# Patient Record
Sex: MALE | Race: WHITE | Employment: OTHER | URBAN - METROPOLITAN AREA
[De-identification: names, ages, dates, MRNs, and addresses within clinical notes are randomized per-mention and may not be internally consistent; named-entity substitution may affect disease eponyms.]

---

## 2018-10-07 ENCOUNTER — APPOINTMENT (OUTPATIENT)
Dept: GENERAL RADIOLOGY | Age: 73
DRG: 177 | End: 2018-10-07
Payer: MEDICARE

## 2018-10-07 ENCOUNTER — HOSPITAL ENCOUNTER (INPATIENT)
Age: 73
LOS: 11 days | Discharge: HOME | DRG: 177 | End: 2018-10-18
Attending: EMERGENCY MEDICINE
Payer: MEDICARE

## 2018-10-07 DIAGNOSIS — R79.89 ELEVATED BRAIN NATRIURETIC PEPTIDE (BNP) LEVEL: ICD-10-CM

## 2018-10-07 DIAGNOSIS — R77.8 ELEVATED TROPONIN: ICD-10-CM

## 2018-10-07 DIAGNOSIS — J18.9 COMMUNITY ACQUIRED PNEUMONIA, UNSPECIFIED LATERALITY: Primary | ICD-10-CM

## 2018-10-07 DIAGNOSIS — I48.91 ATRIAL FIBRILLATION WITH RVR (HCC): ICD-10-CM

## 2018-10-07 LAB
ANION GAP SERPL CALCULATED.3IONS-SCNC: 15 MMOL/L (ref 3–16)
ANISOCYTOSIS: ABNORMAL
BANDED NEUTROPHILS RELATIVE PERCENT: 4 % (ref 0–7)
BASOPHILS ABSOLUTE: 0 K/UL (ref 0–0.2)
BASOPHILS RELATIVE PERCENT: 0 %
BUN BLDV-MCNC: 15 MG/DL (ref 7–20)
CALCIUM SERPL-MCNC: 9.1 MG/DL (ref 8.3–10.6)
CHLORIDE BLD-SCNC: 98 MMOL/L (ref 99–110)
CO2: 24 MMOL/L (ref 21–32)
CREAT SERPL-MCNC: 1 MG/DL (ref 0.8–1.3)
EOSINOPHILS ABSOLUTE: 0.3 K/UL (ref 0–0.6)
EOSINOPHILS RELATIVE PERCENT: 2 %
GFR AFRICAN AMERICAN: >60
GFR NON-AFRICAN AMERICAN: >60
GLUCOSE BLD-MCNC: 119 MG/DL (ref 70–99)
HCT VFR BLD CALC: 47.2 % (ref 40.5–52.5)
HEMOGLOBIN: 15.8 G/DL (ref 13.5–17.5)
LACTIC ACID: 1.3 MMOL/L (ref 0.4–2)
LYMPHOCYTES ABSOLUTE: 1 K/UL (ref 1–5.1)
LYMPHOCYTES RELATIVE PERCENT: 6 %
MACROCYTES: ABNORMAL
MCH RBC QN AUTO: 32.5 PG (ref 26–34)
MCHC RBC AUTO-ENTMCNC: 33.6 G/DL (ref 31–36)
MCV RBC AUTO: 96.8 FL (ref 80–100)
MONOCYTES ABSOLUTE: 1.2 K/UL (ref 0–1.3)
MONOCYTES RELATIVE PERCENT: 7 %
NEUTROPHILS ABSOLUTE: 14 K/UL (ref 1.7–7.7)
NEUTROPHILS RELATIVE PERCENT: 81 %
PDW BLD-RTO: 14.7 % (ref 12.4–15.4)
PLATELET # BLD: 208 K/UL (ref 135–450)
PMV BLD AUTO: 8.2 FL (ref 5–10.5)
POTASSIUM SERPL-SCNC: 5 MMOL/L (ref 3.5–5.1)
PRO-BNP: 2856 PG/ML (ref 0–124)
PROCALCITONIN: 0.06 NG/ML (ref 0–0.15)
RBC # BLD: 4.87 M/UL (ref 4.2–5.9)
SLIDE REVIEW: ABNORMAL
SODIUM BLD-SCNC: 137 MMOL/L (ref 136–145)
TROPONIN: 0.03 NG/ML
WBC # BLD: 16.5 K/UL (ref 4–11)

## 2018-10-07 PROCEDURE — 94640 AIRWAY INHALATION TREATMENT: CPT

## 2018-10-07 PROCEDURE — 96374 THER/PROPH/DIAG INJ IV PUSH: CPT

## 2018-10-07 PROCEDURE — 84484 ASSAY OF TROPONIN QUANT: CPT

## 2018-10-07 PROCEDURE — 83880 ASSAY OF NATRIURETIC PEPTIDE: CPT

## 2018-10-07 PROCEDURE — 2580000003 HC RX 258: Performed by: EMERGENCY MEDICINE

## 2018-10-07 PROCEDURE — 84145 PROCALCITONIN (PCT): CPT

## 2018-10-07 PROCEDURE — 80048 BASIC METABOLIC PNL TOTAL CA: CPT

## 2018-10-07 PROCEDURE — 6370000000 HC RX 637 (ALT 250 FOR IP): Performed by: EMERGENCY MEDICINE

## 2018-10-07 PROCEDURE — 2580000003 HC RX 258: Performed by: INTERNAL MEDICINE

## 2018-10-07 PROCEDURE — 87040 BLOOD CULTURE FOR BACTERIA: CPT

## 2018-10-07 PROCEDURE — 6360000002 HC RX W HCPCS: Performed by: INTERNAL MEDICINE

## 2018-10-07 PROCEDURE — 94664 DEMO&/EVAL PT USE INHALER: CPT

## 2018-10-07 PROCEDURE — 94762 N-INVAS EAR/PLS OXIMTRY CONT: CPT

## 2018-10-07 PROCEDURE — 36415 COLL VENOUS BLD VENIPUNCTURE: CPT

## 2018-10-07 PROCEDURE — 93005 ELECTROCARDIOGRAM TRACING: CPT | Performed by: EMERGENCY MEDICINE

## 2018-10-07 PROCEDURE — 93010 ELECTROCARDIOGRAM REPORT: CPT | Performed by: INTERNAL MEDICINE

## 2018-10-07 PROCEDURE — 96375 TX/PRO/DX INJ NEW DRUG ADDON: CPT

## 2018-10-07 PROCEDURE — 83605 ASSAY OF LACTIC ACID: CPT

## 2018-10-07 PROCEDURE — 6360000002 HC RX W HCPCS: Performed by: EMERGENCY MEDICINE

## 2018-10-07 PROCEDURE — 2060000000 HC ICU INTERMEDIATE R&B

## 2018-10-07 PROCEDURE — 85025 COMPLETE CBC W/AUTO DIFF WBC: CPT

## 2018-10-07 PROCEDURE — 71045 X-RAY EXAM CHEST 1 VIEW: CPT

## 2018-10-07 PROCEDURE — 99285 EMERGENCY DEPT VISIT HI MDM: CPT

## 2018-10-07 PROCEDURE — 2500000003 HC RX 250 WO HCPCS: Performed by: EMERGENCY MEDICINE

## 2018-10-07 RX ORDER — LORAZEPAM 1 MG/1
1 TABLET ORAL NIGHTLY PRN
Status: DISCONTINUED | OUTPATIENT
Start: 2018-10-07 | End: 2018-10-08

## 2018-10-07 RX ORDER — SODIUM CHLORIDE 0.9 % (FLUSH) 0.9 %
10 SYRINGE (ML) INJECTION PRN
Status: DISCONTINUED | OUTPATIENT
Start: 2018-10-07 | End: 2018-10-18 | Stop reason: HOSPADM

## 2018-10-07 RX ORDER — METOPROLOL TARTRATE 5 MG/5ML
5 INJECTION INTRAVENOUS ONCE
Status: COMPLETED | OUTPATIENT
Start: 2018-10-07 | End: 2018-10-07

## 2018-10-07 RX ORDER — DILTIAZEM HYDROCHLORIDE 60 MG/1
60 TABLET, FILM COATED ORAL EVERY 6 HOURS SCHEDULED
Status: DISCONTINUED | OUTPATIENT
Start: 2018-10-07 | End: 2018-10-10

## 2018-10-07 RX ORDER — SODIUM CHLORIDE 0.9 % (FLUSH) 0.9 %
10 SYRINGE (ML) INJECTION EVERY 12 HOURS SCHEDULED
Status: DISCONTINUED | OUTPATIENT
Start: 2018-10-07 | End: 2018-10-18 | Stop reason: HOSPADM

## 2018-10-07 RX ORDER — IPRATROPIUM BROMIDE AND ALBUTEROL SULFATE 2.5; .5 MG/3ML; MG/3ML
1 SOLUTION RESPIRATORY (INHALATION) ONCE
Status: COMPLETED | OUTPATIENT
Start: 2018-10-07 | End: 2018-10-07

## 2018-10-07 RX ORDER — FUROSEMIDE 40 MG/1
40 TABLET ORAL DAILY
Status: DISCONTINUED | OUTPATIENT
Start: 2018-10-08 | End: 2018-10-08

## 2018-10-07 RX ORDER — FUROSEMIDE 10 MG/ML
20 INJECTION INTRAMUSCULAR; INTRAVENOUS ONCE
Status: COMPLETED | OUTPATIENT
Start: 2018-10-07 | End: 2018-10-07

## 2018-10-07 RX ORDER — ASPIRIN 81 MG/1
324 TABLET, CHEWABLE ORAL ONCE
Status: COMPLETED | OUTPATIENT
Start: 2018-10-07 | End: 2018-10-07

## 2018-10-07 RX ORDER — ONDANSETRON 2 MG/ML
4 INJECTION INTRAMUSCULAR; INTRAVENOUS EVERY 6 HOURS PRN
Status: DISCONTINUED | OUTPATIENT
Start: 2018-10-07 | End: 2018-10-18 | Stop reason: HOSPADM

## 2018-10-07 RX ADMIN — FUROSEMIDE 20 MG: 10 INJECTION, SOLUTION INTRAMUSCULAR; INTRAVENOUS at 16:26

## 2018-10-07 RX ADMIN — IPRATROPIUM BROMIDE AND ALBUTEROL SULFATE 1 AMPULE: .5; 3 SOLUTION RESPIRATORY (INHALATION) at 16:10

## 2018-10-07 RX ADMIN — METOPROLOL TARTRATE 5 MG: 5 INJECTION, SOLUTION INTRAVENOUS at 16:30

## 2018-10-07 RX ADMIN — AZITHROMYCIN MONOHYDRATE 500 MG: 500 INJECTION, POWDER, LYOPHILIZED, FOR SOLUTION INTRAVENOUS at 19:15

## 2018-10-07 RX ADMIN — Medication 10 ML: at 19:15

## 2018-10-07 RX ADMIN — ASPIRIN 81 MG CHEWABLE TABLET 324 MG: 81 TABLET CHEWABLE at 17:13

## 2018-10-07 RX ADMIN — CEFTRIAXONE 2 G: 2 INJECTION, POWDER, FOR SOLUTION INTRAMUSCULAR; INTRAVENOUS at 18:13

## 2018-10-07 NOTE — ED TRIAGE NOTES
Pt presents to ED with c/o of SOB. Recent dx of pneumonia and finished abx. Increased in SOB started x2 days ago. Daughter at bedside. Pt in tripod position. Pt in afib on arrival. +producive cough. A/ox4. Denies any need at this time. Call light within reach. Bed in lowest position. Will continue to monitor.

## 2018-10-08 ENCOUNTER — APPOINTMENT (OUTPATIENT)
Dept: CT IMAGING | Age: 73
DRG: 177 | End: 2018-10-08
Payer: MEDICARE

## 2018-10-08 LAB
A/G RATIO: 0.7 (ref 1.1–2.2)
ALBUMIN SERPL-MCNC: 2.9 G/DL (ref 3.4–5)
ALP BLD-CCNC: 74 U/L (ref 40–129)
ALT SERPL-CCNC: 30 U/L (ref 10–40)
ANION GAP SERPL CALCULATED.3IONS-SCNC: 13 MMOL/L (ref 3–16)
AST SERPL-CCNC: 35 U/L (ref 15–37)
BASOPHILS ABSOLUTE: 0.1 K/UL (ref 0–0.2)
BASOPHILS RELATIVE PERCENT: 0.4 %
BILIRUB SERPL-MCNC: 1.4 MG/DL (ref 0–1)
BUN BLDV-MCNC: 18 MG/DL (ref 7–20)
CALCIUM SERPL-MCNC: 9 MG/DL (ref 8.3–10.6)
CHLORIDE BLD-SCNC: 94 MMOL/L (ref 99–110)
CO2: 26 MMOL/L (ref 21–32)
CREAT SERPL-MCNC: 1.2 MG/DL (ref 0.8–1.3)
EOSINOPHILS ABSOLUTE: 0.2 K/UL (ref 0–0.6)
EOSINOPHILS RELATIVE PERCENT: 1.6 %
GFR AFRICAN AMERICAN: >60
GFR NON-AFRICAN AMERICAN: 59
GLOBULIN: 4.3 G/DL
GLUCOSE BLD-MCNC: 99 MG/DL (ref 70–99)
LYMPHOCYTES ABSOLUTE: 1.2 K/UL (ref 1–5.1)
LYMPHOCYTES RELATIVE PERCENT: 7.6 %
MONOCYTES ABSOLUTE: 1.9 K/UL (ref 0–1.3)
MONOCYTES RELATIVE PERCENT: 12.5 %
NEUTROPHILS ABSOLUTE: 11.8 K/UL (ref 1.7–7.7)
NEUTROPHILS RELATIVE PERCENT: 77.9 %
POTASSIUM REFLEX MAGNESIUM: 4.4 MMOL/L (ref 3.5–5.1)
PRO-BNP: 2486 PG/ML (ref 0–124)
SODIUM BLD-SCNC: 133 MMOL/L (ref 136–145)
TOTAL PROTEIN: 7.2 G/DL (ref 6.4–8.2)
TROPONIN: 0.04 NG/ML

## 2018-10-08 PROCEDURE — 2580000003 HC RX 258: Performed by: INTERNAL MEDICINE

## 2018-10-08 PROCEDURE — 94664 DEMO&/EVAL PT USE INHALER: CPT

## 2018-10-08 PROCEDURE — 6360000002 HC RX W HCPCS: Performed by: INTERNAL MEDICINE

## 2018-10-08 PROCEDURE — 85027 COMPLETE CBC AUTOMATED: CPT

## 2018-10-08 PROCEDURE — 80053 COMPREHEN METABOLIC PANEL: CPT

## 2018-10-08 PROCEDURE — 87070 CULTURE OTHR SPECIMN AEROBIC: CPT

## 2018-10-08 PROCEDURE — 87276 INFLUENZA A AG IF: CPT

## 2018-10-08 PROCEDURE — 87260 ADENOVIRUS AG IF: CPT

## 2018-10-08 PROCEDURE — 71260 CT THORAX DX C+: CPT

## 2018-10-08 PROCEDURE — 87275 INFLUENZA B AG IF: CPT

## 2018-10-08 PROCEDURE — 6360000004 HC RX CONTRAST MEDICATION: Performed by: HOSPITALIST

## 2018-10-08 PROCEDURE — 2580000003 HC RX 258: Performed by: HOSPITALIST

## 2018-10-08 PROCEDURE — 87205 SMEAR GRAM STAIN: CPT

## 2018-10-08 PROCEDURE — 36415 COLL VENOUS BLD VENIPUNCTURE: CPT

## 2018-10-08 PROCEDURE — 6360000002 HC RX W HCPCS: Performed by: HOSPITALIST

## 2018-10-08 PROCEDURE — 6370000000 HC RX 637 (ALT 250 FOR IP): Performed by: INTERNAL MEDICINE

## 2018-10-08 PROCEDURE — 6370000000 HC RX 637 (ALT 250 FOR IP): Performed by: HOSPITALIST

## 2018-10-08 PROCEDURE — 6360000002 HC RX W HCPCS: Performed by: NURSE PRACTITIONER

## 2018-10-08 PROCEDURE — 85007 BL SMEAR W/DIFF WBC COUNT: CPT

## 2018-10-08 PROCEDURE — 94640 AIRWAY INHALATION TREATMENT: CPT

## 2018-10-08 PROCEDURE — 87280 RESPIRATORY SYNCYTIAL AG IF: CPT

## 2018-10-08 PROCEDURE — 2060000000 HC ICU INTERMEDIATE R&B

## 2018-10-08 PROCEDURE — 84484 ASSAY OF TROPONIN QUANT: CPT

## 2018-10-08 PROCEDURE — 83880 ASSAY OF NATRIURETIC PEPTIDE: CPT

## 2018-10-08 PROCEDURE — 87449 NOS EACH ORGANISM AG IA: CPT

## 2018-10-08 PROCEDURE — 94760 N-INVAS EAR/PLS OXIMETRY 1: CPT

## 2018-10-08 PROCEDURE — 87641 MR-STAPH DNA AMP PROBE: CPT

## 2018-10-08 PROCEDURE — 87299 ANTIBODY DETECTION NOS IF: CPT

## 2018-10-08 PROCEDURE — 87279 PARAINFLUENZA AG IF: CPT

## 2018-10-08 RX ORDER — ALBUTEROL SULFATE 2.5 MG/3ML
2.5 SOLUTION RESPIRATORY (INHALATION) EVERY 4 HOURS PRN
Status: DISCONTINUED | OUTPATIENT
Start: 2018-10-08 | End: 2018-10-18 | Stop reason: HOSPADM

## 2018-10-08 RX ORDER — ALBUTEROL SULFATE 2.5 MG/3ML
0.5 SOLUTION RESPIRATORY (INHALATION) CONTINUOUS
Status: DISCONTINUED | OUTPATIENT
Start: 2018-10-08 | End: 2018-10-08

## 2018-10-08 RX ORDER — FUROSEMIDE 10 MG/ML
40 INJECTION INTRAMUSCULAR; INTRAVENOUS 2 TIMES DAILY
Status: DISCONTINUED | OUTPATIENT
Start: 2018-10-08 | End: 2018-10-10

## 2018-10-08 RX ORDER — DILTIAZEM HYDROCHLORIDE 240 MG/1
240 CAPSULE, EXTENDED RELEASE ORAL DAILY
Status: ON HOLD | COMMUNITY
End: 2018-10-18 | Stop reason: HOSPADM

## 2018-10-08 RX ORDER — KETOROLAC TROMETHAMINE 15 MG/ML
15 INJECTION, SOLUTION INTRAMUSCULAR; INTRAVENOUS ONCE
Status: COMPLETED | OUTPATIENT
Start: 2018-10-08 | End: 2018-10-08

## 2018-10-08 RX ORDER — LORAZEPAM 1 MG/1
1 TABLET ORAL NIGHTLY PRN
Status: DISCONTINUED | OUTPATIENT
Start: 2018-10-08 | End: 2018-10-18 | Stop reason: HOSPADM

## 2018-10-08 RX ADMIN — DILTIAZEM HYDROCHLORIDE 60 MG: 60 TABLET, FILM COATED ORAL at 12:32

## 2018-10-08 RX ADMIN — AZITHROMYCIN MONOHYDRATE 500 MG: 500 INJECTION, POWDER, LYOPHILIZED, FOR SOLUTION INTRAVENOUS at 18:37

## 2018-10-08 RX ADMIN — Medication 10 ML: at 23:48

## 2018-10-08 RX ADMIN — LORAZEPAM 1 MG: 1 TABLET ORAL at 00:00

## 2018-10-08 RX ADMIN — Medication 10 ML: at 10:27

## 2018-10-08 RX ADMIN — ENOXAPARIN SODIUM 40 MG: 40 INJECTION SUBCUTANEOUS at 10:24

## 2018-10-08 RX ADMIN — KETOROLAC TROMETHAMINE 15 MG: 15 INJECTION, SOLUTION INTRAMUSCULAR; INTRAVENOUS at 00:43

## 2018-10-08 RX ADMIN — ENOXAPARIN SODIUM 40 MG: 40 INJECTION SUBCUTANEOUS at 23:46

## 2018-10-08 RX ADMIN — DILTIAZEM HYDROCHLORIDE 60 MG: 60 TABLET, FILM COATED ORAL at 23:46

## 2018-10-08 RX ADMIN — CEFEPIME HYDROCHLORIDE 2 G: 2 INJECTION, POWDER, FOR SOLUTION INTRAVENOUS at 16:27

## 2018-10-08 RX ADMIN — FUROSEMIDE 40 MG: 40 TABLET ORAL at 10:24

## 2018-10-08 RX ADMIN — ALBUTEROL SULFATE 2.5 MG: 2.5 SOLUTION RESPIRATORY (INHALATION) at 16:56

## 2018-10-08 RX ADMIN — LORAZEPAM 1 MG: 1 TABLET ORAL at 23:46

## 2018-10-08 RX ADMIN — DILTIAZEM HYDROCHLORIDE 60 MG: 60 TABLET, FILM COATED ORAL at 18:37

## 2018-10-08 RX ADMIN — DILTIAZEM HYDROCHLORIDE 60 MG: 60 TABLET, FILM COATED ORAL at 06:42

## 2018-10-08 RX ADMIN — ALBUTEROL SULFATE 2.5 MG: 2.5 SOLUTION RESPIRATORY (INHALATION) at 23:53

## 2018-10-08 RX ADMIN — FUROSEMIDE 40 MG: 10 INJECTION, SOLUTION INTRAMUSCULAR; INTRAVENOUS at 18:37

## 2018-10-08 RX ADMIN — IOPAMIDOL 75 ML: 755 INJECTION, SOLUTION INTRAVENOUS at 09:34

## 2018-10-08 ASSESSMENT — PAIN SCALES - GENERAL
PAINLEVEL_OUTOF10: 0
PAINLEVEL_OUTOF10: 8
PAINLEVEL_OUTOF10: 0

## 2018-10-08 NOTE — PROGRESS NOTES
4 Eyes Skin Assessment     The patient is being assess for  Admission  I agree that 2 RN's have performed a thorough Head to Toe Skin Assessment on the patient. ALL assessment sites listed below have been assessed. Areas assessed by both nurses:  [x]   Head, Face, and Ears   [x]   Shoulders, Back, and Chest  [x]   Arms, Elbows, and Hands   [x]   Coccyx, Sacrum, and IschIum  []   Legs, Feet, and Heels        Does the Patient have Skin Breakdown?  No      Tera Prevention initiated:  No   Wound Care Orders initiated: No      [facility] nurse consulted for Pressure Injury (Stage 3,4, Unstageable, DTI, NWPT, and Complex wounds), New and Established Ostomies:  No     Nurse 1 eSignature: Electronically signed by Arik Be RN on 10/8/18 at 1:41 AM    **SHARE this note so that the co-signing nurse is able to place an eSignature**    Nurse 2 eSignature: {Esignature:[id number]}

## 2018-10-08 NOTE — PROGRESS NOTES
Aspiration Screen    Name: Breanna Nolan  : 1945  Medical Diagnosis: Pneumonia [J18.9]    Swallow screen was completed per MD orders for pneumonia protocol. Patient demonstrates no significant high risk indicators for potential aspiration per swallow screen at this time. No further swallowing intervention is warranted at this time. Continue current diet as tolerated. Please consult speech therapy for bedside swallow evaluation if symptoms of swallowing dysfunction arise.       Ifeanyi Perez M.S, CCC/SLP  #[id number]  Speech Language Pathologist

## 2018-10-08 NOTE — PLAN OF CARE
Problem: FLUID AND ELECTROLYTE IMBALANCE  Goal: Fluid and electrolyte balance are achieved/maintained  Instruct Patient to drink prescribed fluid amount. Report abnormal lab values to Physician. Administer medications/treatments as ordered. Assess/monitor skin turgor,mucus membranes,and respiratory status. Include Patientfamily in decisions. Problem: ACTIVITY INTOLERANCE/IMPAIRED MOBILITY  Goal: Mobility/activity is maintained at optimum level for patient  Outcome: Ongoing  Assess for barriers to mobility/activity. Assess Patient's emotional response to limitations. Assess need for assistive/adaptive devices. Encourage independent activity per ability. Maintain proper body alignment.

## 2018-10-09 LAB
ANION GAP SERPL CALCULATED.3IONS-SCNC: 13 MMOL/L (ref 3–16)
BASOPHILS ABSOLUTE: 0.1 K/UL (ref 0–0.2)
BASOPHILS RELATIVE PERCENT: 1 %
BILIRUBIN URINE: NEGATIVE
BLOOD, URINE: ABNORMAL
BUN BLDV-MCNC: 21 MG/DL (ref 7–20)
CALCIUM SERPL-MCNC: 8.9 MG/DL (ref 8.3–10.6)
CHLORIDE BLD-SCNC: 95 MMOL/L (ref 99–110)
CLARITY: ABNORMAL
CO2: 26 MMOL/L (ref 21–32)
COLOR: ABNORMAL
CREAT SERPL-MCNC: 1.1 MG/DL (ref 0.8–1.3)
EOSINOPHILS ABSOLUTE: 0.4 K/UL (ref 0–0.6)
EOSINOPHILS RELATIVE PERCENT: 4.6 %
EPITHELIAL CELLS, UA: 0 /HPF (ref 0–5)
GFR AFRICAN AMERICAN: >60
GFR NON-AFRICAN AMERICAN: >60
GLUCOSE BLD-MCNC: 102 MG/DL (ref 70–99)
GLUCOSE URINE: NEGATIVE MG/DL
HCT VFR BLD CALC: 45.8 % (ref 40.5–52.5)
HCT VFR BLD CALC: 46.5 % (ref 40.5–52.5)
HEMATOLOGY PATH CONSULT: NORMAL
HEMATOLOGY PATH CONSULT: YES
HEMOGLOBIN: 15.2 G/DL (ref 13.5–17.5)
HEMOGLOBIN: 15.2 G/DL (ref 13.5–17.5)
HYALINE CASTS: 1 /LPF (ref 0–8)
KETONES, URINE: NEGATIVE MG/DL
L. PNEUMOPHILA SEROGP 1 UR AG: NORMAL
LEUKOCYTE ESTERASE, URINE: ABNORMAL
LV EF: 40 %
LVEF MODALITY: NORMAL
LYMPHOCYTES ABSOLUTE: 1.3 K/UL (ref 1–5.1)
LYMPHOCYTES RELATIVE PERCENT: 13.4 %
MCH RBC QN AUTO: 31.9 PG (ref 26–34)
MCH RBC QN AUTO: 32.2 PG (ref 26–34)
MCHC RBC AUTO-ENTMCNC: 32.7 G/DL (ref 31–36)
MCHC RBC AUTO-ENTMCNC: 33.3 G/DL (ref 31–36)
MCV RBC AUTO: 96.6 FL (ref 80–100)
MCV RBC AUTO: 97.4 FL (ref 80–100)
MICROSCOPIC EXAMINATION: YES
MONOCYTES ABSOLUTE: 1.4 K/UL (ref 0–1.3)
MONOCYTES RELATIVE PERCENT: 14.9 %
MRSA SCREEN RT-PCR: NORMAL
NEUTROPHILS ABSOLUTE: 6.2 K/UL (ref 1.7–7.7)
NEUTROPHILS RELATIVE PERCENT: 66.1 %
NITRITE, URINE: NEGATIVE
PDW BLD-RTO: 14.8 % (ref 12.4–15.4)
PDW BLD-RTO: 14.9 % (ref 12.4–15.4)
PH UA: 5.5
PLATELET # BLD: 192 K/UL (ref 135–450)
PLATELET # BLD: 209 K/UL (ref 135–450)
PMV BLD AUTO: 8.6 FL (ref 5–10.5)
PMV BLD AUTO: 8.9 FL (ref 5–10.5)
POTASSIUM REFLEX MAGNESIUM: 4 MMOL/L (ref 3.5–5.1)
PRO-BNP: 1933 PG/ML (ref 0–124)
PROCALCITONIN: 0.09 NG/ML (ref 0–0.15)
PROTEIN UA: 30 MG/DL
RBC # BLD: 4.74 M/UL (ref 4.2–5.9)
RBC # BLD: 4.78 M/UL (ref 4.2–5.9)
RBC UA: >900 /HPF (ref 0–4)
SODIUM BLD-SCNC: 134 MMOL/L (ref 136–145)
SPECIFIC GRAVITY UA: 1.01
URINE TYPE: ABNORMAL
UROBILINOGEN, URINE: 1 E.U./DL
WBC # BLD: 15.2 K/UL (ref 4–11)
WBC # BLD: 9.4 K/UL (ref 4–11)
WBC UA: 5 /HPF (ref 0–5)

## 2018-10-09 PROCEDURE — 2580000003 HC RX 258: Performed by: HOSPITALIST

## 2018-10-09 PROCEDURE — 51798 US URINE CAPACITY MEASURE: CPT

## 2018-10-09 PROCEDURE — 85025 COMPLETE CBC W/AUTO DIFF WBC: CPT

## 2018-10-09 PROCEDURE — 6370000000 HC RX 637 (ALT 250 FOR IP): Performed by: INTERNAL MEDICINE

## 2018-10-09 PROCEDURE — 36415 COLL VENOUS BLD VENIPUNCTURE: CPT

## 2018-10-09 PROCEDURE — 6360000002 HC RX W HCPCS: Performed by: HOSPITALIST

## 2018-10-09 PROCEDURE — 83880 ASSAY OF NATRIURETIC PEPTIDE: CPT

## 2018-10-09 PROCEDURE — 6370000000 HC RX 637 (ALT 250 FOR IP): Performed by: HOSPITALIST

## 2018-10-09 PROCEDURE — 2580000003 HC RX 258: Performed by: INTERNAL MEDICINE

## 2018-10-09 PROCEDURE — 87449 NOS EACH ORGANISM AG IA: CPT

## 2018-10-09 PROCEDURE — 94640 AIRWAY INHALATION TREATMENT: CPT

## 2018-10-09 PROCEDURE — 93306 TTE W/DOPPLER COMPLETE: CPT

## 2018-10-09 PROCEDURE — 84145 PROCALCITONIN (PCT): CPT

## 2018-10-09 PROCEDURE — 87086 URINE CULTURE/COLONY COUNT: CPT

## 2018-10-09 PROCEDURE — 2060000000 HC ICU INTERMEDIATE R&B

## 2018-10-09 PROCEDURE — 81001 URINALYSIS AUTO W/SCOPE: CPT

## 2018-10-09 PROCEDURE — 94760 N-INVAS EAR/PLS OXIMETRY 1: CPT

## 2018-10-09 PROCEDURE — 80048 BASIC METABOLIC PNL TOTAL CA: CPT

## 2018-10-09 RX ADMIN — LORAZEPAM 1 MG: 1 TABLET ORAL at 23:59

## 2018-10-09 RX ADMIN — ALBUTEROL SULFATE 2.5 MG: 2.5 SOLUTION RESPIRATORY (INHALATION) at 06:15

## 2018-10-09 RX ADMIN — DILTIAZEM HYDROCHLORIDE 60 MG: 60 TABLET, FILM COATED ORAL at 17:25

## 2018-10-09 RX ADMIN — Medication 10 ML: at 09:47

## 2018-10-09 RX ADMIN — FUROSEMIDE 40 MG: 10 INJECTION, SOLUTION INTRAMUSCULAR; INTRAVENOUS at 17:25

## 2018-10-09 RX ADMIN — FUROSEMIDE 40 MG: 10 INJECTION, SOLUTION INTRAMUSCULAR; INTRAVENOUS at 09:47

## 2018-10-09 RX ADMIN — CEFEPIME HYDROCHLORIDE 2 G: 2 INJECTION, POWDER, FOR SOLUTION INTRAVENOUS at 17:25

## 2018-10-09 RX ADMIN — DILTIAZEM HYDROCHLORIDE 60 MG: 60 TABLET, FILM COATED ORAL at 23:59

## 2018-10-09 RX ADMIN — ENOXAPARIN SODIUM 40 MG: 40 INJECTION SUBCUTANEOUS at 11:26

## 2018-10-09 RX ADMIN — CEFEPIME HYDROCHLORIDE 2 G: 2 INJECTION, POWDER, FOR SOLUTION INTRAVENOUS at 05:45

## 2018-10-09 RX ADMIN — DILTIAZEM HYDROCHLORIDE 60 MG: 60 TABLET, FILM COATED ORAL at 05:46

## 2018-10-09 RX ADMIN — AZITHROMYCIN MONOHYDRATE 500 MG: 500 INJECTION, POWDER, LYOPHILIZED, FOR SOLUTION INTRAVENOUS at 18:17

## 2018-10-09 RX ADMIN — Medication 10 ML: at 20:46

## 2018-10-09 RX ADMIN — DILTIAZEM HYDROCHLORIDE 60 MG: 60 TABLET, FILM COATED ORAL at 11:26

## 2018-10-09 RX ADMIN — ALBUTEROL SULFATE 2.5 MG: 2.5 SOLUTION RESPIRATORY (INHALATION) at 22:05

## 2018-10-09 ASSESSMENT — PAIN SCALES - WONG BAKER
WONGBAKER_NUMERICALRESPONSE: 2
WONGBAKER_NUMERICALRESPONSE: 2

## 2018-10-09 ASSESSMENT — PAIN SCALES - GENERAL
PAINLEVEL_OUTOF10: 0

## 2018-10-09 ASSESSMENT — PAIN DESCRIPTION - ORIENTATION
ORIENTATION: OUTER
ORIENTATION: OTHER (COMMENT)

## 2018-10-09 ASSESSMENT — PAIN DESCRIPTION - LOCATION
LOCATION: PENIS
LOCATION: PENIS

## 2018-10-09 ASSESSMENT — PAIN DESCRIPTION - PAIN TYPE
TYPE: ACUTE PAIN
TYPE: ACUTE PAIN

## 2018-10-09 NOTE — PROGRESS NOTES
10/07/18   1625  10/08/18   0826  10/09/18   0552   WBC  16.5*  15.2*  9.4   HGB  15.8  15.2  15.2   HCT  47.2  46.5  45.8   MCV  96.8  97.4  96.6   PLT  208  209  192     BMP:   Recent Labs      10/07/18   1625  10/08/18   0826  10/09/18   0552   NA  137  133*  134*   K  5.0  4.4  4.0   CL  98*  94*  95*   CO2  24  26  26   BUN  15  18  21*   CREATININE  1.0  1.2  1.1     Mag: No results for input(s): MAG in the last 72 hours. Phos: No results found for: PHOS  No components found for: GLU    LIVER PROFILE:   Recent Labs      10/08/18   0826   AST  35   ALT  30   BILITOT  1.4*   ALKPHOS  74     PT/INR: No results for input(s): PROTIME, INR in the last 72 hours. APTT: No results for input(s): APTT in the last 72 hours. UA:No results for input(s): NITRITE, COLORU, PHUR, LABCAST, WBCUA, RBCUA, MUCUS, TRICHOMONAS, YEAST, BACTERIA, CLARITYU, SPECGRAV, LEUKOCYTESUR, UROBILINOGEN, BILIRUBINUR, BLOODU, GLUCOSEU, AMORPHOUS in the last 72 hours. Invalid input(s): Minal Godfrey input(s): ABG  Lab Results   Component Value Date    CALCIUM 8.9 10/09/2018       Assessment and Plan:    Pneumonia  Continue azithromycin. Continue with  cefepime to treat gram-negative bacteria pneumonia   Follow blood and sputum culture   MRSA nasal probe was negative   Check viral respiratory panel  Legionella antigen is negative   CAT scan show multi focal pneumonia  Patient will need imaging follow-up in 4-6 weeks    Possible CHF:  Patient has some orthopnea, lower extremity edema, and weight gain since his primary care visit. We'll continue IV Lasix.   Tian Camille  down 5 pounds since admission  Strict input and output monitoring  Echocardiogram is still pending     Chronic atrial fibrillation  Continue with Cardizem  Patient is not on anticoagulation as an outpatient    Morbid obesity complicated his medical management  Tian Camille  loss counseling was provided    Code status:  Full   DVT prophylaxis: Lovenox   Disposition: Pending clinical

## 2018-10-09 NOTE — PLAN OF CARE
Problem: Falls - Risk of:  Goal: Will remain free from falls  Will remain free from falls   Outcome: Ongoing    Goal: Absence of physical injury  Absence of physical injury   Outcome: Ongoing      Problem: Anxiety:  Goal: Level of anxiety will decrease  Level of anxiety will decrease   Outcome: Ongoing      Problem: Discharge Planning:  Goal: Discharged to appropriate level of care  Discharged to appropriate level of care   Outcome: Ongoing    Goal: Participates in care planning  Participates in care planning   Outcome: Ongoing      Problem: Gas Exchange - Impaired:  Goal: Levels of oxygenation will improve  Levels of oxygenation will improve   Outcome: Ongoing      Problem:  Activity:  Goal: Expression of feelings of increased energy will increase  Expression of feelings of increased energy will increase   Outcome: Ongoing      Problem: Cardiac:  Goal: Ability to maintain an adequate cardiac output will improve  Ability to maintain an adequate cardiac output will improve   Outcome: Ongoing    Goal: Complications related to the disease process, condition or treatment will be avoided or minimized  Complications related to the disease process, condition or treatment will be avoided or minimized   Outcome: Ongoing      Problem: Coping:  Goal: General experience of comfort will improve  General experience of comfort will improve   Outcome: Ongoing    Goal: Level of anxiety will decrease  Level of anxiety will decrease   Outcome: Ongoing      Problem: Health Behavior:  Goal: Ability to manage health-related needs will improve  Ability to manage health-related needs will improve   Outcome: Ongoing      Problem: Safety:  Goal: Ability to remain free from injury will improve  Ability to remain free from injury will improve   Outcome: Ongoing    Goal: Will show no signs and symptoms of excessive bleeding  Will show no signs and symptoms of excessive bleeding   Outcome: Ongoing      Problem: HEMODYNAMIC STATUS  Goal: Patient has stable vital signs and fluid balance  Outcome: Ongoing      Problem: FLUID AND ELECTROLYTE IMBALANCE  Goal: Fluid and electrolyte balance are achieved/maintained  Outcome: Ongoing      Problem: ACTIVITY INTOLERANCE/IMPAIRED MOBILITY  Goal: Mobility/activity is maintained at optimum level for patient  Outcome: Ongoing

## 2018-10-10 LAB
ADENOVIRUS, DFA: NEGATIVE
ANION GAP SERPL CALCULATED.3IONS-SCNC: 13 MMOL/L (ref 3–16)
BASOPHILS ABSOLUTE: 0.1 K/UL (ref 0–0.2)
BASOPHILS RELATIVE PERCENT: 0.9 %
BUN BLDV-MCNC: 20 MG/DL (ref 7–20)
CALCIUM SERPL-MCNC: 9 MG/DL (ref 8.3–10.6)
CHLORIDE BLD-SCNC: 97 MMOL/L (ref 99–110)
CO2: 26 MMOL/L (ref 21–32)
CREAT SERPL-MCNC: 1 MG/DL (ref 0.8–1.3)
CULTURE, RESPIRATORY: NORMAL
EKG ATRIAL RATE: 105 BPM
EKG DIAGNOSIS: NORMAL
EKG Q-T INTERVAL: 334 MS
EKG QRS DURATION: 116 MS
EKG QTC CALCULATION (BAZETT): 445 MS
EKG R AXIS: 63 DEGREES
EKG T AXIS: -12 DEGREES
EKG VENTRICULAR RATE: 107 BPM
EOSINOPHILS ABSOLUTE: 0.5 K/UL (ref 0–0.6)
EOSINOPHILS RELATIVE PERCENT: 6.8 %
GFR AFRICAN AMERICAN: >60
GFR NON-AFRICAN AMERICAN: >60
GLUCOSE BLD-MCNC: 97 MG/DL (ref 70–99)
GRAM STAIN RESULT: NORMAL
HCT VFR BLD CALC: 44.7 % (ref 40.5–52.5)
HEMOGLOBIN: 14.9 G/DL (ref 13.5–17.5)
INFLUENZA A,DFA: NEGATIVE
INFLUENZA B,DFA: NEGATIVE
LYMPHOCYTES ABSOLUTE: 1.4 K/UL (ref 1–5.1)
LYMPHOCYTES RELATIVE PERCENT: 18.9 %
MCH RBC QN AUTO: 32 PG (ref 26–34)
MCHC RBC AUTO-ENTMCNC: 33.3 G/DL (ref 31–36)
MCV RBC AUTO: 96.1 FL (ref 80–100)
METAPNEUMOVIRUS, DFA: NEGATIVE
MONOCYTES ABSOLUTE: 1.2 K/UL (ref 0–1.3)
MONOCYTES RELATIVE PERCENT: 16.9 %
NEUTROPHILS ABSOLUTE: 4.2 K/UL (ref 1.7–7.7)
NEUTROPHILS RELATIVE PERCENT: 56.5 %
PARAINFLUENZA 1 DFA STAIN: NEGATIVE
PARAINFLUENZA 2 DFA STAIN: NEGATIVE
PARAINFLUENZA 3: NEGATIVE
PDW BLD-RTO: 14.7 % (ref 12.4–15.4)
PLATELET # BLD: 218 K/UL (ref 135–450)
PMV BLD AUTO: 8.7 FL (ref 5–10.5)
POTASSIUM REFLEX MAGNESIUM: 3.8 MMOL/L (ref 3.5–5.1)
PRO-BNP: 1960 PG/ML (ref 0–124)
RBC # BLD: 4.65 M/UL (ref 4.2–5.9)
RESPIRATORY SYNCYTIAL VIRUS  (RSV) DFA: NEGATIVE
RSPFA SOURCE: NORMAL
SODIUM BLD-SCNC: 136 MMOL/L (ref 136–145)
TSH REFLEX FT4: 2.37 UIU/ML (ref 0.27–4.2)
WBC # BLD: 7.4 K/UL (ref 4–11)

## 2018-10-10 PROCEDURE — 6360000002 HC RX W HCPCS: Performed by: HOSPITALIST

## 2018-10-10 PROCEDURE — 80048 BASIC METABOLIC PNL TOTAL CA: CPT

## 2018-10-10 PROCEDURE — 2580000003 HC RX 258: Performed by: INTERNAL MEDICINE

## 2018-10-10 PROCEDURE — 99223 1ST HOSP IP/OBS HIGH 75: CPT | Performed by: INTERNAL MEDICINE

## 2018-10-10 PROCEDURE — 6370000000 HC RX 637 (ALT 250 FOR IP): Performed by: INTERNAL MEDICINE

## 2018-10-10 PROCEDURE — 36415 COLL VENOUS BLD VENIPUNCTURE: CPT

## 2018-10-10 PROCEDURE — 94760 N-INVAS EAR/PLS OXIMETRY 1: CPT

## 2018-10-10 PROCEDURE — 84443 ASSAY THYROID STIM HORMONE: CPT

## 2018-10-10 PROCEDURE — 6370000000 HC RX 637 (ALT 250 FOR IP): Performed by: HOSPITALIST

## 2018-10-10 PROCEDURE — 6360000002 HC RX W HCPCS: Performed by: INTERNAL MEDICINE

## 2018-10-10 PROCEDURE — 83880 ASSAY OF NATRIURETIC PEPTIDE: CPT

## 2018-10-10 PROCEDURE — 2580000003 HC RX 258: Performed by: HOSPITALIST

## 2018-10-10 PROCEDURE — 2060000000 HC ICU INTERMEDIATE R&B

## 2018-10-10 PROCEDURE — 85025 COMPLETE CBC W/AUTO DIFF WBC: CPT

## 2018-10-10 RX ORDER — LISINOPRIL 10 MG/1
10 TABLET ORAL DAILY
Status: DISCONTINUED | OUTPATIENT
Start: 2018-10-11 | End: 2018-10-13

## 2018-10-10 RX ORDER — METOPROLOL SUCCINATE 50 MG/1
50 TABLET, EXTENDED RELEASE ORAL DAILY
Status: DISCONTINUED | OUTPATIENT
Start: 2018-10-10 | End: 2018-10-18 | Stop reason: HOSPADM

## 2018-10-10 RX ORDER — FUROSEMIDE 10 MG/ML
60 INJECTION INTRAMUSCULAR; INTRAVENOUS 2 TIMES DAILY
Status: DISCONTINUED | OUTPATIENT
Start: 2018-10-10 | End: 2018-10-10

## 2018-10-10 RX ORDER — LISINOPRIL 5 MG/1
2.5 TABLET ORAL DAILY
Status: DISCONTINUED | OUTPATIENT
Start: 2018-10-10 | End: 2018-10-10

## 2018-10-10 RX ORDER — SPIRONOLACTONE 25 MG/1
25 TABLET ORAL DAILY
Status: DISCONTINUED | OUTPATIENT
Start: 2018-10-10 | End: 2018-10-12

## 2018-10-10 RX ORDER — AZITHROMYCIN 500 MG/1
500 TABLET, FILM COATED ORAL EVERY EVENING
Status: DISCONTINUED | OUTPATIENT
Start: 2018-10-10 | End: 2018-10-12

## 2018-10-10 RX ADMIN — CEFEPIME HYDROCHLORIDE 2 G: 2 INJECTION, POWDER, FOR SOLUTION INTRAVENOUS at 06:07

## 2018-10-10 RX ADMIN — DILTIAZEM HYDROCHLORIDE 60 MG: 60 TABLET, FILM COATED ORAL at 06:08

## 2018-10-10 RX ADMIN — CEFEPIME HYDROCHLORIDE 2 G: 2 INJECTION, POWDER, FOR SOLUTION INTRAVENOUS at 18:17

## 2018-10-10 RX ADMIN — FUROSEMIDE 10 MG/HR: 10 INJECTION, SOLUTION INTRAMUSCULAR; INTRAVENOUS at 18:50

## 2018-10-10 RX ADMIN — Medication 10 ML: at 08:58

## 2018-10-10 RX ADMIN — FUROSEMIDE 40 MG: 10 INJECTION, SOLUTION INTRAMUSCULAR; INTRAVENOUS at 08:58

## 2018-10-10 RX ADMIN — METOPROLOL SUCCINATE 50 MG: 50 TABLET, EXTENDED RELEASE ORAL at 18:25

## 2018-10-10 RX ADMIN — DILTIAZEM HYDROCHLORIDE 60 MG: 60 TABLET, FILM COATED ORAL at 12:25

## 2018-10-10 RX ADMIN — AZITHROMYCIN 500 MG: 500 TABLET, FILM COATED ORAL at 18:18

## 2018-10-10 RX ADMIN — Medication 10 ML: at 20:30

## 2018-10-10 RX ADMIN — LISINOPRIL 2.5 MG: 5 TABLET ORAL at 14:29

## 2018-10-10 RX ADMIN — SPIRONOLACTONE 25 MG: 25 TABLET ORAL at 18:25

## 2018-10-10 RX ADMIN — METOPROLOL TARTRATE 25 MG: 25 TABLET ORAL at 14:29

## 2018-10-10 RX ADMIN — LORAZEPAM 1 MG: 1 TABLET ORAL at 23:34

## 2018-10-10 ASSESSMENT — PAIN SCALES - GENERAL
PAINLEVEL_OUTOF10: 0
PAINLEVEL_OUTOF10: 0

## 2018-10-10 ASSESSMENT — PAIN SCALES - WONG BAKER
WONGBAKER_NUMERICALRESPONSE: 0

## 2018-10-10 NOTE — PROGRESS NOTES
Hospitalist Progress Note    CC:   Shortness of breath and cough. Admit date: 10/7/2018  Days in hospital:  3    Subjective:   Patient has a gross hematuria last night. Urology  was called. He thinks his breathing is the same. ROS:   Review of Systems   Constitutional: Negative for chills and fever. Cardiovascular: Negative for chest pain and palpitations. Gastrointestinal: Negative for abdominal pain, constipation and diarrhea. Genitourinary: Negative for dysuria and urgency. Neurological: Negative for headaches. Objective:    /70   Pulse 92   Temp 97.8 °F (36.6 °C) (Oral)   Resp 16   Ht 6' 1\" (1.854 m)   Wt (!) 331 lb 12.7 oz (150.5 kg)   SpO2 95%   BMI 43.77 kg/m²     Gen: alert, NAD  HEENT: NC/AT, moist mucous membranes, no oropharyngeal erythema or exudate  Neck: supple, trachea midline, no anterior cervical or SC LAD  Heart: Irregular irregular , no murmurs, gallops, or rubs. Lungs: CTA bilaterally, no wheezing. No use of accessory muscle. Abd: bowel sounds present, soft, nondistended, none tender. Extrem: No clubbing, cyanosis, bilateral pitting edema  Psych: A & O x3, affect appropriate  Neuro: grossly intact, moves all four extremities spontaneously.       Medications:  Scheduled Meds:   azithromycin  500 mg Intravenous Q24H    furosemide  40 mg Intravenous BID    cefepime  2 g Intravenous Q12H    sodium chloride flush  10 mL Intravenous 2 times per day    diltiazem  60 mg Oral 4 times per day       PRN Meds:  albuterol, LORazepam, sodium chloride flush, magnesium hydroxide, ondansetron, perflutren lipid microspheres    IV:        Intake/Output Summary (Last 24 hours) at 10/10/18 0811  Last data filed at 10/09/18 2200   Gross per 24 hour   Intake             1130 ml   Output              675 ml   Net              455 ml       Results:  CBC:   Recent Labs      10/08/18   0826  10/09/18   0552  10/10/18   0616   WBC  15.2*  9.4  7.4   HGB  15.2  15.2 the patient    Morbid obesity complicated his medical management  Melania roberson counseling was provided    Code status:  Full   DVT prophylaxis:  SCDs due to hematuria   Disposition: Pending clinical improvement    Electronically signed by Jay Jay Chou MD on 10/10/2018 at 8:11 AM

## 2018-10-10 NOTE — PROGRESS NOTES
Patient had earlier episode of some blood in his urine. Urine culture sent. Patient up to void again & had 150 ml of bright red blood with a long strand/ clot coming from his penis. NP called & patient was bladder scanned. Zero residual.  Urology consult ordered.

## 2018-10-11 ENCOUNTER — APPOINTMENT (OUTPATIENT)
Dept: CT IMAGING | Age: 73
DRG: 177 | End: 2018-10-11
Payer: MEDICARE

## 2018-10-11 LAB
ANION GAP SERPL CALCULATED.3IONS-SCNC: 13 MMOL/L (ref 3–16)
BASOPHILS ABSOLUTE: 0.1 K/UL (ref 0–0.2)
BASOPHILS RELATIVE PERCENT: 1 %
BUN BLDV-MCNC: 22 MG/DL (ref 7–20)
CALCIUM SERPL-MCNC: 9.2 MG/DL (ref 8.3–10.6)
CHLORIDE BLD-SCNC: 98 MMOL/L (ref 99–110)
CO2: 27 MMOL/L (ref 21–32)
CREAT SERPL-MCNC: 1 MG/DL (ref 0.8–1.3)
EOSINOPHILS ABSOLUTE: 0.4 K/UL (ref 0–0.6)
EOSINOPHILS RELATIVE PERCENT: 4 %
GFR AFRICAN AMERICAN: >60
GFR NON-AFRICAN AMERICAN: >60
GLUCOSE BLD-MCNC: 102 MG/DL (ref 70–99)
HCT VFR BLD CALC: 46.2 % (ref 40.5–52.5)
HEMOGLOBIN: 15.4 G/DL (ref 13.5–17.5)
LYMPHOCYTES ABSOLUTE: 1.2 K/UL (ref 1–5.1)
LYMPHOCYTES RELATIVE PERCENT: 14.1 %
MCH RBC QN AUTO: 31.8 PG (ref 26–34)
MCHC RBC AUTO-ENTMCNC: 33.3 G/DL (ref 31–36)
MCV RBC AUTO: 95.5 FL (ref 80–100)
MONOCYTES ABSOLUTE: 1 K/UL (ref 0–1.3)
MONOCYTES RELATIVE PERCENT: 11.2 %
NEUTROPHILS ABSOLUTE: 6.2 K/UL (ref 1.7–7.7)
NEUTROPHILS RELATIVE PERCENT: 69.7 %
ORGANISM: ABNORMAL
PDW BLD-RTO: 14.6 % (ref 12.4–15.4)
PLATELET # BLD: 248 K/UL (ref 135–450)
PMV BLD AUTO: 8.1 FL (ref 5–10.5)
POTASSIUM REFLEX MAGNESIUM: 4 MMOL/L (ref 3.5–5.1)
PRO-BNP: 2687 PG/ML (ref 0–124)
RBC # BLD: 4.83 M/UL (ref 4.2–5.9)
SODIUM BLD-SCNC: 138 MMOL/L (ref 136–145)
STREP PNEUMONIAE ANTIGEN, URINE: NORMAL
URINE CULTURE, ROUTINE: ABNORMAL
URINE CULTURE, ROUTINE: ABNORMAL
WBC # BLD: 8.8 K/UL (ref 4–11)

## 2018-10-11 PROCEDURE — 6360000002 HC RX W HCPCS: Performed by: HOSPITALIST

## 2018-10-11 PROCEDURE — 6360000004 HC RX CONTRAST MEDICATION: Performed by: UROLOGY

## 2018-10-11 PROCEDURE — 83880 ASSAY OF NATRIURETIC PEPTIDE: CPT

## 2018-10-11 PROCEDURE — 85025 COMPLETE CBC W/AUTO DIFF WBC: CPT

## 2018-10-11 PROCEDURE — 94640 AIRWAY INHALATION TREATMENT: CPT

## 2018-10-11 PROCEDURE — 36415 COLL VENOUS BLD VENIPUNCTURE: CPT

## 2018-10-11 PROCEDURE — 99233 SBSQ HOSP IP/OBS HIGH 50: CPT | Performed by: INTERNAL MEDICINE

## 2018-10-11 PROCEDURE — 2580000003 HC RX 258: Performed by: INTERNAL MEDICINE

## 2018-10-11 PROCEDURE — 74178 CT ABD&PLV WO CNTR FLWD CNTR: CPT

## 2018-10-11 PROCEDURE — 2580000003 HC RX 258: Performed by: HOSPITALIST

## 2018-10-11 PROCEDURE — 6370000000 HC RX 637 (ALT 250 FOR IP): Performed by: HOSPITALIST

## 2018-10-11 PROCEDURE — 80048 BASIC METABOLIC PNL TOTAL CA: CPT

## 2018-10-11 PROCEDURE — 6360000002 HC RX W HCPCS: Performed by: INTERNAL MEDICINE

## 2018-10-11 PROCEDURE — 2060000000 HC ICU INTERMEDIATE R&B

## 2018-10-11 PROCEDURE — 6370000000 HC RX 637 (ALT 250 FOR IP): Performed by: INTERNAL MEDICINE

## 2018-10-11 RX ORDER — METOLAZONE 5 MG/1
5 TABLET ORAL DAILY
Status: DISCONTINUED | OUTPATIENT
Start: 2018-10-11 | End: 2018-10-12

## 2018-10-11 RX ADMIN — IOPAMIDOL 75 ML: 755 INJECTION, SOLUTION INTRAVENOUS at 09:12

## 2018-10-11 RX ADMIN — LORAZEPAM 1 MG: 1 TABLET ORAL at 23:48

## 2018-10-11 RX ADMIN — LISINOPRIL 10 MG: 10 TABLET ORAL at 08:40

## 2018-10-11 RX ADMIN — ALBUTEROL SULFATE 2.5 MG: 2.5 SOLUTION RESPIRATORY (INHALATION) at 17:43

## 2018-10-11 RX ADMIN — CEFEPIME HYDROCHLORIDE 2 G: 2 INJECTION, POWDER, FOR SOLUTION INTRAVENOUS at 16:29

## 2018-10-11 RX ADMIN — Medication 10 ML: at 08:40

## 2018-10-11 RX ADMIN — METOLAZONE 5 MG: 5 TABLET ORAL at 17:55

## 2018-10-11 RX ADMIN — CEFEPIME HYDROCHLORIDE 2 G: 2 INJECTION, POWDER, FOR SOLUTION INTRAVENOUS at 05:45

## 2018-10-11 RX ADMIN — METOPROLOL SUCCINATE 50 MG: 50 TABLET, EXTENDED RELEASE ORAL at 08:40

## 2018-10-11 RX ADMIN — Medication 10 ML: at 22:02

## 2018-10-11 RX ADMIN — AZITHROMYCIN 500 MG: 500 TABLET, FILM COATED ORAL at 16:29

## 2018-10-11 RX ADMIN — SPIRONOLACTONE 25 MG: 25 TABLET ORAL at 08:40

## 2018-10-11 RX ADMIN — FUROSEMIDE 10 MG/HR: 10 INJECTION, SOLUTION INTRAMUSCULAR; INTRAVENOUS at 21:42

## 2018-10-11 ASSESSMENT — PAIN SCALES - GENERAL
PAINLEVEL_OUTOF10: 0

## 2018-10-12 LAB
ANION GAP SERPL CALCULATED.3IONS-SCNC: 13 MMOL/L (ref 3–16)
BASOPHILS ABSOLUTE: 0.1 K/UL (ref 0–0.2)
BASOPHILS RELATIVE PERCENT: 1.3 %
BLOOD CULTURE, ROUTINE: NORMAL
BUN BLDV-MCNC: 28 MG/DL (ref 7–20)
CALCIUM SERPL-MCNC: 9.6 MG/DL (ref 8.3–10.6)
CHLORIDE BLD-SCNC: 93 MMOL/L (ref 99–110)
CO2: 30 MMOL/L (ref 21–32)
CREAT SERPL-MCNC: 1.3 MG/DL (ref 0.8–1.3)
CULTURE, BLOOD 2: NORMAL
EOSINOPHILS ABSOLUTE: 0.4 K/UL (ref 0–0.6)
EOSINOPHILS RELATIVE PERCENT: 5.7 %
GFR AFRICAN AMERICAN: >60
GFR NON-AFRICAN AMERICAN: 54
GLUCOSE BLD-MCNC: 99 MG/DL (ref 70–99)
HCT VFR BLD CALC: 47.5 % (ref 40.5–52.5)
HEMOGLOBIN: 15.9 G/DL (ref 13.5–17.5)
LYMPHOCYTES ABSOLUTE: 1.6 K/UL (ref 1–5.1)
LYMPHOCYTES RELATIVE PERCENT: 22.3 %
MCH RBC QN AUTO: 32 PG (ref 26–34)
MCHC RBC AUTO-ENTMCNC: 33.6 G/DL (ref 31–36)
MCV RBC AUTO: 95.4 FL (ref 80–100)
MONOCYTES ABSOLUTE: 0.8 K/UL (ref 0–1.3)
MONOCYTES RELATIVE PERCENT: 12.1 %
NEUTROPHILS ABSOLUTE: 4.1 K/UL (ref 1.7–7.7)
NEUTROPHILS RELATIVE PERCENT: 58.6 %
PDW BLD-RTO: 14.5 % (ref 12.4–15.4)
PLATELET # BLD: 265 K/UL (ref 135–450)
PMV BLD AUTO: 8.1 FL (ref 5–10.5)
POTASSIUM REFLEX MAGNESIUM: 4 MMOL/L (ref 3.5–5.1)
PRO-BNP: 3617 PG/ML (ref 0–124)
RBC # BLD: 4.98 M/UL (ref 4.2–5.9)
SODIUM BLD-SCNC: 136 MMOL/L (ref 136–145)
WBC # BLD: 7 K/UL (ref 4–11)

## 2018-10-12 PROCEDURE — 99233 SBSQ HOSP IP/OBS HIGH 50: CPT | Performed by: INTERNAL MEDICINE

## 2018-10-12 PROCEDURE — 2580000003 HC RX 258: Performed by: HOSPITALIST

## 2018-10-12 PROCEDURE — 6370000000 HC RX 637 (ALT 250 FOR IP): Performed by: INTERNAL MEDICINE

## 2018-10-12 PROCEDURE — 6360000002 HC RX W HCPCS: Performed by: INTERNAL MEDICINE

## 2018-10-12 PROCEDURE — 6360000002 HC RX W HCPCS: Performed by: HOSPITALIST

## 2018-10-12 PROCEDURE — 94760 N-INVAS EAR/PLS OXIMETRY 1: CPT

## 2018-10-12 PROCEDURE — 85025 COMPLETE CBC W/AUTO DIFF WBC: CPT

## 2018-10-12 PROCEDURE — 36415 COLL VENOUS BLD VENIPUNCTURE: CPT

## 2018-10-12 PROCEDURE — 6370000000 HC RX 637 (ALT 250 FOR IP): Performed by: HOSPITALIST

## 2018-10-12 PROCEDURE — 2580000003 HC RX 258: Performed by: INTERNAL MEDICINE

## 2018-10-12 PROCEDURE — 80048 BASIC METABOLIC PNL TOTAL CA: CPT

## 2018-10-12 PROCEDURE — 83880 ASSAY OF NATRIURETIC PEPTIDE: CPT

## 2018-10-12 PROCEDURE — 2060000000 HC ICU INTERMEDIATE R&B

## 2018-10-12 RX ORDER — LEVOFLOXACIN 500 MG/1
500 TABLET, FILM COATED ORAL DAILY
Status: DISCONTINUED | OUTPATIENT
Start: 2018-10-12 | End: 2018-10-14

## 2018-10-12 RX ADMIN — Medication 10 ML: at 21:49

## 2018-10-12 RX ADMIN — FUROSEMIDE 10 MG/HR: 10 INJECTION, SOLUTION INTRAMUSCULAR; INTRAVENOUS at 21:49

## 2018-10-12 RX ADMIN — CEFEPIME HYDROCHLORIDE 2 G: 2 INJECTION, POWDER, FOR SOLUTION INTRAVENOUS at 06:01

## 2018-10-12 RX ADMIN — METOLAZONE 5 MG: 5 TABLET ORAL at 08:24

## 2018-10-12 RX ADMIN — LORAZEPAM 1 MG: 1 TABLET ORAL at 23:56

## 2018-10-12 RX ADMIN — SPIRONOLACTONE 25 MG: 25 TABLET ORAL at 08:24

## 2018-10-12 RX ADMIN — LISINOPRIL 10 MG: 10 TABLET ORAL at 08:24

## 2018-10-12 RX ADMIN — LEVOFLOXACIN 500 MG: 500 TABLET, FILM COATED ORAL at 12:17

## 2018-10-12 RX ADMIN — METOPROLOL SUCCINATE 50 MG: 50 TABLET, EXTENDED RELEASE ORAL at 08:24

## 2018-10-12 ASSESSMENT — PAIN SCALES - GENERAL
PAINLEVEL_OUTOF10: 0

## 2018-10-12 NOTE — PROGRESS NOTES
anticoagulation due to the hematuria    Hematuria  Seen by Urology. Patient will need a cystoscopy as an outpatient.     CAT scan: kidney stones       Morbid obesity complicated his medical management  Aury Menjivar  loss counseling was provided    Code status:  Full   DVT prophylaxis:  SCDs due to hematuria   Disposition: Pending clinical improvement    Electronically signed by Lawyer Sabine MD on 10/12/2018 at 8:43 AM

## 2018-10-13 LAB
ANION GAP SERPL CALCULATED.3IONS-SCNC: 13 MMOL/L (ref 3–16)
BASOPHILS ABSOLUTE: 0.1 K/UL (ref 0–0.2)
BASOPHILS RELATIVE PERCENT: 1 %
BUN BLDV-MCNC: 35 MG/DL (ref 7–20)
CALCIUM SERPL-MCNC: 9.5 MG/DL (ref 8.3–10.6)
CHLORIDE BLD-SCNC: 94 MMOL/L (ref 99–110)
CO2: 30 MMOL/L (ref 21–32)
CREAT SERPL-MCNC: 1.5 MG/DL (ref 0.8–1.3)
EOSINOPHILS ABSOLUTE: 0.4 K/UL (ref 0–0.6)
EOSINOPHILS RELATIVE PERCENT: 6.1 %
GFR AFRICAN AMERICAN: 56
GFR NON-AFRICAN AMERICAN: 46
GLUCOSE BLD-MCNC: 109 MG/DL (ref 70–99)
HCT VFR BLD CALC: 47.1 % (ref 40.5–52.5)
HEMOGLOBIN: 15.8 G/DL (ref 13.5–17.5)
LYMPHOCYTES ABSOLUTE: 1.5 K/UL (ref 1–5.1)
LYMPHOCYTES RELATIVE PERCENT: 21.2 %
MCH RBC QN AUTO: 32.1 PG (ref 26–34)
MCHC RBC AUTO-ENTMCNC: 33.4 G/DL (ref 31–36)
MCV RBC AUTO: 95.8 FL (ref 80–100)
MONOCYTES ABSOLUTE: 0.7 K/UL (ref 0–1.3)
MONOCYTES RELATIVE PERCENT: 10.1 %
NEUTROPHILS ABSOLUTE: 4.5 K/UL (ref 1.7–7.7)
NEUTROPHILS RELATIVE PERCENT: 61.6 %
PDW BLD-RTO: 14.3 % (ref 12.4–15.4)
PLATELET # BLD: 289 K/UL (ref 135–450)
PMV BLD AUTO: 8.1 FL (ref 5–10.5)
POTASSIUM REFLEX MAGNESIUM: 3.8 MMOL/L (ref 3.5–5.1)
PRO-BNP: 2984 PG/ML (ref 0–124)
RBC # BLD: 4.91 M/UL (ref 4.2–5.9)
SODIUM BLD-SCNC: 137 MMOL/L (ref 136–145)
WBC # BLD: 7.3 K/UL (ref 4–11)

## 2018-10-13 PROCEDURE — 99233 SBSQ HOSP IP/OBS HIGH 50: CPT | Performed by: INTERNAL MEDICINE

## 2018-10-13 PROCEDURE — 6370000000 HC RX 637 (ALT 250 FOR IP): Performed by: HOSPITALIST

## 2018-10-13 PROCEDURE — 85025 COMPLETE CBC W/AUTO DIFF WBC: CPT

## 2018-10-13 PROCEDURE — 2580000003 HC RX 258: Performed by: INTERNAL MEDICINE

## 2018-10-13 PROCEDURE — 6370000000 HC RX 637 (ALT 250 FOR IP): Performed by: INTERNAL MEDICINE

## 2018-10-13 PROCEDURE — 83880 ASSAY OF NATRIURETIC PEPTIDE: CPT

## 2018-10-13 PROCEDURE — 2060000000 HC ICU INTERMEDIATE R&B

## 2018-10-13 PROCEDURE — 36415 COLL VENOUS BLD VENIPUNCTURE: CPT

## 2018-10-13 PROCEDURE — 80048 BASIC METABOLIC PNL TOTAL CA: CPT

## 2018-10-13 RX ADMIN — METOPROLOL SUCCINATE 50 MG: 50 TABLET, EXTENDED RELEASE ORAL at 08:15

## 2018-10-13 RX ADMIN — Medication 10 ML: at 20:48

## 2018-10-13 RX ADMIN — LORAZEPAM 1 MG: 1 TABLET ORAL at 23:33

## 2018-10-13 RX ADMIN — LISINOPRIL 10 MG: 10 TABLET ORAL at 08:16

## 2018-10-13 RX ADMIN — LEVOFLOXACIN 500 MG: 500 TABLET, FILM COATED ORAL at 08:15

## 2018-10-13 RX ADMIN — Medication 10 ML: at 08:15

## 2018-10-13 ASSESSMENT — PAIN SCALES - GENERAL: PAINLEVEL_OUTOF10: 0

## 2018-10-13 NOTE — PROGRESS NOTES
10/13/18   0543   NA  136  137   K  4.0  3.8   CO2  30  30   BUN  28*  35*   CREATININE  1.3  1.5*   GLUCOSE  99  109*     ECG  Atrial fibrillation. Chest x-ray  Right-sided infiltrates; bilateral pleural effusions      ECHO:   Very poor quality images. Suboptimal image quality. Patient appears to be in atrial fibrillation. Ejection fraction appears reduced, though difficult to adequately assess due  to irregular rhythm and poor image quality. LVEF  40%. Unable to adequately assess diastolic function due to arrhythmia. The right ventricle is not well visualized. Right ventricular size and function appear normal.   Mildly dilated left atrium. Mild mitral regurgitation is present. ASSESSMENT AND PLAN:      New onset congestive heart failure  Echocardiogram shows ejection fraction of 40%, which is a new finding  Etiology is unclear but could be tachycardia induced  He is symptomatic with orthopnea  On Lasix drip, with modest diuresis; will add metolazone  He was instructed about fluid and salt restriction    Lasix drip discontinued because of rising Creatinine 1.5  Breathing is improved. Able to lay flat  Legs still 3+ edematous  Wt down form 335 > 312 lbs. Tough situation   Still has significant extra vascular fluid retention but intravascularly is depleted (low BP, creatinine 1.5)  Could start Dobutamine but that would complicate other factors. Atrial fibrillation  New onset  Rate is controlled  CHADS vasc score (Age, Htn, CHF) =3  Will need anticoagulation   However has hematuria ; So for the time being on no anticoagulation. Recommend cystoscopy to rule out pathology in the bladder after heart failure is resolved  I believe he will need a watchman device    Obesity  Rule out obstructive sleep apnea    Hypertension  BP low      Yani DOMINGO  10/13/2018

## 2018-10-13 NOTE — PROGRESS NOTES
SCD boots given to patient and educated on DVT prevention. Pt verbalized understanding and is agreeable to wearing boots while sitting in chair. Pt does state he wants to be able to get up and walk around. Pt encouraged to do so and to inform nursing when he wants to so SCD boots can be disconnected.

## 2018-10-14 LAB
ANION GAP SERPL CALCULATED.3IONS-SCNC: 13 MMOL/L (ref 3–16)
BASOPHILS ABSOLUTE: 0.1 K/UL (ref 0–0.2)
BASOPHILS RELATIVE PERCENT: 0.8 %
BUN BLDV-MCNC: 42 MG/DL (ref 7–20)
CALCIUM SERPL-MCNC: 9.3 MG/DL (ref 8.3–10.6)
CHLORIDE BLD-SCNC: 94 MMOL/L (ref 99–110)
CO2: 30 MMOL/L (ref 21–32)
CREAT SERPL-MCNC: 1.8 MG/DL (ref 0.8–1.3)
EOSINOPHILS ABSOLUTE: 0.4 K/UL (ref 0–0.6)
EOSINOPHILS RELATIVE PERCENT: 5.6 %
GFR AFRICAN AMERICAN: 45
GFR NON-AFRICAN AMERICAN: 37
GLUCOSE BLD-MCNC: 105 MG/DL (ref 70–99)
HCT VFR BLD CALC: 45.9 % (ref 40.5–52.5)
HEMOGLOBIN: 15.4 G/DL (ref 13.5–17.5)
LYMPHOCYTES ABSOLUTE: 1.4 K/UL (ref 1–5.1)
LYMPHOCYTES RELATIVE PERCENT: 19 %
MCH RBC QN AUTO: 32.5 PG (ref 26–34)
MCHC RBC AUTO-ENTMCNC: 33.5 G/DL (ref 31–36)
MCV RBC AUTO: 97.2 FL (ref 80–100)
MONOCYTES ABSOLUTE: 0.8 K/UL (ref 0–1.3)
MONOCYTES RELATIVE PERCENT: 10.8 %
NEUTROPHILS ABSOLUTE: 4.8 K/UL (ref 1.7–7.7)
NEUTROPHILS RELATIVE PERCENT: 63.8 %
PDW BLD-RTO: 14.5 % (ref 12.4–15.4)
PLATELET # BLD: 293 K/UL (ref 135–450)
PMV BLD AUTO: 7.8 FL (ref 5–10.5)
POTASSIUM REFLEX MAGNESIUM: 3.8 MMOL/L (ref 3.5–5.1)
RBC # BLD: 4.72 M/UL (ref 4.2–5.9)
SODIUM BLD-SCNC: 137 MMOL/L (ref 136–145)
WBC # BLD: 7.5 K/UL (ref 4–11)

## 2018-10-14 PROCEDURE — 36415 COLL VENOUS BLD VENIPUNCTURE: CPT

## 2018-10-14 PROCEDURE — 94760 N-INVAS EAR/PLS OXIMETRY 1: CPT

## 2018-10-14 PROCEDURE — 80048 BASIC METABOLIC PNL TOTAL CA: CPT

## 2018-10-14 PROCEDURE — 2060000000 HC ICU INTERMEDIATE R&B

## 2018-10-14 PROCEDURE — 99232 SBSQ HOSP IP/OBS MODERATE 35: CPT | Performed by: NURSE PRACTITIONER

## 2018-10-14 PROCEDURE — 85025 COMPLETE CBC W/AUTO DIFF WBC: CPT

## 2018-10-14 PROCEDURE — 2580000003 HC RX 258: Performed by: INTERNAL MEDICINE

## 2018-10-14 PROCEDURE — 6370000000 HC RX 637 (ALT 250 FOR IP): Performed by: HOSPITALIST

## 2018-10-14 PROCEDURE — 6370000000 HC RX 637 (ALT 250 FOR IP): Performed by: INTERNAL MEDICINE

## 2018-10-14 RX ADMIN — Medication 10 ML: at 20:27

## 2018-10-14 RX ADMIN — METOPROLOL SUCCINATE 50 MG: 50 TABLET, EXTENDED RELEASE ORAL at 09:47

## 2018-10-14 RX ADMIN — Medication 10 ML: at 09:48

## 2018-10-14 RX ADMIN — LEVOFLOXACIN 500 MG: 500 TABLET, FILM COATED ORAL at 09:47

## 2018-10-14 RX ADMIN — LORAZEPAM 1 MG: 1 TABLET ORAL at 23:52

## 2018-10-14 ASSESSMENT — PAIN SCALES - GENERAL: PAINLEVEL_OUTOF10: 0

## 2018-10-14 NOTE — PROGRESS NOTES
currently on hold  -his SOB has improved significantly  -continue BB    2. Atrial fibrillation  -with controlled VR  -on BB  -CHADs Vasc score 3  -not currently on anticoagulation d/t hematuria  -per Dr. David Lemons: ? Watchman evaluation    3. Essential hypertension  -has been running lower BP  -on BB only    4. GISSELLE  -Cr now at 1.8  -off nephrotoxic meds  -? Nephrology consult    5. Obesity  -wt loss encouraged    6. H/O hematuria  -seen by urology  -plan is for cysto as an outpt    7.  PNA  -on antibiotic      Electronically signed by JOHN Casey - CNP on 10/14/2018 at 10:55 AM

## 2018-10-15 LAB
ANION GAP SERPL CALCULATED.3IONS-SCNC: 13 MMOL/L (ref 3–16)
BASOPHILS ABSOLUTE: 0.1 K/UL (ref 0–0.2)
BASOPHILS RELATIVE PERCENT: 1.1 %
BILIRUBIN URINE: NEGATIVE
BLOOD, URINE: NEGATIVE
BUN BLDV-MCNC: 42 MG/DL (ref 7–20)
CALCIUM SERPL-MCNC: 9.4 MG/DL (ref 8.3–10.6)
CHLORIDE BLD-SCNC: 94 MMOL/L (ref 99–110)
CLARITY: CLEAR
CO2: 31 MMOL/L (ref 21–32)
COLOR: YELLOW
CREAT SERPL-MCNC: 1.9 MG/DL (ref 0.8–1.3)
EOSINOPHILS ABSOLUTE: 0.4 K/UL (ref 0–0.6)
EOSINOPHILS RELATIVE PERCENT: 5.2 %
EPITHELIAL CELLS, UA: 0 /HPF (ref 0–5)
GFR AFRICAN AMERICAN: 42
GFR NON-AFRICAN AMERICAN: 35
GLUCOSE BLD-MCNC: 92 MG/DL (ref 70–99)
GLUCOSE URINE: NEGATIVE MG/DL
HCT VFR BLD CALC: 48.7 % (ref 40.5–52.5)
HEMOGLOBIN: 16.1 G/DL (ref 13.5–17.5)
HYALINE CASTS: 1 /LPF (ref 0–8)
KETONES, URINE: NEGATIVE MG/DL
LEUKOCYTE ESTERASE, URINE: NEGATIVE
LYMPHOCYTES ABSOLUTE: 1.5 K/UL (ref 1–5.1)
LYMPHOCYTES RELATIVE PERCENT: 19 %
MCH RBC QN AUTO: 32.1 PG (ref 26–34)
MCHC RBC AUTO-ENTMCNC: 32.9 G/DL (ref 31–36)
MCV RBC AUTO: 97.4 FL (ref 80–100)
MICROSCOPIC EXAMINATION: YES
MONOCYTES ABSOLUTE: 0.9 K/UL (ref 0–1.3)
MONOCYTES RELATIVE PERCENT: 11.6 %
NEUTROPHILS ABSOLUTE: 4.9 K/UL (ref 1.7–7.7)
NEUTROPHILS RELATIVE PERCENT: 63.1 %
NITRITE, URINE: NEGATIVE
PDW BLD-RTO: 14.4 % (ref 12.4–15.4)
PH UA: 7.5
PLATELET # BLD: 316 K/UL (ref 135–450)
PMV BLD AUTO: 8 FL (ref 5–10.5)
POTASSIUM REFLEX MAGNESIUM: 3.9 MMOL/L (ref 3.5–5.1)
PROTEIN UA: 30 MG/DL
RBC # BLD: 5.01 M/UL (ref 4.2–5.9)
RBC UA: 2 /HPF (ref 0–4)
SODIUM BLD-SCNC: 138 MMOL/L (ref 136–145)
SODIUM URINE: 61 MMOL/L
SPECIFIC GRAVITY UA: 1.01
URINE TYPE: ABNORMAL
UROBILINOGEN, URINE: 1 E.U./DL
WBC # BLD: 7.7 K/UL (ref 4–11)
WBC UA: 2 /HPF (ref 0–5)

## 2018-10-15 PROCEDURE — 6370000000 HC RX 637 (ALT 250 FOR IP): Performed by: HOSPITALIST

## 2018-10-15 PROCEDURE — 80048 BASIC METABOLIC PNL TOTAL CA: CPT

## 2018-10-15 PROCEDURE — 99233 SBSQ HOSP IP/OBS HIGH 50: CPT | Performed by: INTERNAL MEDICINE

## 2018-10-15 PROCEDURE — 94760 N-INVAS EAR/PLS OXIMETRY 1: CPT

## 2018-10-15 PROCEDURE — 2580000003 HC RX 258: Performed by: INTERNAL MEDICINE

## 2018-10-15 PROCEDURE — 81001 URINALYSIS AUTO W/SCOPE: CPT

## 2018-10-15 PROCEDURE — 51798 US URINE CAPACITY MEASURE: CPT

## 2018-10-15 PROCEDURE — 2060000000 HC ICU INTERMEDIATE R&B

## 2018-10-15 PROCEDURE — 2580000003 HC RX 258: Performed by: HOSPITALIST

## 2018-10-15 PROCEDURE — 85025 COMPLETE CBC W/AUTO DIFF WBC: CPT

## 2018-10-15 PROCEDURE — 36415 COLL VENOUS BLD VENIPUNCTURE: CPT

## 2018-10-15 PROCEDURE — 84300 ASSAY OF URINE SODIUM: CPT

## 2018-10-15 RX ORDER — 0.9 % SODIUM CHLORIDE 0.9 %
250 INTRAVENOUS SOLUTION INTRAVENOUS ONCE
Status: COMPLETED | OUTPATIENT
Start: 2018-10-15 | End: 2018-10-15

## 2018-10-15 RX ADMIN — Medication 10 ML: at 11:35

## 2018-10-15 RX ADMIN — METOPROLOL SUCCINATE 50 MG: 50 TABLET, EXTENDED RELEASE ORAL at 11:21

## 2018-10-15 RX ADMIN — SODIUM CHLORIDE 250 ML: 9 INJECTION, SOLUTION INTRAVENOUS at 11:21

## 2018-10-15 RX ADMIN — Medication 10 ML: at 20:50

## 2018-10-15 RX ADMIN — LORAZEPAM 1 MG: 1 TABLET ORAL at 23:40

## 2018-10-15 ASSESSMENT — PAIN SCALES - GENERAL
PAINLEVEL_OUTOF10: 0

## 2018-10-15 NOTE — PROGRESS NOTES
Department of Internal Medicine  Nephrology Progress Note    SUBJECTIVE:  We are following this patient for leesa . Patient progress reviewed. The patient feels ok. REVIEW OF SYSTEMS:  Improved SOB/SOUZA. Feels weak and tired . No family present     Physical Exam:    VITALS:  /68   Pulse 77   Temp 97.6 °F (36.4 °C) (Oral)   Resp 15   Ht 6' 1\" (1.854 m)   Wt (!) 311 lb 15.2 oz (141.5 kg)   SpO2 97%   BMI 41.16 kg/m²   24HR INTAKE/OUTPUT:    Intake/Output Summary (Last 24 hours) at 10/15/18 1336  Last data filed at 10/15/18 1210   Gross per 24 hour   Intake              495 ml   Output             1625 ml   Net            -1130 ml       Constitutional:  Looks comfortable   Respiratory:  Decrease BS at bases   Gastrointestinal:  No epigastric tenderness.   Normal Bowel Sounds  Cardiovascular:  S1, S2 RRR   Edema:  1 plus edema  CNS non focal     DATA:    CBC:  Lab Results   Component Value Date    WBC 7.7 10/15/2018    RBC 5.01 10/15/2018    HGB 16.1 10/15/2018    HCT 48.7 10/15/2018    MCV 97.4 10/15/2018    MCH 32.1 10/15/2018    MCHC 32.9 10/15/2018    RDW 14.4 10/15/2018     10/15/2018    MPV 8.0 10/15/2018     CMP:  Lab Results   Component Value Date     10/15/2018    K 3.9 10/15/2018    CL 94 10/15/2018    CO2 31 10/15/2018    BUN 42 10/15/2018    CREATININE 1.9 10/15/2018    GFRAA 42 10/15/2018    GFRAA >60 04/24/2012    AGRATIO 0.7 10/08/2018    LABGLOM 35 10/15/2018    GLUCOSE 92 10/15/2018    PROT 7.2 10/08/2018    CALCIUM 9.4 10/15/2018    BILITOT 1.4 10/08/2018    ALKPHOS 74 10/08/2018    AST 35 10/08/2018    ALT 30 10/08/2018      Hepatic Function Panel: Lab Results   Component Value Date    ALKPHOS 74 10/08/2018    ALT 30 10/08/2018    AST 35 10/08/2018    PROT 7.2 10/08/2018    BILITOT 1.4 10/08/2018      Phosphorus: No results found for: PHOS    ASSESSMENT:  Active Problems:    Community acquired pneumonia    Atrial fibrillation with RVR (HCC)    Acute combined systolic and diastolic HF (heart failure) (HCC)    Persistent atrial fibrillation (HCC)    GISSELLE (acute kidney injury) ([facility] [address]    Essential hypertension  Resolved Problems:    * No resolved hospital problems. *      PLAN:  1. GISSELLE most likely ATN from low BP, over diuresis and ACE-I.  2. Check PVR, will do workup . 3. Hypotension off ACE-I. Meds adjusted . 4. H/o Jack non obstruction renal calculi . 5. CHF resolved . 6. A fib on meds per cards .    7. Long  dw pt and family     Abel Lopez MD,FACP

## 2018-10-15 NOTE — PROGRESS NOTES
Hold on starting anticoagulation due to the hematuria. Continue BB     Hematuria  Resolved   Seen by Urology. Patient will need a cystoscopy as an outpatient.     CAT scan: kidney stones       Morbid obesity complicated his medical management  Melania roberson counseling was provided    Code status:  Full   DVT prophylaxis:  SCDs due to hematuria   Disposition: Pending clinical improvement    Electronically signed by Jay Jay Chou MD on 10/15/2018 at 8:30 AM

## 2018-10-16 LAB
ANION GAP SERPL CALCULATED.3IONS-SCNC: 13 MMOL/L (ref 3–16)
BASOPHILS ABSOLUTE: 0 K/UL (ref 0–0.2)
BASOPHILS RELATIVE PERCENT: 0.6 %
BILIRUBIN URINE: NEGATIVE
BLOOD, URINE: NEGATIVE
BUN BLDV-MCNC: 38 MG/DL (ref 7–20)
CALCIUM SERPL-MCNC: 10 MG/DL (ref 8.3–10.6)
CHLORIDE BLD-SCNC: 95 MMOL/L (ref 99–110)
CLARITY: CLEAR
CO2: 29 MMOL/L (ref 21–32)
COLOR: YELLOW
CREAT SERPL-MCNC: 1.6 MG/DL (ref 0.8–1.3)
EOSINOPHILS ABSOLUTE: 0.4 K/UL (ref 0–0.6)
EOSINOPHILS RELATIVE PERCENT: 4.9 %
FERRITIN: 336.4 NG/ML (ref 30–400)
GFR AFRICAN AMERICAN: 52
GFR NON-AFRICAN AMERICAN: 43
GLUCOSE BLD-MCNC: 94 MG/DL (ref 70–99)
GLUCOSE URINE: NEGATIVE MG/DL
HCT VFR BLD CALC: 49 % (ref 40.5–52.5)
HEMOGLOBIN: 16.3 G/DL (ref 13.5–17.5)
IRON SATURATION: 30 % (ref 20–50)
IRON: 77 UG/DL (ref 59–158)
KETONES, URINE: NEGATIVE MG/DL
LEUKOCYTE ESTERASE, URINE: ABNORMAL
LYMPHOCYTES ABSOLUTE: 1.4 K/UL (ref 1–5.1)
LYMPHOCYTES RELATIVE PERCENT: 18.5 %
MCH RBC QN AUTO: 32.1 PG (ref 26–34)
MCHC RBC AUTO-ENTMCNC: 33.4 G/DL (ref 31–36)
MCV RBC AUTO: 96.2 FL (ref 80–100)
MICROSCOPIC EXAMINATION: YES
MONOCYTES ABSOLUTE: 0.9 K/UL (ref 0–1.3)
MONOCYTES RELATIVE PERCENT: 11.3 %
NEUTROPHILS ABSOLUTE: 5 K/UL (ref 1.7–7.7)
NEUTROPHILS RELATIVE PERCENT: 64.7 %
NITRITE, URINE: NEGATIVE
PDW BLD-RTO: 14 % (ref 12.4–15.4)
PH UA: 7.5
PLATELET # BLD: 332 K/UL (ref 135–450)
PMV BLD AUTO: 8 FL (ref 5–10.5)
POTASSIUM REFLEX MAGNESIUM: 3.9 MMOL/L (ref 3.5–5.1)
PRO-BNP: 3301 PG/ML (ref 0–124)
PROTEIN UA: NEGATIVE MG/DL
RBC # BLD: 5.09 M/UL (ref 4.2–5.9)
RBC UA: NORMAL /HPF (ref 0–2)
SODIUM BLD-SCNC: 137 MMOL/L (ref 136–145)
SPECIFIC GRAVITY UA: 1.01
TOTAL IRON BINDING CAPACITY: 254 UG/DL (ref 260–445)
URINE TYPE: ABNORMAL
UROBILINOGEN, URINE: 0.2 E.U./DL
WBC # BLD: 7.7 K/UL (ref 4–11)
WBC UA: NORMAL /HPF (ref 0–5)

## 2018-10-16 PROCEDURE — 81001 URINALYSIS AUTO W/SCOPE: CPT

## 2018-10-16 PROCEDURE — 84155 ASSAY OF PROTEIN SERUM: CPT

## 2018-10-16 PROCEDURE — 6370000000 HC RX 637 (ALT 250 FOR IP): Performed by: HOSPITALIST

## 2018-10-16 PROCEDURE — 83880 ASSAY OF NATRIURETIC PEPTIDE: CPT

## 2018-10-16 PROCEDURE — 83540 ASSAY OF IRON: CPT

## 2018-10-16 PROCEDURE — 36415 COLL VENOUS BLD VENIPUNCTURE: CPT

## 2018-10-16 PROCEDURE — 80048 BASIC METABOLIC PNL TOTAL CA: CPT

## 2018-10-16 PROCEDURE — 2580000003 HC RX 258: Performed by: INTERNAL MEDICINE

## 2018-10-16 PROCEDURE — 82728 ASSAY OF FERRITIN: CPT

## 2018-10-16 PROCEDURE — 94760 N-INVAS EAR/PLS OXIMETRY 1: CPT

## 2018-10-16 PROCEDURE — 84165 PROTEIN E-PHORESIS SERUM: CPT

## 2018-10-16 PROCEDURE — 99233 SBSQ HOSP IP/OBS HIGH 50: CPT | Performed by: INTERNAL MEDICINE

## 2018-10-16 PROCEDURE — 2060000000 HC ICU INTERMEDIATE R&B

## 2018-10-16 PROCEDURE — 83550 IRON BINDING TEST: CPT

## 2018-10-16 PROCEDURE — 85025 COMPLETE CBC W/AUTO DIFF WBC: CPT

## 2018-10-16 RX ORDER — ATORVASTATIN CALCIUM 10 MG/1
10 TABLET, FILM COATED ORAL DAILY
Status: DISCONTINUED | OUTPATIENT
Start: 2018-10-17 | End: 2018-10-17

## 2018-10-16 RX ADMIN — Medication 10 ML: at 22:35

## 2018-10-16 RX ADMIN — METOPROLOL SUCCINATE 50 MG: 50 TABLET, EXTENDED RELEASE ORAL at 10:26

## 2018-10-16 RX ADMIN — Medication 10 ML: at 10:27

## 2018-10-16 ASSESSMENT — PAIN SCALES - GENERAL
PAINLEVEL_OUTOF10: 0

## 2018-10-16 NOTE — PLAN OF CARE
Problem: Falls - Risk of:  Goal: Will remain free from falls  Will remain free from falls   Outcome: Ongoing    Goal: Absence of physical injury  Absence of physical injury   Outcome: Ongoing      Problem: Anxiety:  Goal: Level of anxiety will decrease  Level of anxiety will decrease   Outcome: Ongoing      Problem: Discharge Planning:  Goal: Discharged to appropriate level of care  Discharged to appropriate level of care   Outcome: Ongoing    Goal: Participates in care planning  Participates in care planning   Outcome: Ongoing      Problem:  Activity:  Goal: Ability to tolerate increased activity will improve  Ability to tolerate increased activity will improve   Outcome: Ongoing    Goal: Expression of feelings of increased energy will increase  Expression of feelings of increased energy will increase   Outcome: Ongoing      Problem: Cardiac:  Goal: Ability to maintain an adequate cardiac output will improve  Ability to maintain an adequate cardiac output will improve   Outcome: Ongoing    Goal: Complications related to the disease process, condition or treatment will be avoided or minimized  Complications related to the disease process, condition or treatment will be avoided or minimized   Outcome: Ongoing      Problem: Coping:  Goal: General experience of comfort will improve  General experience of comfort will improve   Outcome: Ongoing    Goal: Level of anxiety will decrease  Level of anxiety will decrease   Outcome: Ongoing      Problem: Health Behavior:  Goal: Ability to manage health-related needs will improve  Ability to manage health-related needs will improve   Outcome: Ongoing      Problem: Safety:  Goal: Ability to remain free from injury will improve  Ability to remain free from injury will improve   Outcome: Ongoing    Goal: Will show no signs and symptoms of excessive bleeding  Will show no signs and symptoms of excessive bleeding   Outcome: Ongoing      Problem: HEMODYNAMIC STATUS  Goal: Patient has stable vital signs and fluid balance  Outcome: Ongoing      Problem: FLUID AND ELECTROLYTE IMBALANCE  Goal: Fluid and electrolyte balance are achieved/maintained  Outcome: Ongoing      Problem: ACTIVITY INTOLERANCE/IMPAIRED MOBILITY  Goal: Mobility/activity is maintained at optimum level for patient  Outcome: Ongoing      Problem: Pain:  Goal: Pain level will decrease  Pain level will decrease   Outcome: Ongoing    Goal: Control of acute pain  Control of acute pain   Outcome: Ongoing

## 2018-10-17 LAB
ALBUMIN SERPL-MCNC: 3 G/DL (ref 3.1–4.9)
ALBUMIN SERPL-MCNC: 3.2 G/DL (ref 3.4–5)
ALPHA-1-GLOBULIN: 0.4 G/DL (ref 0.2–0.4)
ALPHA-2-GLOBULIN: 0.8 G/DL (ref 0.4–1.1)
ANION GAP SERPL CALCULATED.3IONS-SCNC: 13 MMOL/L (ref 3–16)
BETA GLOBULIN: 1.5 G/DL (ref 0.9–1.6)
BUN BLDV-MCNC: 33 MG/DL (ref 7–20)
CALCIUM SERPL-MCNC: 9.5 MG/DL (ref 8.3–10.6)
CHLORIDE BLD-SCNC: 95 MMOL/L (ref 99–110)
CO2: 28 MMOL/L (ref 21–32)
CREAT SERPL-MCNC: 1.5 MG/DL (ref 0.8–1.3)
GAMMA GLOBULIN: 1.7 G/DL (ref 0.6–1.8)
GFR AFRICAN AMERICAN: 56
GFR NON-AFRICAN AMERICAN: 46
GLUCOSE BLD-MCNC: 91 MG/DL (ref 70–99)
PHOSPHORUS: 2.9 MG/DL (ref 2.5–4.9)
POTASSIUM SERPL-SCNC: 3.9 MMOL/L (ref 3.5–5.1)
SODIUM BLD-SCNC: 136 MMOL/L (ref 136–145)
SPE/IFE INTERPRETATION: NORMAL
TOTAL PROTEIN: 7.4 G/DL (ref 6.4–8.2)

## 2018-10-17 PROCEDURE — 6370000000 HC RX 637 (ALT 250 FOR IP): Performed by: HOSPITALIST

## 2018-10-17 PROCEDURE — 6370000000 HC RX 637 (ALT 250 FOR IP): Performed by: INTERNAL MEDICINE

## 2018-10-17 PROCEDURE — 36415 COLL VENOUS BLD VENIPUNCTURE: CPT

## 2018-10-17 PROCEDURE — 80069 RENAL FUNCTION PANEL: CPT

## 2018-10-17 PROCEDURE — 6370000000 HC RX 637 (ALT 250 FOR IP): Performed by: FAMILY MEDICINE

## 2018-10-17 PROCEDURE — 2580000003 HC RX 258: Performed by: INTERNAL MEDICINE

## 2018-10-17 PROCEDURE — 94760 N-INVAS EAR/PLS OXIMETRY 1: CPT

## 2018-10-17 PROCEDURE — 2060000000 HC ICU INTERMEDIATE R&B

## 2018-10-17 RX ORDER — FUROSEMIDE 40 MG/1
40 TABLET ORAL DAILY
Status: DISCONTINUED | OUTPATIENT
Start: 2018-10-17 | End: 2018-10-18 | Stop reason: HOSPADM

## 2018-10-17 RX ORDER — ATORVASTATIN CALCIUM 10 MG/1
10 TABLET, FILM COATED ORAL NIGHTLY
Status: DISCONTINUED | OUTPATIENT
Start: 2018-10-17 | End: 2018-10-18 | Stop reason: HOSPADM

## 2018-10-17 RX ADMIN — Medication 10 ML: at 09:15

## 2018-10-17 RX ADMIN — LORAZEPAM 1 MG: 1 TABLET ORAL at 23:47

## 2018-10-17 RX ADMIN — FUROSEMIDE 40 MG: 40 TABLET ORAL at 12:48

## 2018-10-17 RX ADMIN — ATORVASTATIN CALCIUM 10 MG: 10 TABLET, FILM COATED ORAL at 21:16

## 2018-10-17 RX ADMIN — LORAZEPAM 1 MG: 1 TABLET ORAL at 00:20

## 2018-10-17 RX ADMIN — METOPROLOL SUCCINATE 50 MG: 50 TABLET, EXTENDED RELEASE ORAL at 09:15

## 2018-10-17 RX ADMIN — ASPIRIN 325 MG: 325 TABLET, DELAYED RELEASE ORAL at 09:15

## 2018-10-17 RX ADMIN — Medication 10 ML: at 21:15

## 2018-10-17 ASSESSMENT — PAIN SCALES - GENERAL: PAINLEVEL_OUTOF10: 0

## 2018-10-17 NOTE — PLAN OF CARE
Problem: Falls - Risk of:  Goal: Absence of physical injury  Absence of physical injury   Outcome: Ongoing  Patient free from falls this shift. Fall precautions in place at all times. Bed in lowest position with two side rails up and wheels locked. Call light within reach. Patient able and agreeable to contact for safety appropriately. Problem: Anxiety:  Goal: Level of anxiety will decrease  Level of anxiety will decrease   Outcome: Ongoing      Problem:  Activity:  Goal: Expression of feelings of increased energy will increase  Expression of feelings of increased energy will increase   Outcome: Ongoing      Problem: Cardiac:  Goal: Complications related to the disease process, condition or treatment will be avoided or minimized  Complications related to the disease process, condition or treatment will be avoided or minimized   Outcome: Ongoing      Problem: Coping:  Goal: General experience of comfort will improve  General experience of comfort will improve   Outcome: Ongoing    Goal: Level of anxiety will decrease  Level of anxiety will decrease   Outcome: Ongoing      Problem: HEMODYNAMIC STATUS  Goal: Patient has stable vital signs and fluid balance  Outcome: Ongoing      Problem: FLUID AND ELECTROLYTE IMBALANCE  Goal: Fluid and electrolyte balance are achieved/maintained  Outcome: Ongoing      Problem: ACTIVITY INTOLERANCE/IMPAIRED MOBILITY  Goal: Mobility/activity is maintained at optimum level for patient  Outcome: Ongoing

## 2018-10-18 VITALS
TEMPERATURE: 98.2 F | OXYGEN SATURATION: 96 % | HEART RATE: 81 BPM | HEIGHT: 73 IN | SYSTOLIC BLOOD PRESSURE: 98 MMHG | WEIGHT: 308.42 LBS | DIASTOLIC BLOOD PRESSURE: 51 MMHG | BODY MASS INDEX: 40.88 KG/M2 | RESPIRATION RATE: 18 BRPM

## 2018-10-18 LAB
ALBUMIN SERPL-MCNC: 3.1 G/DL (ref 3.4–5)
ANION GAP SERPL CALCULATED.3IONS-SCNC: 14 MMOL/L (ref 3–16)
BUN BLDV-MCNC: 30 MG/DL (ref 7–20)
CALCIUM SERPL-MCNC: 9.5 MG/DL (ref 8.3–10.6)
CHLORIDE BLD-SCNC: 95 MMOL/L (ref 99–110)
CO2: 27 MMOL/L (ref 21–32)
CREAT SERPL-MCNC: 1.4 MG/DL (ref 0.8–1.3)
GFR AFRICAN AMERICAN: >60
GFR NON-AFRICAN AMERICAN: 50
GLUCOSE BLD-MCNC: 96 MG/DL (ref 70–99)
PHOSPHORUS: 3 MG/DL (ref 2.5–4.9)
POTASSIUM SERPL-SCNC: 3.8 MMOL/L (ref 3.5–5.1)
SODIUM BLD-SCNC: 136 MMOL/L (ref 136–145)

## 2018-10-18 PROCEDURE — 6370000000 HC RX 637 (ALT 250 FOR IP): Performed by: INTERNAL MEDICINE

## 2018-10-18 PROCEDURE — 6370000000 HC RX 637 (ALT 250 FOR IP): Performed by: HOSPITALIST

## 2018-10-18 PROCEDURE — 80069 RENAL FUNCTION PANEL: CPT

## 2018-10-18 PROCEDURE — 2580000003 HC RX 258: Performed by: INTERNAL MEDICINE

## 2018-10-18 PROCEDURE — 94760 N-INVAS EAR/PLS OXIMETRY 1: CPT

## 2018-10-18 RX ORDER — METOPROLOL SUCCINATE 50 MG/1
50 TABLET, EXTENDED RELEASE ORAL DAILY
Qty: 30 TABLET | Refills: 3 | Status: ON HOLD | OUTPATIENT
Start: 2018-10-19 | End: 2019-03-04 | Stop reason: SDUPTHER

## 2018-10-18 RX ORDER — ATORVASTATIN CALCIUM 10 MG/1
10 TABLET, FILM COATED ORAL NIGHTLY
Qty: 30 TABLET | Refills: 3 | Status: ON HOLD | OUTPATIENT
Start: 2018-10-18 | End: 2019-03-04 | Stop reason: HOSPADM

## 2018-10-18 RX ORDER — FUROSEMIDE 40 MG/1
40 TABLET ORAL DAILY
Qty: 60 TABLET | Refills: 3 | Status: ON HOLD | OUTPATIENT
Start: 2018-10-19 | End: 2019-03-04 | Stop reason: HOSPADM

## 2018-10-18 RX ADMIN — Medication 10 ML: at 10:56

## 2018-10-18 RX ADMIN — FUROSEMIDE 40 MG: 40 TABLET ORAL at 10:55

## 2018-10-18 RX ADMIN — METOPROLOL SUCCINATE 50 MG: 50 TABLET, EXTENDED RELEASE ORAL at 10:55

## 2018-10-18 RX ADMIN — ASPIRIN 325 MG: 325 TABLET, DELAYED RELEASE ORAL at 10:55

## 2018-10-18 ASSESSMENT — PAIN SCALES - GENERAL: PAINLEVEL_OUTOF10: 0

## 2018-10-18 NOTE — PROGRESS NOTES
weeks    New onset  atrial fibrillation  OLI Vas score is : 3  Hold on starting anticoagulation due to the hematuria; will need in the future  Continue BB   Will need a watchman, will follow up with Dr Smita Mota in the out pt     Hematuria  Resolved   UA neg for blood x 2 now  Seen by Urology. Patient will need a cystoscopy as an outpatient.     CAT scan: kidney stones     Morbid obesity complicated his medical management  Amber roberson counseling was provided    Code status:  Full   DVT prophylaxis:  SCDs due to hematuria   Disposition: DC when crt stabilizes, likely tomorrow; need to address [facility] as well    Electronically signed by David Cruz DO on 10/18/2018 at 9:04 AM

## 2018-10-18 NOTE — CARE COORDINATION
LSW met with patient to discuss dc plan. Patient frustrated to be inpatient for the required length and looking forward to be discharged and return home soon. Daughter to transport home. LSW provided form for patient to gain access to medical records; also educated about [address] IMM letter presented. No other questions or concerns at this time.     Electronically signed by JONO Heaton on 10/18/18 at 4:31 PM
ambulation and all adl's. His PCP is  from [address] [place] [address]. The goal for [address] at time of d/c is to return home with no new needs. Dick [address] likes to go out dancing - that is his goal.Discussed [facility] program - letter given. Contact info provided. No future appointments.     Health Maintenance  Health Maintenance Due   Topic Date Due    Lipid screen  05/04/1986       Chuyita Mack RN

## 2018-10-18 NOTE — CONSULTS
[address] [place] [address]  Heart Failure Service    NAME: Radha Alcantara  MEDICAL RECORD NUMBER:  [id number]  AGE: 68 y.o. GENDER: male  : 1945  TODAY'S DATE:  10/7/2018      I met with Radha Alcantara today to discuss the [facility]. A pamphlet was also provided that includes a description of our services as well as our  contact information. Any questions from the patient were answered. Pertinent heart failure education was reviewed with this patient including but not limited to: Low sodium diet, monitoring of daily weights, fluid restriction, worsening signs and symptoms of heart failure and when to call, the importance of regular exercise and activity, adherence to medication therapy and appropriate follow up. [x] Radha Alcantara is interested in attending our [address] [place] [address], a referral was obtained. He asked that I call him at home to coordinate his Outpatient appointment with his other appointments. I will reach out Monday. He will await our call. [address] [place] [address]  Heart Failure Service  Phone: [phone]  Fax [phone]    We also have outpatient services in COPD, Diabetes, Anticoagulation and Infusion.
[address] [facility]                                 CONSULTATION    PATIENT NAME: Rebeca Erwin                   :           MED REC NO:   [id number]                          ROOM:       [id number]  ACCOUNT NO:   [de-identified]                           ADMIT DATE:  10/07/2018  PROVIDER:     Edwar De La Paz MD    CONSULT DATE:  10/15/2018    REASON FOR CONSULTATION:  Acute kidney injury. HISTORY OF PRESENTING ILLNESS:  He is a 43-year-old   gentleman with a past medical history significant for hypertension,  chronic kidney disease stage IIB, obesity, admitted with shortness of  breath and dyspnea on exertion. The patient was diagnosed with acute  congestive heart failure, possible pneumonia, and was started on  antibiotics as well as Lasix. Renal consultation has been called  because of gradual decline in renal function. At the time of consultation, the patient feeling and breathing better,  has some weakness but denies any shortness of breath or dyspnea on  exertion. Admits improvement in his energy level as well as  breathing. Because of the decline in the kidney function, he has been  taken off of diuretics as well as ACE inhibitor. At the time of  presentation, also found to be in new-onset atrial fibrillation. Cardiologists were consulted and the patient was started on  medications. Echo showed ejection fraction of 40%. PAST MEDICAL HISTORY:  1.  Obesity. 2.  Obstructive sleep apnea. 3.  Peripheral vascular disease (?). SOCIAL HISTORY:  He does not smoke or drink. FAMILY HISTORY:  Brother used to be on dialysis. ALLERGIES:  None. MEDICATIONS:  At this point, off of Lasix, metolazone, and lisinopril. Inpatient medications include Ativan, Proventil, Zofran as needed with  Toprol. REVIEW OF SYSTEMS:  Hard of hearing. No vision problem. No nausea,  vomiting, or diarrhea.   No
SHANIshawna [facility]  Cardiology Consult Note        CC:      Shortness of breath/atrial fibrillation           HPI:   This is a 68 y.o. male has been battling with productive cough, shortness of breath for the last several weeks. He claims to be fine. About 5-6 weeks ago. He claims that he [place] [facility] [place] a distance without stopping. However, lately has been short of breath with productive cough. He was diagnosed with pneumonia and being treated for that but wasn't getting better and decided to come to the ER. The patient also claims to have orthopnea with inability to lay flat and marked exertional dyspnea. He has no previous cardiac history. His proBNP was 2800. A recent echocardiogram done shows that his ejection fraction is 40%, which is new onset. He has been treated with IV Lasix, but his urine output has not been. On the monitor. He is found to have A. fib which is new for him. He is not feeling any palpitations or racing heart. He denies any chest pain      Past Medical History:   Diagnosis Date    Hypertension     Sciatica       History reviewed. No pertinent surgical history. History reviewed. No pertinent family history.    Social History   Substance Use Topics    Smoking status: Never Smoker    Smokeless tobacco: Never Used    Alcohol use No     No Known Allergies   azithromycin  500 mg Oral QPM    metoprolol tartrate  25 mg Oral Q6H    lisinopril  2.5 mg Oral Daily    furosemide  60 mg Intravenous BID    cefepime  2 g Intravenous Q12H    sodium chloride flush  10 mL Intravenous 2 times per day       Review of Systems -   Constitutional: Negative for weight gain/loss; malaise, fever  Respiratory: Negative for Asthma;  cough and hemoptysis  Cardiovascular: Negative for palpitations,dizziness   Gastrointestinal: Negative for abd.pain; constipation/diarrhea;    Genitourinary: Negative for stones; hematuria; frequency
reiterate education and assist with transition of care. CURRENT DIET: DIET LOW SODIUM 2 GM; 1200 ml    EDUCATIONAL PACKETS PROVIDED- PRINTED FROM [employer]. Titles and material given:  Yes   [x]  What is Heart Failure? [x]  Heart Failure: Warning Signs of a Flare-Up  [x]  Heart Failure: Making Changes to Your Diet  [x]  Heart Failure: Medications to Help Your Heart   [x]  Other:  HF Zones and Wt log sheet     PATIENT/CAREGIVER TEACHING:    Level of patient/caregiver understanding able to:   [x] Verbalize understanding   [] Demonstrate understanding       [x] Teach back        [x] Needs reinforcement     []  Other:      TEACHING TIME:  60 minutes       Plan:       DISCHARGE PLAN:  Placement for patient upon discharge: home with support from dtr    Hospice Care:  no  Code Status: Full Code  Discharge appointment scheduled: Yes- 10/18 at 2:00 with Dr Alyssa Galindo and I also made card f/u appt as new diagnosis with Dr Tawanda Schroeder 10/25 at 12:00      RECOMMENDATIONS:   [x]  Encourage to call Heart Failure Resource Line with any questions or concerns. [x]  Educate further Mr. Fady Harden on fluid restriction 48 oz (1.2 FR presently) during inpatient stay so he can understand how to measure intake at home. [x]  Continue to educate on S/S of Heart Failure.   [x]  Emphasize daily weights, diet, and if changes, to call Heart Failure Resource Line  [x]  Other: attend the [facility] faxed           Electronically signed by Bobby Muñoz RN, BSN CHFN  on 10/11/2018 at 1:50 PM

## 2018-10-18 NOTE — PROGRESS NOTES
Pharmacy Heart Failure Medication Reconciliation Note    Pt discharged from [facility] today after admission for SOBSanti Pina has a diagnosis of combined diastolic / systolic heart failure (last EF = 40% on 10/9/18).     Patient taking an ACEI / ARB / Entresto:  No: held due to GISSELLE      Patient taking a BETA BLOCKER:  Yes  Patient taking a LOOP DIURETIC: Yes  Patient taking a ALDOSTERONE RECEPTOR ANTAGONIST: No: GISSELLE - EF = 40%     Corrections to discharge medications include:  None    Discharge Medications:         Medication List        START taking these medications      aspirin 325 MG EC tablet  Take 1 tablet by mouth daily  Start taking on:  10/19/2018     atorvastatin 10 MG tablet  Commonly known as:  LIPITOR  Take 1 tablet by mouth nightly     furosemide 40 MG tablet  Commonly known as:  LASIX  Take 1 tablet by mouth daily  Start taking on:  10/19/2018     metoprolol succinate 50 MG extended release tablet  Commonly known as:  TOPROL XL  Take 1 tablet by mouth daily  Start taking on:  10/19/2018            STOP taking these medications      diltiazem 240 MG extended release capsule  Commonly known as:  DILACOR XR               Where to Get Your Medications        You can get these medications from any pharmacy    Bring a paper prescription for each of these medications  · aspirin 325 MG EC tablet  · atorvastatin 10 MG tablet  · furosemide 40 MG tablet  · metoprolol succinate 50 MG extended release tablet         Aurora Blue, PharmD  Heart Failure Discharge Medication Reconciliation Program  [phone]

## 2018-10-18 NOTE — PROGRESS NOTES
Department of Internal Medicine  Nephrology Progress Note    SUBJECTIVE:  We are following this patient for leesa . Patient progress reviewed. The patient feels ok. Patient reports fees well and has no acute concerns at this time. Agreeable to following up as o/p. REVIEW OF SYSTEMS:  Stable  SOB/SOUZA. Feels weak and tired . Physical Exam:    VITALS:  BP (!) 98/51   Pulse 81   Temp 98.2 °F (36.8 °C) (Oral)   Resp 18   Ht 6' 1\" (1.854 m)   Wt (!) 308 lb 6.8 oz (139.9 kg)   SpO2 96%   BMI 40.69 kg/m²   24HR INTAKE/OUTPUT:      Intake/Output Summary (Last 24 hours) at 10/18/18 1627  Last data filed at 10/18/18 0801   Gross per 24 hour   Intake              500 ml   Output             1750 ml   Net            -1250 ml       Constitutional:  Looks comfortable   Respiratory:  Decrease BS at bases   Gastrointestinal:  No epigastric tenderness.   Normal Bowel Sounds  Cardiovascular:  S1, S2 RRR   Edema:  1 plus edema  CNS non focal     DATA:    CBC:  Lab Results   Component Value Date    WBC 7.7 10/16/2018    RBC 5.09 10/16/2018    HGB 16.3 10/16/2018    HCT 49.0 10/16/2018    MCV 96.2 10/16/2018    MCH 32.1 10/16/2018    MCHC 33.4 10/16/2018    RDW 14.0 10/16/2018     10/16/2018    MPV 8.0 10/16/2018     CMP:  Lab Results   Component Value Date     10/18/2018    K 3.8 10/18/2018    K 3.9 10/16/2018    CL 95 10/18/2018    CO2 27 10/18/2018    BUN 30 10/18/2018    CREATININE 1.4 10/18/2018    GFRAA >60 10/18/2018    GFRAA >60 04/24/2012    AGRATIO 0.7 10/08/2018    LABGLOM 50 10/18/2018    GLUCOSE 96 10/18/2018    PROT 7.4 10/16/2018    CALCIUM 9.5 10/18/2018    BILITOT 1.4 10/08/2018    ALKPHOS 74 10/08/2018    AST 35 10/08/2018    ALT 30 10/08/2018      Hepatic Function Panel:   Lab Results   Component Value Date    ALKPHOS 74 10/08/2018    ALT 30 10/08/2018    AST 35 10/08/2018    PROT 7.4 10/16/2018    BILITOT 1.4 10/08/2018      Phosphorus:   Lab Results   Component Value Date    PHOS 3.0 10/18/2018       ASSESSMENT:  Active Problems:    Community acquired pneumonia    Atrial fibrillation with RVR ([facility]    Acute combined systolic and diastolic HF (heart failure) (HCC)    Persistent atrial fibrillation (HCC)    GISSELLE (acute kidney injury) ([facility]    Essential hypertension    Acute on chronic systolic heart failure (HCC)    PAF (paroxysmal atrial fibrillation) ([facility]    Aortic atherosclerosis ([facility]    Morbid obesity with BMI of 40.0-44.9, adult ([facility]  Resolved Problems:    * No resolved hospital problems. *      PLAN:  1. GISSELLE most likely ATN from low BP, over diuresis and ACE-I. Now resolved on current regimen. ACE-I to start as O/P.  2. Cr better . Lasix 20 mg daily. . ACE-I will be started as  Out pt . 3. Hypotension off ACE-I. Resolved . 4. H/o Jack non obstruction renal calculi . 5. CHF; continue lasix 20 mg daily. 6. A fib on meds per cards . 7. Long  dw pt and family and team.         Recommendations:  Stable for DC today; patient agreeable to DC and follow up with Dr. Lucero Left as o/p.  DC Weight 140 kg. Lasix 40 mg daily Scr is 1.4. Thank you for involving us in the care of this patient, please call with any questions.     Signed:  Vimal Taylor M.D.   [facility]  Office Number: [phone]  Fax Number: [phone]  Answering Service: [phone]  10/18/2018, 4:27 PM

## 2018-10-19 ENCOUNTER — CARE COORDINATION (OUTPATIENT)
Dept: CASE MANAGEMENT | Age: 73
End: 2018-10-19

## 2018-10-20 ENCOUNTER — CARE COORDINATION (OUTPATIENT)
Dept: CASE MANAGEMENT | Age: 73
End: 2018-10-20

## 2018-10-20 NOTE — CARE COORDINATION
[facility] Transitions Initial Follow Up Call    Call within 2 business days of discharge: Yes    Patient: Saritha Ventura Patient : 1945   MRN: <[id number]>  Reason for Admission: There are no discharge diagnoses documented for the most recent discharge. Discharge Date: 10/18/18 RARS: Readmission Risk Score: 11         Facility: [address] [place] [address] 24 Hour Call    Do you have support at home?:  Child  Are you an active caregiver in your home?:  No  Care Transitions Interventions         Follow Up: Attempted to reach patient for Care Transition follow up. No answer to phone. Message left with CTC contact information and request for call back.   Future Appointments  Date Time Provider Jessi Covarrubias   10/22/2018 6:30 PM SCHEDULE, [facility] SCHEDULE LETITIA OAKLEY [facility] None   10/25/2018 12:00 PM Gregg Kim MD [facility] ROSENDO Mccrary, RN

## 2018-10-21 ENCOUNTER — CARE COORDINATION (OUTPATIENT)
Dept: CASE MANAGEMENT | Age: 73
End: 2018-10-21

## 2018-10-22 ENCOUNTER — SCHEDULED TELEPHONE ENCOUNTER (OUTPATIENT)
Dept: PHARMACY | Age: 73
End: 2018-10-22

## 2018-10-22 NOTE — TELEPHONE ENCOUNTER
Left message for patient to please call to schedule an appointment in the [facility] for HF.

## 2018-10-29 ENCOUNTER — SCHEDULED TELEPHONE ENCOUNTER (OUTPATIENT)
Dept: PHARMACY | Age: 73
End: 2018-10-29

## 2018-10-30 ENCOUNTER — TELEPHONE (OUTPATIENT)
Dept: PHARMACY | Age: 73
End: 2018-10-30

## 2018-11-01 ENCOUNTER — OFFICE VISIT (OUTPATIENT)
Dept: CARDIOLOGY CLINIC | Age: 73
End: 2018-11-01
Payer: MEDICARE

## 2018-11-01 VITALS
SYSTOLIC BLOOD PRESSURE: 122 MMHG | OXYGEN SATURATION: 99 % | WEIGHT: 300 LBS | DIASTOLIC BLOOD PRESSURE: 72 MMHG | HEART RATE: 62 BPM | HEIGHT: 73 IN | BODY MASS INDEX: 39.76 KG/M2

## 2018-11-01 DIAGNOSIS — N17.9 AKI (ACUTE KIDNEY INJURY) (HCC): ICD-10-CM

## 2018-11-01 DIAGNOSIS — I48.19 PERSISTENT ATRIAL FIBRILLATION (HCC): ICD-10-CM

## 2018-11-01 DIAGNOSIS — I50.42 CHRONIC COMBINED SYSTOLIC AND DIASTOLIC HF (HEART FAILURE) (HCC): Primary | ICD-10-CM

## 2018-11-01 DIAGNOSIS — I10 ESSENTIAL HYPERTENSION: ICD-10-CM

## 2018-11-01 PROCEDURE — 1111F DSCHRG MED/CURRENT MED MERGE: CPT | Performed by: NURSE PRACTITIONER

## 2018-11-01 PROCEDURE — 4040F PNEUMOC VAC/ADMIN/RCVD: CPT | Performed by: NURSE PRACTITIONER

## 2018-11-01 PROCEDURE — 1036F TOBACCO NON-USER: CPT | Performed by: NURSE PRACTITIONER

## 2018-11-01 PROCEDURE — G8427 DOCREV CUR MEDS BY ELIG CLIN: HCPCS | Performed by: NURSE PRACTITIONER

## 2018-11-01 PROCEDURE — 1101F PT FALLS ASSESS-DOCD LE1/YR: CPT | Performed by: NURSE PRACTITIONER

## 2018-11-01 PROCEDURE — 1123F ACP DISCUSS/DSCN MKR DOCD: CPT | Performed by: NURSE PRACTITIONER

## 2018-11-01 PROCEDURE — G8484 FLU IMMUNIZE NO ADMIN: HCPCS | Performed by: NURSE PRACTITIONER

## 2018-11-01 PROCEDURE — 99214 OFFICE O/P EST MOD 30 MIN: CPT | Performed by: NURSE PRACTITIONER

## 2018-11-01 PROCEDURE — G8417 CALC BMI ABV UP PARAM F/U: HCPCS | Performed by: NURSE PRACTITIONER

## 2018-11-01 PROCEDURE — G8598 ASA/ANTIPLAT THER USED: HCPCS | Performed by: NURSE PRACTITIONER

## 2018-11-01 PROCEDURE — 3017F COLORECTAL CA SCREEN DOC REV: CPT | Performed by: NURSE PRACTITIONER

## 2018-11-01 RX ORDER — ASCORBIC ACID 500 MG
500 TABLET ORAL DAILY
COMMUNITY

## 2018-11-01 NOTE — PROGRESS NOTES
Lab Results   Component Value Date    WBC 7.7 10/16/2018    HGB 16.3 10/16/2018    HCT 49.0 10/16/2018    MCV 96.2 10/16/2018     10/16/2018     Echo: 10/9/18  Very poor quality images. Suboptimal image quality. Patient appears to be in atrial fibrillation. Ejection fraction appears reduced, though difficult to adequately assess due to irregular rhythm and poor image quality. LVEF 40%. Unable to adequately assess diastolic function due to arrhythmia. The right ventricle is not well visualized. Right ventricular size and function appear normal.  Mildly dilated left atrium. Mild mitral regurgitation is present.     CT chest:   Calcification of the thoracic aorta.  Coronary artery calcification       Assessment:    1. 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 Persistent atrial fibrillation (HCC)  -controlled VR on exam ( he refuses an ECG)  -CHADS Vasc score at least 4: he refuses long term anticoagulation despite being aware of increased stroke risk. He does not want any procedures performed  -continue ASA    3. Essential hypertension  -well controlled  -goal 130/80  -continue medical management    4. GISSELLE (acute kidney injury) ([facility]  -last Cr 1.4  -will repeat and if improved consider low dose ARB if pt willing    5. Obesity  -wt loss encouraged     6. H/O hematuria  -seen by urology  -plan is for cysto (he has not followed with urology)     7. Aortic atherosclerosis/coronary atherosclerosis.    -continue ASA, statin, Toprol  -continue medical management and risk factor modification    Plan:  Decrease Lasix to 20 mg daily and take additional 20 mg if needed  Continue Toprol, lipitor, ASA  Emphasized and discussed low-fat/low sodium diet, monitoring of daily weights, fluid restriction, worsening signs and symptoms

## 2018-11-03 RX ORDER — FUROSEMIDE 20 MG/1
20 TABLET ORAL 2 TIMES DAILY
Qty: 60 TABLET | Refills: 5 | Status: SHIPPED | OUTPATIENT
Start: 2018-11-03 | End: 2019-03-01

## 2018-11-06 ENCOUNTER — CARE COORDINATION (OUTPATIENT)
Dept: CARE COORDINATION | Age: 73
End: 2018-11-06

## 2018-11-16 ENCOUNTER — CARE COORDINATION (OUTPATIENT)
Dept: CASE MANAGEMENT | Age: 73
End: 2018-11-16

## 2018-11-23 ENCOUNTER — CARE COORDINATION (OUTPATIENT)
Dept: CARE COORDINATION | Age: 73
End: 2018-11-23

## 2018-12-11 DIAGNOSIS — N17.9 AKI (ACUTE KIDNEY INJURY) (HCC): ICD-10-CM

## 2018-12-11 DIAGNOSIS — I50.42 CHRONIC COMBINED SYSTOLIC AND DIASTOLIC HF (HEART FAILURE) (HCC): ICD-10-CM

## 2018-12-11 LAB
ANION GAP SERPL CALCULATED.3IONS-SCNC: 14 MMOL/L (ref 3–16)
BUN BLDV-MCNC: 20 MG/DL (ref 7–20)
CALCIUM SERPL-MCNC: 9.9 MG/DL (ref 8.3–10.6)
CHLORIDE BLD-SCNC: 99 MMOL/L (ref 99–110)
CO2: 30 MMOL/L (ref 21–32)
CREAT SERPL-MCNC: 1.2 MG/DL (ref 0.8–1.3)
GFR AFRICAN AMERICAN: >60
GFR NON-AFRICAN AMERICAN: 59
GLUCOSE BLD-MCNC: 112 MG/DL (ref 70–99)
POTASSIUM SERPL-SCNC: 4.5 MMOL/L (ref 3.5–5.1)
SODIUM BLD-SCNC: 143 MMOL/L (ref 136–145)

## 2018-12-21 ENCOUNTER — CARE COORDINATION (OUTPATIENT)
Dept: CASE MANAGEMENT | Age: 73
End: 2018-12-21

## 2018-12-21 NOTE — CARE COORDINATION
[facility] Transitions Follow Up Call    2018    Patient: Yen Ghosh  Patient : 1945   MRN: <[id number]>  Reason for Admission: There are no discharge diagnoses documented for the most recent discharge. Discharge Date: 10/18/18 RARS: Readmission Risk Score: 11      Care Transitions Subsequent and Final Call    Subsequent and Final Calls  Care Transitions Interventions  Other Interventions: Follow Up  Unable to contact left name and contact number on patients VM for return contact   No future appointments.     Asiya Mccarty RN

## 2019-01-15 ENCOUNTER — CARE COORDINATION (OUTPATIENT)
Dept: CASE MANAGEMENT | Age: 74
End: 2019-01-15

## 2019-02-05 ENCOUNTER — TELEPHONE (OUTPATIENT)
Dept: CARDIOLOGY CLINIC | Age: 74
End: 2019-02-05

## 2019-03-01 ENCOUNTER — APPOINTMENT (OUTPATIENT)
Dept: CT IMAGING | Age: 74
DRG: 062 | End: 2019-03-01
Payer: MEDICARE

## 2019-03-01 ENCOUNTER — HOSPITAL ENCOUNTER (INPATIENT)
Age: 74
LOS: 3 days | Discharge: HOME HEALTH | DRG: 062 | End: 2019-03-04
Attending: EMERGENCY MEDICINE | Admitting: INTERNAL MEDICINE
Payer: MEDICARE

## 2019-03-01 ENCOUNTER — APPOINTMENT (OUTPATIENT)
Dept: MRI IMAGING | Age: 74
DRG: 062 | End: 2019-03-01
Payer: MEDICARE

## 2019-03-01 ENCOUNTER — APPOINTMENT (OUTPATIENT)
Dept: GENERAL RADIOLOGY | Age: 74
DRG: 062 | End: 2019-03-01
Payer: MEDICARE

## 2019-03-01 DIAGNOSIS — R91.8 PULMONARY NODULES: ICD-10-CM

## 2019-03-01 DIAGNOSIS — I48.91 ATRIAL FIBRILLATION WITH RVR (HCC): ICD-10-CM

## 2019-03-01 DIAGNOSIS — J90 PLEURAL EFFUSION ON RIGHT: ICD-10-CM

## 2019-03-01 DIAGNOSIS — I63.9 CEREBROVASCULAR ACCIDENT (CVA), UNSPECIFIED MECHANISM (HCC): Primary | ICD-10-CM

## 2019-03-01 LAB
ANION GAP SERPL CALCULATED.3IONS-SCNC: 17 MMOL/L (ref 3–16)
APTT: 38.3 SEC (ref 26–36)
BASOPHILS ABSOLUTE: 0.1 K/UL (ref 0–0.2)
BASOPHILS RELATIVE PERCENT: 0.7 %
BILIRUBIN URINE: NEGATIVE
BLOOD, URINE: NEGATIVE
BUN BLDV-MCNC: 21 MG/DL (ref 7–20)
CALCIUM SERPL-MCNC: 9.5 MG/DL (ref 8.3–10.6)
CHLORIDE BLD-SCNC: 100 MMOL/L (ref 99–110)
CLARITY: CLEAR
CO2: 22 MMOL/L (ref 21–32)
COLOR: YELLOW
CREAT SERPL-MCNC: 1.2 MG/DL (ref 0.8–1.3)
EOSINOPHILS ABSOLUTE: 0.3 K/UL (ref 0–0.6)
EOSINOPHILS RELATIVE PERCENT: 3.2 %
EPITHELIAL CELLS, UA: 1 /HPF (ref 0–5)
GFR AFRICAN AMERICAN: >60
GFR NON-AFRICAN AMERICAN: 59
GLUCOSE BLD-MCNC: 136 MG/DL (ref 70–99)
GLUCOSE BLD-MCNC: 162 MG/DL (ref 70–99)
GLUCOSE URINE: NEGATIVE MG/DL
HCT VFR BLD CALC: 50 % (ref 40.5–52.5)
HEMOGLOBIN: 16.9 G/DL (ref 13.5–17.5)
HYALINE CASTS: 1 /LPF (ref 0–8)
INR BLD: 1.17 (ref 0.86–1.14)
KETONES, URINE: NEGATIVE MG/DL
LACTIC ACID: 2.3 MMOL/L (ref 0.4–2)
LEUKOCYTE ESTERASE, URINE: NEGATIVE
LV EF: 28 %
LVEF MODALITY: NORMAL
LYMPHOCYTES ABSOLUTE: 1.8 K/UL (ref 1–5.1)
LYMPHOCYTES RELATIVE PERCENT: 18.7 %
MAGNESIUM: 2 MG/DL (ref 1.8–2.4)
MCH RBC QN AUTO: 32.5 PG (ref 26–34)
MCHC RBC AUTO-ENTMCNC: 33.8 G/DL (ref 31–36)
MCV RBC AUTO: 96.3 FL (ref 80–100)
MICROSCOPIC EXAMINATION: YES
MONOCYTES ABSOLUTE: 1.1 K/UL (ref 0–1.3)
MONOCYTES RELATIVE PERCENT: 11.7 %
NEUTROPHILS ABSOLUTE: 6.1 K/UL (ref 1.7–7.7)
NEUTROPHILS RELATIVE PERCENT: 65.7 %
NITRITE, URINE: NEGATIVE
PDW BLD-RTO: 15.4 % (ref 12.4–15.4)
PERFORMED ON: ABNORMAL
PH UA: 6
PLATELET # BLD: 182 K/UL (ref 135–450)
PMV BLD AUTO: 8.3 FL (ref 5–10.5)
POTASSIUM REFLEX MAGNESIUM: 3.5 MMOL/L (ref 3.5–5.1)
PRO-BNP: 3192 PG/ML (ref 0–124)
PROTEIN UA: 30 MG/DL
PROTHROMBIN TIME: 13.3 SEC (ref 9.8–13)
RBC # BLD: 5.19 M/UL (ref 4.2–5.9)
RBC UA: 1 /HPF (ref 0–4)
SODIUM BLD-SCNC: 139 MMOL/L (ref 136–145)
SPECIFIC GRAVITY UA: >1.03
TOTAL CK: 52 U/L (ref 39–308)
TROPONIN: <0.01 NG/ML
TSH REFLEX: 3.14 UIU/ML (ref 0.27–4.2)
URINE REFLEX TO CULTURE: YES
URINE TYPE: ABNORMAL
UROBILINOGEN, URINE: 0.2 E.U./DL
WBC # BLD: 9.3 K/UL (ref 4–11)
WBC UA: 6 /HPF (ref 0–5)

## 2019-03-01 PROCEDURE — 83735 ASSAY OF MAGNESIUM: CPT

## 2019-03-01 PROCEDURE — 6360000002 HC RX W HCPCS: Performed by: INTERNAL MEDICINE

## 2019-03-01 PROCEDURE — 70496 CT ANGIOGRAPHY HEAD: CPT

## 2019-03-01 PROCEDURE — 85610 PROTHROMBIN TIME: CPT

## 2019-03-01 PROCEDURE — 93005 ELECTROCARDIOGRAM TRACING: CPT | Performed by: EMERGENCY MEDICINE

## 2019-03-01 PROCEDURE — 81001 URINALYSIS AUTO W/SCOPE: CPT

## 2019-03-01 PROCEDURE — 83036 HEMOGLOBIN GLYCOSYLATED A1C: CPT

## 2019-03-01 PROCEDURE — 3E03317 INTRODUCTION OF OTHER THROMBOLYTIC INTO PERIPHERAL VEIN, PERCUTANEOUS APPROACH: ICD-10-PCS | Performed by: EMERGENCY MEDICINE

## 2019-03-01 PROCEDURE — 92610 EVALUATE SWALLOWING FUNCTION: CPT

## 2019-03-01 PROCEDURE — 96365 THER/PROPH/DIAG IV INF INIT: CPT

## 2019-03-01 PROCEDURE — 36415 COLL VENOUS BLD VENIPUNCTURE: CPT

## 2019-03-01 PROCEDURE — 94762 N-INVAS EAR/PLS OXIMTRY CONT: CPT

## 2019-03-01 PROCEDURE — 6370000000 HC RX 637 (ALT 250 FOR IP): Performed by: INTERNAL MEDICINE

## 2019-03-01 PROCEDURE — 94664 DEMO&/EVAL PT USE INHALER: CPT

## 2019-03-01 PROCEDURE — 6360000004 HC RX CONTRAST MEDICATION: Performed by: EMERGENCY MEDICINE

## 2019-03-01 PROCEDURE — 71045 X-RAY EXAM CHEST 1 VIEW: CPT

## 2019-03-01 PROCEDURE — 85730 THROMBOPLASTIN TIME PARTIAL: CPT

## 2019-03-01 PROCEDURE — 84443 ASSAY THYROID STIM HORMONE: CPT

## 2019-03-01 PROCEDURE — 99291 CRITICAL CARE FIRST HOUR: CPT

## 2019-03-01 PROCEDURE — 82550 ASSAY OF CK (CPK): CPT

## 2019-03-01 PROCEDURE — 70450 CT HEAD/BRAIN W/O DYE: CPT

## 2019-03-01 PROCEDURE — 70498 CT ANGIOGRAPHY NECK: CPT

## 2019-03-01 PROCEDURE — 2580000003 HC RX 258: Performed by: INTERNAL MEDICINE

## 2019-03-01 PROCEDURE — 80048 BASIC METABOLIC PNL TOTAL CA: CPT

## 2019-03-01 PROCEDURE — 85025 COMPLETE CBC W/AUTO DIFF WBC: CPT

## 2019-03-01 PROCEDURE — 2100000000 HC CCU R&B

## 2019-03-01 PROCEDURE — 6360000002 HC RX W HCPCS: Performed by: EMERGENCY MEDICINE

## 2019-03-01 PROCEDURE — 83880 ASSAY OF NATRIURETIC PEPTIDE: CPT

## 2019-03-01 PROCEDURE — 84484 ASSAY OF TROPONIN QUANT: CPT

## 2019-03-01 PROCEDURE — 70551 MRI BRAIN STEM W/O DYE: CPT

## 2019-03-01 PROCEDURE — 83605 ASSAY OF LACTIC ACID: CPT

## 2019-03-01 PROCEDURE — 2580000003 HC RX 258: Performed by: EMERGENCY MEDICINE

## 2019-03-01 PROCEDURE — 93010 ELECTROCARDIOGRAM REPORT: CPT | Performed by: INTERNAL MEDICINE

## 2019-03-01 PROCEDURE — 87086 URINE CULTURE/COLONY COUNT: CPT

## 2019-03-01 PROCEDURE — 92523 SPEECH SOUND LANG COMPREHEN: CPT

## 2019-03-01 PROCEDURE — 93306 TTE W/DOPPLER COMPLETE: CPT

## 2019-03-01 PROCEDURE — 99223 1ST HOSP IP/OBS HIGH 75: CPT | Performed by: INTERNAL MEDICINE

## 2019-03-01 RX ORDER — FLUOCINONIDE 0.5 MG/G
OINTMENT TOPICAL 2 TIMES DAILY
Status: DISCONTINUED | OUTPATIENT
Start: 2019-03-01 | End: 2019-03-04 | Stop reason: HOSPADM

## 2019-03-01 RX ORDER — ATORVASTATIN CALCIUM 40 MG/1
40 TABLET, FILM COATED ORAL NIGHTLY
Status: DISCONTINUED | OUTPATIENT
Start: 2019-03-01 | End: 2019-03-04 | Stop reason: HOSPADM

## 2019-03-01 RX ORDER — SODIUM CHLORIDE 0.9 % (FLUSH) 0.9 %
10 SYRINGE (ML) INJECTION PRN
Status: DISCONTINUED | OUTPATIENT
Start: 2019-03-01 | End: 2019-03-02 | Stop reason: SDUPTHER

## 2019-03-01 RX ORDER — SODIUM CHLORIDE 0.9 % (FLUSH) 0.9 %
10 SYRINGE (ML) INJECTION PRN
Status: DISCONTINUED | OUTPATIENT
Start: 2019-03-01 | End: 2019-03-04 | Stop reason: HOSPADM

## 2019-03-01 RX ORDER — ONDANSETRON 2 MG/ML
4 INJECTION INTRAMUSCULAR; INTRAVENOUS EVERY 6 HOURS PRN
Status: DISCONTINUED | OUTPATIENT
Start: 2019-03-01 | End: 2019-03-04 | Stop reason: HOSPADM

## 2019-03-01 RX ORDER — ACETAMINOPHEN 325 MG/1
650 TABLET ORAL EVERY 4 HOURS PRN
Status: DISCONTINUED | OUTPATIENT
Start: 2019-03-01 | End: 2019-03-04 | Stop reason: HOSPADM

## 2019-03-01 RX ORDER — SODIUM CHLORIDE 0.9 % (FLUSH) 0.9 %
10 SYRINGE (ML) INJECTION EVERY 12 HOURS SCHEDULED
Status: DISCONTINUED | OUTPATIENT
Start: 2019-03-01 | End: 2019-03-02 | Stop reason: SDUPTHER

## 2019-03-01 RX ORDER — FUROSEMIDE 10 MG/ML
40 INJECTION INTRAMUSCULAR; INTRAVENOUS 2 TIMES DAILY
Status: COMPLETED | OUTPATIENT
Start: 2019-03-01 | End: 2019-03-03

## 2019-03-01 RX ORDER — DILTIAZEM HYDROCHLORIDE 5 MG/ML
10 INJECTION INTRAVENOUS ONCE
Status: DISCONTINUED | OUTPATIENT
Start: 2019-03-01 | End: 2019-03-01 | Stop reason: ALTCHOICE

## 2019-03-01 RX ORDER — DEXTROSE MONOHYDRATE 25 G/50ML
12.5 INJECTION, SOLUTION INTRAVENOUS
Status: DISCONTINUED | OUTPATIENT
Start: 2019-03-01 | End: 2019-03-01

## 2019-03-01 RX ORDER — FLUOCINONIDE GEL 0.5 MG/G
1 GEL TOPICAL DAILY PRN
Refills: 3 | COMMUNITY
Start: 2019-02-08

## 2019-03-01 RX ORDER — METOPROLOL SUCCINATE 25 MG/1
50 TABLET, EXTENDED RELEASE ORAL DAILY
Status: DISCONTINUED | OUTPATIENT
Start: 2019-03-01 | End: 2019-03-04 | Stop reason: HOSPADM

## 2019-03-01 RX ORDER — FUROSEMIDE 40 MG/1
40 TABLET ORAL DAILY
Status: DISCONTINUED | OUTPATIENT
Start: 2019-03-01 | End: 2019-03-01

## 2019-03-01 RX ORDER — SODIUM CHLORIDE 0.9 % (FLUSH) 0.9 %
10 SYRINGE (ML) INJECTION EVERY 12 HOURS SCHEDULED
Status: DISCONTINUED | OUTPATIENT
Start: 2019-03-01 | End: 2019-03-04 | Stop reason: HOSPADM

## 2019-03-01 RX ADMIN — Medication 10 ML: at 20:46

## 2019-03-01 RX ADMIN — Medication 10 ML: at 12:06

## 2019-03-01 RX ADMIN — FUROSEMIDE 40 MG: 40 TABLET ORAL at 16:45

## 2019-03-01 RX ADMIN — METOPROLOL SUCCINATE 50 MG: 25 TABLET, EXTENDED RELEASE ORAL at 11:16

## 2019-03-01 RX ADMIN — IOPAMIDOL 75 ML: 755 INJECTION, SOLUTION INTRAVENOUS at 07:36

## 2019-03-01 RX ADMIN — Medication 10 ML: at 20:44

## 2019-03-01 RX ADMIN — ALTEPLASE 9 MG: KIT at 09:11

## 2019-03-01 RX ADMIN — FUROSEMIDE 40 MG: 10 INJECTION, SOLUTION INTRAMUSCULAR; INTRAVENOUS at 18:13

## 2019-03-01 RX ADMIN — FLUOCINONIDE: 0.05 OINTMENT TOPICAL at 20:46

## 2019-03-01 RX ADMIN — ALTEPLASE 81 MG: KIT at 09:11

## 2019-03-01 RX ADMIN — ATORVASTATIN CALCIUM 40 MG: 40 TABLET, FILM COATED ORAL at 20:39

## 2019-03-01 ASSESSMENT — ENCOUNTER SYMPTOMS
COLOR CHANGE: 0
SHORTNESS OF BREATH: 0
BLOOD IN STOOL: 0
VOMITING: 0
CHOKING: 0
EYE ITCHING: 0
ABDOMINAL DISTENTION: 0
EYE REDNESS: 0
RECTAL PAIN: 0
EYE DISCHARGE: 0
ANAL BLEEDING: 0
CHEST TIGHTNESS: 0
WHEEZING: 0
NAUSEA: 0
APNEA: 0
ABDOMINAL PAIN: 0
DIARRHEA: 0
STRIDOR: 0
COUGH: 0
CONSTIPATION: 0
EYE PAIN: 0
BACK PAIN: 0
PHOTOPHOBIA: 0

## 2019-03-01 ASSESSMENT — PAIN SCALES - GENERAL
PAINLEVEL_OUTOF10: 0

## 2019-03-01 ASSESSMENT — PAIN SCALES - WONG BAKER
WONGBAKER_NUMERICALRESPONSE: 0
WONGBAKER_NUMERICALRESPONSE: 0

## 2019-03-01 NOTE — PROGRESS NOTES
Speech Language Pathology  Facility/Department: [address] CVICU  Initial Speech/Language/Cognitive Assessment    NAME: Declan Aceevdo  : [id number]   MRN: [id number]  ADMISSION DATE: 3/1/2019  ADMITTING DIAGNOSIS: Acute CVA (cerebrovascular accident) [facility]) [I63.9]     Declan Acevedo has Community acquired pneumonia; Atrial fibrillation with RVR ([facility] Acute combined systolic and diastolic HF (heart failure) ([facility] Persistent atrial fibrillation ([address] GISSELLE (acute kidney injury) ([facility] Essential hypertension; Acute on chronic systolic heart failure ([facility] PAF (paroxysmal atrial fibrillation) ([facility] Aortic atherosclerosis ([facility] Morbid obesity with BMI of 40.0-44.9, adult ([facility] Acute CVA (cerebrovascular accident) ([facility] Pleural effusion on right; and Pulmonary nodules on his problem list.    DATE ONSET: 3/1/2019    Date of Eval: 3/1/2019   Evaluating Therapist: KATERINE Sherman    Chart Review:  H&P: 68 y. o. male who presented to [facility] with left arm weakness, patient went to bed about 3:30 AM and he was in his normal state of health, he woke up at about 6 AM with left-sided weakness and numbness mainly in his arm came to emergency department CT scans and MRI done stroke team consult to TPA infused patient started to feel some improvement in his left arm weakness, denies any chest pain, shortness of breath, nausea, vomiting, headache, blurry or double vision.  No fever or chills.  Patient found to be in Atrovent fibrillation with rapid ventricular response and he was started on Cardizem drip.     2019 admitted with numbness and tingling in left arm  2019 MRI Brain:   Impression   Small area of increased DWI signal within right frontal and parietal lobe   posteriorly could represent acute infarcts.  No evidence of abnormal   susceptibility signal to suggest hemorrhage.  No corresponding FLAIR signal   abnormality.  Correlation with the timing of symptoms is recommended.       Slightly asymmetric T2 recommendations: Patient;RN;Family member  Family member consulted: daughter    Education:  Patient Education: Completed on results/recommendations  Patient Education Response: Verbalizes understanding    Therapy Time:   Individual   Time In 1320   Time Out 1350   Minutes [address].  Hernan Santiago, [id number] [facility], [id number]  Speech-Language Pathologist  3/1/2019 1:50 PM

## 2019-03-01 NOTE — PROGRESS NOTES
Occupational Therapy/Cancel      Mauricio Leaks    OT orders received . Chart reviewed. Pt received tPA today for CVA. Will hold OT assessment until Tomorrow when he is 24 hours S/P treatment w/  tPA   .   Sally Courser OT/L #[id number]  3/1/19 6:12 PM

## 2019-03-01 NOTE — CONSULTS
Neurology Consult Note  Reason for Consult: CVA    Chief complaint: left arm weakness, sensory changes    Dr Renée Mccoy MD asked me to see Sofia Fuller in consultation for evaluation of CVA    History of Present Illness:  Sofia Fuller is a 68 y.o. male who presents with left arm weakness sensory changes. I obtained my information via interview with the patient, supplemented by chart review. The patient says that he went to sleep around 0300 this morning, without any problems. He says that he woke up around 0600 in order to go to the bathroom and initially felt that he had been sleeping on his left arm wrong, as it felt weak and had a numbness and tingling sensation. He denies any additional symptoms at the time. After about 10-15 minutes of persistent symptoms, he became concerned and decided to come to the ED in order to be evaluated. When he arrived here, his BP was 150/82. His initial exam was only documented to be positive for numbness of his LUE. A stroke alert was called. He went for a CT head which was without any acute intracranial abnormalities. CTA head/neck did not identify any significant stenosis or LVO. At this point, TPA was not recommended. Upon returning form the CT scanner, his neuro exam was now positive for LUE weakness, in conjunction with the numbness. [facility] Stroke Team was advised of this change and recommended MRI brain w/o contrast per WAKE UP protocol. There were DWI changes in the right MCA territory, without any associated FLAIR changes. The patient agreed to TPA, which was administered at 0911. Currently, he feels that his symptoms are improving, though certainly not back to baseline. The patient does have persistent atrial fibrillation. Per Cardiology note from November, his CHADS VASc score was 4 and it appears anticoagulation was discussed, though the patient ultimately declined.   He apparently also elected to not have any procedures either (would imagine a Watchman device was discussed). He was on aspirin prior to admission. Medical History:  Past Medical History:   Diagnosis Date    Arrhythmia     Atrial fib    Hypertension     Sciatica      No past surgical history on file. Scheduled Meds:   diltiazem  10 mg Intravenous Once    sodium chloride flush  10 mL Intravenous 2 times per day    atorvastatin  40 mg Oral Nightly    furosemide  40 mg Oral Daily    metoprolol succinate  50 mg Oral Daily    sodium chloride flush  10 mL Intravenous 2 times per day     Prescriptions Prior to Admission:   Multiple Vitamins-Minerals (MULTIVITAMIN ADULT PO), Take by mouth  vitamin C (ASCORBIC ACID) 500 MG tablet, Take 500 mg by mouth daily  aspirin 325 MG EC tablet, Take 1 tablet by mouth daily  metoprolol succinate (TOPROL XL) 50 MG extended release tablet, Take 1 tablet by mouth daily  furosemide (LASIX) 40 MG tablet, Take 1 tablet by mouth daily  fluocinonide (LIDEX) 0.05 % gel, Apply 1 each topically daily as needed for Other For psoriasis  atorvastatin (LIPITOR) 10 MG tablet, Take 1 tablet by mouth nightly    No Known Allergies    No family history on file. History   Smoking Status    Never Smoker   Smokeless Tobacco    Never Used     History   Drug Use No     History   Alcohol Use No     ROS:  Constitutional- No weight loss or fevers  Eyes- No diplopia. No photophobia. Ears/nose/throat- No dysphagia. No Dysarthria  Cardiovascular- No palpitations. No chest pain  Respiratory- No dyspnea. No Cough  Gastrointestinal- No Abdominal pain. No Vomiting. Genitourinary- No incontinence. No urinary retention  Musculoskeletal- No myalgia. No arthralgia  Skin- No rash. No easy bruising. Psychiatric- No depression. No anxiety  Endocrine- No diabetes. No thyroid issues. Hematologic- No bleeding difficulty. No fatigue  Neurologic- + weakness. No Headache.     Exam:  Blood pressure 128/80, pulse 102, temperature 97.7 °F (36.5 °C), temperature source Oral, resp. rate 23, height 6' 1\" (1.854 m), weight (!) 301 lb 8 oz (136.8 kg), SpO2 95 %. Constitutional    Vital signs: BP, HR, and RR reviewed   General alert, no distress, well-nourished  Eyes: unable to visualize the fundi  Cardiovascular: pulses symmetric in all 4 extremities. + mild peripheral edema. Psychiatric: cooperative with examination, no psychotic behavior noted. Neurologic  Mental status:   orientation to person, place, time, situation. General fund of knowledge grossly intact   Memory grossly intact   Attention intact as able to attend well to the exam     Language fluent in conversation. No aphasia. Comprehension intact; follows simple commands  Cranial nerves:   CN2: visual fields full w/o extinction on confrontational testing  CN 3,4,6: extraocular muscles intact. Pupils are equal, round, reactive bilaterally. CN5: facial sensation symmetric   CN7: face symmetric without dysarthria  CN8: hearing grossly intact  CN9: palate elevated symmetrically  CN11: trap full strength on shoulder shrug  CN12: tongue midline with protrusion  Strength: LUE drift, weakness, though antigravity. Good strength in his remaining limbs. Deep tendon reflexes: normal in all 4 extremities  Sensory: diminished sensation more distally involving his LUE, and possible his LLE. Cerebellar/coordination: finger nose finger normal without ataxia in his RUE. Clumsy LUE. Tone: normal in all 4 extremities  Gait: deferred at this time. Labs  Glucose 136  Na 139  K 3.5  BUN 21  Creatinine 1.2  Mg 2.00  Lactic acid 2.3  TSH 3.14    WBC 9.3K  Hg 16.9  Platelets 154  INR 4.34    CK 52  BNP 3192    Studies  MRI brain w/o contrast 3/1/19, independently reviewed  Small area of increased DWI signal within the right frontal and parietal lobe. No hemorrhage. No corresponding FLAIR signal abnormality.     Slightly asymmetric T2 signal within the left thalamus and left midbrain, may be artifactual.

## 2019-03-01 NOTE — ED NOTES
Pt returned from MRI. Pt notified me he needed to urinate. I explained to pt he would have to use an urinal in bed until the results of the MRI came back due to his weakness on the left side. Pt began to argue with me and stated that he stood up while in MRI and that I could just ask the tech I explained to him that due to his weakness on the left side that I was not going to take the risk of him falling. Pt is not happy but daughter states she understands.       Juana Perrin, RN  03/01/19 [id number]
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
## 2019-03-01 NOTE — CONSULTS
Stroke Team Consult note:    70 yo M with ho afib and no AC due to hematuria in past assoc with nephrolithiasis. Admitted with left UE weakness. Initially resolving but then recurred. Last known well 3am.  Initial NIHSS 3 (LUE only, Disabling). MRI with DWI+/FLAIR neg changes to suggest <4.5 hours since stroke onset. ED arrrival 7:01am  IV tPA given at 9:11am.   min    Exam in ED after tPA at about 11:30am:  NIHSS 1 (LUE drift only). IMP:   Rt cortical stroke treated with extended time window thrombolysis. NIHSS 3->1  Likely cardioembolic    REC:  Post tPA care as per protocol. Neurology consult. Consider longterm DOAC given high risk for recurrence. Please call if worsened sxs; n/v/ha; BP>180/105.     CRITICAL CARE TIME=20 min    Nisha Lee  [facility]  [phone] (mobile)

## 2019-03-01 NOTE — ED TRIAGE NOTES
Pt is a 68year old male who presents in the ED today via EMS for left arm numbness and tingling that he states started at 0600 this morning when he woke up to go to the bathroom. Pt states he went to bed between 3-4am.  Pt LKW is between 3-4am.  Pt was placed on the monitor. Breathing is easy, even and unlabored. Skin is warm and dry. Pt is alert and oriented x4. Call light in reach. Daughter is on her way. Upon assessment pt is complaining of left arm numbness and tingling but states he can still make a fist and has a .

## 2019-03-01 NOTE — H&P
Hospital Medicine History & Physical      PCP: No primary care provider on file. Date of Admission: 3/1/2019    Date of Service: Pt seen/examined on 03/01/2019 and Admitted to Inpatient with expected LOS greater than two midnights due to medical therapy. Chief Complaint:  Left arm weakness       History Of Present Illness:      68 y.o. male who presented to [facility] with left arm weakness, patient went to bed about 3:30 AM and he was in his normal state of health, he woke up at about 6 AM with left-sided weakness and numbness mainly in his arm came to emergency department CT scans and MRI done stroke team consult to TPA infused patient started to feel some improvement in his left arm weakness, denies any chest pain, shortness of breath, nausea, vomiting, headache, blurry or double vision. No fever or chills. Patient found to be in Atrovent fibrillation with rapid ventricular response and he was started on Cardizem drip. Past Medical History:          Diagnosis Date    Arrhythmia     Atrial fib    Hypertension     Sciatica        Past Surgical History:      No past surgical history on file. Medications Prior to Admission:      Prior to Admission medications    Medication Sig Start Date End Date Taking? Authorizing Provider   Multiple Vitamins-Minerals (MULTIVITAMIN ADULT PO) Take by mouth   Yes Historical Provider, MD   vitamin C (ASCORBIC ACID) 500 MG tablet Take 500 mg by mouth daily   Yes Historical Provider, MD   aspirin 325 MG EC tablet Take 1 tablet by mouth daily 10/19/18  Yes Aurora Jeffers DO   metoprolol succinate (TOPROL XL) 50 MG extended release tablet Take 1 tablet by mouth daily 10/19/18  Yes Aurora Jeffers DO   furosemide (LASIX) 40 MG tablet Take 1 tablet by mouth daily 10/19/18  Yes Aurora Jeffers DO   atorvastatin (LIPITOR) 10 MG tablet Take 1 tablet by mouth nightly 10/18/18   Aurora Jeffers DO       Allergies:  Patient has no known allergies.     Social History:      The patient currently lives at home    TOBACCO:   reports that he has never smoked. He has never used smokeless tobacco.  ETOH:   reports that he does not drink alcohol. Family History:      Reviewed in detail and negative for DM    No family history on file. REVIEW OF SYSTEMS:   Pertinent positives as noted in the HPI. All other systems reviewed and negative. PHYSICAL EXAM PERFORMED:    /78   Pulse 102   Temp 97.7 °F (36.5 °C) (Oral)   Resp 20   Ht 6' 1\" (1.854 m)   Wt (!) 301 lb 8 oz (136.8 kg)   SpO2 96%   BMI 39.78 kg/m²     General appearance:  No apparent distress  HEENT:  Normal cephalic. Neck: Supple  Respiratory:  Normal respiratory effort. Clear to auscultation, bilaterally without Rales/Wheezes/Rhonchi. Cardiovascular:  Regular rate and rhythm with normal S1/S2 without murmurs, rubs or gallops. Abdomen: Soft, non-tender  Musculoskeletal:  No clubbing  Skin: Skin color, texture, turgor normal.  No rashes or lesions. Neurologic:  4/5 LUE, otherwise 5/5, CRANIAL NERVES NORMAL AS EXAMINED   Psychiatric:  Alert and oriented  Capillary Refill: Brisk,< 3 seconds   Peripheral Pulses: +2 palpable, equal bilaterally       Labs:     Recent Labs      03/01/19   0707   WBC  9.3   HGB  16.9   HCT  50.0   PLT  182     Recent Labs      03/01/19   0707   NA  139   K  3.5   CL  100   CO2  22   BUN  21*   CREATININE  1.2   CALCIUM  9.5     No results for input(s): AST, ALT, BILIDIR, BILITOT, ALKPHOS in the last 72 hours.   Recent Labs      03/01/19   0707   INR  1.17*     Recent Labs      03/01/19   0707   CKTOTAL  52   TROPONINI  <0.01       Urinalysis:      Lab Results   Component Value Date    NITRU Negative 10/16/2018    WBCUA 0-2 10/16/2018    RBCUA 0-2 10/16/2018    BLOODU Negative 10/16/2018    SPECGRAV 1.009 10/16/2018    GLUCOSEU Negative 10/16/2018       Radiology:     CXR: I have reviewed the CXR with the following interpretation: RIGHT PLEURAL EFFUSION   EKG:  I have reviewed the EKG with the following interpretation: AFIB RVR    MRI BRAIN WO CONTRAST MR WITNESS   Preliminary Result   Small area of increased DWI signal within right frontal and parietal lobe   posteriorly could represent acute infarcts. No evidence of abnormal   susceptibility signal to suggest hemorrhage. No corresponding FLAIR signal   abnormality. Correlation with the timing of symptoms is recommended. Slightly asymmetric T2 signal within the left thalamus, and left midbrain. This is of unclear etiology, and may be artifactual.  Consider short interval   follow-up. Chronic small vessel ischemic white matter disease and diffuse cerebral   volume loss. CTA HEAD W CONTRAST   Preliminary Result   No high-grade stenosis or focal occlusion involving the intracranial cervical   vasculature. 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 This is age indeterminate but favored to be chronic. No   discrete dissection flap is identified. No evidence of intracranial aneurysm. Partially imaged moderate to large right pleural effusion with findings   suggestive of pulmonary interstitial edema. Enlarged mediastinal lymph   nodes. Cluster of pulmonary nodules within left lung apex measuring 15 x 9   mm. Consider follow-up chest CT for further evaluation. CTA NECK W CONTRAST   Preliminary Result   No high-grade stenosis or focal occlusion involving the intracranial cervical   vasculature. 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 This is age indeterminate but favored to be chronic. No   discrete dissection flap is identified. No evidence of intracranial aneurysm. Partially imaged moderate to large right pleural effusion with findings   suggestive of pulmonary interstitial edema. Enlarged mediastinal lymph   nodes.   Cluster of pulmonary nodules within left lung apex measuring 15 x 9 mm.  Consider follow-up chest CT for further evaluation. XR CHEST PORTABLE   Final Result   There is a small right-sided pleural effusion which may be loculated given   the imaging appearance. Mild pulmonary edema. CT Head WO Contrast   Final Result   1. No acute intracranial abnormality. Critical results were called by Dr. Lee Chen. Cathryn Salazar MD to Kahty Pacheco on   3/1/2019 at 07:38. ASSESSMENT:    Active Hospital Problems    Diagnosis Date Noted    Acute CVA (cerebrovascular accident) ([facility] [I63.9] 03/01/2019    Pleural effusion on right [J90] 03/01/2019    Pulmonary nodules [R91.8] 03/01/2019    Morbid obesity with BMI of 40.0-44.9, adult ([facility] [E66.01, Z68.41]     Essential hypertension [I10]     Atrial fibrillation with RVR ([facility] [I48.91]          PLAN:    1. Acute right-sided stroke, patient will be admitted to intensive care unit, TPA initiated, closely monitor for any potential complications, patient is at risk for intracranial bleed, no aspirin or heparin product for the next 24 hours, neurology consulted, will order echo, dementia monitoring, will follow on results and adjust management as needed, discussed with patient, all questions answered. 2.  Atrophy fibrillation with rapid ventricular response, currently on Cardizem drip, will continue. 3.  Essential hypertension, resume by mouth medication. 4.  Multiple pulmonary nodules along was right-sided pleural effusion, to note that patient did have history of pneumonia, never been smoker no recent weight loss, will consult pulmonary for further recommendation. 5. .  Obesity, complicated current presentation discussed weight loss and will discuss further. 6.  Pleural effusion on the right, as above.     DVT Prophylaxis: SCD  Diet: Diet NPO Effective Now  Code Status: Prior    Critical care time including but not limited to physical exam, monitoring and adjusting IV medication, discussion was other subspecialty, in a patient with potential life-threatening complications and not including any procedure time 47 minutes. Loreda Meckel, MD    Thank you No primary care provider on file. for the opportunity to be involved in this patient's care. If you have any questions or concerns please feel free to contact me at [phone].

## 2019-03-01 NOTE — PROGRESS NOTES
diminished. Patient sitting up in bed. Vital signs are stable. Daughter at bedside. Call light within reach, bed alarm on for safety.

## 2019-03-01 NOTE — ED PROVIDER NOTES
[facility]  eMERGENCY dEPARTMENT eNCOUnter      Pt Name: Raghav Rollins  MRN: [id number]  Sofiagfcedric 1945  Date of evaluation: 3/1/2019  Provider: Asad Rush MD    CHIEF COMPLAINT       Chief Complaint   Patient presents with    Numbness     numbness and tingling in left arm going down to hand started 1 hour ago     [address] Cortes is a 68 y.o. male who presents to the emergency department with numbness, weakness,  and tingling in left arm. Went to bed last night at 3am (Therefore last known well 4.15 hours PTA). Awoke this morning with left arm weakness, numbness, tingling. States that on arrival states that weakness has resolved but still feels mild numbness to the left arm. No slurring of speech, facial droop. No other associated symptoms. To note, Hx pAfib high rupinder vasc score supposed to be on anticoagulation per cardiology but per patient choice is not on any currently. Nursing Notes were reviewed. REVIEW OF SYSTEMS       Review of Systems   Constitutional: Negative for activity change, appetite change, chills, diaphoresis, fatigue, fever and unexpected weight change. HENT: Negative for congestion, dental problem, drooling, ear discharge and ear pain. Eyes: Negative for photophobia, pain, discharge, redness, itching and visual disturbance. Respiratory: Negative for apnea, cough, choking, chest tightness, shortness of breath, wheezing and stridor. Cardiovascular: Negative for chest pain, palpitations and leg swelling. Gastrointestinal: Negative for abdominal distention, abdominal pain, anal bleeding, blood in stool, constipation, diarrhea, nausea, rectal pain and vomiting. Endocrine: Negative for cold intolerance and heat intolerance. Genitourinary: Negative for decreased urine volume and urgency. Musculoskeletal: Negative for arthralgias and back pain. Skin: Negative for color change and pallor. Neurological: Positive for numbness. Negative for dizziness and facial asymmetry. Hematological: Negative for adenopathy. Does not bruise/bleed easily. Psychiatric/Behavioral: Negative for agitation, behavioral problems, confusion and decreased concentration. Except as noted above the remainder of the review of systems was reviewed and negative. PAST MEDICAL HISTORY     Past Medical History:   Diagnosis Date    Arrhythmia     Atrial fib    Hypertension     Sciatica        SURGICAL HISTORY     No past surgical history on file. CURRENT MEDICATIONS       Previous Medications    ASPIRIN 325 MG EC TABLET    Take 1 tablet by mouth daily    ATORVASTATIN (LIPITOR) 10 MG TABLET    Take 1 tablet by mouth nightly    FUROSEMIDE (LASIX) 20 MG TABLET    Take 1 tablet by mouth 2 times daily    FUROSEMIDE (LASIX) 40 MG TABLET    Take 1 tablet by mouth daily    METOPROLOL SUCCINATE (TOPROL XL) 50 MG EXTENDED RELEASE TABLET    Take 1 tablet by mouth daily    MULTIPLE VITAMINS-MINERALS (MULTIVITAMIN ADULT PO)    Take by mouth    VITAMIN C (ASCORBIC ACID) 500 MG TABLET    Take 500 mg by mouth daily       ALLERGIES     Patient has no known allergies. FAMILY HISTORY     No family history on file. SOCIAL HISTORY       Social History     Social History    Marital status:      Spouse name: N/A    Number of children: N/A    Years of education: N/A     Social History Main Topics    Smoking status: Never Smoker    Smokeless tobacco: Never Used    Alcohol use No    Drug use: No    Sexual activity: Not on file     Other Topics Concern    Not on file     Social History Narrative    No narrative on file       PHYSICAL EXAM       Vitals:    03/01/19 0705   Pulse: 108   Resp: 25   Temp: 97.7 °F (36.5 °C)   TempSrc: Oral   Weight: (!) 301 lb 8 oz (136.8 kg)   Height: 6' 1\" (1.854 m)     Physical Exam   Constitutional: He appears well-developed. No distress. HENT:   Head: Normocephalic and atraumatic. Reviewed   BASIC METABOLIC PANEL W/ REFLEX TO MG FOR LOW K - Abnormal; Notable for the following:        Result Value    Anion Gap 17 (*)     Glucose 162 (*)     BUN 21 (*)     GFR Non- 59 (*)     All other components within normal limits    Narrative:     Performed at:  [address] [facility]   Phone [phone]   LACTIC ACID, PLASMA - Abnormal; Notable for the following:     Lactic Acid 2.3 (*)     All other components within normal limits    Narrative:     Performed at:  [address] [facility]   Phone [phone]   APTT - Abnormal; Notable for the following:     aPTT 38.3 (*)     All other components within normal limits    Narrative:     Performed at:  [address] [employer]   Phone [phone]   PROTIME-INR - Abnormal; Notable for the following:     Protime 13.3 (*)     INR 1.17 (*)     All other components within normal limits    Narrative:     Performed at:  [address] [facility]   Phone [phone]   BRAIN NATRIURETIC PEPTIDE - Abnormal; Notable for the following:     Pro-BNP 3,192 (*)     All other components within normal limits    Narrative:     Performed at:  [address] [facility]   Phone [phone]   POCT GLUCOSE - Abnormal; Notable for the following:     POC Glucose 136 (*)     All other components within normal limits    Narrative:     Performed at:  [address] [employer]   Phone [phone]   CBC WITH AUTO DIFFERENTIAL    Narrative:     Performed at:  [address] [facility]   Phone [phone]   TROPONIN Narrative:     Performed at:  [facility]  [address] [place], [place] [address]   Phone [phone]   CK    Narrative:     Performed at:  [facility]  [address] [place], [place] [address]   Phone [phone]   TSH WITH REFLEX    Narrative:     Performed at:  [facility]  [address]  [place], [place] [address]   Phone [phone]   MAGNESIUM    Narrative:     Performed at:  [facility]  [address]  [place], [place] [address]   Phone [phone]   URINE RT REFLEX TO CULTURE       All other labs were withinnormal range or not returned as of this dictation. EMERGENCY DEPARTMENT COURSE and DIFFERENTIAL DIAGNOSIS/MDM:     1am- Dr Harsha Mathew for stroke team paged after [address]- No tPa as symptoms resolving and NIH 0.     744am- Patient with worsening left arm weakness, called Dr Marino Burrell obtaining MRI and consider tPa if DWI + and FLAIR negative, Will await CTA results. 815am- MRI results obtained, called and discussed with Dr Harsha Mathew for stroke, based off results and protocol it was determined that patient should get tPa (went to bed last night at 4am awoke at 6am with left arm tingling and mild weakness resolving NIH 0 at bedside, upon going to CT weakness came back around 6am and NIH 2). No thrombectomy as no occlusion on CTA, in afib RVR for which Cardizem started. Embolus from afib most likely cause of stroke. 941- Will admit to hospitalist    CRITICAL CARE TIME   Total Critical Caretime was 39 minutes, excluding separately reportable procedures. There was a high probability of clinically significant/life threatening deterioration in the patient's condition which required my urgent intervention. PROCEDURES:  Unlessotherwise noted below, none    FINAL IMPRESSION      1.  Cerebrovascular accident (CVA), unspecified mechanism ([address]

## 2019-03-01 NOTE — PROGRESS NOTES
Speech Language Pathology  Facility/Department: [address] CVICU   BEDSIDE SWALLOW EVALUATION    NAME: Raghav Rollins  : [id number]  MRN: [id number]    ADMISSION DATE: 3/1/2019       ADMITTING DIAGNOSIS: Acute CVA (cerebrovascular accident) [facility]) [I63.9]      Raghav Rollins has Community acquired pneumonia; Atrial fibrillation with RVR ([facility] Acute combined systolic and diastolic HF (heart failure) ([facility] Persistent atrial fibrillation ([address] GISSELLE (acute kidney injury) ([facility] Essential hypertension; Acute on chronic systolic heart failure ([facility] PAF (paroxysmal atrial fibrillation) ([facility] Aortic atherosclerosis ([facility] Morbid obesity with BMI of 40.0-44.9, adult ([facility] Acute CVA (cerebrovascular accident) ([facility] Pleural effusion on right; and Pulmonary nodules on his problem list.       ONSET DATE: 2019    Chart Review:  H&P: 68 y.o. male who presented to [facility] with left arm weakness, patient went to bed about 3:30 AM and he was in his normal state of health, he woke up at about 6 AM with left-sided weakness and numbness mainly in his arm came to emergency department CT scans and MRI done stroke team consult to TPA infused patient started to feel some improvement in his left arm weakness, denies any chest pain, shortness of breath, nausea, vomiting, headache, blurry or double vision. No fever or chills. Patient found to be in Atrovent fibrillation with rapid ventricular response and he was started on Cardizem drip.    2019 admitted with numbness and tingling in left arm  2019 MRI Brain:   Impression   Small area of increased DWI signal within right frontal and parietal lobe   posteriorly could represent acute infarcts.  No evidence of abnormal   susceptibility signal to suggest hemorrhage.  No corresponding FLAIR signal   abnormality.  Correlation with the timing of symptoms is recommended.       Slightly asymmetric T2 signal within the left thalamus, and left midbrain.    This is of unclear etiology, and may be artifactual.  Consider short interval   follow-up.       Chronic small vessel ischemic white matter disease and diffuse cerebral   volume loss. 03/01/2019 CXR:There is a small right-sided pleural effusion which may be loculated given the imaging appearance. Mild pulmonary edema. Date of Eval: 3/1/2019  Evaluating Therapist: Wilberto Carlson    Current Diet level:  Current Diet : NPO  Current Liquid Diet : NPO    Primary Complaint  RN reports no difficulty with swallowing at this date. Pain:  Pain Assessment  Patient Currently in Pain: Denies  Pain Assessment: 0-10  Pain Level: 0  Patient's Stated Pain Goal: No pain    Reason for Referral  Cait Richard was referred for a bedside swallow evaluation to assess the efficiency of his swallow function, identify signs and symptoms of aspiration and make recommendations regarding safe dietary consistencies, effective compensatory strategies, and safe eating environment. Impression  · Accepted and tolerated swallow evaluation at bedside. · Patient was alert, cooperative, and pleasant. · Patient was Ox4 and can follow complex directions. · Oral and phayrngeal stages of the swallow appear to be [facility]. · All PO trials were were tolerated with no overt s/s of penetration/aspiration. · Recommend regular/thin diet as the safest, most optimal diet at this date. · F/u for safe diet tolerance 1-3 times while in actue admisison. Dysphagia Outcome Severity Scale: Level 7: Normal in all situations     Treatment Plan  Requires SLP Intervention: Yes  Duration/Frequency of Treatment: ST to f/u 1-3 times for safe diet tolerance while in acute admission  D/C Recommendations: Home independently; To be determined    Recommended Diet and Intervention  Diet Solids Recommendation: Regular  Liquid Consistency Recommendation:  Thin  Recommended Form of Meds: PO    Compensatory Swallowing Strategies  Remain upright for 30-45 minutes after meals;Upright as possible for all oral intake;Small bites/sips;Eat/Feed slowly    Treatment/Goals  The patient will tolerate recommended diet without observed clinical signs of aspiration    General  Chart Reviewed: Yes  Subjective: Accepted and tolerated swallow evaluation at bedside  Behavior/Cognition: Alert; Cooperative;Pleasant mood  Follows Directions: Complex  Dentition: Adequate  Patient Positioning: Upright in bed  Baseline Vocal Quality: Normal  Volitional Cough: Strong  Prior Dysphagia History: Patient denies history of dysphagia   Consistencies Administered: Reg solid;Puree; Thin - straw;Honey - cup;Nectar - cup    Vision/Hearing  Vision: Within Functional Limits  Hearing: Within functional limits    Oral Motor Deficits  Oral Motor: Exceptions to [facility]  Labial Symmetry: Abnormal symmetry right  Lingual ROM: Reduced right    Oral Phase Dysfunction  Oral Phase: WFL     Indicators of Pharyngeal Phase Dysfunction  Pharyngeal Phase: WFL    Prognosis  Prognosis for safe diet advancement: good  Individuals consulted  Consulted and agree with results and recommendations: Patient; Family member;RN  Family member consulted: daughter    Education  Patient Education Response: Verbalizes understanding;Demonstrated understanding           Therapy Time  SLP Individual Minutes  Time In: 1300  Time Out: 5027  Minutes: [address], [place].  Clinician   3/1/2019 1:20 PM    This Speech Language Pathologist was present directed the patient's care, made skilled judgement and was responsible for assessment and treatment. Lory Ambrose, #[id number]  Speech-Language Pathologist

## 2019-03-02 ENCOUNTER — APPOINTMENT (OUTPATIENT)
Dept: CT IMAGING | Age: 74
DRG: 062 | End: 2019-03-02
Payer: MEDICARE

## 2019-03-02 LAB
CHOLESTEROL, TOTAL: 120 MG/DL (ref 0–199)
ESTIMATED AVERAGE GLUCOSE: 114 MG/DL
HBA1C MFR BLD: 5.6 %
HCT VFR BLD CALC: 48.7 % (ref 40.5–52.5)
HDLC SERPL-MCNC: 50 MG/DL (ref 40–60)
HEMOGLOBIN: 16.3 G/DL (ref 13.5–17.5)
LDL CHOLESTEROL CALCULATED: 57 MG/DL
MCH RBC QN AUTO: 32.1 PG (ref 26–34)
MCHC RBC AUTO-ENTMCNC: 33.4 G/DL (ref 31–36)
MCV RBC AUTO: 96.2 FL (ref 80–100)
PDW BLD-RTO: 15.4 % (ref 12.4–15.4)
PLATELET # BLD: 210 K/UL (ref 135–450)
PMV BLD AUTO: 9 FL (ref 5–10.5)
RBC # BLD: 5.06 M/UL (ref 4.2–5.9)
TRIGL SERPL-MCNC: 63 MG/DL (ref 0–150)
VLDLC SERPL CALC-MCNC: 13 MG/DL
WBC # BLD: 10 K/UL (ref 4–11)

## 2019-03-02 PROCEDURE — 97162 PT EVAL MOD COMPLEX 30 MIN: CPT

## 2019-03-02 PROCEDURE — 97530 THERAPEUTIC ACTIVITIES: CPT

## 2019-03-02 PROCEDURE — 6360000002 HC RX W HCPCS: Performed by: INTERNAL MEDICINE

## 2019-03-02 PROCEDURE — 97167 OT EVAL HIGH COMPLEX 60 MIN: CPT

## 2019-03-02 PROCEDURE — 80061 LIPID PANEL: CPT

## 2019-03-02 PROCEDURE — 99222 1ST HOSP IP/OBS MODERATE 55: CPT | Performed by: INTERNAL MEDICINE

## 2019-03-02 PROCEDURE — 2580000003 HC RX 258: Performed by: INTERNAL MEDICINE

## 2019-03-02 PROCEDURE — 97116 GAIT TRAINING THERAPY: CPT

## 2019-03-02 PROCEDURE — 2100000000 HC CCU R&B

## 2019-03-02 PROCEDURE — 2580000003 HC RX 258: Performed by: EMERGENCY MEDICINE

## 2019-03-02 PROCEDURE — 85027 COMPLETE CBC AUTOMATED: CPT

## 2019-03-02 PROCEDURE — 70450 CT HEAD/BRAIN W/O DYE: CPT

## 2019-03-02 PROCEDURE — 6370000000 HC RX 637 (ALT 250 FOR IP): Performed by: PSYCHIATRY & NEUROLOGY

## 2019-03-02 PROCEDURE — 97110 THERAPEUTIC EXERCISES: CPT

## 2019-03-02 PROCEDURE — 36415 COLL VENOUS BLD VENIPUNCTURE: CPT

## 2019-03-02 PROCEDURE — 6370000000 HC RX 637 (ALT 250 FOR IP): Performed by: INTERNAL MEDICINE

## 2019-03-02 RX ORDER — ASPIRIN 81 MG/1
81 TABLET, CHEWABLE ORAL DAILY
Status: DISCONTINUED | OUTPATIENT
Start: 2019-03-02 | End: 2019-03-03

## 2019-03-02 RX ADMIN — FUROSEMIDE 40 MG: 10 INJECTION, SOLUTION INTRAMUSCULAR; INTRAVENOUS at 08:09

## 2019-03-02 RX ADMIN — Medication 10 ML: at 08:10

## 2019-03-02 RX ADMIN — ATORVASTATIN CALCIUM 40 MG: 40 TABLET, FILM COATED ORAL at 21:49

## 2019-03-02 RX ADMIN — FUROSEMIDE 40 MG: 10 INJECTION, SOLUTION INTRAMUSCULAR; INTRAVENOUS at 18:27

## 2019-03-02 RX ADMIN — ENOXAPARIN SODIUM 40 MG: 40 INJECTION SUBCUTANEOUS at 21:00

## 2019-03-02 RX ADMIN — Medication 10 ML: at 21:53

## 2019-03-02 RX ADMIN — METOPROLOL SUCCINATE 50 MG: 25 TABLET, EXTENDED RELEASE ORAL at 11:08

## 2019-03-02 RX ADMIN — ASPIRIN 81 MG 81 MG: 81 TABLET ORAL at 21:49

## 2019-03-02 ASSESSMENT — PAIN SCALES - GENERAL
PAINLEVEL_OUTOF10: 0

## 2019-03-02 ASSESSMENT — PAIN SCALES - WONG BAKER: WONGBAKER_NUMERICALRESPONSE: 0

## 2019-03-02 NOTE — PROGRESS NOTES
3/1/19  1904: Shift report received from Jillian COLON/Kristi student nurse. 1934: Initial assessment completed, pt in bed wit daughter at bedside. Lt sided weakness noted. alert, oriented and follows commands, clear speech, stated feeling of tingling and numbness in Lt extremity. 2034: Initially stated he was taking 10mg at home so wouldn't take 40mg. This RN educated pt on the reason behind the increased dose. Pt verbalized understanding. NIHSS reassessment remain unchanged, pt able to follow commands but with little drift in the LT arm.  2100: Pt preferred to use urinal in bathroom but not by the bedside. 2200: Education on SCDs reinforced. Pt insisted SCDs be removed, this RN educated pt and daughter on imnportance of SDCs. Pt verbalized understanding. 3/2/19  0000: Reassessment remain unchanged, VSS, Lt hand slightly drift but not touching the bed. Decreased sensation to the lower part of lt arm than upper part, Pt able to ambulate to the bathroom to void in a urinal.  0200: Pt helped to sit up in a chair for about 15mins, then returned back to bed.   0400: Pt ambulate to bathroom to void, reassessment still unchanged with slight drifting of lt arm. Decreased sensation to lt lower hand than the upper part. 2396: End of shift report given to Audrene Cabot, RN.   Electronically signed by Yin Brandon RN on 3/2/2019 at 7:38 AM

## 2019-03-02 NOTE — PROGRESS NOTES
4 Eyes Skin Assessment     The patient is being assess for  Admission    I agree that 2 RN's have performed a thorough Head to Toe Skin Assessment on the patient. ALL assessment sites listed below have been assessed. Areas assessed by both nurses:   [x]   Head, Face, and Ears     [x]   Shoulders, Back, and Chest  [x]   Arms, Elbows, and Hands   []   Coccyx, Sacrum, and IschIum   REFUSED  [x]   Legs, Feet, and Heels        Does the Patient have Skin Breakdown?   No         Tera Prevention initiated:  No   Wound Care Orders initiated:  No      [facility] nurse consulted for Pressure Injury (Stage 3,4, Unstageable, DTI, NWPT, and Complex wounds), New and Established Ostomies:  No      Nurse 1 eSignature: Electronically signed by Nava Jensen RN on 3/1/19 at 8:29 PM    **SHARE this note so that the co-signing nurse is able to place an eSignature**    Nurse 2 eSignature: Electronically signed by Joanna Stewart RN on 3/1/19 at 10:50 PM

## 2019-03-02 NOTE — PLAN OF CARE
Problem: Falls - Risk of:  Goal: Will remain free from falls  Will remain free from falls   Outcome: Ongoing  Patient remained free of falls throughout entire shift. Patient educated on safe ambulation techniques and how to keep environment safe for ambulation. Patient also educated on bleeding precautions and risk of falling on tPA medication. Bed alarm placed for safety. Daughter at bedside, also educated on fall precautions. Problem: HEMODYNAMIC STATUS  Goal: Patient has stable vital signs and fluid balance  Outcome: Ongoing  Blood pressure assessed frequently per protocol. Maintained within limits. SBP less than 180 and DBP less than 105. Monitoring I&Os. Problem: ACTIVITY INTOLERANCE/IMPAIRED MOBILITY  Goal: Mobility/activity is maintained at optimum level for patient  Outcome: Ongoing  Activity improving. LUE gradually increasing function. Problem: Anxiety/Stress:  Goal: Level of anxiety will decrease  Level of anxiety will decrease   Outcome: Ongoing  Patient with high levels of anxiety. Educated frequently on nursing interventions. Emotional support provided and patient encouraged to express emotions. Daughter at the bedside for support system. Problem: Aspiration:  Goal: Absence of aspiration  Absence of aspiration   Outcome: Ongoing  Patient met with speech therapy. Assessed and able to have PO intake per recommendations. Tolerating PO fluids and PO intake without any S/S of aspiration. SpO2 maintaining in the 90s.

## 2019-03-02 NOTE — PLAN OF CARE
Problem: Falls - Risk of:  Goal: Will remain free from falls  Will remain free from falls   Outcome: Ongoing  Fall risk precautions in place. Call light within reach. Frequent visual monitoring in place q1h. Bed/chair alarm activated as applicable. Problem: ACTIVITY INTOLERANCE/IMPAIRED MOBILITY  Goal: Mobility/activity is maintained at optimum level for patient  Pt educated to use call light for assistance to the bathroom, pt verbalized understanding.

## 2019-03-02 NOTE — PROGRESS NOTES
Occupational Therapy   Occupational Therapy Initial Assessment  Date: 3/2/2019   Patient Name: Dannie Delcid  MRN: [id number]     : 1945    Date of Service: 3/2/2019  Assessment: Pt is 69 yo male admitted w/ CVA involving primarily L UE function and sensation. Pt now s/p tPA and has return in major muscle groups with residual weaknes and  impaired coordination. Pt was fully independent PTA. Pt currently requiring S-Min A for ADLs and S-for mobility . Pt safe ti return home with dtr's assist . recommend Out pt OT for UE and PT assessment to assure pt has returned to baseline for all functional mobility tasks. Pt fatigued after OT session. Pt and dtr aware if pt does not tolerate activity involved with getting to out pt therapy he may qualify for homcare therapies instead initiallty and then transition to Out pt. Discharge Recommendations:  Dannie Delcid scored a 18/24 on the AM-PAC ADL Inpatient form. Current research shows that an AM-PAC score of 18 or greater is typically associated with a discharge to the patient's home setting. Based on the patients AM-PAC score and their current ADL deficits, it is recommended that the patient have 2-3 sessions per week of Occupational Therapy at d/c to increase the patients independence. Patient would benefit from continued therapy after discharge, Outpatient OT, Home with assist PRN  OT Equipment Recommendations  Equipment Needed: No      Patient Diagnosis(es): The primary encounter diagnosis was Cerebrovascular accident (CVA), unspecified mechanism ([address] A diagnosis of Atrial fibrillation with RVR (HCC) was also pertinent to this visit. has a past medical history of Arrhythmia, Cerebral artery occlusion with cerebral infarction [facility]), CHF (congestive heart failure) ([address] Hypertension, and Sciatica. has a past surgical history that includes fracture surgery and eye surgery.            Restrictions  Restrictions/Precautions  Restrictions/Precautions: Fall Risk    Subjective   General  Chart Reviewed: Yes  Patient assessed for rehabilitation services?: Yes(Needs out pt OT / PT)  Additional Pertinent Hx:  Hx per S Dexterpavan:\" 67 y/o male admit 3/1/19 with Sxs of L UE Numb/Tingling. MRI Brain - Small Area of Increase Signal R Frontal Parietal Lobe Posteriorly could represent Acute Infarcts. Slight Asymmetri T2 Signal within L Thalamus and L Midbrain. PMH as noted including CVA, CHF, Chronic A-Fib (seen by Cardio 11/2918 although pt declined Anticoag despite knowing CVA risk), Sciatica, Fx/Surg (Jaw, 1970's).   '  Family / Caregiver Present: Yes(dtr)  Referring Practitioner: Jazz Fall  Diagnosis: R CVA,  L weakness  Subjective  Subjective: Pt received sitting up in chair,dtr present. Pt repeatedly asking when the lab is to come as he needs fasting blood work. Pt difficult to redirect from issue of blood draw . Eventually able to focus on L UE function and education on stroke recovery. General Comment  Comments: Session focused primarily on L UE function and questions about recovery of function and initial neuro re-ed. Pt and dtr ed to have pt to attempt use of L UE -particularly in weight bearing when ever possible . Pt seems to have some decreased awareness of RE side as well.   Pain Assessment  Patient Currently in Pain: Denies  Pain Assessment: 0-10  Patel-Baker Pain Rating: No hurt  Pain Level: 0  Patient's Stated Pain Goal: No pain  Oxygen Therapy  SpO2: 94 %  Social/Functional History  Social/Functional History  Lives With: Daughter(Dtr Jacques Sun) - works for [employer]. )  Type of Home: [employer] Layout: Two level, Able to Live on Main level with bedroom/bathroom, Laundry in basement  Home Access: Stairs to enter without rails(1+1 step to enter garage to main level. )  Bathroom Shower/Tub: Tub/Shower unit  Bathroom Toilet: Standard  Bathroom Accessibility: Accessible  ADL Assistance: [address] [place] [address] Avenue: (Shared with dtr. )  Ambulation Assistance: Independent(Without assist device PTA. )  Transfer Assistance: Independent  Active : Yes  Occupation: Retired  Type of occupation: Retired - various jobs. Objective   Vision: Within Functional Limits  Hearing: Within functional limits    Orientation  Overall Orientation Status: Within Normal Limits     Balance  Sitting Balance: Independent  Standing Balance: Supervision  Standing Balance  Activity: Pt amb in room w/o A per RN, stands to get undergarments up and down w/o LOB  Functional Mobility  Functional - Mobility Device: No device  Assist Level: Supervision  ADL  Feeding: Setup;Verbal cueing;Supervision  Grooming: Setup;Minimal assistance;Supervision  Toileting: Minimal assistance; Moderate assistance(uses urinal)  Additional Comments: Given pt's limited L UE function , mobility ,balance and activity tolerance expect he is needing Min A for bath/dressing. Pt states he can pull underware up and down using his R UE successfully. Pt /dtr ed on safety tips for ADLs . Tone RUE  RUE Tone: Normotonic  Tone LUE  LUE Tone: Hypotonic  Tone Description: generally lower tone however Pronators are tight - can supinate passively to neutral only  Coordination  Coordination and Movement description: Fine motor impairments;Decreased accuracy; Left UE;Gross motor impairments; Ataxia; Decreased speed  Quality of Movement Other  Comment: return of function in major muscle groups , function diminished distally, c/o numbness and tingling in L hand primarily but extends to a lesser degree just below elbow, Pt lacks functional supination of L, has full  gross wrist /finger flex and ext  . Can achieve crude tip to tip prehension with thumb and index. , cannot isolate digits to prevent their interference. Cognition  Overall Cognitive Status: [facility]  Cognition Comment: Pt,s anxiety limits his ability to focus and process . requires multiple repititions of information.  This appears to be more a function of anxiety thamn recall. Sensation  Overall Sensation Status: Impaired(Pt reports impaired sensation (dull) L distal UE.  c/o parasthesia in hand particularly with numbness and tingling )  Type of ROM/Therapeutic Exercise  Type of ROM/Therapeutic Exercise: AROM;AAROM  Comment: Instruct pt and dtr in initial ther ex program af ROM and dexterity ex and use of L UE in theraputic activities. Pt given info re: out pt services as well. LUE PROM (degrees)  LUE PROM: Exceptions  LUE General PROM: passive supination forearm to neutral only, difficulty retracting/depresing  scapula , humeral head in anterior positon   LUE AROM (degrees)  LUE AROM : WFL  LUE General AROM: Has Funtional gross ROM - all major muscle groups except forearm supination , difficulty sustaining AROM  Left Hand PROM (degrees)  Left Hand PROM: WNL  Left Hand AROM (degrees)  Left Hand General AROM: gross flex ext, cannot isolate movements , lacks approximation of thumb and little finger  RUE PROM (degrees)  RUE PROM: WFL  RUE AROM (degrees)  RUE AROM : WFL  Right Hand AROM (degrees)  Right Hand AROM: WFL  LUE Strength  Gross LUE Strength: Exceptions to [facility]  LUE Strength Comment: antigravity strength in major muscle groups, overall stength is 3+/ to  4-/5 , scapular stability weak   RUE Strength  Gross RUE Strength: WFL       Assessment   Performance deficits / Impairments: Decreased functional mobility ; Decreased ADL status; Decreased endurance;Decreased ROM; Decreased strength;Decreased sensation;Decreased coordination;Decreased fine motor control  Assessment: Pt is 69 yo male admitted w/ CVA involving primarily L UE function and sensation. Pt now s/p tPA and has return in major muscle groups with residual weaknes and  impaired coordination. Pt was fully independent PTA. Pt currently requiring S-Min A for ADLs and S-for mobility .  Pt safe ti return home with dtr's assist . recommend Out pt OT for UE and PT assessment to assure pt has returned to baseline for all functional mobility tasks. Pt fatigued after OT session. Pt and dtr aware if pt does not tolerate activity involved with getting to out pt therapy he may qualify for homecare therapies instead initially and then transition to Out pt OT. Prognosis: Good  Decision Making: High Complexity  History: see chart  Exam: 8 deficit areas  Assistance / Modification: S-Min A ADLs and Mob  Patient Education: Ed pt and dtr at length re: nature of CVA and recovery process, UE thr ex and neuro re ed, incorporating L UE into ADLs ,safety and necessity of having his doctors OK to return to driving eventually.   Barriers to Learning: anxiety  REQUIRES OT FOLLOW UP: Yes  Activity Tolerance  Activity Tolerance: Treatment limited secondary to medical complications (free text)  Activity Tolerance: Pt distracted by his anxiety  waiting for his lab draw so he can eat breakfast and anxiety created about his distorted sensation and kinited function of L UE. treatment more time consuming but he eventually processed the information   Safety Devices  Safety Devices in place: Yes  Type of devices: Left in chair;Nurse notified;Call light within reach(RN reports pt amb in room w/ S only)         Plan   Plan  Times per week: 30-5  Current Treatment Recommendations: Strengthening, Positioning, ROM, Safety Education & Training, Self-Care / ADL, Patient/Caregiver Education & Training, Neuromuscular Re-education, Endurance Training    G-Code     OutComes Score  How much help for putting on and taking off regular lower body clothing?: A Little  How much help for Bathing?: A Little  How much help for Toileting?: A Little  How much help for putting on and taking off regular upper body clothing?: A Little  How much help for taking care of personal grooming?: A Little  How much help for eating meals?: A Little  AM-PAC Inpatient Daily Activity Raw Score: 18  AM-PAC Inpatient ADL T-Scale Score : 38.66  ADL Inpatient CMS 0-100% Score:

## 2019-03-02 NOTE — PROGRESS NOTES
0700: Handoff received from [facility]. NHISS completed with 2 RN's. 0745: Dr. Elena Yang at bedside. 0903: NHISS completed. Assessment also completed. Pt appears anxious, asks repeated questions about his current situation, does not listen well. WILIE presents with obvious weakness and ataxia. BLE both strong and gait is steady. Lungs are clear and heart rhythm is a-fib. He refused metoprolol until he is able to eat following blood draw at approximately 1030 today. He is able to ambulate to BR and use urinal independently. 0830: Dr. George Davila at bedside. 4896: Pt escorted to CT scan. 1855: Pt returned to room and 24 hour post tpa NHISS administration administered. 1100: Blood drawn for fasting lipid panel. Pt now agreeable to take metoprolol. 1221: Reassessment complete - no changes. 1245: Dr. Mateo Zamora at bedside. 1600: Reassessment complete - no changes. 1800: Pt refused lovenox. Needs education before agreeing. 1940: Handoff report given to Kuldip Arredondo. NIHSS completed by both RN's.

## 2019-03-02 NOTE — PROGRESS NOTES
Nilson Chambers MD, 50 mg at 03/02/19 1108    acetaminophen (TYLENOL) tablet 650 mg, 650 mg, Oral, Q4H PRN, Nilson Suresh MD    magnesium hydroxide (MILK OF MAGNESIA) 400 MG/5ML suspension 30 mL, 30 mL, Oral, Daily PRN, Nilson Suresh MD    ondansetron (ZOFRAN) injection 4 mg, 4 mg, Intravenous, Q6H PRN, Nilson Suresh MD    diltiazem 125 mg in dextrose 5 % 125 mL infusion, 5 mg/hr, Intravenous, Continuous PRN, Nilson Suresh MD    fluocinonide (LIDEX) 0.05 % ointment, , Topical, BID, Nilson Suresh MD, Stopped at 03/02/19 0823    furosemide (LASIX) injection 40 mg, 40 mg, Intravenous, BID, Yessenia Ron MD, 40 mg at 03/02/19 0809    ------------------------------------------    Exam:    Vitals:  BP (!) 162/58   Pulse 93   Temp 98.4 °F (36.9 °C) (Oral)   Resp 17   Ht 6' 1\" (1.854 m)   Wt 293 lb 3.4 oz (133 kg)   SpO2 94%   BMI 38.68 kg/m²     Awake, alert, oriented x3. PERRL. EOMI. Moves all 4 ext. Pronator drift on left. No focal weakness in BLE. Ataxia and dysdiadochokinesis in LUE. Recent Results (from the past 24 hour(s))   Lipid panel - fasting    Collection Time: 03/02/19 11:08 AM   Result Value Ref Range    Cholesterol, Total 120 0 - 199 mg/dL    Triglycerides 63 0 - 150 mg/dL    HDL 50 40 - 60 mg/dL    LDL Calculated 57 <100 mg/dL    VLDL Cholesterol Calculated 13 Not Established mg/dL       --  Repeat head CT images personally reviewed from this am. No acute findings present.       --  Echo (3/1/2109):  Summary   Suboptimal image quality.   Ejection fraction is visually estimated to be 25-30%, though likely   underestimated due to atrial fibrillation.   Indeterminate diastolic function.   Mitral valve leaflets appear mildly thickened.   Mild to moderate mitral regurgitation is present.   The left atrium is severely dilated.   Trivial tricuspid regurgitation.   PA systolic pressure is 30 mm

## 2019-03-02 NOTE — PROGRESS NOTES
Physical Therapy    Facility/Department: [address] CVICU  Initial Assessment    PTA pt living with dtr in home with 1st floor bed/bath, independent with daily care and functional mobility. Sxs limited to mild numb/weak and impaired coordination L UE. Pt interested in Out-Pt PT Services for cont recovery of UE sxs. Dtr in agreement and reports adequate assist/support for d/c home. NAME: Carmita Mahan  : 1945  MRN: [id number]    Date of Service: 3/2/2019    Discharge Recommendations:  Outpatient PT   PT Equipment Recommendations  Equipment Needed: No       Patient Diagnosis(es): The primary encounter diagnosis was Cerebrovascular accident (CVA), unspecified mechanism ([facility] [address] A diagnosis of Atrial fibrillation with RVR ([facility]) was also pertinent to this visit. has a past medical history of Arrhythmia, Cerebral artery occlusion with cerebral infarction [facility]), CHF (congestive heart failure) ([facility] [address] Hypertension, and Sciatica. has a past surgical history that includes fracture surgery and eye surgery. Restrictions  Restrictions/Precautions  Restrictions/Precautions: Fall Risk  Vision/Hearing  Vision: Within Functional Limits  Hearing: Within functional limits     Subjective  General  Chart Reviewed: Yes  Patient assessed for rehabilitation services?: Yes  Additional Pertinent Hx: 67 y/o male admit 3/1/19 with Sxs of L UE Numb/Tingling. MRI Brain - Small Area of Increase Signal R Frontal Parietal Lobe Posteriorly could represent Acute Infarcts. Slight Asymmetri T2 Signal within L Thalamus and L Midbrain. PMH as noted including CVA, CHF, Chronic A-Fib (seen by Cardio 2918 although pt declined Anticoag despite knowing CVA risk), Sciatica, Fx/Surg (Jaw, 's). Response To Previous Treatment: Not applicable  Family / Caregiver Present: Yes(Dtr Kem Pickard). )  Referring Practitioner: Dr. Christos Taylor  Diagnosis: R/O CVA. H/O Chronic A-Fib.    Follows Commands: Within Functional Limits  Other (Comment): Slight guarded initially although improving as progress functional activities. Balance  Sitting - Static: Good  Sitting - Dynamic: Good  Standing - Static: Good  Standing - Dynamic: Good;-        Assessment   Body structures, Functions, Activity limitations: Decreased functional mobility ; Decreased strength;Decreased balance  Assessment: 69 y/o male admit 3/1/19 with Sxs of L UE Numb/Tingling. MRI Brain - Small Area of Increase Signal R Frontal Parietal Lobe Posteriorly could represent Acute Infarcts. Slight Asymmetri T2 Signal within L Thalamus and L Midbrain. PMH as noted including CVA, CHF, Chronic A-Fib (seen by Cardio 11/2918 although pt declined Anticoag despite knowing CVA risk), Sciatica, Fx/Surg (Jaw, 1970's). PTA pt living with dtr in home with 1st floor bed/bath, independent with daily care and functional mobility. Sxs limited to mild numb/weak and impaired coordination L UE. Pt interested in Out-Pt PT Services for cont recovery of UE sxs. Dtr in agreement and reports adequate assist/support for d/c home. Prognosis: Good  Decision Making: Medium Complexity  History: 69 y/o male admit 3/1/19 with Sxs of L UE Numb/Tingling. MRI Brain - Small Area of Increase Signal R Frontal Parietal Lobe Posteriorly could represent Acute Infarcts. Slight Asymmetri T2 Signal within L Thalamus and L Midbrain. PMH as noted including CVA, CHF, Chronic A-Fib (seen by Cardio 11/2918 although pt declined Anticoag despite knowing CVA risk), Sciatica, Fx/Surg (Jaw, 1970's). Exam: See above. Clinical Presentation: See above. Patient Education: Role of PT, POC, Need to call for assist.   Barriers to Learning: Some anxiety. REQUIRES PT FOLLOW UP: Yes  Activity Tolerance  Activity Tolerance: Patient Tolerated treatment well         Plan   Plan  Times per week: 1-2 further PT Rxs while in acute care setting.    Current Treatment Recommendations: Strengthening, Transfer Training, Gait Training, Stair training, Safety Education & Training, Patient/Caregiver Education & Training  Safety Devices  Type of devices: Call light within reach, Left in chair, Nurse notified(Pt/family aware to call for assist.  PT spoke with pt's nurse Madhavi Mitchell).  )    G-Code     OutComes Score                                                  AM-PAC Score  AM-PAC Inpatient Mobility Raw Score : 21  AM-PAC Inpatient T-Scale Score : 50.25  Mobility Inpatient CMS 0-100% Score: 28.97  Mobility Inpatient CMS G-Code Modifier : CJ          Goals  Short term goals  Time Frame for Short term goals: 1-2 further PT Rxs. Short term goal 1: Transfers Independent. Short term goal 2: Amb with/without assist device 200' Supervision only. Short term goal 3: Stair Negotiation SBA. Patient Goals   Patient goals : Have sensation and strength L UE get better.          Therapy Time   Individual Concurrent Group Co-treatment   Time In 0700         Time Out 0745         Minutes [address] [place], PT

## 2019-03-02 NOTE — PROGRESS NOTES
Hospitalist Progress Note      PCP: No primary care provider on file. Date of Admission: 3/1/2019        Subjective: feels ok, still having some numbness in his left hand, no blurry vision, nausea or vomiting, no headache, no neck pain. Medications:  Reviewed    Infusion Medications    diltiazem       Scheduled Medications    sodium chloride flush  10 mL Intravenous 2 times per day    atorvastatin  40 mg Oral Nightly    metoprolol succinate  50 mg Oral Daily    sodium chloride flush  10 mL Intravenous 2 times per day    fluocinonide   Topical BID    furosemide  40 mg Intravenous BID     PRN Meds: sodium chloride flush, sodium chloride flush, acetaminophen, magnesium hydroxide, ondansetron, diltiazem      Intake/Output Summary (Last 24 hours) at 3/2/2019 0741  Last data filed at 3/2/2019 0500  Gross per 24 hour   Intake 837 ml   Output 1530 ml   Net -693 ml       Physical Exam Performed:    BP (!) 133/92   Pulse 83   Temp 98 °F (36.7 °C) (Oral)   Resp 16   Ht 6' 1\" (1.854 m)   Wt 293 lb 3.4 oz (133 kg)   SpO2 96%   BMI 38.68 kg/m²     General appearance: No apparent distress  Neck: Supple  Respiratory:  Normal respiratory effort. Clear to auscultation, bilaterally without Rales/Wheezes/Rhonchi. Cardiovascular: Regular rate and rhythm with normal S1/S2 without murmurs, rubs or gallops. Abdomen: Soft, non-tender  Musculoskeletal: No clubbing  Skin: Skin color, texture, turgor normal.  No rashes or lesions. Neurologic:  Minimal weakness left arm. Psychiatric: Alert and oriented  Capillary Refill: Brisk,< 3 seconds   Peripheral Pulses: +2 palpable, equal bilaterally       Labs:   Recent Labs     03/01/19  0707   WBC 9.3   HGB 16.9   HCT 50.0        Recent Labs     03/01/19  0707      K 3.5      CO2 22   BUN 21*   CREATININE 1.2   CALCIUM 9.5     No results for input(s): AST, ALT, BILIDIR, BILITOT, ALKPHOS in the last 72 hours.   Recent Labs     03/01/19  0707   INR 1.17*     Recent Labs     03/01/19  0707   CKTOTAL 52   TROPONINI <0.01       Urinalysis:      Lab Results   Component Value Date    NITRU Negative 03/01/2019    WBCUA 6 03/01/2019    RBCUA 1 03/01/2019    BLOODU Negative 03/01/2019    SPECGRAV >1.030 03/01/2019    GLUCOSEU Negative 03/01/2019       Radiology:  MRI BRAIN WO CONTRAST MR WITNESS   Preliminary Result   Small area of increased DWI signal within right frontal and parietal lobe   posteriorly could represent acute infarcts. No evidence of abnormal   susceptibility signal to suggest hemorrhage. No corresponding FLAIR signal   abnormality. Correlation with the timing of symptoms is recommended. Slightly asymmetric T2 signal within the left thalamus, and left midbrain. This is of unclear etiology, and may be artifactual.  Consider short interval   follow-up. Chronic small vessel ischemic white matter disease and diffuse cerebral   volume loss. XR CHEST PORTABLE   Final Result   There is a small right-sided pleural effusion which may be loculated given   the imaging appearance. Mild pulmonary edema. CTA NECK W CONTRAST   Preliminary Result   No high-grade stenosis or focal occlusion involving the intracranial cervical   vasculature. 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 This is age indeterminate but favored to be chronic. No   discrete dissection flap is identified. No evidence of intracranial aneurysm. Partially imaged moderate to large right pleural effusion with findings   suggestive of pulmonary interstitial edema. Enlarged mediastinal lymph   nodes. Cluster of pulmonary nodules within left lung apex measuring 15 x 9   mm. Consider follow-up chest CT for further evaluation. CTA HEAD W CONTRAST   Preliminary Result   No high-grade stenosis or focal occlusion involving the intracranial cervical   vasculature.       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 This is age indeterminate but favored to be chronic. No   discrete dissection flap is identified. No evidence of intracranial aneurysm. Partially imaged moderate to large right pleural effusion with findings   suggestive of pulmonary interstitial edema. Enlarged mediastinal lymph   nodes. Cluster of pulmonary nodules within left lung apex measuring 15 x 9   mm. Consider follow-up chest CT for further evaluation. CT Head WO Contrast   Final Result   1. No acute intracranial abnormality. Critical results were called by Dr. Porsha Moses. Adriana White MD to Ashleegregoria Vanessa on   3/1/2019 at 07:38. CT HEAD WO CONTRAST    (Results Pending)           Assessment/Plan:    Active Hospital Problems    Diagnosis Date Noted    Cerebrovascular accident (CVA) ([facility] [I63.9] 03/01/2019    Pleural effusion on right [J90] 03/01/2019    Pulmonary nodules [R91.8] 03/01/2019    Morbid obesity with BMI of 40.0-44.9, adult ([facility] [E66.01, Z68.41]     Essential hypertension [I10]     Atrial fibrillation with RVR ([address] [I48.91]      1. Acute right-sided stroke, admitted to intensive care unit, TPA in ED, closely monitor for any potential complications, patient is at risk for intracranial bleed, no aspirin or heparin product for the next 24 hours, neurology consulted, will follow on ct scan. 2.  Atrophy fibrillation with rapid ventricular response, controlled. 3.  Essential hypertension, resume by mouth medication. 4.  Multiple pulmonary nodules along was right-sided pleural effusion, pulmonary consulted. 5. .  Obesity, complicated current presentation discussed weight loss and will discuss further. 6.  Pleural effusion on the right, as above. 7. Chronic systolic heart failure, POA, worsened EF since last time, no signs of acute decompensation, EF could be affected by AFIB and RVR on admission, cardiology consulted, lasix iv.          DVT Prophylaxis: SCD for now  Diet: DIET CARDIAC; Low Sodium (2 GM)  Code Status: Full Code        Dispo - ICU    Nilson Grayson MD
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
## 2019-03-03 ENCOUNTER — APPOINTMENT (OUTPATIENT)
Dept: CT IMAGING | Age: 74
DRG: 062 | End: 2019-03-03
Payer: MEDICARE

## 2019-03-03 LAB
A/G RATIO: 0.8 (ref 1.1–2.2)
ALBUMIN SERPL-MCNC: 3.6 G/DL (ref 3.4–5)
ALP BLD-CCNC: 69 U/L (ref 40–129)
ALT SERPL-CCNC: 13 U/L (ref 10–40)
ANION GAP SERPL CALCULATED.3IONS-SCNC: 16 MMOL/L (ref 3–16)
AST SERPL-CCNC: 42 U/L (ref 15–37)
BASOPHILS ABSOLUTE: 0.1 K/UL (ref 0–0.2)
BASOPHILS RELATIVE PERCENT: 1.1 %
BILIRUB SERPL-MCNC: 1.5 MG/DL (ref 0–1)
BUN BLDV-MCNC: 20 MG/DL (ref 7–20)
CALCIUM SERPL-MCNC: 9.6 MG/DL (ref 8.3–10.6)
CHLORIDE BLD-SCNC: 98 MMOL/L (ref 99–110)
CO2: 27 MMOL/L (ref 21–32)
CREAT SERPL-MCNC: 1.1 MG/DL (ref 0.8–1.3)
EOSINOPHILS ABSOLUTE: 0.2 K/UL (ref 0–0.6)
EOSINOPHILS RELATIVE PERCENT: 2.7 %
GFR AFRICAN AMERICAN: >60
GFR NON-AFRICAN AMERICAN: >60
GLOBULIN: 4.7 G/DL
GLUCOSE BLD-MCNC: 104 MG/DL (ref 70–99)
HCT VFR BLD CALC: 48 % (ref 40.5–52.5)
HEMOGLOBIN: 16 G/DL (ref 13.5–17.5)
LYMPHOCYTES ABSOLUTE: 1.3 K/UL (ref 1–5.1)
LYMPHOCYTES RELATIVE PERCENT: 14.4 %
MCH RBC QN AUTO: 31.9 PG (ref 26–34)
MCHC RBC AUTO-ENTMCNC: 33.3 G/DL (ref 31–36)
MCV RBC AUTO: 96 FL (ref 80–100)
MONOCYTES ABSOLUTE: 1.1 K/UL (ref 0–1.3)
MONOCYTES RELATIVE PERCENT: 12.3 %
NEUTROPHILS ABSOLUTE: 6.3 K/UL (ref 1.7–7.7)
NEUTROPHILS RELATIVE PERCENT: 69.5 %
PDW BLD-RTO: 15.2 % (ref 12.4–15.4)
PLATELET # BLD: 197 K/UL (ref 135–450)
PMV BLD AUTO: 8.3 FL (ref 5–10.5)
POTASSIUM SERPL-SCNC: 3.9 MMOL/L (ref 3.5–5.1)
RBC # BLD: 5 M/UL (ref 4.2–5.9)
SODIUM BLD-SCNC: 141 MMOL/L (ref 136–145)
TOTAL PROTEIN: 8.3 G/DL (ref 6.4–8.2)
URINE CULTURE, ROUTINE: NORMAL
WBC # BLD: 9.1 K/UL (ref 4–11)

## 2019-03-03 PROCEDURE — 36415 COLL VENOUS BLD VENIPUNCTURE: CPT

## 2019-03-03 PROCEDURE — 2580000003 HC RX 258: Performed by: EMERGENCY MEDICINE

## 2019-03-03 PROCEDURE — 6360000002 HC RX W HCPCS: Performed by: INTERNAL MEDICINE

## 2019-03-03 PROCEDURE — 6370000000 HC RX 637 (ALT 250 FOR IP): Performed by: INTERNAL MEDICINE

## 2019-03-03 PROCEDURE — 94762 N-INVAS EAR/PLS OXIMTRY CONT: CPT

## 2019-03-03 PROCEDURE — 2140000000 HC CCU INTERMEDIATE R&B

## 2019-03-03 PROCEDURE — 80053 COMPREHEN METABOLIC PANEL: CPT

## 2019-03-03 PROCEDURE — 85025 COMPLETE CBC W/AUTO DIFF WBC: CPT

## 2019-03-03 PROCEDURE — 71250 CT THORAX DX C-: CPT

## 2019-03-03 PROCEDURE — 99232 SBSQ HOSP IP/OBS MODERATE 35: CPT | Performed by: INTERNAL MEDICINE

## 2019-03-03 RX ORDER — LISINOPRIL 5 MG/1
5 TABLET ORAL DAILY
Status: DISCONTINUED | OUTPATIENT
Start: 2019-03-03 | End: 2019-03-04

## 2019-03-03 RX ORDER — SPIRONOLACTONE 25 MG/1
25 TABLET ORAL DAILY
Status: DISCONTINUED | OUTPATIENT
Start: 2019-03-04 | End: 2019-03-04 | Stop reason: HOSPADM

## 2019-03-03 RX ADMIN — ATORVASTATIN CALCIUM 40 MG: 40 TABLET, FILM COATED ORAL at 20:19

## 2019-03-03 RX ADMIN — APIXABAN 5 MG: 5 TABLET, FILM COATED ORAL at 08:06

## 2019-03-03 RX ADMIN — FUROSEMIDE 40 MG: 10 INJECTION, SOLUTION INTRAMUSCULAR; INTRAVENOUS at 17:17

## 2019-03-03 RX ADMIN — FUROSEMIDE 40 MG: 10 INJECTION, SOLUTION INTRAMUSCULAR; INTRAVENOUS at 08:06

## 2019-03-03 RX ADMIN — METOPROLOL SUCCINATE 50 MG: 25 TABLET, EXTENDED RELEASE ORAL at 08:07

## 2019-03-03 RX ADMIN — Medication 10 ML: at 08:09

## 2019-03-03 RX ADMIN — LISINOPRIL 5 MG: 5 TABLET ORAL at 08:07

## 2019-03-03 RX ADMIN — Medication 10 ML: at 20:19

## 2019-03-03 RX ADMIN — APIXABAN 5 MG: 5 TABLET, FILM COATED ORAL at 20:19

## 2019-03-03 ASSESSMENT — PAIN SCALES - GENERAL
PAINLEVEL_OUTOF10: 0

## 2019-03-03 NOTE — PROGRESS NOTES
Pulmonary Progress Note    Date of Admission: 3/1/2019   LOS: 2 days       CC:  pulm nodules    Subjective:  Improved shortness of breath. Improved lower extremities edema  Improved movement of left upper ext. ROS:    No nausea  No Vomiting  No chest pain    PICC D#       Intake/Output Summary (Last 24 hours) at 3/3/2019 0737  Last data filed at 3/3/2019 0600  Gross per 24 hour   Intake 600 ml   Output 2025 ml   Net -1425 ml         PHYSICAL EXAM:    Blood pressure (!) 152/92, pulse 94, temperature 98.8 °F (37.1 °C), temperature source Oral, resp. rate 16, height 6' 1\" (1.854 m), weight 291 lb 3.6 oz (132.1 kg), SpO2 96 %.'  Gen: No distress. Eyes: PERRL. No sclera icterus. No conjunctival injection. ENT: No discharge. Pharynx clear. External appearance of ears and nose normal.  Neck: Trachea midline. No obvious mass. Resp: No accessory muscle use. No crackles. No wheezes. No rhonchi. Decreased BS at bases bilaterally. CV: Regular rate. Regular rhythm. No murmur or rub. No edema. GI: Non-tender. Non-distended. No hernia. Skin: Warm, dry, normal texture and turgor. No nodule on exposed extremities. Lymph: No cervical LAD. No supraclavicular LAD. M/S: No cyanosis. No clubbing. No joint deformity. Neuro: Moves all four extremities. Psych: Oriented x 3. No anxiety. Awake. Alert. Intact judgement and insight.       Medications:    Scheduled Meds:   lisinopril  5 mg Oral Daily    apixaban  5 mg Oral BID    aspirin  81 mg Oral Daily    sodium chloride flush  10 mL Intravenous 2 times per day    atorvastatin  40 mg Oral Nightly    metoprolol succinate  50 mg Oral Daily    fluocinonide   Topical BID    furosemide  40 mg Intravenous BID       Continuous Infusions:   diltiazem         PRN Meds:  sodium chloride flush, acetaminophen, magnesium hydroxide, ondansetron, diltiazem    Labs reviewed:  CBC:   Recent Labs     03/01/19  0707 03/02/19  1108   WBC 9.3 10.0   HGB 16.9 16.3   HCT 50.0 left pleural effusion. Interlobular septal thickening. Bronchial wall thickening. Clustered  nodularity in the paramediastinal left upper lung. There is a 9 mm minor  fissure nodule that is more conspicuous than on the prior exam.    Upper Abdomen: Included upper abdomen is unremarkable for any acute findings. Soft Tissues/Bones: No enlarged axillary or supraclavicular lymph nodes. Heterogeneous thyroid gland. Thoracic spondylosis. Impression Features of heart failure, including small right pleural effusion and  interstitial pulmonary edema. Clustered nodularity at the medial left lung apex is indeterminate, perhaps  slightly increased since 10/08/2018. Although some of these may represent  benign parenchymal lymph nodes, these are ultimately indeterminate. Recommend follow-up CT chest in 3 months. The 10 mm minor fissure nodule may also be reassessed in 3 months. This is  favored to represent a benign (reactive) parenchymal lymph node given  morphology and location. CTPA: No results found for this or any previous visit. CXR PA/LAT: No results found for this or any previous visit. CXR portable:   Results for orders placed during the hospital encounter of 03/01/19   XR CHEST PORTABLE    Narrative EXAMINATION:  SINGLE XRAY VIEW OF THE CHEST    3/1/2019 7:48 am    COMPARISON:  CT chest October 8, 2018 and chest radiograph October 7, 2018. HISTORY:  ORDERING SYSTEM PROVIDED HISTORY: SOB  TECHNOLOGIST PROVIDED HISTORY:  Reason for exam:->SOB  Ordering Physician Provided Reason for Exam: Numbness (numbness and tingling  in left arm going down to hand started 1 hour ago)  Acuity: Acute  Type of Exam: Initial    FINDINGS:  There are diffuse bilateral interstitial opacities. The previously seen  focal consolidation in the right upper lobe has resolved. There is a  right-sided pleural effusion which is somewhat loculated in appearance. No  pneumothorax identified.   Cardiac and mediastinal contours are without acute  process. No acute osseous abnormality. Impression There is a small right-sided pleural effusion which may be loculated given  the imaging appearance. Mild pulmonary edema.                Assessment:         Acute CVA  Pleural effusion  CHF, ef 25%, worsened, MR, dilated LA   pulmonary nodule in the left upper lobe  Pulmonary nodule right lower lobe            Plan:      Pleural effusion  - POA  - likely persistent since last admission.   -  likely secondary to chf /volume status  - appears to be improving with diuresis . Monitor. No thora with anticoagulation and may be improving.       pulmonary nodule  - will need to monitor. Several nodules. - right lower lobe most concerning to me. May be scarring secondary to prior pneumonia         CHF  - decrease in EF from 40 to 25%. -  lasix. - consult cardiology  - good UOP. Negative > 2L            Acute CVA  - per neurology       Will follow effusion while here but ok to d/c from pulm pov. This note was transcribed using [id number] [employer]. Please disregard any translational errors.       Jackie Pérez [facility] [phone]

## 2019-03-03 NOTE — PROGRESS NOTES
1930: Report received from [facility], NIHSS completed. Patient L hand numb and tingling, and weakness, remains unchanged per day shift RN     2000: Spoke extensively with patient about Lovenox for anticoagulation, patient anxious with multiple and repeated questions. Patient willing to receive medications with other nightly meds. 2200: Shift assessment completed. L hand remains unchanged with numbness and tingling, patient continues to work on exercises provided by PT/OT. Patient able to ambulate to bathroom, tolerating well. Patient agreeable to Lovenox shot, and night time medications, education provided on aspirin and Lipitor. Explained upcoming CT scan at 0000, patient anxious and with many questions but cooperative and agreeable. Patient has no other needs at this time. 2345: Patient taken to CT, tolerated well. Returned to bed     0030: Assessment completed. No changes noted. VSS on room air, patient ambulates to bathroom. L hand remain numb and tingling per patient report, weakness remains as well. Patient refuses to wear scds, attempted to educate importance, patient continued to decline. Patient less anxious than previous, all questions answered at this time. No other needs. 0400: Lab at the bedside to draw morning labs. Patient hesitant and questioning of reason for labs. This RN educated patient on reason for labs, patient verbalizes understanding but would like to wait until later this morning. Assessment completed, no changes. Weakness remains in L hand, numb and tingling per patient. VSS on room air, patient denies pain. 0600: Patient requested bathroom, tolerated ambulation well. Returned to bed, weight obtained. Patient asking questions about morning plan. Education provided, verbalized understanding.      0700: Report given to [facility]

## 2019-03-03 NOTE — PROGRESS NOTES
[facility]   Daily Progress Note      Admit Date:  3/1/2019      Subjective:   Mr. Kirill Wilcox is a 68yo male with a past medical history significant for chronic atrial fibrillation who presented to [facility] with numbness and tingling in his left arm. After discussion with the Stroke Team he was given IV tPA at 09:00 on 3/1/19. . We were asked to see him in consultation.      He states that he woke up yesterday morning with the left arm numbness and felt like he had slept on it. There was no pain in the arm. He was able to move it without difficulty. There was no weakness in the arm. He has been ambulating in the hallways and reports his breathing is good. There is no chest pain. He says his left arm numbness has improved. Rate controlled in a-fib.        Objective:     BP (!) 153/97   Pulse 88   Temp 97.9 °F (36.6 °C) (Oral)   Resp 18   Ht 6' 1\" (1.854 m)   Wt 291 lb 3.6 oz (132.1 kg)   SpO2 97%   BMI 38.42 kg/m²      Intake/Output Summary (Last 24 hours) at 3/3/2019 1256  Last data filed at 3/3/2019 0600  Gross per 24 hour   Intake 240 ml   Output 1225 ml   Net -985 ml       Physical Exam:  General:  Awake, alert, NAD  Skin:  Warm and dry  Neck:  JVD<8, no carotid bruits  Chest:  Clear to auscultation, no wheezes/rhonchi/rales  Cardiovascular:  Irreg irreg, normal S1/S2, no M/R/G  Abdomen:  Soft, nontender, +bowel sounds  Extremities:  No edema  Pulses: 2+ bilat carotid    2+ bilat radial    2+ bilat femoral        Medications:    lisinopril  5 mg Oral Daily    apixaban  5 mg Oral BID    [START ON 3/4/2019] spironolactone  25 mg Oral Daily    sodium chloride flush  10 mL Intravenous 2 times per day    atorvastatin  40 mg Oral Nightly    metoprolol succinate  50 mg Oral Daily    fluocinonide   Topical BID    furosemide  40 mg Intravenous BID      diltiazem         Lab Data:  CBC:   Recent Labs     03/01/19  0707 03/02/19  1108 03/03/19  0756   WBC 9.3 10.0 9.1   HGB 16.9 16.3 evidence of intracranial aneurysm.       Partially imaged moderate to large right pleural effusion with findings   suggestive of pulmonary interstitial edema.  Enlarged mediastinal lymph   nodes.  Cluster of pulmonary nodules within left lung apex measuring 15 x 9   mm.             Assessment:  1. Atrial fibrillation with RVR  2. Acute CVA  3. Chronic Systolic CHF, class 3      Plan:   Ana Cristina Vasquez is doing better. He has been started on the Eliquis. I would give him one more dose of IV lasix 40mg this evening and then switch to aldactone 25mg daily tomorrow. Increase the lisinopril to 10mg daily. Continue the Toprol XL at 50mg daily. If he continues to improve he can be discharged to [facility] tomorrow. He will need follow-up in a week with us. Discontinue aspirin.             Signed:  Daniella Jung MD

## 2019-03-03 NOTE — PROGRESS NOTES
0700: Handoff report received from [address] [place] [address]. NHISS completed with 2 RN's. 0730: Dr. Gely Garzon at bedside discussing CT of chest with pt.  0804: Assessment complete. Pt again with barrage of questions. RN answered as best as possible. NUVIA and hand continue to have weakness, numbness, and tingling. Strength does appear to have improved somewhat. BLE edema decreased. 1202: Reassessment complete - no significant changes. Pt remains anxious with multiple questions and concerns. Ambulating in hallway, gait is steady. 1718: Reassessment complete - no significant changes. Anxiousness continues with patient questioning every aspect of his care. 1900: Handoff report given to [address] [place] [address].

## 2019-03-03 NOTE — PROGRESS NOTES
Hospitalist Progress Note      PCP: No primary care provider on file. Date of Admission: 3/1/2019        Subjective: feels ok, minimal weakness left hand, decreased sensation left hand, no blurry vision, nausea, vomiting or neck pain. Medications:  Reviewed    Infusion Medications    diltiazem       Scheduled Medications    lisinopril  5 mg Oral Daily    apixaban  5 mg Oral BID    aspirin  81 mg Oral Daily    sodium chloride flush  10 mL Intravenous 2 times per day    atorvastatin  40 mg Oral Nightly    metoprolol succinate  50 mg Oral Daily    fluocinonide   Topical BID    furosemide  40 mg Intravenous BID     PRN Meds: sodium chloride flush, acetaminophen, magnesium hydroxide, ondansetron, diltiazem      Intake/Output Summary (Last 24 hours) at 3/3/2019 0640  Last data filed at 3/3/2019 0600  Gross per 24 hour   Intake 600 ml   Output 2025 ml   Net -1425 ml       Physical Exam Performed:    BP (!) 152/92   Pulse 94   Temp 98.8 °F (37.1 °C) (Oral)   Resp 16   Ht 6' 1\" (1.854 m)   Wt 291 lb 3.6 oz (132.1 kg)   SpO2 96%   BMI 38.42 kg/m²     General appearance: No apparent distress. Neck: Supple  Respiratory:  Normal respiratory effort. Clear to auscultation, bilaterally without Rales/Wheezes/Rhonchi. Cardiovascular: Regular rate and rhythm with normal S1/S2 without murmurs, rubs or gallops. Abdomen: Soft, non-tender  Musculoskeletal: No clubbing, cyanosis   Skin: Skin color, texture, turgor normal.  No rashes or lesions. Neurologic:  minimal weakness left arm  Psychiatric: Alert and oriente  Capillary Refill: Brisk,< 3 seconds       Labs:   Recent Labs     03/01/19  0707 03/02/19  1108   WBC 9.3 10.0   HGB 16.9 16.3   HCT 50.0 48.7    210     Recent Labs     03/01/19  0707      K 3.5      CO2 22   BUN 21*   CREATININE 1.2   CALCIUM 9.5     No results for input(s): AST, ALT, BILIDIR, BILITOT, ALKPHOS in the last 72 hours.   Recent Labs     03/01/19  0707   INR with rapid ventricular response, controlled. eliquis added per cardiology recommendations. 3.  Essential hypertension, resume by mouth medication. 4.  Multiple pulmonary nodules along was right-sided pleural effusion, pulmonary consulted. 5. . Diania Rinne, complicated current presentation discussed weight loss and will discuss further. 6.  Pleural effusion on the right, as above. 7. Chronic systolic heart failure, POA, worsened EF since last time, iv lasix, Toprol XL, added lisinopril, negative for 2000 cc.          DVT Prophylaxis: eliquis  Diet: DIET CARDIAC; Low Sodium (2 GM)  Code Status: Full Code    PT/OT Eval Status: ongoing    Dispo - hopefully in am    Alma Alonso MD

## 2019-03-03 NOTE — PROGRESS NOTES
1930: Report received from [facility], NIHSS completed. Patient L hand numb and tingling, and weakness, remains unchanged per day shift RN     2000: Spoke extensively with patient about Lovenox for anticoagulation, patient anxious with multiple and repeated questions. Patient willing to receive medications with other nightly meds. 2200: Shift assessment completed. L hand remains unchanged with numbness and tingling, patient continues to work on exercises provided by PT/OT. Patient able to ambulate to bathroom, tolerating well. Patient agreeable to Lovenox shot, and night time medications, education provided on aspirin and Lipitor. Explained upcoming CT scan at 0000, patient anxious and with many questions but cooperative and agreeable. Patient has no other needs at this time. 2345: Patient taken to CT, tolerated well. Returned to bed     0030: Assessment completed. No changes noted. VSS on room air, patient ambulates to bathroom. L hand remain numb and tingling per patient report, weakness remains as well. Patient refuses to wear scds, attempted to educate importance, patient continued to decline. Patient less anxious than previous, all questions answered at this time. No other needs. 0400: Lab at the bedside to draw morning labs. Patient hesitant and questioning of reason for labs. This RN educated patient on reason for labs, patient verbalizes understanding but would like to wait until later this morning. Assessment completed, no changes. Weakness remains in L hand, numb and tingling per patient. VSS on room air, patient denies pain. 0700: report given to [facility], NIHSS completed.

## 2019-03-04 VITALS
BODY MASS INDEX: 38.89 KG/M2 | DIASTOLIC BLOOD PRESSURE: 72 MMHG | RESPIRATION RATE: 16 BRPM | OXYGEN SATURATION: 98 % | TEMPERATURE: 98.4 F | SYSTOLIC BLOOD PRESSURE: 134 MMHG | HEART RATE: 92 BPM | WEIGHT: 293.43 LBS | HEIGHT: 73 IN

## 2019-03-04 PROCEDURE — 6370000000 HC RX 637 (ALT 250 FOR IP): Performed by: INTERNAL MEDICINE

## 2019-03-04 PROCEDURE — 97116 GAIT TRAINING THERAPY: CPT

## 2019-03-04 PROCEDURE — 97110 THERAPEUTIC EXERCISES: CPT

## 2019-03-04 PROCEDURE — 99232 SBSQ HOSP IP/OBS MODERATE 35: CPT | Performed by: INTERNAL MEDICINE

## 2019-03-04 PROCEDURE — 94760 N-INVAS EAR/PLS OXIMETRY 1: CPT

## 2019-03-04 RX ORDER — SPIRONOLACTONE 25 MG/1
25 TABLET ORAL DAILY
Qty: 30 TABLET | Refills: 3 | Status: SHIPPED | OUTPATIENT
Start: 2019-03-05 | End: 2019-07-01 | Stop reason: SDUPTHER

## 2019-03-04 RX ORDER — ATORVASTATIN CALCIUM 40 MG/1
40 TABLET, FILM COATED ORAL NIGHTLY
Qty: 30 TABLET | Refills: 3 | Status: SHIPPED | OUTPATIENT
Start: 2019-03-04 | End: 2019-07-01 | Stop reason: SDUPTHER

## 2019-03-04 RX ORDER — METOPROLOL SUCCINATE 25 MG/1
75 TABLET, EXTENDED RELEASE ORAL DAILY
Qty: 90 TABLET | Refills: 3 | Status: SHIPPED | OUTPATIENT
Start: 2019-03-04 | End: 2019-07-03 | Stop reason: SDUPTHER

## 2019-03-04 RX ORDER — LISINOPRIL 10 MG/1
10 TABLET ORAL DAILY
Status: DISCONTINUED | OUTPATIENT
Start: 2019-03-04 | End: 2019-03-04 | Stop reason: HOSPADM

## 2019-03-04 RX ORDER — ASPIRIN 81 MG/1
81 TABLET ORAL DAILY
Qty: 30 TABLET | Refills: 5 | Status: SHIPPED | OUTPATIENT
Start: 2019-03-04 | End: 2019-04-16 | Stop reason: CLARIF

## 2019-03-04 RX ORDER — LISINOPRIL 10 MG/1
10 TABLET ORAL DAILY
Qty: 30 TABLET | Refills: 3 | Status: SHIPPED | OUTPATIENT
Start: 2019-03-05 | End: 2019-07-01 | Stop reason: SDUPTHER

## 2019-03-04 RX ADMIN — APIXABAN 5 MG: 5 TABLET, FILM COATED ORAL at 08:30

## 2019-03-04 RX ADMIN — LISINOPRIL 10 MG: 10 TABLET ORAL at 13:11

## 2019-03-04 RX ADMIN — SPIRONOLACTONE 25 MG: 25 TABLET ORAL at 08:30

## 2019-03-04 RX ADMIN — METOPROLOL SUCCINATE 50 MG: 25 TABLET, EXTENDED RELEASE ORAL at 08:29

## 2019-03-04 ASSESSMENT — PAIN SCALES - GENERAL
PAINLEVEL_OUTOF10: 0

## 2019-03-04 NOTE — PROGRESS NOTES
D: Discharge instructions and post care stroke and A fib printed and explain   [facility]. He has his follow up appointments and aware when to call 911 for S/S of stroke.   Tele leads and wires removed L AC HL removed per nursing protocol   He was escorted per wheel chair to his daughter car

## 2019-03-04 NOTE — DISCHARGE SUMMARY
Hospital Medicine Discharge Summary    Patient: Lito Bateman     Gender: male  : 1945   Age: 68 y.o. MRN: [id number]    Code Status: Full Code     Primary Care Provider: No primary care provider on file. Admit Date: 3/1/2019   Discharge Date:   3/4/2019    Admitting Physician: Nina Rodriguez MD  Discharge Physician: Mayte Hyatt DO     Discharge Diagnoses: Active Hospital Problems    Diagnosis Date Noted    Cerebrovascular accident (CVA) ([facility] [I63.9] 2019    Pleural effusion, right [J90] 2019    Lung nodules [R91.8] 2019    Morbid obesity with BMI of 40.0-44.9, adult ([facility] [E66.01, Z68.41]     Paroxysmal atrial fibrillation ([facility] [I48.0]     Essential hypertension [I10]        Hospital Course: A 67 yo male admitted with left arm weakness, found to have an acute stroke. He was also found to be in afib with RVR and started on a cardizem gtt in the ED. CVA  - received tPA and admitted to the ICU  - head CT neg for acute pathology  - CTA head/neck - no high grade stenosis  - MRI - acute ischemic infarcts, suggestive of embolic infarcts  - ECHO - LVEF 25%  - ASA, statin  - consult neurology   - check lipids - WNL  - allowed permissive HTN, now will normalize  - started eliquis 5 mg BID 3/4/19     Atrial fibrillation with rapid ventricular response, controlled. eliquis added per cardiology recommendations.      Essential hypertension, resume by mouth medications, lisinopril 5 mg daily added     Multiple pulmonary nodules along was right-sided pleural effusion, pulmonary consulted.  Will need follow up CT      Obesity, complicated current presentation discussed weight loss and will discuss further.     Pleural effusion on the right, as above.     Chronic systolic heart failure, POA, worsened EF since last time with decrease in LVEF from 40% to 25%, iv lasix, Toprol XL, added lisinopril, negative for 2.5L total and down 8 lbs    Disposition:  Home    Exam:     BP 134/72   Pulse 92   Temp 98.4 °F (36.9 °C) (Oral)   Resp 16   Ht 6' 1\" (1.854 m)   Wt 293 lb 6.9 oz (133.1 kg)   SpO2 98%   BMI 38.71 kg/m²     General appearance:  No apparent distress, appears stated age and cooperative. HEENT:  Normal cephalic, atraumatic without obvious deformity. Pupils equal, round, and reactive to light. Extra ocular muscles intact. Conjunctivae/corneas clear. Neck: Supple, with full range of motion. No jugular venous distention. Trachea midline. Respiratory:  Normal respiratory effort. Clear to auscultation, bilaterally without Rales/Wheezes/Rhonchi. Cardiovascular:  Regular rate and rhythm with normal S1/S2 without murmurs, rubs or gallops. Abdomen: Soft, non-tender, non-distended with normal bowel sounds. Musculoskeletal:  No clubbing, cyanosis or edema bilaterally. Full range of motion without deformity. Skin: Skin color, texture, turgor normal.  No rashes or lesions. Neurologic:  Neurovascularly intact without any focal sensory/motor deficits. Cranial nerves: II-XII intact, grossly non-focal.  Psychiatric:  Alert and oriented, thought content appropriate, normal insight    Consults:     IP CONSULT TO STROKE TEAM  IP CONSULT TO SOCIAL WORK  IP CONSULT TO NEUROLOGY  IP CONSULT TO PULMONOLOGY  IP CONSULT TO CARDIOLOGY  IP CONSULT TO SPIRITUAL SERVICES  IP CONSULT TO SOCIAL WORK  IP CONSULT TO HOME CARE NEEDS    Labs:  For convenience and continuity at follow-up the following most recent labs are provided:    Lab Results   Component Value Date    WBC 9.1 03/03/2019    HGB 16.0 03/03/2019    HCT 48.0 03/03/2019    MCV 96.0 03/03/2019     03/03/2019     03/03/2019    K 3.9 03/03/2019    K 3.5 03/01/2019    CL 98 03/03/2019    CO2 27 03/03/2019    BUN 20 03/03/2019    CREATININE 1.1 03/03/2019    CALCIUM 9.6 03/03/2019    PHOS 3.0 10/18/2018    ALKPHOS 69 03/03/2019    ALT 13 03/03/2019    AST 42 03/03/2019    BILITOT 1.5 03/03/2019    LABALBU 3.6 03/03/2019 LDLCALC 57 03/02/2019    TRIG 63 03/02/2019     Lab Results   Component Value Date    INR 1.17 (H) 03/01/2019       Radiology:  CT CHEST WO CONTRAST   Final Result   Features of heart failure, including small right pleural effusion and   interstitial pulmonary edema. Clustered nodularity at the medial left lung apex is indeterminate, perhaps   slightly increased since 10/08/2018. Although some of these may represent   benign parenchymal lymph nodes, these are ultimately indeterminate. Recommend follow-up CT chest in 3 months. 10 mm minor fissure nodule may also be reassessed in 3 months. This is   favored to represent a benign (reactive) parenchymal lymph node given   morphology and location. CT HEAD WO CONTRAST   Final Result   1. No acute intracranial abnormality. MRI BRAIN WO CONTRAST MR WITNESS   Final Result   1. Small area of increased DWI signal within right frontal and parietal lobe   posteriorly could represent acute infarcts. No evidence of abnormal   susceptibility signal to suggest hemorrhage. No corresponding FLAIR signal   abnormality. Correlation with distribution and timing of symptoms is   recommended. 2. Slightly asymmetric T2/FLAIR signal within left thalamus, and left   midbrain. This is of unclear etiology, and may be artifactual.  This is not   further evaluated due to lack of T2 weighted sequences and T1 weighted   sequences. Consider short interval follow-up. 3. Chronic small vessel ischemic white matter disease and diffuse cerebral   volume loss. Findings were discussed with Dr. Jeremy Junior at 8:51am on 3/1/2019         XR CHEST PORTABLE   Final Result   There is a small right-sided pleural effusion which may be loculated given   the imaging appearance. Mild pulmonary edema. CTA NECK W CONTRAST   Final Result   1. No high-grade stenosis or focal occlusion involving intracranial or   cervical vasculature.       2. 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 This is age indeterminate but favored to be chronic. No discrete   dissection flap is identified. 3. No evidence of intracranial aneurysm. 4. Partially imaged moderate to large right pleural effusion with findings   suggestive of pulmonary interstitial edema. Enlarged mediastinal lymph   nodes. Cluster of pulmonary nodules within left lung apex measuring 15 x 9   mm. Consider follow-up chest CT for further evaluation. CTA HEAD W CONTRAST   Final Result   1. No high-grade stenosis or focal occlusion involving intracranial or   cervical vasculature. 2. 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 This is age indeterminate but favored to be chronic. No discrete   dissection flap is identified. 3. No evidence of intracranial aneurysm. 4. Partially imaged moderate to large right pleural effusion with findings   suggestive of pulmonary interstitial edema. Enlarged mediastinal lymph   nodes. Cluster of pulmonary nodules within left lung apex measuring 15 x 9   mm. Consider follow-up chest CT for further evaluation. CT Head WO Contrast   Final Result   1. No acute intracranial abnormality. Critical results were called by Dr. Marivel Jones. Yanira Louis MD to [facility] on   3/1/2019 at 07:38.          CT CHEST WO CONTRAST    (Results Pending)       EKG     Rhythm: atrial fibrillation - rapid  Rate: tachycardia  Clinical Impression: no acute changes    Discharge Medications:   Current Discharge Medication List      START taking these medications    Details   apixaban (ELIQUIS) 5 MG TABS tablet Take 1 tablet by mouth 2 times daily  Qty: 60 tablet, Refills: 3      lisinopril (PRINIVIL;ZESTRIL) 10 MG tablet Take 1 tablet by mouth daily  Qty: 30 tablet, Refills: 3      spironolactone (ALDACTONE) 25 MG tablet Take 1 tablet by mouth daily  Qty: 30 tablet, Refills: 3           Current Discharge Medication List      CONTINUE these medications which have CHANGED    Details   aspirin EC 81 MG EC tablet Take 1 tablet by mouth daily  Qty: 30 tablet, Refills: 5      atorvastatin (LIPITOR) 40 MG tablet Take 1 tablet by mouth nightly  Qty: 30 tablet, Refills: 3           Current Discharge Medication List      CONTINUE these medications which have NOT CHANGED    Details   Multiple Vitamins-Minerals (MULTIVITAMIN ADULT PO) Take by mouth      vitamin C (ASCORBIC ACID) 500 MG tablet Take 500 mg by mouth daily      metoprolol succinate (TOPROL XL) 50 MG extended release tablet Take 1 tablet by mouth daily  Qty: 30 tablet, Refills: 3      fluocinonide (LIDEX) 0.05 % gel Apply 1 each topically daily as needed for Other For psoriasis  Refills: 3           Current Discharge Medication List      STOP taking these medications       furosemide (LASIX) 20 MG tablet Comments:   Reason for Stopping:         furosemide (LASIX) 40 MG tablet Comments:   Reason for Stopping: Follow-up appointments:  one week    Provider Follow-up:  pcp    Condition at Discharge:  Stable    The patient was seen and examined on day of discharge and this discharge summary is in conjunction with any daily progress note from day of discharge. Time Spent on discharge is 45 minutes  in the examination, evaluation, counseling and review of medications and discharge plan. Signed:    Abrahan Spencer DO   3/4/2019      Thank you No primary care provider on file. for the opportunity to be involved in this patient's care. If you have any questions or concerns please feel free to contact me at [phone].

## 2019-03-04 NOTE — PROGRESS NOTES
Trina Brower in the last 72 hours. Urinalysis:      Lab Results   Component Value Date    NITRU Negative 03/01/2019    WBCUA 6 03/01/2019    RBCUA 1 03/01/2019    BLOODU Negative 03/01/2019    SPECGRAV >1.030 03/01/2019    GLUCOSEU Negative 03/01/2019       Radiology:  CT CHEST WO CONTRAST   Final Result   Features of heart failure, including small right pleural effusion and   interstitial pulmonary edema. Clustered nodularity at the medial left lung apex is indeterminate, perhaps   slightly increased since 10/08/2018. Although some of these may represent   benign parenchymal lymph nodes, these are ultimately indeterminate. Recommend follow-up CT chest in 3 months. 10 mm minor fissure nodule may also be reassessed in 3 months. This is   favored to represent a benign (reactive) parenchymal lymph node given   morphology and location. CT HEAD WO CONTRAST   Final Result   1. No acute intracranial abnormality. MRI BRAIN WO CONTRAST MR WITNESS   Final Result   1. Small area of increased DWI signal within right frontal and parietal lobe   posteriorly could represent acute infarcts. No evidence of abnormal   susceptibility signal to suggest hemorrhage. No corresponding FLAIR signal   abnormality. Correlation with distribution and timing of symptoms is   recommended. 2. Slightly asymmetric T2/FLAIR signal within left thalamus, and left   midbrain. This is of unclear etiology, and may be artifactual.  This is not   further evaluated due to lack of T2 weighted sequences and T1 weighted   sequences. Consider short interval follow-up. 3. Chronic small vessel ischemic white matter disease and diffuse cerebral   volume loss. Findings were discussed with Dr. Lorrie Roche at 8:51am on 3/1/2019         XR CHEST PORTABLE   Final Result   There is a small right-sided pleural effusion which may be loculated given   the imaging appearance. Mild pulmonary edema.          CTA NECK W CONTRAST   Final Result   1. No high-grade stenosis or focal occlusion involving intracranial or   cervical vasculature. 2. 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 This is age indeterminate but favored to be chronic. No discrete   dissection flap is identified. 3. No evidence of intracranial aneurysm. 4. Partially imaged moderate to large right pleural effusion with findings   suggestive of pulmonary interstitial edema. Enlarged mediastinal lymph   nodes. Cluster of pulmonary nodules within left lung apex measuring 15 x 9   mm. Consider follow-up chest CT for further evaluation. CTA HEAD W CONTRAST   Final Result   1. No high-grade stenosis or focal occlusion involving intracranial or   cervical vasculature. 2. 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 This is age indeterminate but favored to be chronic. No discrete   dissection flap is identified. 3. No evidence of intracranial aneurysm. 4. Partially imaged moderate to large right pleural effusion with findings   suggestive of pulmonary interstitial edema. Enlarged mediastinal lymph   nodes. Cluster of pulmonary nodules within left lung apex measuring 15 x 9   mm. Consider follow-up chest CT for further evaluation. CT Head WO Contrast   Final Result   1. No acute intracranial abnormality. Critical results were called by Dr. Zeb Landaverde. Alfonso Quintana MD to Polina Tate on   3/1/2019 at 07:38.          CT CHEST WO CONTRAST    (Results Pending)           Assessment/Plan:    Active Hospital Problems    Diagnosis Date Noted    Cerebrovascular accident (CVA) ([address] [I63.9] 03/01/2019    Pleural effusion on right [J90] 03/01/2019    Pulmonary nodules [R91.8] 03/01/2019    Morbid obesity with BMI of 40.0-44.9, adult ([address] [E66.01, Z68.41]     Essential hypertension [I10]     Atrial fibrillation with RVR ([address] [I48.91]      CVA  - received tPA and admitted to the ICU  - head CT neg for acute pathology  - CTA head/neck - no high grade stenosis  - MRI   - ECHO - LVEF 25%  - ASA, statin  - consult neurology   - check lipids - WNL  - allowed permissive HTN, now will normalize  - started eliquis 5 mg BID    Atrial fibrillation with rapid ventricular response, controlled. eliquis added per cardiology recommendations. Essential hypertension, resume by mouth medications, lisinopril 5 mg daily added    Multiple pulmonary nodules along was right-sided pleural effusion, pulmonary consulted. Will need follow up CT     Obesity, complicated current presentation discussed weight loss and will discuss further. Pleural effusion on the right, as above.     Chronic systolic heart failure, POA, worsened EF since last time with decrease in LVEF from 40% to 25%, iv lasix, Toprol XL, added lisinopril, negative for 2.5L total and down 8 lbs    DVT Prophylaxis: eliquis  Diet: DIET CARDIAC; Low Sodium (2 GM)  Code Status: Full Code    PT/OT Eval Status: ongoing    Dispo - hopefully in am    Aurora Jeffers, DO

## 2019-03-04 NOTE — PROGRESS NOTES
D: Report given by Tia Wilburn RN NIHSS completed still left side numbness still. Strength on left hand moderate.   He has lots of questions which were answered   Daughter at bedside  Eric Naik PT was going to walk him   Will continue to monitor

## 2019-03-04 NOTE — PROGRESS NOTES
D: PCP list given will look over and choose a Primary [facility] will fill Eliquis script and phase sheet sent.   Michelle Tinoco will set up a follow up appointment

## 2019-03-04 NOTE — PROGRESS NOTES
dextrose 5 % 125 mL infusion, 5 mg/hr, Intravenous, Continuous PRN  fluocinonide (LIDEX) 0.05 % ointment, , Topical, BID    Data reviewed:  Labs:  CBC:   Recent Labs     03/02/19  1108 03/03/19  0756   WBC 10.0 9.1   HGB 16.3 16.0   HCT 48.7 48.0   MCV 96.2 96.0    197     BMP:   Recent Labs     03/03/19  0756      K 3.9   CL 98*   CO2 27   BUN 20   CREATININE 1.1     LIVER PROFILE:   Recent Labs     03/03/19  0756   AST 42*   ALT 13   BILITOT 1.5*   ALKPHOS 69     PT/INR: No results for input(s): PROTIME, INR in the last 72 hours. APTT: No results for input(s): APTT in the last 72 hours. Cultures:   Urine culture (3/1): Negative      Films:  Chest images and reports were reviewed by me. My interpretation is:  Chest CT (3/3/19): No LAD; right effusion; nodular density in the minor fissure; septal thickening in the apices      Assessment:     Acute CVA  Right pleural effusion  Lung nodules  Atrial fibrillation      Plan:    Acute CVA  - On eliquis and lipitor    Right pleural effusion  - May be due to excess volume  - On spironolactone    Lung nodules  - Has nodular densities in the right lower lobe and left upper lobe. Repeat chest CT in 3 months    Atrial fibrillation  - Metoprolol and eliquis      Prophylaxis  DVT- eliquis    Thank you for allowing me to participate in the care of this patient. Will sign off. Please call with any questions or concerns. Has 3 month pulmonary follow up scheduled.      Sommer Meade MD  [facility], Critical [facility] Sleep

## 2019-03-04 NOTE — DISCHARGE INSTR - COC
I70.0    Morbid obesity with BMI of 40.0-44.9, adult ([facility]) E66.01, Z68.41    Cerebrovascular accident (CVA) ([facility] I63.9    Pleural effusion, right J90    Lung nodules R91.8       Isolation/Infection:   Isolation          No Isolation            Nurse Assessment:  Last Vital Signs: /72   Pulse 92   Temp 98.4 °F (36.9 °C) (Oral)   Resp 16   Ht 6' 1\" (1.854 m)   Wt 293 lb 6.9 oz (133.1 kg)   SpO2 98%   BMI 38.71 kg/m²     Last documented pain score (0-10 scale): Pain Level: 0  Last Weight:   Wt Readings from Last 1 Encounters:   03/04/19 293 lb 6.9 oz (133.1 kg)     Mental Status:  Alert and orient         Nursing Mobility/ADLs:  Walking   independent   Transfer  independent   Bathing  independent   Dressing  needs help   Toileting  independent   Feeding  self   Med Admin  daughter helps   Med Delivery    Daughter picks them up     Wound Care Documentation and Therapy:        Elimination:  Continence:   · Bowel: YES  3/3/2019  · Bladder: YES  3/4/2019            Date of Last BM: 3/3/2019    Intake/Output Summary (Last 24 hours) at 3/4/2019 1314  Last data filed at 3/4/2019 0900  Gross per 24 hour   Intake 480 ml   Output 575 ml   Net -95 ml     I/O last 3 completed shifts:   In: 120 [P.O.:120]  Out: 18 [Urine:575]    Safety Concerns:     Do not stop your medications     Impairments/Disabilities:         Left hand needs Occupational therapy     Nutrition Therapy:  Current Nutrition Therapy:   Cardiac diet  FR     Routes of Feeding:   Liquids: thin   Daily Fluid Restriction:   1800       Treatments at the Time of Hospital Discharge:   Respiratory Treatments: none           Rehab Therapies: Occupational   Weight Bearing Status/Restrictions: none  Other Medical Equipment (for information only, NOT a DME order):   none      Patient's personal belongings (please select all that are sent with patient):  Clothes Daughter getting him dressed per request     RN SIGNATURE:  Electronically signed by Josafat Maldonado

## 2019-03-04 NOTE — PROGRESS NOTES
Physical Therapy  Facility/Department: [address] CVICU  Daily Treatment Note  NAME: Cait Richard  : 1945  MRN: [id number]    Date of Service: 3/4/2019    Discharge Recommendations:  Outpatient PT   PT Equipment Recommendations  Equipment Needed: No    Patient Diagnosis(es): The primary encounter diagnosis was Cerebrovascular accident (CVA), unspecified mechanism ([facility] Diagnoses of Atrial fibrillation with RVR ([facility] Pleural effusion on right, and Pulmonary nodules were also pertinent to this visit. has a past medical history of Arrhythmia, Cerebral artery occlusion with cerebral infarction [facility]), CHF (congestive heart failure) ([facility] Hypertension, and Sciatica. has a past surgical history that includes fracture surgery and eye surgery. Restrictions  Restrictions/Precautions  Restrictions/Precautions: Fall Risk  Subjective   General  Chart Reviewed: Yes  Additional Pertinent Hx: 67 y/o male admit 3/1/19 with Sxs of L UE Numb/Tingling. MRI Brain - Small Area of Increase Signal R Frontal Parietal Lobe Posteriorly could represent Acute Infarcts. Slight Asymmetric T2 Signal within L Thalamus and L Midbrain. PMH as noted including CVA, CHF, Chronic A-Fib (seen by Cardio [id number] although pt declined Anticoag despite knowing CVA risk), Sciatica, Fx/Surg (Jaw, s). Response To Previous Treatment: Patient with no complaints from previous session. Family / Caregiver Present: Yes([facility] [place] [facility]). )  Referring Practitioner: Dr. Luzmaria Yanes  Subjective  Subjective: Pt/family agreeable to cont PT Rx. Pt cont to appear frustrated at times regard cont sxs L UE (tingling, weak/diminished coordination) although reports that he is seeing slight improve. (Cont to stress to pt that recovery following neuro event can be slow and improve can continue even several wks/months following).    Pain Screening  Patient Currently in Pain: Denies  Vital Signs  Patient Currently in Pain: Denies : 22  AM-PAC Inpatient T-Scale Score : 53.28  Mobility Inpatient CMS 0-100% Score: 20.91  Mobility Inpatient CMS G-Code Modifier : CJ          Goals  Short term goals  Time Frame for Short term goals: 1-2 further PT Rxs. Short term goal 1: Transfers Independent. Short term goal 2: Amb with/without assist device 200' Supervision only. Short term goal 3: Stair Negotiation SBA. Patient Goals   Patient goals : Have sensation and strength L UE get better. Plan    Plan  Times per week: 1-2 further PT Rxs while in acute care setting. Current Treatment Recommendations: Strengthening, Transfer Training, Gait Training, Stair training, Safety Education & Training, Patient/Caregiver Education & Training  Safety Devices  Type of devices: Call light within reach, Left in chair, Nurse notified(Pt/family aware to call for assist.  PT spoke with pt's nurse Jonatan Fatima).   )     Therapy Time   Individual Concurrent Group Co-treatment   Time In 0715         Time Out 0745         Minutes [address] [place] [address], PT

## 2019-03-04 NOTE — PROGRESS NOTES
1900: Report received from [facility], RUSTSS completed    2020: Shift assessment completed, VSS on room air. Patient remains anxious with multiple questions for night plan. Patient state left hand remains numb with tingling but strength has increased. Patient agreeable with night time medications. Daughter at the bedside. No other needs at this time. 0000: Patient in bed with eyes closed. VSS on room air. Denies pain. 0430: Patient called out requesting bathroom. Ambulated to bathroom and returned to bed. Assessment completed, no changes. Patient states \" feels like he is getting better all the time. \" VSS on room air, no pain. Patient has no other needs.     070: Report given to [employer]

## 2019-03-05 ENCOUNTER — TELEPHONE (OUTPATIENT)
Dept: CARDIOLOGY CLINIC | Age: 74
End: 2019-03-05

## 2019-03-05 ENCOUNTER — CARE COORDINATION (OUTPATIENT)
Dept: CASE MANAGEMENT | Age: 74
End: 2019-03-05

## 2019-03-06 ENCOUNTER — TELEPHONE (OUTPATIENT)
Dept: CARDIOLOGY CLINIC | Age: 74
End: 2019-03-06

## 2019-03-12 ENCOUNTER — OFFICE VISIT (OUTPATIENT)
Dept: CARDIOLOGY CLINIC | Age: 74
End: 2019-03-12
Payer: MEDICARE

## 2019-03-12 VITALS
HEART RATE: 78 BPM | HEIGHT: 73 IN | DIASTOLIC BLOOD PRESSURE: 70 MMHG | BODY MASS INDEX: 38.43 KG/M2 | WEIGHT: 290 LBS | OXYGEN SATURATION: 98 % | SYSTOLIC BLOOD PRESSURE: 120 MMHG

## 2019-03-12 DIAGNOSIS — I63.9 CEREBROVASCULAR ACCIDENT (CVA), UNSPECIFIED MECHANISM (HCC): ICD-10-CM

## 2019-03-12 DIAGNOSIS — I50.42 CHRONIC COMBINED SYSTOLIC AND DIASTOLIC HF (HEART FAILURE) (HCC): ICD-10-CM

## 2019-03-12 DIAGNOSIS — I48.19 PERSISTENT ATRIAL FIBRILLATION (HCC): Primary | ICD-10-CM

## 2019-03-12 DIAGNOSIS — I42.9 CARDIOMYOPATHY, UNSPECIFIED TYPE (HCC): ICD-10-CM

## 2019-03-12 PROCEDURE — 1036F TOBACCO NON-USER: CPT | Performed by: NURSE PRACTITIONER

## 2019-03-12 PROCEDURE — 99214 OFFICE O/P EST MOD 30 MIN: CPT | Performed by: NURSE PRACTITIONER

## 2019-03-12 PROCEDURE — 1123F ACP DISCUSS/DSCN MKR DOCD: CPT | Performed by: NURSE PRACTITIONER

## 2019-03-12 PROCEDURE — 4040F PNEUMOC VAC/ADMIN/RCVD: CPT | Performed by: NURSE PRACTITIONER

## 2019-03-12 PROCEDURE — G8484 FLU IMMUNIZE NO ADMIN: HCPCS | Performed by: NURSE PRACTITIONER

## 2019-03-12 PROCEDURE — G8598 ASA/ANTIPLAT THER USED: HCPCS | Performed by: NURSE PRACTITIONER

## 2019-03-12 PROCEDURE — 3017F COLORECTAL CA SCREEN DOC REV: CPT | Performed by: NURSE PRACTITIONER

## 2019-03-12 PROCEDURE — 1111F DSCHRG MED/CURRENT MED MERGE: CPT | Performed by: NURSE PRACTITIONER

## 2019-03-12 PROCEDURE — 1101F PT FALLS ASSESS-DOCD LE1/YR: CPT | Performed by: NURSE PRACTITIONER

## 2019-03-12 PROCEDURE — G8417 CALC BMI ABV UP PARAM F/U: HCPCS | Performed by: NURSE PRACTITIONER

## 2019-03-12 PROCEDURE — 93000 ELECTROCARDIOGRAM COMPLETE: CPT | Performed by: NURSE PRACTITIONER

## 2019-03-12 PROCEDURE — G8427 DOCREV CUR MEDS BY ELIG CLIN: HCPCS | Performed by: NURSE PRACTITIONER

## 2019-03-15 ENCOUNTER — TELEPHONE (OUTPATIENT)
Dept: CARDIOLOGY CLINIC | Age: 74
End: 2019-03-15

## 2019-03-19 LAB
EKG ATRIAL RATE: 120 BPM
EKG DIAGNOSIS: NORMAL
EKG Q-T INTERVAL: 382 MS
EKG QRS DURATION: 132 MS
EKG QTC CALCULATION (BAZETT): 530 MS
EKG R AXIS: 64 DEGREES
EKG T AXIS: -4 DEGREES
EKG VENTRICULAR RATE: 116 BPM

## 2019-03-22 ENCOUNTER — TELEPHONE (OUTPATIENT)
Dept: CARDIOLOGY CLINIC | Age: 74
End: 2019-03-22

## 2019-03-28 ENCOUNTER — HOSPITAL ENCOUNTER (OUTPATIENT)
Dept: PHYSICAL THERAPY | Age: 74
Setting detail: THERAPIES SERIES
Discharge: HOME | End: 2019-03-28
Payer: MEDICARE

## 2019-03-28 PROCEDURE — 97161 PT EVAL LOW COMPLEX 20 MIN: CPT

## 2019-03-28 PROCEDURE — 97530 THERAPEUTIC ACTIVITIES: CPT

## 2019-03-30 ENCOUNTER — APPOINTMENT (OUTPATIENT)
Dept: GENERAL RADIOLOGY | Age: 74
End: 2019-03-30
Payer: MEDICARE

## 2019-03-30 ENCOUNTER — HOSPITAL ENCOUNTER (EMERGENCY)
Age: 74
Discharge: HOME | End: 2019-03-30
Attending: EMERGENCY MEDICINE
Payer: MEDICARE

## 2019-03-30 ENCOUNTER — TELEPHONE (OUTPATIENT)
Dept: OTHER | Facility: CLINIC | Age: 74
End: 2019-03-30

## 2019-03-30 VITALS
DIASTOLIC BLOOD PRESSURE: 73 MMHG | SYSTOLIC BLOOD PRESSURE: 128 MMHG | TEMPERATURE: 100.2 F | OXYGEN SATURATION: 94 % | HEART RATE: 81 BPM | RESPIRATION RATE: 16 BRPM

## 2019-03-30 DIAGNOSIS — M10.9 ACUTE GOUT OF LEFT KNEE, UNSPECIFIED CAUSE: Primary | ICD-10-CM

## 2019-03-30 LAB
A/G RATIO: 0.6 (ref 1.1–2.2)
ALBUMIN SERPL-MCNC: 3.2 G/DL (ref 3.4–5)
ALP BLD-CCNC: 84 U/L (ref 40–129)
ALT SERPL-CCNC: 23 U/L (ref 10–40)
ANION GAP SERPL CALCULATED.3IONS-SCNC: 11 MMOL/L (ref 3–16)
APPEARANCE FLUID: NORMAL
AST SERPL-CCNC: 34 U/L (ref 15–37)
BASOPHILS ABSOLUTE: 0.1 K/UL (ref 0–0.2)
BASOPHILS RELATIVE PERCENT: 0.6 %
BILIRUB SERPL-MCNC: 1.2 MG/DL (ref 0–1)
BUN BLDV-MCNC: 18 MG/DL (ref 7–20)
C-REACTIVE PROTEIN: 47.6 MG/L (ref 0–5.1)
CALCIUM SERPL-MCNC: 9.7 MG/DL (ref 8.3–10.6)
CELL COUNT FLUID TYPE: NORMAL
CHLORIDE BLD-SCNC: 100 MMOL/L (ref 99–110)
CLOT EVALUATION: NORMAL
CO2: 25 MMOL/L (ref 21–32)
COLOR FLUID: NORMAL
CREAT SERPL-MCNC: 1 MG/DL (ref 0.8–1.3)
CRYSTAL CMT 2: NORMAL
CRYSTALS, FLUID: NORMAL
EOSINOPHIL FLUID: 1 %
EOSINOPHILS ABSOLUTE: 0.1 K/UL (ref 0–0.6)
EOSINOPHILS RELATIVE PERCENT: 0.6 %
GFR AFRICAN AMERICAN: >60
GFR NON-AFRICAN AMERICAN: >60
GLOBULIN: 5.4 G/DL
GLUCOSE BLD-MCNC: 112 MG/DL (ref 70–99)
HCT VFR BLD CALC: 46.9 % (ref 40.5–52.5)
HEMOGLOBIN: 15.6 G/DL (ref 13.5–17.5)
LYMPHOCYTES ABSOLUTE: 1.1 K/UL (ref 1–5.1)
LYMPHOCYTES RELATIVE PERCENT: 12.7 %
LYMPHOCYTES, BODY FLUID: 10 %
MCH RBC QN AUTO: 31.6 PG (ref 26–34)
MCHC RBC AUTO-ENTMCNC: 33.2 G/DL (ref 31–36)
MCV RBC AUTO: 95.1 FL (ref 80–100)
MONOCYTE, FLUID: 7 %
MONOCYTES ABSOLUTE: 1.4 K/UL (ref 0–1.3)
MONOCYTES RELATIVE PERCENT: 16.7 %
NEUTROPHIL, FLUID: 82 %
NEUTROPHILS ABSOLUTE: 5.9 K/UL (ref 1.7–7.7)
NEUTROPHILS RELATIVE PERCENT: 69.4 %
NUCLEATED CELLS FLUID: 670 /CUMM
NUMBER OF CELLS COUNTED FLUID: 100
PDW BLD-RTO: 15.3 % (ref 12.4–15.4)
PLATELET # BLD: 189 K/UL (ref 135–450)
PMV BLD AUTO: 8.3 FL (ref 5–10.5)
POTASSIUM REFLEX MAGNESIUM: 4.4 MMOL/L (ref 3.5–5.1)
RBC # BLD: 4.93 M/UL (ref 4.2–5.9)
RBC FLUID: NORMAL /CUMM
SEDIMENTATION RATE, ERYTHROCYTE: 73 MM/HR (ref 0–20)
SODIUM BLD-SCNC: 136 MMOL/L (ref 136–145)
SOURCE BODY FLUID: NORMAL
TOTAL PROTEIN: 8.6 G/DL (ref 6.4–8.2)
WBC # BLD: 8.5 K/UL (ref 4–11)

## 2019-03-30 PROCEDURE — 86140 C-REACTIVE PROTEIN: CPT

## 2019-03-30 PROCEDURE — 6370000000 HC RX 637 (ALT 250 FOR IP): Performed by: EMERGENCY MEDICINE

## 2019-03-30 PROCEDURE — 99283 EMERGENCY DEPT VISIT LOW MDM: CPT

## 2019-03-30 PROCEDURE — 2500000003 HC RX 250 WO HCPCS: Performed by: EMERGENCY MEDICINE

## 2019-03-30 PROCEDURE — 80053 COMPREHEN METABOLIC PANEL: CPT

## 2019-03-30 PROCEDURE — 96374 THER/PROPH/DIAG INJ IV PUSH: CPT

## 2019-03-30 PROCEDURE — 89051 BODY FLUID CELL COUNT: CPT

## 2019-03-30 PROCEDURE — 85025 COMPLETE CBC W/AUTO DIFF WBC: CPT

## 2019-03-30 PROCEDURE — 85652 RBC SED RATE AUTOMATED: CPT

## 2019-03-30 PROCEDURE — 96375 TX/PRO/DX INJ NEW DRUG ADDON: CPT

## 2019-03-30 PROCEDURE — 73560 X-RAY EXAM OF KNEE 1 OR 2: CPT

## 2019-03-30 PROCEDURE — 87070 CULTURE OTHR SPECIMN AEROBIC: CPT

## 2019-03-30 PROCEDURE — 87205 SMEAR GRAM STAIN: CPT

## 2019-03-30 PROCEDURE — 89060 EXAM SYNOVIAL FLUID CRYSTALS: CPT

## 2019-03-30 RX ORDER — PREDNISONE 20 MG/1
60 TABLET ORAL ONCE
Status: COMPLETED | OUTPATIENT
Start: 2019-03-30 | End: 2019-03-30

## 2019-03-30 RX ORDER — PREDNISONE 20 MG/1
60 TABLET ORAL ONCE
Status: DISCONTINUED | OUTPATIENT
Start: 2019-03-30 | End: 2019-03-30

## 2019-03-30 RX ORDER — HYDROCODONE BITARTRATE AND ACETAMINOPHEN 5; 325 MG/1; MG/1
1 TABLET ORAL ONCE
Status: COMPLETED | OUTPATIENT
Start: 2019-03-30 | End: 2019-03-30

## 2019-03-30 RX ORDER — PREDNISONE 10 MG/1
10 TABLET ORAL DAILY
Qty: 30 TABLET | Refills: 0 | Status: SHIPPED | OUTPATIENT
Start: 2019-03-30 | End: 2019-04-09

## 2019-03-30 RX ORDER — OXYCODONE HYDROCHLORIDE AND ACETAMINOPHEN 5; 325 MG/1; MG/1
1 TABLET ORAL EVERY 4 HOURS PRN
Qty: 19 TABLET | Refills: 0 | Status: SHIPPED | OUTPATIENT
Start: 2019-03-30 | End: 2019-04-06

## 2019-03-30 RX ORDER — LORAZEPAM 1 MG/1
1 TABLET ORAL NIGHTLY PRN
Qty: 7 TABLET | Refills: 0 | Status: SHIPPED | OUTPATIENT
Start: 2019-03-30 | End: 2019-04-06

## 2019-03-30 RX ORDER — BUPIVACAINE HYDROCHLORIDE 5 MG/ML
30 INJECTION, SOLUTION PERINEURAL ONCE
Status: COMPLETED | OUTPATIENT
Start: 2019-03-30 | End: 2019-03-30

## 2019-03-30 RX ADMIN — HYDROCODONE BITARTRATE AND ACETAMINOPHEN 1 TABLET: 5; 325 TABLET ORAL at 19:17

## 2019-03-30 RX ADMIN — BUPIVACAINE HYDROCHLORIDE 150 MG: 5 INJECTION, SOLUTION PERINEURAL at 19:18

## 2019-03-30 RX ADMIN — PREDNISONE 60 MG: 20 TABLET ORAL at 19:17

## 2019-03-30 RX ADMIN — LIDOCAINE HYDROCHLORIDE 5 ML: 10 INJECTION, SOLUTION EPIDURAL; INFILTRATION; INTRACAUDAL; PERINEURAL at 19:19

## 2019-03-30 ASSESSMENT — PAIN DESCRIPTION - ONSET: ONSET: ON-GOING

## 2019-03-30 ASSESSMENT — PAIN DESCRIPTION - DESCRIPTORS: DESCRIPTORS: ACHING

## 2019-03-30 ASSESSMENT — PAIN DESCRIPTION - LOCATION: LOCATION: KNEE

## 2019-03-30 ASSESSMENT — PAIN SCALES - GENERAL
PAINLEVEL_OUTOF10: 0
PAINLEVEL_OUTOF10: 8
PAINLEVEL_OUTOF10: 9
PAINLEVEL_OUTOF10: 0

## 2019-03-30 ASSESSMENT — PAIN DESCRIPTION - PAIN TYPE: TYPE: ACUTE PAIN

## 2019-03-30 ASSESSMENT — PAIN - FUNCTIONAL ASSESSMENT: PAIN_FUNCTIONAL_ASSESSMENT: ACTIVITIES ARE NOT PREVENTED

## 2019-03-30 ASSESSMENT — PAIN DESCRIPTION - PROGRESSION: CLINICAL_PROGRESSION: NOT CHANGED

## 2019-03-30 ASSESSMENT — PAIN DESCRIPTION - ORIENTATION: ORIENTATION: LEFT

## 2019-03-30 ASSESSMENT — PAIN DESCRIPTION - FREQUENCY: FREQUENCY: CONTINUOUS

## 2019-03-30 NOTE — ED NOTES
This RN at beside with Dr Jere Puentes to assist in left knee aspiration. Fluid collected, labeled. Denisa Walton walked specimen to lab.       Yanira Mccoy RN  03/30/19 1944

## 2019-03-31 NOTE — ED NOTES
Pt resting comfortably on stretcher, in NAD. Pt is awake, alert and oriented x 4. Respirations easy and non-labored. VSS. Denies pain at this time. Awaiting test results. Pt verbalized understanding.       Christ Patel RN  03/30/19 2007
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
## 2019-04-01 ENCOUNTER — TELEPHONE (OUTPATIENT)
Dept: OTHER | Facility: CLINIC | Age: 74
End: 2019-04-01

## 2019-04-01 NOTE — TELEPHONE ENCOUNTER
Pt called writer to confirm appt with Dr. Yee Jaramillo (ortho). Writer gave pt number to call office if unable to keep appt.

## 2019-04-01 NOTE — TELEPHONE ENCOUNTER
Writer attempted to contact pt to inform him of ED f/u appt. Apt scheduled for Tuesday 4-2 @9:45am with Dr. Jaspreet Elizondo (ortho). Attempt unsuccessful. Voicemail left stating time and date of appt.

## 2019-04-02 ENCOUNTER — TELEPHONE (OUTPATIENT)
Dept: CARDIOLOGY CLINIC | Age: 74
End: 2019-04-02

## 2019-04-02 NOTE — TELEPHONE ENCOUNTER
Lissette Crowe called in wanting to know what kind of laxative can her FARIDEH take? She stated she was told to ask by the pharmacy. You can reach Lissette Crowe at [phone]. I was not sure if message should sent to Latanya Summers or George Davila.

## 2019-04-03 NOTE — TELEPHONE ENCOUNTER
Patient needs a refill of his eliquis. Pt would like a 30 day supply sent to Pharmacy:  [facility], [address] [place] [address] [address] [phone] - [phone]. Pt states he is out of medication and would also like to know how much a 30 day supply will cost him. You can reach the pt at [phone].

## 2019-04-04 ENCOUNTER — HOSPITAL ENCOUNTER (OUTPATIENT)
Dept: OCCUPATIONAL THERAPY | Age: 74
Setting detail: THERAPIES SERIES
Discharge: HOME | End: 2019-04-04
Payer: MEDICARE

## 2019-04-04 PROCEDURE — 97110 THERAPEUTIC EXERCISES: CPT

## 2019-04-04 PROCEDURE — 97530 THERAPEUTIC ACTIVITIES: CPT

## 2019-04-04 PROCEDURE — 97112 NEUROMUSCULAR REEDUCATION: CPT

## 2019-04-04 NOTE — PROGRESS NOTES
3+/5  L Elbow Flex: 3+/5  L Elbow Ext: 3+/5  L Wrist Flex: 4-/5  L Wrist Ext: 4-/5  L Hand Grasp: 4-/5  RUE Strength  Gross RUE Strength: WFL  R Shoulder Flex: 5/5  R Shoulder ABduction: 5/5  R Elbow Flex: 5/5  R Elbow Ext: 5/5  R Wrist Flex: 5/5  R Wrist Ext: 5/5  R Hand Grasp: 5/5  Hand Dominance  Hand Dominance: Right  Left Hand Strength -  (lbs)  Handle Setting 2: 57, 55, 42lbs on 03/28/19  Left Hand Strength - Pinch (lbs)  Lateral: 13, 12, 10lbs 03/28/19  Tip: 5, 2, 9lbs 03/28/19  Palmar 3 point: 5, 9, 12lbs 03/28/19  LUE Edema - Circumference (cm)  LUE Edema Present?: No  Left 9-Hole Peg Test  Left 9-Hole Peg Test: Impaired(Unable to complete test as pt was unable to  a peg)  Right Hand Strength -  (lbs)  Handle Setting 2: 80lbs on 03/28/19  Right Hand Strength - Pinch (lbs)  Lateral: 20lbs 03/28/19  Tip: 13lbs 03/28/19  Palmar 3 point: 19lbs 03/28/19  RUE Edema - Circumference (cm)  RUE Edema Present?: No  Fine Motor Skills  Left 9-Hole Peg Test: Impaired(Unable to complete test as pt was unable to  a peg)  Right 9 Hole Peg Test Time (secs): 23  Hand Assessment Comment  Hand Assessment Comment: Severely impaired coordination w/ (L) hand as pt was unable to  a peg. Impaired (L)hand strength & grasp. (R) hand measurements WFL            Therapeutic Exercise/Therapeutic Activities:   Exercise/Equipment  Resistance/Repetitions   Other comments   Proximal strengthening 2x 10 reps     -scapula retraction/protraction  -scapula depression/elevation Tolerated well; reviewed for HEP    Min vc's for form.     LUE strengthening  X 10 reps 2#  -shoulder flexion/extension  -shoulder abduction/adduction to 90*  -elbow flexion/extension    X 10 reps 1#   -wrist flexion/extension  -wrist radial/ulnar deviation  -wrist supination/pronation  (pt with limited ROM in wrist supination therefore educated pt in complete wrist supination stretches with use of RUE x 10 reps for 10 secs) Added to HEP; encouraged pt to complete with soup can or without weight. Fine motor coordionation x10   -finger abduction/adduction  -finger opposition  -finger lifts (hyperextension)   -lumbricles and PIP/DIP flexion sequence    In hand manipulation activities:   -translation, rotation, and shift with large marker. Grasp/release 9 hole pegs from board that were placed by therapist.     Pt then attempted to complete 9 hole peg test and able to grasp and place 4 pegs without difficulty however noted increased difficulty with increased pegs d/t frustration and fatigue. Tip pinch 10 small beans and placing in bucket; tolerated fair. Added to HEP. (noted improvement with finger opposition). Noted difficult with in hand manipulation. Home Exercise Program:    -Educated in wall pushups, scapular protraction/retraction & scapular elevation/depressiong x10 reps/exercise. -UE strengthening,     Manual Treatments:  N/A    Modalities:  N/A    Assessment: Pt presents in OP therapy post CVA w/ (L) side weakness w/ deficits in strength & coordination. Patient participated in LUE strengthening and fine motor coordination this date well however con't to present with deficits. Pt would benefit from skilled OT services on OP to address pt goals and deficits. Pt verbalized understanding of HEP. Con't OT poc.      Goals  Short term goals  Time Frame for Short term goals: within 4 weeks pt will be able to:  Short term goal 1: pt will improve (L) hand coordination by completing dressing boards IND'ly in 2 consecutive sessions in order to dress IND'ly  Short term goal 2: pt will complete 9-hole peg test within 35 seconds in (L) hand in order to tie his tie  Short term goal 3: pt will improve (L) hand  strength by 3-5lbs to open a jar  Short term goal 4: pt will improve lateral pinch  by 3-5lbs in order to tear junk mail  Short term goal 5: pt will improve 3 jaw evi pinch by 3-5lbs in order to complete cutting task w/ utensils  Long term goals  Time Frame for Long term goals : will determine LTGs after STGs met  Patient Goals   Patient goals : \"I want to be able to use my (L) arm like normal again. \"    Timed Code Treatment Minutes:  45     Total Treatment Minutes:  45    Treatment/Activity Tolerance:     [x]  Patient tolerated treatment well []  Patient limited by fatigue    []  Patient limited by pain []  Patient limited by other medical complications   []  Other:     Prognosis: [x]  Good []  Fair  []  Poor    Patient Requires Follow-up:  [x]  Yes  []  No    Plan: []  Continue per plan of care []  Alter current plan (see comments)   [x]  Plan of care initiated []  Hold pending MD visit []  Discharge      Electronically signed by:  Alice LOVELACE/L #[id number]

## 2019-04-11 ENCOUNTER — HOSPITAL ENCOUNTER (OUTPATIENT)
Dept: OCCUPATIONAL THERAPY | Age: 74
Setting detail: THERAPIES SERIES
Discharge: HOME | End: 2019-04-11
Payer: MEDICARE

## 2019-04-11 PROCEDURE — 97112 NEUROMUSCULAR REEDUCATION: CPT

## 2019-04-11 PROCEDURE — 97110 THERAPEUTIC EXERCISES: CPT

## 2019-04-11 PROCEDURE — 97530 THERAPEUTIC ACTIVITIES: CPT

## 2019-04-11 NOTE — PROGRESS NOTES
Occupational Therapy Daily Treatment Note    Date:  2019    Patient Name:  Tracy Last     :  [birth year]  MRN: [id number]  Medical/Treatment Diagnosis Information:  -CVA 19  -Pt 68 y.o. male who presents in OP d/t decreased coordination & (L)UE strength deficits secondary to recent CVA. Insurance/Certification information: Medicare  Physician Information:   Dr. Charmayne Jobs of care signed (Y/N):  N, sent 3/28/19  Visit# / total visits:  3/16   Pain level: 0/10     G-Code (if applicable):      Date / Visit # G-Code Applied:  3/28/19   QuickDASH Total Score: 33       QuickDASH Disability/Symptom Score : 50 %    Progress Note: []  Yes  [x]  No  Next due by: Visit #10      Subjective:  Patient presents to OP OT and agreeable to OT txt. Objective Measures:  3/28/19    ADL  Feeding: Setup(Assist to cut up foods)  Grooming: Independent  UE Bathing: Modified independent   LE Bathing: Independent  UE Dressing: Setup(Requires A for zippers & buttons)  LE Dressing: Setup(Requires A for buttons & zippers)  Toileting: Independent(Pt reports wearing elastic band shorts)  Tone RUE  RUE Tone: Normotonic  Tone LUE  LUE Tone: Normotonic  Coordination  Movements Are Fluid And Coordinated: No  Coordination and Movement description: Fine motor impairments;Decreased speed;Decreased accuracy; Left UE  Quality of Movement Other  Comment: Difficulty w/ finger opposition.   Cognition  Overall Cognitive Status: WNL  Sensation  Overall Sensation Status: Impaired  Light Touch: Partial deficits in the LLE(Some sensation deficits noted on (L)index finger)    LUE AROM (degrees)  LUE AROM : WFL  Left Hand AROM (degrees)  Left Hand AROM: WFL  RUE AROM (degrees)  RUE AROM : WFL  Right Hand AROM (degrees)  Right Hand AROM: WFL  LUE Strength  Gross LUE Strength: Exceptions to [facility]  L Shoulder Flex: 4-/5  L Shoulder ABduction: 3+/5  L Elbow Flex: 3+/5  L Elbow Ext: 3+/5  L Wrist Flex: 4-/5  L Wrist Ext: 4-/5  L Hand Grasp: in bucket  -finger to hand manipulation however pt only able to complete with 1 bean therefore encouraged to complete for HEP with larger objects.   -tip pinch x 10 beans while holding small imer with 3rd-5th digit. -finger opposition x 10 reps (mod vc's for increase precision). Buttoning board: Pt with noted difficulty as he easily gets frustrated. Mod-max vc's for bilateral coordination. Able to complete 5 buttons with increased time. Encouraged pt to focus on tips of finger to increase a more precise pinch during finger opposition and pinching ex. Home Exercise Program:    -Educated in wall pushups, scapular protraction/retraction & scapular elevation/depressiong x10 reps/exercise. -UE strengthening  -pink theraputty and in hand manipulation    Manual Treatments:  N/A    Modalities:  N/A    Assessment: Pt presents in OP therapy post CVA w/ (L) side weakness w/ deficits in strength & coordination. Patient participated in LUE strengthening and fine motor coordination this date well however con't to present with deficits. Pt would benefit from skilled OT services on OP to address pt goals and deficits. Pt verbalized understanding of HEP. Con't OT poc.      Goals  Short term goals  Time Frame for Short term goals: within 4 weeks pt will be able to:  Short term goal 1: pt will improve (L) hand coordination by completing dressing boards IND'ly in 2 consecutive sessions in order to dress IND'ly  Short term goal 2: pt will complete 9-hole peg test within 35 seconds in (L) hand in order to tie his tie  Short term goal 3: pt will improve (L) hand  strength by 3-5lbs to open a jar  Short term goal 4: pt will improve lateral pinch  by 3-5lbs in order to tear junk mail  Short term goal 5: pt will improve 3 jaw evi pinch by 3-5lbs in order to complete cutting task w/ utensils  Long term goals  Time Frame for Long term goals : will determine LTGs after STGs met  Patient Goals   Patient goals : \"I want to be able to use my (L) arm like normal again. \"    Timed Code Treatment Minutes:  46     Total Treatment Minutes:  46    Treatment/Activity Tolerance:     [x]  Patient tolerated treatment well []  Patient limited by fatigue    []  Patient limited by pain []  Patient limited by other medical complications   []  Other:     Prognosis: [x]  Good []  Fair  []  Poor    Patient Requires Follow-up:  [x]  Yes  []  No    Plan: []  Continue per plan of care []  Alter current plan (see comments)   [x]  Plan of care initiated []  Hold pending MD visit []  Discharge      Electronically signed by:  Aneudy Tian OTR/L #[id number]

## 2019-04-16 ENCOUNTER — HOSPITAL ENCOUNTER (OUTPATIENT)
Dept: OCCUPATIONAL THERAPY | Age: 74
Setting detail: THERAPIES SERIES
Discharge: HOME | End: 2019-04-16
Payer: MEDICARE

## 2019-04-16 ENCOUNTER — OFFICE VISIT (OUTPATIENT)
Dept: CARDIOLOGY CLINIC | Age: 74
End: 2019-04-16
Payer: MEDICARE

## 2019-04-16 VITALS
SYSTOLIC BLOOD PRESSURE: 122 MMHG | HEIGHT: 73 IN | RESPIRATION RATE: 16 BRPM | BODY MASS INDEX: 36.05 KG/M2 | HEART RATE: 78 BPM | DIASTOLIC BLOOD PRESSURE: 78 MMHG | WEIGHT: 272 LBS

## 2019-04-16 DIAGNOSIS — I42.9 CARDIOMYOPATHY, UNSPECIFIED TYPE (HCC): ICD-10-CM

## 2019-04-16 DIAGNOSIS — I48.19 PERSISTENT ATRIAL FIBRILLATION (HCC): Primary | ICD-10-CM

## 2019-04-16 PROCEDURE — G8598 ASA/ANTIPLAT THER USED: HCPCS | Performed by: NURSE PRACTITIONER

## 2019-04-16 PROCEDURE — 99213 OFFICE O/P EST LOW 20 MIN: CPT | Performed by: NURSE PRACTITIONER

## 2019-04-16 PROCEDURE — G8427 DOCREV CUR MEDS BY ELIG CLIN: HCPCS | Performed by: NURSE PRACTITIONER

## 2019-04-16 PROCEDURE — 1123F ACP DISCUSS/DSCN MKR DOCD: CPT | Performed by: NURSE PRACTITIONER

## 2019-04-16 PROCEDURE — G8417 CALC BMI ABV UP PARAM F/U: HCPCS | Performed by: NURSE PRACTITIONER

## 2019-04-16 PROCEDURE — 4040F PNEUMOC VAC/ADMIN/RCVD: CPT | Performed by: NURSE PRACTITIONER

## 2019-04-16 PROCEDURE — 97530 THERAPEUTIC ACTIVITIES: CPT

## 2019-04-16 PROCEDURE — 1036F TOBACCO NON-USER: CPT | Performed by: NURSE PRACTITIONER

## 2019-04-16 PROCEDURE — 3017F COLORECTAL CA SCREEN DOC REV: CPT | Performed by: NURSE PRACTITIONER

## 2019-04-16 NOTE — PROGRESS NOTES
Occupational Therapy Daily Treatment Note    Date:  2019    Patient Name:  Ingris Cuellar     :  [id number]  MRN: [id number]  Medical/Treatment Diagnosis Information:  -CVA 19  -Pt 68 y.o. male who presents in OP d/t decreased coordination & (L)UE strength deficits secondary to recent CVA. Insurance/Certification information: Medicare  Physician Information:   Dr. Akila Head of care signed (Y/N):  N, sent 3/28/19  Visit# / total visits:     Pain level: 0/10     G-Code (if applicable):      Date / Visit # G-Code Applied:  3/28/19   QuickDASH Total Score: 33       QuickDASH Disability/Symptom Score : 50 %    Progress Note: []  Yes  [x]  No  Next due by: Visit #10      Subjective:  Patient presents to OP OT and agreeable to OT txt. Patient reports that he was able to  10 M&Ms in 28 seconds at home and place in bowl. Objective Measures:  3/28/19    ADL  Feeding: Setup(Assist to cut up foods)  Grooming: Independent  UE Bathing: Modified independent   LE Bathing: Independent  UE Dressing: Setup(Requires A for zippers & buttons)  LE Dressing: Setup(Requires A for buttons & zippers)  Toileting: Independent(Pt reports wearing elastic band shorts)  Tone RUE  RUE Tone: Normotonic  Tone LUE  LUE Tone: Normotonic  Coordination  Movements Are Fluid And Coordinated: No  Coordination and Movement description: Fine motor impairments;Decreased speed;Decreased accuracy; Left UE  Quality of Movement Other  Comment: Difficulty w/ finger opposition.   Cognition  Overall Cognitive Status: WNL  Sensation  Overall Sensation Status: Impaired  Light Touch: Partial deficits in the LLE(Some sensation deficits noted on (L)index finger)    LUE AROM (degrees)  LUE AROM : WFL  Left Hand AROM (degrees)  Left Hand AROM: WFL  RUE AROM (degrees)  RUE AROM : WFL  Right Hand AROM (degrees)  Right Hand AROM: WFL  LUE Strength  Gross LUE Strength: Exceptions to [facility]  L Shoulder Flex: 4-/5  L Shoulder ABduction: 3+/5  L Elbow Flex: 3+/5  L Elbow Ext: 3+/5  L Wrist Flex: 4-/5  L Wrist Ext: 4-/5  L Hand Grasp: 4-/5  RUE Strength  Gross RUE Strength: WFL  R Shoulder Flex: 5/5  R Shoulder ABduction: 5/5  R Elbow Flex: 5/5  R Elbow Ext: 5/5  R Wrist Flex: 5/5  R Wrist Ext: 5/5  R Hand Grasp: 5/5  Hand Dominance  Hand Dominance: Right  Left Hand Strength -  (lbs)  Handle Setting 2: 57, 55, 42lbs on 03/28/19  Left Hand Strength - Pinch (lbs)  Lateral: 13, 12, 10lbs 03/28/19  Tip: 5, 2, 9lbs 03/28/19  Palmar 3 point: 5, 9, 12lbs 03/28/19  LUE Edema - Circumference (cm)  LUE Edema Present?: No  Left 9-Hole Peg Test  Left 9-Hole Peg Test: Impaired(Unable to complete test as pt was unable to  a peg)  Right Hand Strength -  (lbs)  Handle Setting 2: 80lbs on 03/28/19  Right Hand Strength - Pinch (lbs)  Lateral: 20lbs 03/28/19  Tip: 13lbs 03/28/19  Palmar 3 point: 19lbs 03/28/19  RUE Edema - Circumference (cm)  RUE Edema Present?: No  Fine Motor Skills  Left 9-Hole Peg Test: Impaired(Unable to complete test as pt was unable to  a peg)  Right 9 Hole Peg Test Time (secs): 23  Hand Assessment Comment  Hand Assessment Comment: Severely impaired coordination w/ (L) hand as pt was unable to  a peg. Impaired (L)hand strength & grasp. (R) hand measurements WFL            Therapeutic Exercise/Therapeutic Activities:   Exercise/Equipment  Resistance/Repetitions   Other comments   Fine motor coordionation Coins:   -pinched 10 pennies and placed into bucket. -tip pinched 10 pennies and placed in palm to focus on finger to palm manipulation (completed twice)  -palm to finger manipulation with 10 pennies     Dressing boards:   -Tying shoe laces:           completed on dressing board 5x with noted improvement each trail. Vc's for slowing down and not rushing through activity for improved precision.           Pt then completed tying own shoe on pt foot with mod vc's for sequencing and allowed pt to prop foot up on chair to assist.
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
## 2019-04-18 ENCOUNTER — HOSPITAL ENCOUNTER (OUTPATIENT)
Dept: OCCUPATIONAL THERAPY | Age: 74
Setting detail: THERAPIES SERIES
Discharge: HOME | End: 2019-04-18
Payer: MEDICARE

## 2019-04-18 PROCEDURE — 97530 THERAPEUTIC ACTIVITIES: CPT

## 2019-04-18 NOTE — PROGRESS NOTES
Occupational Therapy Daily Treatment Note    Date:  2019    Patient Name:  Alley Sandra     :  [id number]  MRN: [id number]  Medical/Treatment Diagnosis Information:  -CVA 19  -Pt 68 y.o. male who presents in OP d/t decreased coordination & (L)UE strength deficits secondary to recent CVA. Insurance/Certification information: Medicare  Physician Information:   Dr. Juan Jose Vernon of care signed (Y/N):  N, sent 3/28/19  Visit# / total visits:     Pain level: 0/10     G-Code (if applicable):      Date / Visit # G-Code Applied:  3/28/19   QuickDASH Total Score: 33       QuickDASH Disability/Symptom Score : 50 %    Progress Note: []  Yes  [x]  No  Next due by: Visit #10      Subjective:  Patient presents to OP OT and agreeable to OT txt. Objective Measures:  3/28/19    ADL  Feeding: Setup(Assist to cut up foods)  Grooming: Independent  UE Bathing: Modified independent   LE Bathing: Independent  UE Dressing: Setup(Requires A for zippers & buttons)  LE Dressing: Setup(Requires A for buttons & zippers)  Toileting: Independent(Pt reports wearing elastic band shorts)  Tone RUE  RUE Tone: Normotonic  Tone LUE  LUE Tone: Normotonic  Coordination  Movements Are Fluid And Coordinated: No  Coordination and Movement description: Fine motor impairments;Decreased speed;Decreased accuracy; Left UE  Quality of Movement Other  Comment: Difficulty w/ finger opposition.   Cognition  Overall Cognitive Status: WNL  Sensation  Overall Sensation Status: Impaired  Light Touch: Partial deficits in the LLE(Some sensation deficits noted on (L)index finger)    LUE AROM (degrees)  LUE AROM : WFL  Left Hand AROM (degrees)  Left Hand AROM: WFL  RUE AROM (degrees)  RUE AROM : WFL  Right Hand AROM (degrees)  Right Hand AROM: WFL  LUE Strength  Gross LUE Strength: Exceptions to [facility]  L Shoulder Flex: 4-/5  L Shoulder ABduction: 3+/5  L Elbow Flex: 3+/5  L Elbow Ext: 3+/5  L Wrist Flex: 4-/5  L Wrist Ext: 4-/5  L Hand Grasp: Home Exercise Program:    -Educated in wall pushups, scapular protraction/retraction & scapular elevation/depressiong x10 reps/exercise. -UE strengthening  -pink theraputty and in hand manipulation  -buttons and tying shoelaces, coins     Manual Treatments:  N/A    Modalities:  N/A    Assessment: Pt presents in OP therapy post CVA w/ (L) side weakness w/ deficits in strength & coordination. Patient participated in LUE strengthening and fine motor coordination this date well however con't to present with deficits. Pt would benefit from skilled OT services on OP to address pt goals and deficits. Pt verbalized understanding of HEP. Con't OT poc. Goals  Short term goals  Time Frame for Short term goals: within 4 weeks pt will be able to:  Short term goal 1: pt will improve (L) hand coordination by completing dressing boards IND'ly in 2 consecutive sessions in order to dress IND'ly  Short term goal 2: pt will complete 9-hole peg test within 35 seconds in (L) hand in order to tie his tie  Short term goal 3: pt will improve (L) hand  strength by 3-5lbs to open a jar  Short term goal 4: pt will improve lateral pinch  by 3-5lbs in order to tear junk mail  Short term goal 5: pt will improve 3 jaw evi pinch by 3-5lbs in order to complete cutting task w/ utensils  Long term goals  Time Frame for Long term goals : will determine LTGs after STGs met  Patient Goals   Patient goals : \"I want to be able to use my (L) arm like normal again. \"    Timed Code Treatment Minutes:  42     Total Treatment Minutes:  42    Treatment/Activity Tolerance:     [x]  Patient tolerated treatment well []  Patient limited by fatigue    []  Patient limited by pain []  Patient limited by other medical complications   []  Other:     Prognosis: [x]  Good []  Fair  []  Poor    Patient Requires Follow-up:  [x]  Yes  []  No    Plan: []  Continue per plan of care []  Alter current plan (see comments)   [x]  Plan of care initiated []  Hold

## 2019-04-22 ENCOUNTER — HOSPITAL ENCOUNTER (OUTPATIENT)
Dept: OCCUPATIONAL THERAPY | Age: 74
Setting detail: THERAPIES SERIES
End: 2019-04-22
Payer: MEDICARE

## 2019-04-22 LAB
BODY FLUID CULTURE, STERILE: NORMAL
GRAM STAIN RESULT: NORMAL

## 2019-04-23 ENCOUNTER — TELEPHONE (OUTPATIENT)
Dept: CARDIOLOGY CLINIC | Age: 74
End: 2019-04-23

## 2019-04-23 NOTE — TELEPHONE ENCOUNTER
Patient walked into the office and requested a Rx for a handicap placard. When this is ready he would like to pick it up in the office. He was last seen in the office 4/16/19 by Kolton Bailey NP. Please call him at [phone].   Thank you

## 2019-04-23 NOTE — TELEPHONE ENCOUNTER
Discussed with Althea. From a cardiac standpoint pt will not require a parking placard and she would like for him to walk more. Left message in detail on v/m.

## 2019-05-21 ENCOUNTER — HOSPITAL ENCOUNTER (OUTPATIENT)
Dept: OCCUPATIONAL THERAPY | Age: 74
Setting detail: THERAPIES SERIES
Discharge: HOME | End: 2019-05-21
Payer: MEDICARE

## 2019-05-21 PROCEDURE — 97530 THERAPEUTIC ACTIVITIES: CPT

## 2019-05-21 PROCEDURE — 97112 NEUROMUSCULAR REEDUCATION: CPT

## 2019-05-21 NOTE — PROGRESS NOTES
Occupational Therapy Daily Treatment Note    Date:  2019    Patient Name:  Alley Sandra     :  [id number]  MRN: [id number]  Medical/Treatment Diagnosis Information:  -CVA 19  -Pt 68 y.o. male who presents in OP d/t decreased coordination & (L)UE strength deficits secondary to recent CVA. Insurance/Certification information: Medicare  Physician Information:   Dr. Juan Jose Vernon of care signed (Y/N):  N, sent 3/28/19  Visit# / total visits:    Pain level: 0/10     G-Code (if applicable):      Date / Visit # G-Code Applied:  3/28/19   QuickDASH Total Score: 33       QuickDASH Disability/Symptom Score : 50 %    Progress Note: []  Yes  [x]  No  Next due by: Visit #10      Subjective:  Patient presents to OP OT and agreeable to OT txt. Objective Measures:  3/28/19     ADL  Feeding: Setup(Assist to cut up foods)  Grooming: Independent  UE Bathing: Modified independent   LE Bathing: Independent  UE Dressing: Setup(Requires A for zippers & buttons)  LE Dressing: Setup(Requires A for buttons & zippers)  Toileting: Independent(Pt reports wearing elastic band shorts)  Tone RUE  RUE Tone: Normotonic  Tone LUE  LUE Tone: Normotonic  Coordination  Movements Are Fluid And Coordinated: No  Coordination and Movement description: Fine motor impairments;Decreased speed;Decreased accuracy; Left UE  Quality of Movement Other  Comment: Difficulty w/ finger opposition.   Cognition  Overall Cognitive Status: WNL  Sensation  Overall Sensation Status: Impaired  Light Touch: Partial deficits in the LLE(Some sensation deficits noted on (L)index finger)     LUE AROM (degrees)  LUE AROM : WFL  Left Hand AROM (degrees)  Left Hand AROM: WFL  RUE AROM (degrees)  RUE AROM : WFL  Right Hand AROM (degrees)  Right Hand AROM: WFL  LUE Strength  Gross LUE Strength: Exceptions to [facility]  L Shoulder Flex: 4-/5  L Shoulder ABduction: 3+/5  L Elbow Flex: 3+/5  L Elbow Ext: 3+/5  L Wrist Flex: 4-/5  L Wrist Ext: 4-/5  L Hand Grasp: 4-/5  RUE Strength  Gross RUE Strength: WFL  R Shoulder Flex: 5/5  R Shoulder ABduction: 5/5  R Elbow Flex: 5/5  R Elbow Ext: 5/5  R Wrist Flex: 5/5  R Wrist Ext: 5/5  R Hand Grasp: 5/5  Hand Dominance  Hand Dominance: Right  Left Hand Strength -  (lbs)  Handle Setting 2: 57, 55, 42lbs on 03/28/19  Left Hand Strength - Pinch (lbs)  Lateral: 13, 12, 10lbs 03/28/19  Tip: 5, 2, 9lbs 03/28/19  Palmar 3 point: 5, 9, 12lbs 03/28/19  LUE Edema - Circumference (cm)  LUE Edema Present?: No  Left 9-Hole Peg Test  Left 9-Hole Peg Test: Impaired(Unable to complete test as pt was unable to  a peg)  Right Hand Strength -  (lbs)  Handle Setting 2: 80lbs on 03/28/19  Right Hand Strength - Pinch (lbs)  Lateral: 20lbs 03/28/19  Tip: 13lbs 03/28/19  Palmar 3 point: 19lbs 03/28/19  RUE Edema - Circumference (cm)  RUE Edema Present?: No  Fine Motor Skills  Left 9-Hole Peg Test: Impaired(Unable to complete test as pt was unable to  a peg)  Right 9 Hole Peg Test Time (secs): 23  Hand Assessment Comment  Hand Assessment Comment: Severely impaired coordination w/ (L) hand as pt was unable to  a peg. Impaired (L)hand strength & grasp. (R) hand measurements [facility]        5/9/19     ADL  Feeding: Pt able cut food last night. Grooming: Independent  UE Bathing: Modified independent   LE Bathing: Independent  UE Dressing: Assist for top button and increased time to complete with dress shirts. LE Dressing: Increased time to complete buttoning on pants.   Toileting: Independent     Coordination  Comment: Able to complete finger opposition.     Sensation  Overall Sensation Status: Noted deep/light touch discrimination difficulty (con't to assess at following sessions).        LUE AROM (degrees)  LUE AROM : WFL  Left Hand AROM (degrees)  Left Hand AROM: WF  RUE AROM (degrees)  RUE AROM : WFL  Right Hand AROM (degrees)  Right Hand AROM: WFL  LUE Strength  Gross LUE Strength: Exceptions to [facility]  L Shoulder Flex: 5/5  L Shoulder ABduction: 4+/5  L Elbow Flex: 4+/5  L Elbow Ext: 4+/5  L Wrist Flex: 5/5  L Wrist Ext: 5/5  L Hand Grasp: 5/5     RUE Strength  Gross RUE Strength: WFL  R Shoulder Flex: 5/5  R Shoulder ABduction: 5/5  R Elbow Flex: 5/5  R Elbow Ext: 5/5  R Wrist Flex: 5/5  R Wrist Ext: 5/5  R Hand Grasp: 5/5     Hand Dominance  Hand Dominance: Right  Left Hand Strength -  (lbs)  Handle Setting 2: 70,65,55 lbs  Left Hand Strength - Pinch (lbs)  Lateral: 14,13,13 lbs 03/28/19  Tip: 6,7,8lbs 03/28/19  Palmar 3 point: 13,13,13 Lbs 03/28/19     Right Hand Strength -  (lbs)  Handle Setting 2: 80lbs on 03/28/19  Right Hand Strength - Pinch (lbs)  Lateral: 20lbs 03/28/19  Tip: 13lbs 03/28/19  Palmar 3 point: 19lbs 03/28/19     Fine Motor Skills  Left 9-Hole Peg Test: Impaired: 71 secs 1st time and then 59 secs second attempt with 1 drop each time.   (pt needing to move 9 hole peg board during first attempt however during second attempt pt able to place pegs without moving board). Right 9 Hole Peg Test Time (secs): 21            Therapeutic Exercise/Therapeutic Activities:   Exercise/Equipment  Resistance/Repetitions   Other comments   Fine motor coordionation                                                       Buttons:      -Pt donned dress shirt and practiced buttoning shirt 2x. 1) not timed  2)  39 secs buttoning 6 buttons in stance. 3) 1 min 55 secs in stance for 6 buttons     -Pt attempted to button 7th top button using BUE however unsuccessful after increased time.   -Pt then attempted to  front of mirror and use button hook with mod A from therapist and max vc's however pt easily agitated and declining wanting to use button hook at home for dressing top button.  Keyhole peg board; 20 pegs   -LUE with mod vc's for not incorporating RUE for assist. -Patient with noted improvement when talking out which way the peg should be manipulated and as activity progressed.        Patient con't to demonstrate increased difficulty with top button needing vc's and increased time.                                       Proprioception Testing Finger to nose test x 3 reps : demonstrated good control however increased attention to task when using LUE. Placing RUE in various planes and then having pt place LUE in correct position 5 times with no difficulty with eyes closed. Stereognosis  Pt identified 10 items placed in pt's LUE hand with mod difficult this date. Con't to encourage pt to complete at home with various objects. Approx. 75% correct with item identification with L UE Noted multiple drops while manipulating various objects.       Home Exercise Program:    -Educated in wall pushups, scapular protraction/retraction & scapular elevation/depressiong x10 reps/exercise. -UE strengthening  -pink theraputty and in hand manipulation  -buttons and tying shoelaces, coins   -wrist circles  -rubberband digit ex  -encouraged pt to incorporate more coordination tasks (ex grooming) using LUE even though pt is R hand dominant to improve fine motor skills.   -tying a tie, stacking pennies while holding small object in 3rd-5th digit.   -fidget spinner to focus on in-hand manipulation   -Provided green theraputty and encouraged pt to mix with pink theraputty, digit adduction with pink theraputty  -sterogensis and shoulder stability ex.  -discussed identifying various objects textures to increase sensation.      Assessment:  Pt presents in OP therapy post CVA w/ (L) side weakness w/ deficits in strength & coordination. This date pt presents with increased ability to coordinate L UE fine motor movement with ability to tie tie. Pt continues to make progress with ability to coordinate movements in outreached position. Pt con't to present with fine motor and coordination impairments. Pt continue to make good progress towards set goal. Pt would benefit from continued skilled OT services on OP to address pt goals and deficits.  Pt verbalized understanding of HEP. Con't OT poc.      Progress towards goals:    Short term goals  Time Frame for Short term goals: within 4 weeks pt will be able to:  Short term goal 1: pt will improve (L) hand coordination by completing dressing boards IND'ly in 2 consecutive sessions in order to dress IND'ly. GOAL MET   Short term goal 2: pt will complete 9-hole peg test within 35 seconds in (L) hand in order to tie his tie. PROGRESSING. Short term goal 3: pt will improve (L) hand  strength by 3-5lbs to open a jar GOAL MET by increasing  strength by up to 20# however con't to report some difficulty with opening tight jars. Short term goal 4: pt will improve lateral pinch  by 3-5lbs in order to tear junk mail. PROGRESSING. Short term goal 5: pt will improve 3 jaw evi pinch by 3-5lbs in order to complete cutting task w/ utensils. GOAL MET. Long term goals  Time Frame for Long term goals :   Con't to with STG that were not met. Patient Goals   Patient goals : \"I want to be able to use my (L) arm like normal again. \"    Timed Code Treatment Minutes:  46   Total Treatment Minutes:  46    Treatment/Activity Tolerance:     [x]  Patient tolerated treatment well []  Patient limited by fatigue    []  Patient limited by pain []  Patient limited by other medical complications   []  Other:     Prognosis: [x]  Good []  Fair  []  Poor    Patient Requires Follow-up:  [x]  Yes  []  No    Plan: []  Continue per plan of care []  Alter current plan (see comments)   [x]  Plan of care initiated []  Hold pending MD visit []  Discharge      Electronically signed by:  Claudette Baton OTR/L #[id number]

## 2019-05-30 ENCOUNTER — HOSPITAL ENCOUNTER (OUTPATIENT)
Dept: OCCUPATIONAL THERAPY | Age: 74
Setting detail: THERAPIES SERIES
Discharge: HOME | End: 2019-05-30
Payer: MEDICARE

## 2019-05-30 PROCEDURE — 97112 NEUROMUSCULAR REEDUCATION: CPT

## 2019-05-30 PROCEDURE — 97530 THERAPEUTIC ACTIVITIES: CPT

## 2019-05-30 NOTE — PROGRESS NOTES
Occupational Therapy Daily Treatment Note    Date:  2019    Patient Name:  Yue Menchaca     :  [id number]  MRN: [id number]  Medical/Treatment Diagnosis Information:  -CVA 19  -Pt 68 y.o. male who presents in OP d/t decreased coordination & (L)UE strength deficits secondary to recent CVA. Insurance/Certification information: Medicare  Physician Information:   Dr. Javier Brown of care signed (Y/N):  N, sent 3/28/19  Visit# / total visits:    Pain level: 0/10     G-Code (if applicable):      Date / Visit # G-Code Applied:  3/28/19   QuickDASH Total Score: 33       QuickDASH Disability/Symptom Score : 50 %    Progress Note: []  Yes  [x]  No  Next due by: Visit #10      Subjective:  Patient presents to OP OT and agreeable to OT txt. Pt reports his hand continues to not feel like his own and drops object intermittently d/t decreased sensation. Pt educated and discussed POC for upcoming visits d/t pt progress and medicare guidelines. Objective Measures:  3/28/19     ADL  Feeding: Setup(Assist to cut up foods)  Grooming: Independent  UE Bathing: Modified independent   LE Bathing: Independent  UE Dressing: Setup(Requires A for zippers & buttons)  LE Dressing: Setup(Requires A for buttons & zippers)  Toileting: Independent(Pt reports wearing elastic band shorts)  Tone RUE  RUE Tone: Normotonic  Tone LUE  LUE Tone: Normotonic  Coordination  Movements Are Fluid And Coordinated: No  Coordination and Movement description: Fine motor impairments;Decreased speed;Decreased accuracy; Left UE  Quality of Movement Other  Comment: Difficulty w/ finger opposition.   Cognition  Overall Cognitive Status: WNL  Sensation  Overall Sensation Status: Impaired  Light Touch: Partial deficits in the LLE(Some sensation deficits noted on (L)index finger)     LUE AROM (degrees)  LUE AROM : WFL  Left Hand AROM (degrees)  Left Hand AROM: WFL  RUE AROM (degrees)  RUE AROM : WFL  Right Hand AROM (degrees)  Right Hand AROM: WFL  LUE Strength  Gross LUE Strength: Exceptions to [facility]  L Shoulder Flex: 4-/5  L Shoulder ABduction: 3+/5  L Elbow Flex: 3+/5  L Elbow Ext: 3+/5  L Wrist Flex: 4-/5  L Wrist Ext: 4-/5  L Hand Grasp: 4-/5  RUE Strength  Gross RUE Strength: WFL  R Shoulder Flex: 5/5  R Shoulder ABduction: 5/5  R Elbow Flex: 5/5  R Elbow Ext: 5/5  R Wrist Flex: 5/5  R Wrist Ext: 5/5  R Hand Grasp: 5/5  Hand Dominance  Hand Dominance: Right  Left Hand Strength -  (lbs)  Handle Setting 2: 57, 55, 42lbs on 03/28/19  Left Hand Strength - Pinch (lbs)  Lateral: 13, 12, 10lbs 03/28/19  Tip: 5, 2, 9lbs 03/28/19  Palmar 3 point: 5, 9, 12lbs 03/28/19  LUE Edema - Circumference (cm)  LUE Edema Present?: No  Left 9-Hole Peg Test  Left 9-Hole Peg Test: Impaired(Unable to complete test as pt was unable to  a peg)  Right Hand Strength -  (lbs)  Handle Setting 2: 80lbs on 03/28/19  Right Hand Strength - Pinch (lbs)  Lateral: 20lbs 03/28/19  Tip: 13lbs 03/28/19  Palmar 3 point: 19lbs 03/28/19  RUE Edema - Circumference (cm)  RUE Edema Present?: No  Fine Motor Skills  Left 9-Hole Peg Test: Impaired(Unable to complete test as pt was unable to  a peg)  Right 9 Hole Peg Test Time (secs): 23  Hand Assessment Comment  Hand Assessment Comment: Severely impaired coordination w/ (L) hand as pt was unable to  a peg. Impaired (L)hand strength & grasp. (R) hand measurements [facility]        5/9/19     ADL  Feeding: Pt able cut food last night. Grooming: Independent  UE Bathing: Modified independent   LE Bathing: Independent  UE Dressing: Assist for top button and increased time to complete with dress shirts. LE Dressing: Increased time to complete buttoning on pants.   Toileting: Independent     Coordination  Comment: Able to complete finger opposition.     Sensation  Overall Sensation Status: Noted deep/light touch discrimination difficulty (con't to assess at following sessions).        LUE AROM (degrees)  LUE AROM : WFL  Left Hand pulling covers self in bed.                                              Home Exercise Program:    -Educated in wall pushups, scapular protraction/retraction & scapular elevation/depressiong x10 reps/exercise. -UE strengthening  -pink theraputty and in hand manipulation  -buttons and tying shoelaces, coins   -wrist circles  -rubberband digit ex  -encouraged pt to incorporate more coordination tasks (ex grooming) using LUE even though pt is R hand dominant to improve fine motor skills.   -tying a tie, stacking pennies while holding small object in 3rd-5th digit.   -fidget spinner to focus on in-hand manipulation   -Provided green theraputty and encouraged pt to mix with pink theraputty, digit adduction with pink theraputty  -sterogensis and shoulder stability ex.  -discussed identifying various objects textures to increase sensation. -encouraged pt to potentially obtain small lego set to focus on in hand manipulation and sensation. -provided lime green and purple theraband.      Assessment:  Pt presents in OP therapy post CVA w/ (L) side weakness w/ deficits in strength & coordination. This date pt presents with increased ability to coordinate L UE fine motor movement with ability to tie tie. Pt continues to make progress with ability to coordinate movements in outreached position. Pt con't to present with fine motor and coordination impairments. Pt continue to make good progress towards set goal. Pt would benefit from continued skilled OT services on OP to address pt goals and deficits. Pt verbalized understanding of HEP. Con't OT poc.      Progress towards goals:    Short term goals  Time Frame for Short term goals: within 4 weeks pt will be able to:  Short term goal 1: pt will improve (L) hand coordination by completing dressing boards IND'ly in 2 consecutive sessions in order to dress IND'ly. GOAL MET   Short term goal 2: pt will complete 9-hole peg test within 35 seconds in (L) hand in order to tie his tie. PROGRESSING. Short term goal 3: pt will improve (L) hand  strength by 3-5lbs to open a jar GOAL MET by increasing  strength by up to 20# however con't to report some difficulty with opening tight jars. Short term goal 4: pt will improve lateral pinch  by 3-5lbs in order to tear junk mail. PROGRESSING. Short term goal 5: pt will improve 3 jaw evi pinch by 3-5lbs in order to complete cutting task w/ utensils. GOAL MET. Long term goals  Time Frame for Long term goals :   Con't to with STG that were not met. Patient Goals   Patient goals : \"I want to be able to use my (L) arm like normal again. \"    Timed Code Treatment Minutes:  47   Total Treatment Minutes:  47    Treatment/Activity Tolerance:     [x]  Patient tolerated treatment well []  Patient limited by fatigue    []  Patient limited by pain []  Patient limited by other medical complications   []  Other:     Prognosis: [x]  Good []  Fair  []  Poor    Patient Requires Follow-up:  [x]  Yes  []  No    Plan: []  Continue per plan of care []  Alter current plan (see comments)   [x]  Plan of care initiated []  Hold pending MD visit []  Discharge      Electronically signed by:  Coleman Morris OTR/L #[id number]

## 2019-06-04 ENCOUNTER — HOSPITAL ENCOUNTER (OUTPATIENT)
Dept: OCCUPATIONAL THERAPY | Age: 74
Setting detail: THERAPIES SERIES
Discharge: HOME | End: 2019-06-04
Payer: MEDICARE

## 2019-06-04 PROCEDURE — 97112 NEUROMUSCULAR REEDUCATION: CPT

## 2019-06-04 PROCEDURE — 97530 THERAPEUTIC ACTIVITIES: CPT

## 2019-06-04 NOTE — PROGRESS NOTES
(L)index finger)     LUE AROM (degrees)  LUE AROM : WFL  Left Hand AROM (degrees)  Left Hand AROM: WFL  RUE AROM (degrees)  RUE AROM : WFL  Right Hand AROM (degrees)  Right Hand AROM: WFL  LUE Strength  Gross LUE Strength: Exceptions to [facility]  L Shoulder Flex: 4-/5  L Shoulder ABduction: 3+/5  L Elbow Flex: 3+/5  L Elbow Ext: 3+/5  L Wrist Flex: 4-/5  L Wrist Ext: 4-/5  L Hand Grasp: 4-/5  RUE Strength  Gross RUE Strength: WFL  R Shoulder Flex: 5/5  R Shoulder ABduction: 5/5  R Elbow Flex: 5/5  R Elbow Ext: 5/5  R Wrist Flex: 5/5  R Wrist Ext: 5/5  R Hand Grasp: 5/5  Hand Dominance  Hand Dominance: Right  Left Hand Strength -  (lbs)  Handle Setting 2: 57, 55, 42lbs on 03/28/19  Left Hand Strength - Pinch (lbs)  Lateral: 13, 12, 10lbs 03/28/19  Tip: 5, 2, 9lbs 03/28/19  Palmar 3 point: 5, 9, 12lbs 03/28/19  LUE Edema - Circumference (cm)  LUE Edema Present?: No  Left 9-Hole Peg Test  Left 9-Hole Peg Test: Impaired(Unable to complete test as pt was unable to  a peg)  Right Hand Strength -  (lbs)  Handle Setting 2: 80lbs on 03/28/19  Right Hand Strength - Pinch (lbs)  Lateral: 20lbs 03/28/19  Tip: 13lbs 03/28/19  Palmar 3 point: 19lbs 03/28/19  RUE Edema - Circumference (cm)  RUE Edema Present?: No  Fine Motor Skills  Left 9-Hole Peg Test: Impaired(Unable to complete test as pt was unable to  a peg)  Right 9 Hole Peg Test Time (secs): 23  Hand Assessment Comment  Hand Assessment Comment: Severely impaired coordination w/ (L) hand as pt was unable to  a peg. Impaired (L)hand strength & grasp. (R) hand measurements [facility]        5/9/19     ADL  Feeding: Pt able cut food last night. Grooming: Independent  UE Bathing: Modified independent   LE Bathing: Independent  UE Dressing: Assist for top button and increased time to complete with dress shirts. LE Dressing: Increased time to complete buttoning on pants.   Toileting: Independent     Coordination  Comment: Able to complete finger opposition.     Sensation  Overall Sensation Status: Noted deep/light touch discrimination difficulty (con't to assess at following sessions).        LUE AROM (degrees)  LUE AROM : WFL  Left Hand AROM (degrees)  Left Hand AROM: WFL  RUE AROM (degrees)  RUE AROM : WFL  Right Hand AROM (degrees)  Right Hand AROM: WFL  LUE Strength  Gross LUE Strength: Exceptions to [facility]  L Shoulder Flex: 5/5  L Shoulder ABduction: 4+/5  L Elbow Flex: 4+/5  L Elbow Ext: 4+/5  L Wrist Flex: 5/5  L Wrist Ext: 5/5  L Hand Grasp: 5/5     RUE Strength  Gross RUE Strength: WFL  R Shoulder Flex: 5/5  R Shoulder ABduction: 5/5  R Elbow Flex: 5/5  R Elbow Ext: 5/5  R Wrist Flex: 5/5  R Wrist Ext: 5/5  R Hand Grasp: 5/5     Hand Dominance  Hand Dominance: Right  Left Hand Strength -  (lbs)  Handle Setting 2: 70,65,55 lbs  Left Hand Strength - Pinch (lbs)  Lateral: 14,13,13 lbs 03/28/19  Tip: 6,7,8lbs 03/28/19  Palmar 3 point: 13,13,13 Lbs 03/28/19     Right Hand Strength -  (lbs)  Handle Setting 2: 80lbs on 03/28/19  Right Hand Strength - Pinch (lbs)  Lateral: 20lbs 03/28/19  Tip: 13lbs 03/28/19  Palmar 3 point: 19lbs 03/28/19     Fine Motor Skills  Left 9-Hole Peg Test: Impaired: 71 secs 1st time and then 59 secs second attempt with 1 drop each time.   (pt needing to move 9 hole peg board during first attempt however during second attempt pt able to place pegs without moving board).    Right 9 Hole Peg Test Time (secs): 21            Therapeutic Exercise/Therapeutic Activities:   Exercise/Equipment  Resistance/Repetitions   Other comments   Fine motor coordionation                                                       Keyhole peg board; 25 pegs  R hand: 1 min and 40 secs (5/23/19)    L hand:1)4 min and 22 secs              2) 3 min and 57 secs       14 golf inessa peg board:   -completed focusing on in hand manipulation to place pegs in hole twice.   -completed removing pegs from board using tip pinch and maintaining 5 small pegs in hand for finger > hand manipulation. Lateral, tip, and three jaw evi pinch using pink theraputty removing small beans. 9 hole peg test:   1) 30 secs  2) 24 secs                        Improved in hand manipulation skills during key hole peg board.                                 Home Exercise Program:    -Educated in wall pushups, scapular protraction/retraction & scapular elevation/depressiong x10 reps/exercise. -UE strengthening  -pink theraputty and in hand manipulation  -buttons and tying shoelaces, coins   -wrist circles  -rubberband digit ex  -encouraged pt to incorporate more coordination tasks (ex grooming) using LUE even though pt is R hand dominant to improve fine motor skills.   -tying a tie, stacking pennies while holding small object in 3rd-5th digit.   -fidget spinner to focus on in-hand manipulation   -Provided green theraputty and encouraged pt to mix with pink theraputty, digit adduction with pink theraputty  -sterogensis and shoulder stability ex.  -discussed identifying various objects textures to increase sensation. -encouraged pt to potentially obtain small lego set to focus on in hand manipulation and sensation. -provided lime green and purple theraband.   -provided 10 small beans to place into theraputty to focus on pinch strength.      Assessment:  Pt presents in OP therapy post CVA w/ (L) side weakness w/ deficits in strength & coordination. This date pt presents with increased ability to coordinate L UE fine motor movement with ability to tie tie. Pt continues to make progress with ability to coordinate movements in outreached position. Pt con't to present with fine motor and coordination impairments. Pt continue to make good progress towards set goal. Pt would benefit from continued skilled OT services on OP to address pt goals and deficits. Pt verbalized understanding of HEP.  Con't OT poc.      Progress towards goals:    Short term goals  Time Frame for Short term goals: within 4

## 2019-07-01 ENCOUNTER — OFFICE VISIT (OUTPATIENT)
Dept: CARDIOLOGY CLINIC | Age: 74
End: 2019-07-01
Payer: MEDICARE

## 2019-07-01 VITALS
BODY MASS INDEX: 36.84 KG/M2 | WEIGHT: 278 LBS | HEIGHT: 73 IN | HEART RATE: 74 BPM | OXYGEN SATURATION: 96 % | DIASTOLIC BLOOD PRESSURE: 70 MMHG | SYSTOLIC BLOOD PRESSURE: 120 MMHG

## 2019-07-01 DIAGNOSIS — I10 ESSENTIAL HYPERTENSION: ICD-10-CM

## 2019-07-01 DIAGNOSIS — I48.20 CHRONIC ATRIAL FIBRILLATION (HCC): Primary | ICD-10-CM

## 2019-07-01 DIAGNOSIS — I50.22 CHRONIC SYSTOLIC HEART FAILURE (HCC): ICD-10-CM

## 2019-07-01 DIAGNOSIS — E66.9 OBESITY (BMI 30-39.9): ICD-10-CM

## 2019-07-01 PROCEDURE — G8427 DOCREV CUR MEDS BY ELIG CLIN: HCPCS | Performed by: NURSE PRACTITIONER

## 2019-07-01 PROCEDURE — G8417 CALC BMI ABV UP PARAM F/U: HCPCS | Performed by: NURSE PRACTITIONER

## 2019-07-01 PROCEDURE — 4040F PNEUMOC VAC/ADMIN/RCVD: CPT | Performed by: NURSE PRACTITIONER

## 2019-07-01 PROCEDURE — 99214 OFFICE O/P EST MOD 30 MIN: CPT | Performed by: NURSE PRACTITIONER

## 2019-07-01 PROCEDURE — 3017F COLORECTAL CA SCREEN DOC REV: CPT | Performed by: NURSE PRACTITIONER

## 2019-07-01 PROCEDURE — G8598 ASA/ANTIPLAT THER USED: HCPCS | Performed by: NURSE PRACTITIONER

## 2019-07-01 PROCEDURE — 93000 ELECTROCARDIOGRAM COMPLETE: CPT | Performed by: NURSE PRACTITIONER

## 2019-07-01 PROCEDURE — 1123F ACP DISCUSS/DSCN MKR DOCD: CPT | Performed by: NURSE PRACTITIONER

## 2019-07-01 PROCEDURE — 1036F TOBACCO NON-USER: CPT | Performed by: NURSE PRACTITIONER

## 2019-07-01 NOTE — PROGRESS NOTES
rub  Pulmonary/Chest: Effort normal.  Lungs clear to auscultation. Chest wall nontender  Abdominal: soft, nontender, nondistended. + bowel sounds; no hepatomegaly  Extremities: No edema, cyanosis, or clubbing. Pulses are 2+ radial/DP/PT bilaterally. Cap refill brisk. Neurological: No focal deficit. Skin: Skin is warm and dry. Psychiatric: He has a normal mood and affect. His speech is normal and behavior is normal.     Lab Review:   Lab Results   Component Value Date    TRIG 63 03/02/2019    HDL 50 03/02/2019    LDLCALC 57 03/02/2019    LABVLDL 13 03/02/2019     Lab Results   Component Value Date     03/30/2019    K 4.4 03/30/2019     03/30/2019    CO2 25 03/30/2019    BUN 18 03/30/2019    CREATININE 1.0 03/30/2019    GLUCOSE 112 03/30/2019    CALCIUM 9.7 03/30/2019      Lab Results   Component Value Date    WBC 8.5 03/30/2019    HGB 15.6 03/30/2019    HCT 46.9 03/30/2019    MCV 95.1 03/30/2019     03/30/2019     ECG 7/1/2019: Atrial fib with controlled VR; nonspecific ST-TW abnormalities    Echo 3/1/2019:  Suboptimal image quality. Ejection fraction is visually estimated to be 25-30%, though likely underestimated due to atrial fibrillation. Indeterminate diastolic function. Mitral valve leaflets appear mildly thickened. Mild to moderate mitral regurgitation is present. The left atrium is severely dilated. Trivial tricuspid regurgitation. PA systolic pressure is 30 mm Hg.     CTA head 3/1/2019:  1. No high-grade stenosis or focal occlusion involving intracranial or   cervical vasculature.       2. 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  This is age indeterminate but favored to be chronic.  No discrete   dissection flap is identified.       3. No evidence of intracranial aneurysm.       4. Partially imaged moderate to large right pleural effusion with findings   suggestive of pulmonary interstitial edema.  Enlarged mediastinal lymph   nodes. aorta.  Coronary artery calcification     Assessment:    1. Chronic atrial fibrillation (HCC)  -controlled VR on ECG and exam  -CHADs Vasc score 6  -on long term anticoagulation (Eliquis)  -continue BB (Toprol XL)    2. Essential hypertension  -controlled  -continue medical management    3. Chronic systolic heart failure (HCC)  -LVEF last at 25-30% (decreased from 40% previously)  -will ask for repeat echo prior to return visit  -continue spironolactone, Toprol, lisinopril  -currently compensated and NYHA class II  -low sodium diet and fluid restriction    4. Obesity (BMI 30.0-39.9)  -weight loss encouraged    5. CVA  -S/P tPa in 2/2019 after presenting with CVA  -secondary to embolic event  -now on Eliquis    6. CAD equivalent  -CT chest with moderate coronary calcifications    7. Pulmonary nodules  -follows Dr. Patricia Briceno:  Continue spironolactone, Toprol, lisinopril, atorvastatin, Eliquis  Labs from 3/30/2019 reviewed  Emphasized and discussed low-fat/low sodium diet, monitoring of daily weights, fluid restriction, worsening signs and symptoms of heart failure and when to call, and the importance of regular exercise and activity. Approximately 25 minutes spent with pt and his daughter and > 50% of time spent on pt education and answering questions. Follow up with Dr. Li Mckenzie in 4-6 months with echo prior to return visit with Dr. Li Mckenzie or sooner if needed    Return for 4-6 months with Dr. Li Mckenzie. Thanks for allowing me to participate in the care of this patient.       [facility], APRN-CNP  [facility], [place], operated by [facility], [address] [place] [address]) [place], [address] [place] [address] Davey Suarez [address]  Office: [phone]  Fax: [phone]

## 2019-07-02 RX ORDER — ATORVASTATIN CALCIUM 40 MG/1
TABLET, FILM COATED ORAL
Qty: 90 TABLET | Refills: 1 | Status: SHIPPED | OUTPATIENT
Start: 2019-07-02 | End: 2020-02-25

## 2019-07-02 RX ORDER — SPIRONOLACTONE 25 MG/1
25 TABLET ORAL DAILY
Qty: 90 TABLET | Refills: 1 | Status: SHIPPED | OUTPATIENT
Start: 2019-07-02 | End: 2019-12-26

## 2019-07-02 RX ORDER — LISINOPRIL 10 MG/1
10 TABLET ORAL DAILY
Qty: 90 TABLET | Refills: 1 | Status: SHIPPED | OUTPATIENT
Start: 2019-07-02 | End: 2019-12-26

## 2019-07-02 NOTE — TELEPHONE ENCOUNTER
I Medication Refills:    Requested Prescriptions     Pending Prescriptions Disp Refills    spironolactone (ALDACTONE) 25 MG tablet [Pharmacy Med Name: SPIRONOLACTONE 25MG TABLETS] 90 tablet 1     Sig: TAKE 1 TABLET BY MOUTH DAILY    atorvastatin (LIPITOR) 40 MG tablet [Pharmacy Med Name: ATORVASTATIN 40MG TABLETS] 90 tablet 1     Sig: TAKE 1 TABLET BY MOUTH EVERY NIGHT    lisinopril (PRINIVIL;ZESTRIL) 10 MG tablet [Pharmacy Med Name: LISINOPRIL 10MG TABLETS] 90 tablet 1     Sig: TAKE 1 TABLET BY MOUTH DAILY                     Last Office Visit: 7/1/19  Next Office Visit:no open schedule yet for 1/2020  Last Refill:3/5/19  Last Labs: 3/30/19

## 2019-07-03 RX ORDER — METOPROLOL SUCCINATE 25 MG/1
75 TABLET, EXTENDED RELEASE ORAL DAILY
Qty: 90 TABLET | Refills: 3 | Status: SHIPPED | OUTPATIENT
Start: 2019-07-03 | End: 2019-11-05 | Stop reason: SDUPTHER

## 2019-07-03 NOTE — TELEPHONE ENCOUNTER
Medication Refill    Last appointment with cardiology? 7/1/2019      Next appointment with Cardiology?  Jan 2020    Medication needing refilled:metoprolol succinate (TOPROL XL)    Doseage of the medication:25 MG extended release tablet    How are you taking this medication (QD, BID, TID, QID, PRN):  Take 3 tablets by mouth daily    Patient want a 30 or 90 day supply called in:  30 days    Which Pharmacy are we sending the medication to    [address], [facility]

## 2019-10-23 ENCOUNTER — TELEPHONE (OUTPATIENT)
Dept: CARDIOLOGY CLINIC | Age: 74
End: 2019-10-23

## 2019-11-05 RX ORDER — METOPROLOL SUCCINATE 25 MG/1
75 TABLET, EXTENDED RELEASE ORAL DAILY
Qty: 90 TABLET | Refills: 3 | Status: SHIPPED | OUTPATIENT
Start: 2019-11-05 | End: 2020-03-02

## 2019-11-06 ENCOUNTER — TELEPHONE (OUTPATIENT)
Dept: CARDIOLOGY CLINIC | Age: 74
End: 2019-11-06

## 2019-11-14 ENCOUNTER — TELEPHONE (OUTPATIENT)
Dept: CARDIOLOGY CLINIC | Age: 74
End: 2019-11-14

## 2019-11-18 ENCOUNTER — TELEPHONE (OUTPATIENT)
Dept: CARDIOLOGY CLINIC | Age: 74
End: 2019-11-18

## 2019-12-26 RX ORDER — SPIRONOLACTONE 25 MG/1
25 TABLET ORAL DAILY
Qty: 90 TABLET | Refills: 1 | Status: SHIPPED | OUTPATIENT
Start: 2019-12-26 | End: 2020-06-08 | Stop reason: SINTOL

## 2019-12-26 RX ORDER — LISINOPRIL 10 MG/1
10 TABLET ORAL DAILY
Qty: 90 TABLET | Refills: 1 | Status: SHIPPED | OUTPATIENT
Start: 2019-12-26 | End: 2020-06-19

## 2020-02-26 RX ORDER — ATORVASTATIN CALCIUM 40 MG/1
TABLET, FILM COATED ORAL
Qty: 30 TABLET | Refills: 0 | Status: SHIPPED | OUTPATIENT
Start: 2020-02-26 | End: 2020-03-02

## 2020-03-02 RX ORDER — METOPROLOL SUCCINATE 25 MG/1
75 TABLET, EXTENDED RELEASE ORAL DAILY
Qty: 90 TABLET | Refills: 0 | Status: SHIPPED | OUTPATIENT
Start: 2020-03-02 | End: 2020-03-31 | Stop reason: SDUPTHER

## 2020-03-02 RX ORDER — ATORVASTATIN CALCIUM 40 MG/1
TABLET, FILM COATED ORAL
Qty: 30 TABLET | Refills: 0 | Status: SHIPPED | OUTPATIENT
Start: 2020-03-02 | End: 2020-03-20

## 2020-03-20 RX ORDER — ATORVASTATIN CALCIUM 40 MG/1
TABLET, FILM COATED ORAL
Qty: 30 TABLET | Refills: 0 | Status: SHIPPED | OUTPATIENT
Start: 2020-03-20 | End: 2020-04-24

## 2020-03-31 RX ORDER — METOPROLOL SUCCINATE 25 MG/1
75 TABLET, EXTENDED RELEASE ORAL DAILY
Qty: 90 TABLET | Refills: 3 | Status: SHIPPED | OUTPATIENT
Start: 2020-03-31 | End: 2020-06-11 | Stop reason: DRUGHIGH

## 2020-04-13 ENCOUNTER — TELEPHONE (OUTPATIENT)
Dept: CARDIOLOGY CLINIC | Age: 75
End: 2020-04-13

## 2020-04-13 NOTE — TELEPHONE ENCOUNTER
Ngoc Renee called back that her dad is concerned about negative effects that may happen to his heart if he decreases the metoprolol. I let her know that his heart rate could increase but we can always go back to the 75 mg if this does not help his dizziness. She verbalized understanding.

## 2020-04-13 NOTE — TELEPHONE ENCOUNTER
Spoke with with [facility] and she says that she is not real sure when the dizziness is happening. He did call her last night at about 1145 to tell her. I asked her to decrease his Toprol to 50 mg for the next couple of days and call with an update. She is going to try and get is vitals in the next day or 2 as well. She will call back with an update.

## 2020-04-24 RX ORDER — ATORVASTATIN CALCIUM 40 MG/1
TABLET, FILM COATED ORAL
Qty: 30 TABLET | Refills: 0 | Status: SHIPPED | OUTPATIENT
Start: 2020-04-24 | End: 2020-06-01

## 2020-04-27 NOTE — TELEPHONE ENCOUNTER
Attempted to call patient. Left message on voicemail to schedule follow up visit with Dr. Ivet Hernandez and have fasting lab work done. Also called daughter Glen Goyal, per HIPAA. Relayed same information. Made aware that she sent 30 day rx with no refill so appointment is made timely so patient does not miss any doses of medication. Glen Goyal verbalized and confirmed understanding. She will talk to her father and they will set up an appointment time with our office.

## 2020-05-27 ENCOUNTER — TELEPHONE (OUTPATIENT)
Dept: CARDIOLOGY CLINIC | Age: 75
End: 2020-05-27

## 2020-06-01 RX ORDER — ATORVASTATIN CALCIUM 40 MG/1
TABLET, FILM COATED ORAL
Qty: 30 TABLET | Refills: 2 | Status: SHIPPED | OUTPATIENT
Start: 2020-06-01 | End: 2020-08-27

## 2020-06-01 NOTE — TELEPHONE ENCOUNTER
Last OV 7/1/19. Next OV 9/1/20. CMP & Lipid panel ordered on 4/24/20. Attempted to call patient. Left message on voicemail to have fasting lab work. Also called daughter Teetee Norman, [place] per HIPAA. Requested that patient have lab work this week. Teetee Ester verbalized and confirmed understanding. CMP 3/30/19. Lipid panel 3/2/19.

## 2020-06-02 RX ORDER — ATORVASTATIN CALCIUM 40 MG/1
TABLET, FILM COATED ORAL
Qty: 30 TABLET | Refills: 0 | Status: SHIPPED | OUTPATIENT
Start: 2020-06-02 | End: 2020-08-27

## 2020-06-04 DIAGNOSIS — I25.10 CORONARY ARTERY DISEASE INVOLVING NATIVE CORONARY ARTERY OF NATIVE HEART WITHOUT ANGINA PECTORIS: ICD-10-CM

## 2020-06-04 LAB
A/G RATIO: 1.3 (ref 1.1–2.2)
ALBUMIN SERPL-MCNC: 4.2 G/DL (ref 3.4–5)
ALP BLD-CCNC: 65 U/L (ref 40–129)
ALT SERPL-CCNC: 18 U/L (ref 10–40)
ANION GAP SERPL CALCULATED.3IONS-SCNC: 11 MMOL/L (ref 3–16)
AST SERPL-CCNC: 26 U/L (ref 15–37)
BILIRUB SERPL-MCNC: 1.2 MG/DL (ref 0–1)
BUN BLDV-MCNC: 40 MG/DL (ref 7–20)
CALCIUM SERPL-MCNC: 9.5 MG/DL (ref 8.3–10.6)
CHLORIDE BLD-SCNC: 102 MMOL/L (ref 99–110)
CHOLESTEROL, TOTAL: 108 MG/DL (ref 0–199)
CO2: 25 MMOL/L (ref 21–32)
CREAT SERPL-MCNC: 1.4 MG/DL (ref 0.8–1.3)
GFR AFRICAN AMERICAN: 60
GFR NON-AFRICAN AMERICAN: 50
GLOBULIN: 3.3 G/DL
GLUCOSE BLD-MCNC: 94 MG/DL (ref 70–99)
HDLC SERPL-MCNC: 49 MG/DL (ref 40–60)
LDL CHOLESTEROL CALCULATED: 48 MG/DL
POTASSIUM SERPL-SCNC: 5.5 MMOL/L (ref 3.5–5.1)
SODIUM BLD-SCNC: 138 MMOL/L (ref 136–145)
TOTAL PROTEIN: 7.5 G/DL (ref 6.4–8.2)
TRIGL SERPL-MCNC: 56 MG/DL (ref 0–150)
VLDLC SERPL CALC-MCNC: 11 MG/DL

## 2020-06-08 ENCOUNTER — TELEPHONE (OUTPATIENT)
Dept: CARDIOLOGY CLINIC | Age: 75
End: 2020-06-08

## 2020-06-08 NOTE — TELEPHONE ENCOUNTER
Returned call to patient. He wanted to review lab results again. Patient verbalized and confirmed understanding.

## 2020-06-08 NOTE — TELEPHONE ENCOUNTER
TEST/LAB RESULTS      WHICH RESULTS ARE YOU CALLING ABOUT: LABS    WHEN WAS THIS DONE: 6/4/20    WHERE DID YOU HAVE THIS DONE: [facility]     CALL BACK NUMBER: [phone]

## 2020-06-11 RX ORDER — METOPROLOL SUCCINATE 25 MG/1
50 TABLET, EXTENDED RELEASE ORAL DAILY
COMMUNITY
End: 2020-07-22 | Stop reason: DRUGHIGH

## 2020-06-17 ENCOUNTER — TELEPHONE (OUTPATIENT)
Dept: CARDIOLOGY CLINIC | Age: 75
End: 2020-06-17

## 2020-06-19 RX ORDER — LISINOPRIL 10 MG/1
10 TABLET ORAL DAILY
Qty: 90 TABLET | Refills: 3 | Status: SHIPPED | OUTPATIENT
Start: 2020-06-19 | End: 2021-06-30

## 2020-06-22 DIAGNOSIS — I50.32 CHRONIC DIASTOLIC HEART FAILURE (HCC): ICD-10-CM

## 2020-06-22 LAB
ANION GAP SERPL CALCULATED.3IONS-SCNC: 14 MMOL/L (ref 3–16)
BUN BLDV-MCNC: 37 MG/DL (ref 7–20)
CALCIUM SERPL-MCNC: 9.6 MG/DL (ref 8.3–10.6)
CHLORIDE BLD-SCNC: 103 MMOL/L (ref 99–110)
CO2: 24 MMOL/L (ref 21–32)
CREAT SERPL-MCNC: 1.3 MG/DL (ref 0.8–1.3)
GFR AFRICAN AMERICAN: >60
GFR NON-AFRICAN AMERICAN: 54
GLUCOSE BLD-MCNC: 96 MG/DL (ref 70–99)
POTASSIUM SERPL-SCNC: 4.8 MMOL/L (ref 3.5–5.1)
PRO-BNP: 2648 PG/ML (ref 0–449)
SODIUM BLD-SCNC: 141 MMOL/L (ref 136–145)

## 2020-06-22 NOTE — TELEPHONE ENCOUNTER
Pt can not remember if he took his Lisinopril 10mg and his Eliquis 5 mg td. Was curious if he should just wait til tomorrow to take again.     #[phone]

## 2020-07-21 ENCOUNTER — TELEPHONE (OUTPATIENT)
Dept: CARDIOLOGY CLINIC | Age: 75
End: 2020-07-21

## 2020-07-21 NOTE — TELEPHONE ENCOUNTER
Spoke w/ Maribel Wang ([place] per HIPAA), states her father noticed a weight gain of 5lbs since on vacation last week. Pt's dry weight is around 252lbs and he now weighs 257lbs. Pt denies swelling but, had some chest tightness and SOB yesterday morning only. Pt was on Spironolactone and stopped due to an increase in his K+. His daughter wants to know if he can take 1-2 tablets of his spironolactone to decrease his weight?   Last OV note 7/1/20: Chronic atrial fibrillation, Essential hypertension, Chronic systolic heart failure, Obesity, CVA, CAD equivalent, Pulmonary nodules

## 2020-07-21 NOTE — TELEPHONE ENCOUNTER
Richie Hutson (Miguel's daughter, ok per HIPAA), called in stating that about a month ago, he was taken off of his spirolactone and for the last week he has been gaining weight. She wants to know if he should take one or two of his leftover pills to get the weight off of him. You can reach Richie Hutson at [phone].

## 2020-07-21 NOTE — TELEPHONE ENCOUNTER
Most recent labs from 6/22/20 show K+ of 4.8 and Creat of 1.3. Patient taken off of Aldactone due to elevated K+. Has a hx of CHF on BB and ACE only. ECHO 3/1/2019 showed EF of 25-30%. Patient saw Kaylee Palencia on 7/1/20 when repeat Echo was ordered but not yet scheduled. F/u scheduled with Dr. Levar Bhatt on 9/1/20. Discussed with Dr. Levar Bhatt and pt should remain off of Aldactone and proceed with Echo. May likely need ischemic work-up if EF remains reduced. Shavon Shields to inform of Dr. Victor Manuel Mejia recommendations. She will discuss with pt and call back to have Echo scheduled.

## 2020-07-22 RX ORDER — METOPROLOL SUCCINATE 50 MG/1
50 TABLET, EXTENDED RELEASE ORAL DAILY
Qty: 30 TABLET | Refills: 5 | Status: SHIPPED | OUTPATIENT
Start: 2020-07-22 | End: 2021-01-15

## 2020-07-22 RX ORDER — METOPROLOL SUCCINATE 50 MG/1
50 TABLET, EXTENDED RELEASE ORAL DAILY
COMMUNITY
End: 2020-07-22 | Stop reason: SDUPTHER

## 2020-08-03 ENCOUNTER — TELEPHONE (OUTPATIENT)
Dept: CARDIOLOGY CLINIC | Age: 75
End: 2020-08-03

## 2020-08-03 NOTE — TELEPHONE ENCOUNTER
Maribel Hankins daughter (okay per HIPAA), called in this morning stating that Claudia Home is having some swelling in his left ankle and they want to know if he should take a diuretic for a day or two. You can reach Keith Litten at [phone].

## 2020-08-03 NOTE — TELEPHONE ENCOUNTER
Pt daughter stated that pt is having some swellin and would like to know if he should take his diuretic for a day or two

## 2020-08-27 RX ORDER — ATORVASTATIN CALCIUM 40 MG/1
TABLET, FILM COATED ORAL
Qty: 30 TABLET | Refills: 2 | Status: SHIPPED | OUTPATIENT
Start: 2020-08-27 | End: 2020-11-30 | Stop reason: SDUPTHER

## 2020-08-31 NOTE — DISCHARGE SUMMARY
Assumed care of pt  At this time  Pt  Is resting comfortably in room on stretcher  Pt  Has relaxed and non labored respirations  Pt  Is being monitored with  q15 behavioral health checks  See paper charting for observations       Negin Turner RN  08/31/20 2397 Hospital Medicine Discharge Summary    Patient: Karan Johnson     Gender: male  : 1945   Age: 68 y.o. MRN: [id number]    Code Status: Full Code     Primary Care Provider: No primary care provider on file. Admit Date: 10/7/2018   Discharge Date:   10/18/2018    Admitting Physician: No admitting provider for patient encounter. Discharge Physician: Kia Jorge DO     Discharge Diagnoses: Active Hospital Problems    Diagnosis Date Noted    Acute on chronic systolic heart failure (HCC) [I50.23]     PAF (paroxysmal atrial fibrillation) ([facility]) [I48.0]     Aortic atherosclerosis (HCC) [I70.0]     Morbid obesity with BMI of 40.0-44.9, adult ([facility]) [E66.01, Z68.41]     Persistent atrial fibrillation (HCC) [I48.1]     GISSELLE [facility]acute [facility]) [facility] [N17.9]     Essential hypertension [I10]     Atrial fibrillation with RVR ([facility]) [I48.91]     Acute combined systolic and diastolic HF (heart failure) ([facility] [I50.41]     Community acquired pneumonia [J18.9] 10/07/2018       Hospital Course:   Acute diastolic/systolic CHF: new onset  Weight down 335-->312-->313-->311, and net negatives -1.2 L x 24 hrs;  -7.4L total  Dry Wt is 330 lbs, today 308 lbs  Lasix drip was discontinued on 10/13, PO restarted by nephrology today  Strict input and output monitoring  Low salt diet. Echocardiogram: EF40%  Hold  ACE inhibitors: will restart at low dose in the office with nephro   Will follow up with cardiology in the out pt     Acute kidney injury: improving, crt down to 1.5 today  Over Diuresing with hypotension  Cr continues to go up off Lasix x 6 days, and ACEI  Decrease toprol   nephrology consulted  D/C fluid restriction and gave 500 mL bolus yesterday  Avoid nephrotoxic medications   Monitor closely      Pneumonia  Received 5 days of  azithromycin and cefepime to treat gram-negative bacteria pneumonia   Blood and sputum culture are NGTD. ABx  was changed to Levaquin on 10/12.   Levaquin was stopped on

## 2020-09-01 NOTE — CARE COORDINATION
Call rec'd from patient this morning who states he wants to use [facility] for his home care orders. Referral sent.   Barbara Morrissey [place]     Case Management   [phone]    3/5/2019  9:04 AM
GENE was called by bedside RN who states pt is ready to leave the unit for his ride is here and he needs home OT.  BAW came to the room to meet with him and a family member. BAW informed him the order is for home PT/OT services. GENE provided a list of home care agencies for his review. He wants to take it home and will call back with the agency of his choice as he reports his reading glasses are packed and he has to go for his ride is here. GENE provided list with this worker's phone number list on it.   Everett Hernandez     Case Management   [phone]    3/4/2019  3:19 PM
INITIAL CASE MANAGEMENT ASSESSMENT    Reviewed chart, met with patient to assess possible discharge needs. Explained Case Management role/services. Living Situation: lives home alone. Has a daughter for assist as needed. ADLs: independent prior to admission. DME: none    PT/OT Recs: will follow- ordered. Active Services: non      Transportation:      Medications: uses his own pharmacy     PCP: none      HD/PD: none     PLAN/COMMENTS:  Pt reporting he does not feel he wants any services on d/c. SW following for recommendations from PT/OT. Pt reporting he has an active lifestyle prior to admission. Pt has support from his daughter as needed. Will follow up with Pt pending recommendations. Thank You. Electronically signed by JONO Corona, BAW on 3/1/2019 at 3:34 PM      SW/CM provided contact information for patient or family to call with any questions. SW/CM will follow and assist as needed.
Female

## 2020-09-04 NOTE — ED NOTES
Awaiting update from MD. Pt verbalized understanding. Call light in reach.       Harsha Jeter, RN  03/30/19 3519 [facility]
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
## 2020-09-30 ENCOUNTER — OFFICE VISIT (OUTPATIENT)
Dept: CARDIOLOGY CLINIC | Age: 75
End: 2020-09-30
Payer: MEDICARE

## 2020-09-30 VITALS
WEIGHT: 250.6 LBS | BODY MASS INDEX: 33.21 KG/M2 | TEMPERATURE: 97.8 F | HEART RATE: 75 BPM | HEIGHT: 73 IN | DIASTOLIC BLOOD PRESSURE: 81 MMHG | SYSTOLIC BLOOD PRESSURE: 174 MMHG | OXYGEN SATURATION: 99 %

## 2020-09-30 PROCEDURE — 4040F PNEUMOC VAC/ADMIN/RCVD: CPT | Performed by: INTERNAL MEDICINE

## 2020-09-30 PROCEDURE — 99214 OFFICE O/P EST MOD 30 MIN: CPT | Performed by: INTERNAL MEDICINE

## 2020-09-30 PROCEDURE — 1123F ACP DISCUSS/DSCN MKR DOCD: CPT | Performed by: INTERNAL MEDICINE

## 2020-09-30 PROCEDURE — 1036F TOBACCO NON-USER: CPT | Performed by: INTERNAL MEDICINE

## 2020-09-30 PROCEDURE — G8417 CALC BMI ABV UP PARAM F/U: HCPCS | Performed by: INTERNAL MEDICINE

## 2020-09-30 PROCEDURE — 3017F COLORECTAL CA SCREEN DOC REV: CPT | Performed by: INTERNAL MEDICINE

## 2020-09-30 PROCEDURE — 93000 ELECTROCARDIOGRAM COMPLETE: CPT | Performed by: INTERNAL MEDICINE

## 2020-09-30 PROCEDURE — G8427 DOCREV CUR MEDS BY ELIG CLIN: HCPCS | Performed by: INTERNAL MEDICINE

## 2020-09-30 RX ORDER — FUROSEMIDE 20 MG/1
20 TABLET ORAL DAILY
Qty: 30 TABLET | Refills: 5 | Status: SHIPPED | OUTPATIENT
Start: 2020-09-30 | End: 2020-11-30 | Stop reason: SDUPTHER

## 2020-11-04 ENCOUNTER — TELEPHONE (OUTPATIENT)
Dept: CARDIOLOGY CLINIC | Age: 75
End: 2020-11-04

## 2020-11-04 NOTE — TELEPHONE ENCOUNTER
LVM for pt to call on Thursday - route call to [facility] - has pt received application in the mail?   If not, I will send a copy to be returned with a copy of the front and back of his insurance cards as well as copy of proof of income    Dr. Al Fernandez will sign provider form and paper script

## 2020-11-06 ENCOUNTER — TELEPHONE (OUTPATIENT)
Dept: CARDIOLOGY CLINIC | Age: 75
End: 2020-11-06

## 2020-11-06 NOTE — TELEPHONE ENCOUNTER
Sumaya Marya called in this morning having questions about her dad's Lasix. She stated that at her dad's last appointment he was told to take Lasix's for 2 weeks and then go get a blood test to make sure it isn't effecting his kidneys. Pt was wondering if he should go get that now or if he should wait until it is closer to his appointment w/ Bettina Sal.       Sumaya Marya can be reached at [phone]

## 2020-11-06 NOTE — TELEPHONE ENCOUNTER
Per OV on 9/30/2020 :  He needs a repeat BMP in 2 weeks and a chest X-ray at that time. this should have been completed by Oct 16 - I will let her know to call scheduling [phone] for Chest X-ray and complete the labs on that day. Next appt. 11/25/2020    Spoke to William Valderrama. Pt will go today for labs and she will call scheduling and try to get in before ov on 11/25/2020.

## 2020-11-06 NOTE — TELEPHONE ENCOUNTER
Spoke to William Valderrama, daughter, ok per HIPAA. Send paperwork to pt and they will fill out and return asap with copy of proof of income. Pt does not have part d insurance, so he will not need a pharmacy statement. Dr. Christos Starr signed provider form and paper script today. Forms are on Dior's desk.

## 2020-11-20 DIAGNOSIS — I48.20 CHRONIC ATRIAL FIBRILLATION (HCC): ICD-10-CM

## 2020-11-20 DIAGNOSIS — I50.22 CHRONIC SYSTOLIC CHF (CONGESTIVE HEART FAILURE), NYHA CLASS 2 (HCC): ICD-10-CM

## 2020-11-20 LAB
ANION GAP SERPL CALCULATED.3IONS-SCNC: 11 MMOL/L (ref 3–16)
BUN BLDV-MCNC: 19 MG/DL (ref 7–20)
CALCIUM SERPL-MCNC: 9.5 MG/DL (ref 8.3–10.6)
CHLORIDE BLD-SCNC: 104 MMOL/L (ref 99–110)
CO2: 28 MMOL/L (ref 21–32)
CREAT SERPL-MCNC: 1 MG/DL (ref 0.8–1.3)
GFR AFRICAN AMERICAN: >60
GFR NON-AFRICAN AMERICAN: >60
GLUCOSE BLD-MCNC: 111 MG/DL (ref 70–99)
POTASSIUM SERPL-SCNC: 4.2 MMOL/L (ref 3.5–5.1)
PRO-BNP: 3042 PG/ML (ref 0–449)
SODIUM BLD-SCNC: 143 MMOL/L (ref 136–145)

## 2020-11-30 RX ORDER — ATORVASTATIN CALCIUM 40 MG/1
40 TABLET, FILM COATED ORAL DAILY
Qty: 90 TABLET | Refills: 2 | Status: SHIPPED | OUTPATIENT
Start: 2020-11-30 | End: 2020-12-04 | Stop reason: SDUPTHER

## 2020-11-30 RX ORDER — FUROSEMIDE 40 MG/1
40 TABLET ORAL DAILY
Qty: 30 TABLET | Refills: 2 | Status: SHIPPED | OUTPATIENT
Start: 2020-11-30 | End: 2021-01-18

## 2020-12-04 RX ORDER — ATORVASTATIN CALCIUM 40 MG/1
40 TABLET, FILM COATED ORAL DAILY
Qty: 90 TABLET | Refills: 0 | Status: SHIPPED | OUTPATIENT
Start: 2020-12-04 | End: 2021-08-30

## 2021-01-15 RX ORDER — METOPROLOL SUCCINATE 50 MG/1
50 TABLET, EXTENDED RELEASE ORAL DAILY
Qty: 90 TABLET | Refills: 0 | Status: SHIPPED | OUTPATIENT
Start: 2021-01-15 | End: 2021-04-19

## 2021-01-15 NOTE — TELEPHONE ENCOUNTER
Last ov 9/30/20  Pending appt overdue for apt  Last refill 7/22/20 #30x5      My chart message sent to pt of needing an apt

## 2021-01-18 RX ORDER — FUROSEMIDE 40 MG/1
40 TABLET ORAL DAILY
Qty: 90 TABLET | Refills: 2 | Status: SHIPPED | OUTPATIENT
Start: 2021-01-18 | End: 2021-03-31

## 2021-02-05 ENCOUNTER — TELEPHONE (OUTPATIENT)
Dept: CARDIOLOGY CLINIC | Age: 76
End: 2021-02-05

## 2021-02-05 NOTE — TELEPHONE ENCOUNTER
Called and spoke with patient, relayed message below. Patient expressed understanding. He states he will call back to schedule an appointment.

## 2021-02-05 NOTE — TELEPHONE ENCOUNTER
Josue Galindo called in stating if can take PREDNISONE 10MG and LASIX 40MG at the same time.       Josue Galindo can be reach at [phone]

## 2021-02-23 RX ORDER — FUROSEMIDE 40 MG/1
40 TABLET ORAL DAILY
Qty: 90 TABLET | Refills: 2 | OUTPATIENT
Start: 2021-02-23

## 2021-02-26 ENCOUNTER — TELEPHONE (OUTPATIENT)
Dept: CARDIOLOGY CLINIC | Age: 76
End: 2021-02-26

## 2021-02-26 NOTE — TELEPHONE ENCOUNTER
Alyse Junior called in this afternoon and has a question about his medications. He was just prescribed Colchicine for his Gout and is wondering if it is going to interfere with his cholesterol medications?     He can be reached at [phone]

## 2021-03-01 NOTE — TELEPHONE ENCOUNTER
Per Up to Date colchicine will increase the effects of his statin. He can use the colchicine short term but would not recommend long term use.

## 2021-03-02 NOTE — TELEPHONE ENCOUNTER
Patent called back and states now he doesn't think he even had Gout. Is not taking any medication for Gout.     No need for any call back at this time

## 2021-03-31 RX ORDER — FUROSEMIDE 40 MG/1
40 TABLET ORAL DAILY
Qty: 90 TABLET | Refills: 3 | Status: SHIPPED | OUTPATIENT
Start: 2021-03-31 | End: 2022-04-22 | Stop reason: SDUPTHER
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
## 2021-04-07 ENCOUNTER — OFFICE VISIT (OUTPATIENT)
Dept: CARDIOLOGY CLINIC | Age: 76
End: 2021-04-07
Payer: MEDICARE

## 2021-04-07 VITALS
OXYGEN SATURATION: 97 % | BODY MASS INDEX: 34.51 KG/M2 | HEIGHT: 73 IN | WEIGHT: 260.4 LBS | DIASTOLIC BLOOD PRESSURE: 82 MMHG | HEART RATE: 75 BPM | SYSTOLIC BLOOD PRESSURE: 120 MMHG

## 2021-04-07 DIAGNOSIS — I10 ESSENTIAL HYPERTENSION: ICD-10-CM

## 2021-04-07 DIAGNOSIS — I48.20 CHRONIC ATRIAL FIBRILLATION (HCC): Primary | ICD-10-CM

## 2021-04-07 DIAGNOSIS — E66.9 OBESITY (BMI 30-39.9): ICD-10-CM

## 2021-04-07 DIAGNOSIS — I50.22 CHRONIC SYSTOLIC HEART FAILURE (HCC): ICD-10-CM

## 2021-04-07 DIAGNOSIS — E78.5 DYSLIPIDEMIA: ICD-10-CM

## 2021-04-07 DIAGNOSIS — R91.8 PULMONARY NODULES: ICD-10-CM

## 2021-04-07 PROCEDURE — G8417 CALC BMI ABV UP PARAM F/U: HCPCS | Performed by: NURSE PRACTITIONER

## 2021-04-07 PROCEDURE — 3017F COLORECTAL CA SCREEN DOC REV: CPT | Performed by: NURSE PRACTITIONER

## 2021-04-07 PROCEDURE — 4040F PNEUMOC VAC/ADMIN/RCVD: CPT | Performed by: NURSE PRACTITIONER

## 2021-04-07 PROCEDURE — 1123F ACP DISCUSS/DSCN MKR DOCD: CPT | Performed by: NURSE PRACTITIONER

## 2021-04-07 PROCEDURE — 99214 OFFICE O/P EST MOD 30 MIN: CPT | Performed by: NURSE PRACTITIONER

## 2021-04-07 PROCEDURE — G8427 DOCREV CUR MEDS BY ELIG CLIN: HCPCS | Performed by: NURSE PRACTITIONER

## 2021-04-07 PROCEDURE — 1036F TOBACCO NON-USER: CPT | Performed by: NURSE PRACTITIONER

## 2021-04-19 RX ORDER — METOPROLOL SUCCINATE 50 MG/1
50 TABLET, EXTENDED RELEASE ORAL DAILY
Qty: 90 TABLET | Refills: 3 | Status: SHIPPED | OUTPATIENT
Start: 2021-04-19 | End: 2022-04-20

## 2021-04-19 NOTE — TELEPHONE ENCOUNTER
Last OV: 4/7/21  Next OV: none return in one year around 4/22  Labs:4/7/21  Last EKG (if needed):9/30/20

## 2021-06-02 ENCOUNTER — TELEPHONE (OUTPATIENT)
Dept: CARDIOLOGY CLINIC | Age: 76
End: 2021-06-02

## 2021-06-30 RX ORDER — LISINOPRIL 10 MG/1
10 TABLET ORAL DAILY
Qty: 90 TABLET | Refills: 1 | Status: SHIPPED | OUTPATIENT
Start: 2021-06-30 | End: 2022-01-03

## 2021-08-30 RX ORDER — ATORVASTATIN CALCIUM 40 MG/1
40 TABLET, FILM COATED ORAL DAILY
Qty: 30 TABLET | Refills: 0 | Status: SHIPPED | OUTPATIENT
Start: 2021-08-30 | End: 2021-09-28

## 2021-08-31 RX ORDER — ATORVASTATIN CALCIUM 40 MG/1
40 TABLET, FILM COATED ORAL DAILY
Qty: 90 TABLET | OUTPATIENT
Start: 2021-08-31

## 2021-09-28 RX ORDER — ATORVASTATIN CALCIUM 40 MG/1
40 TABLET, FILM COATED ORAL DAILY
Qty: 90 TABLET | Refills: 1 | Status: SHIPPED | OUTPATIENT
Start: 2021-09-28 | End: 2022-04-01

## 2021-09-28 NOTE — TELEPHONE ENCOUNTER
Last OV: 4/7/21  Next OV: 1 yr f/u 4/2022  Last refill:8/30/21  Most recent Labs: 4/7/21  Last EKG (if needed):

## 2022-01-01 DIAGNOSIS — N18.32 STAGE 3B CHRONIC KIDNEY DISEASE (HCC): ICD-10-CM

## 2022-01-01 DIAGNOSIS — I50.41 ACUTE COMBINED SYSTOLIC AND DIASTOLIC HF (HEART FAILURE) (HCC): ICD-10-CM

## 2022-01-01 LAB
ALBUMIN SERPL-MCNC: 3.7 G/DL (ref 3.4–5)
ANION GAP SERPL CALCULATED.3IONS-SCNC: 11 MMOL/L (ref 3–16)
BUN BLDV-MCNC: 52 MG/DL (ref 7–20)
CALCIUM SERPL-MCNC: 9.6 MG/DL (ref 8.3–10.6)
CHLORIDE BLD-SCNC: 99 MMOL/L (ref 99–110)
CO2: 29 MMOL/L (ref 21–32)
CREAT SERPL-MCNC: 1.6 MG/DL (ref 0.8–1.3)
GFR SERPL CREATININE-BSD FRML MDRD: 44 ML/MIN/{1.73_M2}
GLUCOSE BLD-MCNC: 91 MG/DL (ref 70–99)
PHOSPHORUS: 3.7 MG/DL (ref 2.5–4.9)
POTASSIUM SERPL-SCNC: 4.2 MMOL/L (ref 3.5–5.1)
SODIUM BLD-SCNC: 139 MMOL/L (ref 136–145)

## 2022-01-03 RX ORDER — LISINOPRIL 10 MG/1
10 TABLET ORAL DAILY
Qty: 90 TABLET | Refills: 1 | Status: SHIPPED | OUTPATIENT
Start: 2022-01-03 | End: 2022-07-20

## 2022-01-20 ENCOUNTER — APPOINTMENT (OUTPATIENT)
Dept: CT IMAGING | Age: 77
DRG: 844 | End: 2022-01-20
Payer: MEDICARE

## 2022-01-20 ENCOUNTER — APPOINTMENT (OUTPATIENT)
Dept: GENERAL RADIOLOGY | Age: 77
DRG: 844 | End: 2022-01-20
Payer: MEDICARE

## 2022-01-20 ENCOUNTER — HOSPITAL ENCOUNTER (INPATIENT)
Age: 77
LOS: 6 days | Discharge: HOME | DRG: 844 | End: 2022-01-26
Attending: EMERGENCY MEDICINE | Admitting: INTERNAL MEDICINE
Payer: MEDICARE

## 2022-01-20 DIAGNOSIS — J18.9 COMMUNITY ACQUIRED PNEUMONIA OF RIGHT LUNG, UNSPECIFIED PART OF LUNG: ICD-10-CM

## 2022-01-20 DIAGNOSIS — J90 RECURRENT RIGHT PLEURAL EFFUSION: ICD-10-CM

## 2022-01-20 DIAGNOSIS — I48.20 CHRONIC A-FIB (HCC): ICD-10-CM

## 2022-01-20 DIAGNOSIS — R93.89 ABNORMAL CT OF THE CHEST: Primary | ICD-10-CM

## 2022-01-20 LAB
ANION GAP SERPL CALCULATED.3IONS-SCNC: 14 MMOL/L (ref 3–16)
BASOPHILS ABSOLUTE: 0 K/UL (ref 0–0.2)
BASOPHILS RELATIVE PERCENT: 0.1 %
BUN BLDV-MCNC: 36 MG/DL (ref 7–20)
CALCIUM SERPL-MCNC: 9.7 MG/DL (ref 8.3–10.6)
CHLORIDE BLD-SCNC: 100 MMOL/L (ref 99–110)
CO2: 23 MMOL/L (ref 21–32)
CREAT SERPL-MCNC: 1.3 MG/DL (ref 0.8–1.3)
EOSINOPHILS ABSOLUTE: 0 K/UL (ref 0–0.6)
EOSINOPHILS RELATIVE PERCENT: 0 %
GFR AFRICAN AMERICAN: >60
GFR NON-AFRICAN AMERICAN: 54
GLUCOSE BLD-MCNC: 148 MG/DL (ref 70–99)
HCT VFR BLD CALC: 42.6 % (ref 40.5–52.5)
HEMOGLOBIN: 14.2 G/DL (ref 13.5–17.5)
INR BLD: 2.37 (ref 0.88–1.12)
LYMPHOCYTES ABSOLUTE: 0.4 K/UL (ref 1–5.1)
LYMPHOCYTES RELATIVE PERCENT: 4.1 %
MCH RBC QN AUTO: 32.7 PG (ref 26–34)
MCHC RBC AUTO-ENTMCNC: 33.4 G/DL (ref 31–36)
MCV RBC AUTO: 97.8 FL (ref 80–100)
MONOCYTES ABSOLUTE: 1.3 K/UL (ref 0–1.3)
MONOCYTES RELATIVE PERCENT: 13 %
NEUTROPHILS ABSOLUTE: 8.3 K/UL (ref 1.7–7.7)
NEUTROPHILS RELATIVE PERCENT: 82.8 %
PDW BLD-RTO: 14.9 % (ref 12.4–15.4)
PLATELET # BLD: 211 K/UL (ref 135–450)
PMV BLD AUTO: 7.7 FL (ref 5–10.5)
POTASSIUM SERPL-SCNC: 4.1 MMOL/L (ref 3.5–5.1)
PROTHROMBIN TIME: 27.8 SEC (ref 9.9–12.7)
RBC # BLD: 4.35 M/UL (ref 4.2–5.9)
SARS-COV-2, NAAT: NOT DETECTED
SODIUM BLD-SCNC: 137 MMOL/L (ref 136–145)
WBC # BLD: 10 K/UL (ref 4–11)

## 2022-01-20 PROCEDURE — 87635 SARS-COV-2 COVID-19 AMP PRB: CPT

## 2022-01-20 PROCEDURE — 6370000000 HC RX 637 (ALT 250 FOR IP): Performed by: INTERNAL MEDICINE

## 2022-01-20 PROCEDURE — 80048 BASIC METABOLIC PNL TOTAL CA: CPT

## 2022-01-20 PROCEDURE — 99283 EMERGENCY DEPT VISIT LOW MDM: CPT

## 2022-01-20 PROCEDURE — 85025 COMPLETE CBC W/AUTO DIFF WBC: CPT

## 2022-01-20 PROCEDURE — 2580000003 HC RX 258: Performed by: NURSE PRACTITIONER

## 2022-01-20 PROCEDURE — 96365 THER/PROPH/DIAG IV INF INIT: CPT

## 2022-01-20 PROCEDURE — 36415 COLL VENOUS BLD VENIPUNCTURE: CPT

## 2022-01-20 PROCEDURE — 71250 CT THORAX DX C-: CPT

## 2022-01-20 PROCEDURE — 6360000002 HC RX W HCPCS: Performed by: NURSE PRACTITIONER

## 2022-01-20 PROCEDURE — 71045 X-RAY EXAM CHEST 1 VIEW: CPT

## 2022-01-20 PROCEDURE — 1200000000 HC SEMI PRIVATE

## 2022-01-20 PROCEDURE — 93005 ELECTROCARDIOGRAM TRACING: CPT | Performed by: NURSE PRACTITIONER

## 2022-01-20 PROCEDURE — 85610 PROTHROMBIN TIME: CPT

## 2022-01-20 RX ORDER — ZINC SULFATE 50(220)MG
50 CAPSULE ORAL DAILY
COMMUNITY

## 2022-01-20 RX ORDER — AZITHROMYCIN 250 MG/1
TABLET, FILM COATED ORAL
Status: ON HOLD | COMMUNITY
Start: 2022-01-20 | End: 2022-01-26 | Stop reason: HOSPADM

## 2022-01-20 RX ORDER — ACETAMINOPHEN 650 MG/1
650 SUPPOSITORY RECTAL EVERY 6 HOURS PRN
Status: DISCONTINUED | OUTPATIENT
Start: 2022-01-20 | End: 2022-01-26 | Stop reason: HOSPADM

## 2022-01-20 RX ORDER — LIDOCAINE HYDROCHLORIDE AND EPINEPHRINE BITARTRATE 20; .01 MG/ML; MG/ML
20 INJECTION, SOLUTION SUBCUTANEOUS ONCE
Status: DISCONTINUED | OUTPATIENT
Start: 2022-01-20 | End: 2022-01-26 | Stop reason: HOSPADM

## 2022-01-20 RX ORDER — ATORVASTATIN CALCIUM 40 MG/1
40 TABLET, FILM COATED ORAL DAILY
Status: DISCONTINUED | OUTPATIENT
Start: 2022-01-21 | End: 2022-01-22

## 2022-01-20 RX ORDER — VITAMIN B COMPLEX
1 CAPSULE ORAL DAILY
COMMUNITY

## 2022-01-20 RX ORDER — SODIUM CHLORIDE 0.9 % (FLUSH) 0.9 %
5-40 SYRINGE (ML) INJECTION EVERY 12 HOURS SCHEDULED
Status: DISCONTINUED | OUTPATIENT
Start: 2022-01-20 | End: 2022-01-26 | Stop reason: HOSPADM

## 2022-01-20 RX ORDER — METOPROLOL SUCCINATE 50 MG/1
50 TABLET, EXTENDED RELEASE ORAL NIGHTLY
Status: DISCONTINUED | OUTPATIENT
Start: 2022-01-20 | End: 2022-01-26 | Stop reason: HOSPADM

## 2022-01-20 RX ORDER — POLYETHYLENE GLYCOL 3350 17 G/17G
17 POWDER, FOR SOLUTION ORAL DAILY PRN
Status: DISCONTINUED | OUTPATIENT
Start: 2022-01-20 | End: 2022-01-26 | Stop reason: HOSPADM

## 2022-01-20 RX ORDER — SODIUM CHLORIDE 0.9 % (FLUSH) 0.9 %
5-40 SYRINGE (ML) INJECTION PRN
Status: DISCONTINUED | OUTPATIENT
Start: 2022-01-20 | End: 2022-01-26 | Stop reason: HOSPADM

## 2022-01-20 RX ORDER — ONDANSETRON 4 MG/1
4 TABLET, ORALLY DISINTEGRATING ORAL EVERY 8 HOURS PRN
Status: DISCONTINUED | OUTPATIENT
Start: 2022-01-20 | End: 2022-01-26 | Stop reason: HOSPADM

## 2022-01-20 RX ORDER — ALLOPURINOL 100 MG/1
TABLET ORAL
COMMUNITY
Start: 2021-11-24

## 2022-01-20 RX ORDER — SODIUM CHLORIDE 9 MG/ML
25 INJECTION, SOLUTION INTRAVENOUS PRN
Status: DISCONTINUED | OUTPATIENT
Start: 2022-01-20 | End: 2022-01-26 | Stop reason: HOSPADM

## 2022-01-20 RX ORDER — ONDANSETRON 2 MG/ML
4 INJECTION INTRAMUSCULAR; INTRAVENOUS EVERY 6 HOURS PRN
Status: DISCONTINUED | OUTPATIENT
Start: 2022-01-20 | End: 2022-01-26 | Stop reason: HOSPADM

## 2022-01-20 RX ORDER — ACETAMINOPHEN 325 MG/1
650 TABLET ORAL EVERY 6 HOURS PRN
Status: DISCONTINUED | OUTPATIENT
Start: 2022-01-20 | End: 2022-01-26 | Stop reason: HOSPADM

## 2022-01-20 RX ORDER — METOPROLOL SUCCINATE 50 MG/1
1 TABLET, EXTENDED RELEASE ORAL DAILY
Status: DISCONTINUED | OUTPATIENT
Start: 2022-01-21 | End: 2022-01-20

## 2022-01-20 RX ORDER — VITAMIN B COMPLEX
1000 TABLET ORAL DAILY
COMMUNITY

## 2022-01-20 RX ORDER — LISINOPRIL 10 MG/1
10 TABLET ORAL DAILY
Status: DISCONTINUED | OUTPATIENT
Start: 2022-01-21 | End: 2022-01-22

## 2022-01-20 RX ORDER — METHYLPREDNISOLONE 4 MG/1
TABLET ORAL
COMMUNITY
Start: 2022-01-20 | End: 2022-05-20

## 2022-01-20 RX ORDER — BUPIVACAINE HYDROCHLORIDE 5 MG/ML
30 INJECTION, SOLUTION PERINEURAL ONCE
Status: DISCONTINUED | OUTPATIENT
Start: 2022-01-20 | End: 2022-01-26 | Stop reason: HOSPADM

## 2022-01-20 RX ADMIN — CEFTRIAXONE 1000 MG: 1 INJECTION, POWDER, FOR SOLUTION INTRAMUSCULAR; INTRAVENOUS at 22:03

## 2022-01-20 RX ADMIN — METOPROLOL SUCCINATE 50 MG: 50 TABLET, EXTENDED RELEASE ORAL at 23:49

## 2022-01-20 ASSESSMENT — PAIN SCALES - GENERAL: PAINLEVEL_OUTOF10: 0

## 2022-01-20 ASSESSMENT — ENCOUNTER SYMPTOMS
SHORTNESS OF BREATH: 1
COUGH: 1
GASTROINTESTINAL NEGATIVE: 1

## 2022-01-21 ENCOUNTER — APPOINTMENT (OUTPATIENT)
Dept: INTERVENTIONAL RADIOLOGY/VASCULAR | Age: 77
DRG: 844 | End: 2022-01-21
Payer: MEDICARE

## 2022-01-21 LAB
ALBUMIN FLUID: 2.4 G/DL
ANION GAP SERPL CALCULATED.3IONS-SCNC: 12 MMOL/L (ref 3–16)
APPEARANCE FLUID: NORMAL
BANDED NEUTROPHILS RELATIVE PERCENT: 1 % (ref 0–7)
BASOPHILS ABSOLUTE: 0 K/UL (ref 0–0.2)
BASOPHILS RELATIVE PERCENT: 0 %
BUN BLDV-MCNC: 37 MG/DL (ref 7–20)
CALCIUM SERPL-MCNC: 9.6 MG/DL (ref 8.3–10.6)
CELL COUNT FLUID TYPE: NORMAL
CHLORIDE BLD-SCNC: 101 MMOL/L (ref 99–110)
CHOLESTEROL FLUID: 68 MG/DL
CLOT EVALUATION: NORMAL
CO2: 25 MMOL/L (ref 21–32)
COLOR FLUID: NORMAL
CREAT SERPL-MCNC: 1.3 MG/DL (ref 0.8–1.3)
EKG DIAGNOSIS: NORMAL
EKG Q-T INTERVAL: 324 MS
EKG QRS DURATION: 124 MS
EKG QTC CALCULATION (BAZETT): 448 MS
EKG R AXIS: 66 DEGREES
EKG T AXIS: -43 DEGREES
EKG VENTRICULAR RATE: 115 BPM
EOSINOPHILS ABSOLUTE: 0 K/UL (ref 0–0.6)
EOSINOPHILS RELATIVE PERCENT: 0 %
GFR AFRICAN AMERICAN: >60
GFR NON-AFRICAN AMERICAN: 54
GLUCOSE BLD-MCNC: 114 MG/DL (ref 70–99)
GLUCOSE, FLUID: 102 MG/DL
HCT VFR BLD CALC: 38.9 % (ref 40.5–52.5)
HEMOGLOBIN: 13 G/DL (ref 13.5–17.5)
INR BLD: 2.18 (ref 0.88–1.12)
LD, FLUID: 392 U/L
LYMPHOCYTES ABSOLUTE: 0.6 K/UL (ref 1–5.1)
LYMPHOCYTES RELATIVE PERCENT: 5 %
LYMPHOCYTES, BODY FLUID: 43 %
MACROPHAGE FLUID: 34 %
MCH RBC QN AUTO: 32.8 PG (ref 26–34)
MCHC RBC AUTO-ENTMCNC: 33.4 G/DL (ref 31–36)
MCV RBC AUTO: 98.1 FL (ref 80–100)
MESOTHELIAL FLUID: 2 %
MONOCYTE, FLUID: 5 %
MONOCYTES ABSOLUTE: 1.2 K/UL (ref 0–1.3)
MONOCYTES RELATIVE PERCENT: 10 %
NEUTROPHIL, FLUID: 16 %
NEUTROPHILS ABSOLUTE: 9.8 K/UL (ref 1.7–7.7)
NEUTROPHILS RELATIVE PERCENT: 84 %
NUCLEATED CELLS FLUID: 665 /CUMM
NUMBER OF CELLS COUNTED FLUID: 100
OVALOCYTES: ABNORMAL
PDW BLD-RTO: 14.5 % (ref 12.4–15.4)
PLATELET # BLD: 203 K/UL (ref 135–450)
PLATELET SLIDE REVIEW: ADEQUATE
PMV BLD AUTO: 7.5 FL (ref 5–10.5)
POIKILOCYTES: ABNORMAL
POTASSIUM REFLEX MAGNESIUM: 4.3 MMOL/L (ref 3.5–5.1)
PROCALCITONIN: 0.15 NG/ML (ref 0–0.15)
PROTEIN FLUID: 5 G/DL
PROTHROMBIN TIME: 25.5 SEC (ref 9.9–12.7)
RBC # BLD: 3.97 M/UL (ref 4.2–5.9)
RBC FLUID: NORMAL /CUMM
SLIDE REVIEW: ABNORMAL
SODIUM BLD-SCNC: 138 MMOL/L (ref 136–145)
TOTAL PROTEIN: 5.1 G/DL (ref 6.4–8.2)
VOLUME: 20 ML
WBC # BLD: 11.5 K/UL (ref 4–11)

## 2022-01-21 PROCEDURE — 84157 ASSAY OF PROTEIN OTHER: CPT

## 2022-01-21 PROCEDURE — 84155 ASSAY OF PROTEIN SERUM: CPT

## 2022-01-21 PROCEDURE — 84145 PROCALCITONIN (PCT): CPT

## 2022-01-21 PROCEDURE — 32551 INSERTION OF CHEST TUBE: CPT

## 2022-01-21 PROCEDURE — 93010 ELECTROCARDIOGRAM REPORT: CPT | Performed by: INTERNAL MEDICINE

## 2022-01-21 PROCEDURE — 2700000000 HC OXYGEN THERAPY PER DAY

## 2022-01-21 PROCEDURE — 80048 BASIC METABOLIC PNL TOTAL CA: CPT

## 2022-01-21 PROCEDURE — 87205 SMEAR GRAM STAIN: CPT

## 2022-01-21 PROCEDURE — 94761 N-INVAS EAR/PLS OXIMETRY MLT: CPT

## 2022-01-21 PROCEDURE — 6360000002 HC RX W HCPCS: Performed by: INTERNAL MEDICINE

## 2022-01-21 PROCEDURE — 85610 PROTHROMBIN TIME: CPT

## 2022-01-21 PROCEDURE — 87040 BLOOD CULTURE FOR BACTERIA: CPT

## 2022-01-21 PROCEDURE — 88305 TISSUE EXAM BY PATHOLOGIST: CPT

## 2022-01-21 PROCEDURE — 2580000003 HC RX 258: Performed by: INTERNAL MEDICINE

## 2022-01-21 PROCEDURE — 99223 1ST HOSP IP/OBS HIGH 75: CPT | Performed by: INTERNAL MEDICINE

## 2022-01-21 PROCEDURE — 6370000000 HC RX 637 (ALT 250 FOR IP): Performed by: INTERNAL MEDICINE

## 2022-01-21 PROCEDURE — 32557 INSERT CATH PLEURA W/ IMAGE: CPT

## 2022-01-21 PROCEDURE — 88112 CYTOPATH CELL ENHANCE TECH: CPT

## 2022-01-21 PROCEDURE — 84311 SPECTROPHOTOMETRY: CPT

## 2022-01-21 PROCEDURE — 85025 COMPLETE CBC W/AUTO DIFF WBC: CPT

## 2022-01-21 PROCEDURE — 82945 GLUCOSE OTHER FLUID: CPT

## 2022-01-21 PROCEDURE — 2709999900 IR CHEST TUBE INSERTION

## 2022-01-21 PROCEDURE — 87070 CULTURE OTHR SPECIMN AEROBIC: CPT

## 2022-01-21 PROCEDURE — 88184 FLOWCYTOMETRY/ TC 1 MARKER: CPT

## 2022-01-21 PROCEDURE — 88185 FLOWCYTOMETRY/TC ADD-ON: CPT

## 2022-01-21 PROCEDURE — 36415 COLL VENOUS BLD VENIPUNCTURE: CPT

## 2022-01-21 PROCEDURE — 89051 BODY FLUID CELL COUNT: CPT

## 2022-01-21 PROCEDURE — 83615 LACTATE (LD) (LDH) ENZYME: CPT

## 2022-01-21 PROCEDURE — 1200000000 HC SEMI PRIVATE

## 2022-01-21 PROCEDURE — 82465 ASSAY BLD/SERUM CHOLESTEROL: CPT

## 2022-01-21 PROCEDURE — 82042 OTHER SOURCE ALBUMIN QUAN EA: CPT

## 2022-01-21 PROCEDURE — 83986 ASSAY PH BODY FLUID NOS: CPT

## 2022-01-21 RX ORDER — LORAZEPAM 1 MG/1
1 TABLET ORAL EVERY 8 HOURS PRN
Status: DISCONTINUED | OUTPATIENT
Start: 2022-01-21 | End: 2022-01-26 | Stop reason: HOSPADM

## 2022-01-21 RX ADMIN — LISINOPRIL 10 MG: 10 TABLET ORAL at 08:10

## 2022-01-21 RX ADMIN — METOPROLOL SUCCINATE 50 MG: 50 TABLET, EXTENDED RELEASE ORAL at 20:51

## 2022-01-21 RX ADMIN — SODIUM CHLORIDE, PRESERVATIVE FREE 10 ML: 5 INJECTION INTRAVENOUS at 00:39

## 2022-01-21 RX ADMIN — CEFTRIAXONE 1000 MG: 1 INJECTION, POWDER, FOR SOLUTION INTRAMUSCULAR; INTRAVENOUS at 20:58

## 2022-01-21 RX ADMIN — ATORVASTATIN CALCIUM 40 MG: 40 TABLET, FILM COATED ORAL at 20:51

## 2022-01-21 RX ADMIN — AZITHROMYCIN MONOHYDRATE 500 MG: 500 INJECTION, POWDER, LYOPHILIZED, FOR SOLUTION INTRAVENOUS at 00:17

## 2022-01-21 RX ADMIN — AZITHROMYCIN MONOHYDRATE 500 MG: 500 INJECTION, POWDER, LYOPHILIZED, FOR SOLUTION INTRAVENOUS at 23:08

## 2022-01-21 RX ADMIN — SODIUM CHLORIDE, PRESERVATIVE FREE 10 ML: 5 INJECTION INTRAVENOUS at 08:12

## 2022-01-21 ASSESSMENT — PAIN SCALES - GENERAL
PAINLEVEL_OUTOF10: 5
PAINLEVEL_OUTOF10: 0

## 2022-01-21 NOTE — ED TRIAGE NOTES
Patient presents stating that he has a leak in his L lung and was sent by his MD. Patient is SOB. Denies that he has been around anyone with COVID. He is febrile at 100.1. Denies that he wears oxygen at home.

## 2022-01-21 NOTE — FLOWSHEET NOTE
Patient is non compliant with his nursing care during this shift. He yelled at PCA this morning when she came in to check his VS. PCA was able to convince him that it is important to do so. Then he yelled at the  when she came in to do a blood draw for his routine daily PT/INR , CBC and BMP. Earlier, he also refused another  when she came in to do a blood draw on his for ordered stat blood culture. He refused this same lab draw earlier in the ER according to ER notes. Nurse came back with this  to explain to him the importance of this order. He again refused to listen. He flat out refused and told her not to bother him. Nurse came in his room to talk to him. The patient is laying on his right side, eyes closed. Nasal cannula pushed up above his nosebridge, not wearing O2 at all. Calm and in no distress. \"I just want to sleep. I don't want them coming in here 2oclock, 3oclock, 4o'clock. Why don't you guys all just sleep! \"    Nurse calmly reminded patient that it is important to get his blood drawn at this time but patient just ignored her and closed his eyes again. VS are all WNL. HR now 86. Afebrile. Notified doctor regarding this behavior. Will attempt to draw labs later. He has a prn order for lorazepam for anxiety but he changed his mind on taking it saying, \"I don't need it right now. I'll call you when I do. \"    Dr. Jennifer Hardings this nurse back. Said to document this and relay to incoming nurse so as the attending doctor today will address this issue to the patient.

## 2022-01-21 NOTE — CONSULTS
REASON FOR CONSULTATION/CC: right pleural effusion      Consult at request of Amy Ann MD for      PCP: Gerhardt Fat  Established Pulmonologist:   None    HISTORY OF PRESENT ILLNESS: Cathryn Pressley is a 77y.o. year old male with a history of   who presents with      He presented ot the ER for abnormal radiograph with symptoms of shortness of breath and cough x 2 days treated with Z pack. CT demonstrates large right loculated pleural effusion       This note may have been  transcribed using [id number]. Please disregard any translational errors. Assessment:        A fib - Eliquis   CVA  Gout  CKD  HTN    Plan:      Hospital Day 1     Right pleural effusion   * on eliquis until yesterday. With large loculated pleural effusion, would like benefit from VATS with chest tube rather then chest tube with tPA/ Dornase. * with minimal symptoms prior to 2 days ago DDx concern for cancer. * WBC normal.  Added on procal to assess for infection. * on empiric  Ceftriaxone    *After discussing options with daughter at bedside, given his forgetfulness and age, she was to proceed with a chest tube option first.  IR guided chest tube order placed. Continue to hold Eliquis. Orders placed. *Hypoxemia likely resolve after chest tube placed. Forgetfulness  *Daughter states this is been present for years. Therefore, hold on work-up at this point. Consider ammonia if elevated LFTs. Consider head CT if malignancy found. Mobility       Access             This note was transcribed using [id number] [employer]. Please disregard any translational errors.     Thank you for the consult    Jackie P[facility]rez [facility]  [phone]             Data:     PAST MEDICAL HISTORY:  Past Medical History:   Diagnosis Date    Arrhythmia     Atrial fib    Cerebral artery occlusion with cerebral infarction ([address]     CHF (congestive heart failure) ([address]     ears and nose normal.  Neck: Trachea midline. No obvious mass. Resp: No accessory muscle use. No crackles. No wheezes. No rhonchi. CV: Regular rate. Regular rhythm. No murmur or rub. No edema. GI: Non-tender. Non-distended. No hernia. Skin: Warm, dry, normal texture and turgor. No nodule on exposed extremities. Lymph: No cervical LAD. No supraclavicular LAD. M/S: No cyanosis. No clubbing. No joint deformity. Neuro: Moves all four extremities. Psych: very forgetful       Data Reviewed:   LABS:  CBC:   Recent Labs     01/20/22 2045   WBC 10.0   HGB 14.2   HCT 42.6   MCV 97.8        BMP:   Recent Labs     01/20/22 2045      K 4.1      CO2 23   BUN 36*   CREATININE 1.3     LIVER PROFILE: No results for input(s): AST, ALT, LIPASE, BILIDIR, BILITOT, ALKPHOS in the last 72 hours. Invalid input(s): AMYLASE,  ALB  PT/INR:   Recent Labs     01/20/22 2045   PROTIME 27.8*   INR 2.37*     APTT: No results for input(s): APTT in the last 72 hours. UA:No results for input(s): NITRITE, COLORU, PHUR, LABCAST, WBCUA, RBCUA, MUCUS, TRICHOMONAS, YEAST, BACTERIA, CLARITYU, SPECGRAV, LEUKOCYTESUR, UROBILINOGEN, BILIRUBINUR, BLOODU, GLUCOSEU, AMORPHOUS in the last 72 hours. Invalid input(s): KETONESU  No results for input(s): PHART, UXG8PNU, PO2ART in the last 72 hours. Vent Information  SpO2: 98 %    Radiology Review:  Pertinent images / reports were reviewed as a part of this visit. CT Chest w/ contrast: Results for orders placed during the hospital encounter of 10/07/18    CT CHEST W CONTRAST    Narrative  EXAMINATION:  CT OF THE CHEST WITH CONTRAST 10/8/2018 9:34 am    TECHNIQUE:  CT of the chest was performed with the administration of intravenous  contrast. Multiplanar reformatted images are provided for review.  Dose  modulation, iterative reconstruction, and/or weight based adjustment of the  mA/kV was utilized to reduce the radiation dose to as low as reasonably  achievable. COMPARISON:  Chest radiograph dated 10/07/2018    HISTORY:  ORDERING SYSTEM PROVIDED HISTORY: PNEUMONIA, UNRESOLVED  TECHNOLOGIST PROVIDED HISTORY:  Ordering Physician Provided Reason for Exam: fu pn a,  sob,  Acuity: Chronic  Type of Exam: Subsequent/Follow-up    FINDINGS:  Mediastinum: Calcification of the thoracic aorta. Coronary artery  calcification. AP window lymph node is approximately 1.8 cm in short axis. Additional shotty mediastinal lymph nodes are seen. Thyroid is symmetric in  size. No axillary adenopathy. Pericardial calcification posteriorly. Lungs/pleura: Small to moderate bilateral pleural effusions. There is dense  consolidation within the lateral right upper lobe with sub solid character,  compatible with that opacity on recent radiograph. Additional heterogeneous  and ground-glass opacity is seen bilaterally. Sub solid nodular  consolidation is seen within the left upper and lower lobes. Bilateral  bronchial thickening is demonstrated. No evidence of pneumothorax. Upper Abdomen: Liver is fatty. Calcified granulomas within the liver. No  acute process within the upper-most abdomen. Soft Tissues/Bones: DISH of the thoracic spine. Impression  Multifocal bilateral consolidation, greatest within the lateral right upper  lobe, compatible with given patient history of pneumonia. Mild mediastinal  adenopathy. Small to moderate bilateral pleural effusions. Follow-up to  resolution recommended. CT Chest w/o contrast: Results for orders placed during the hospital encounter of 01/20/22    CT CHEST WO CONTRAST    Narrative  EXAMINATION:  CT OF THE CHEST WITHOUT CONTRAST 1/20/2022 9:31 pm    TECHNIQUE:  CT of the chest was performed without the administration of intravenous  contrast. Multiplanar reformatted images are provided for review.  Dose  modulation, iterative reconstruction, and/or weight based adjustment of the  mA/kV was utilized to reduce the radiation dose to as low as reasonably  achievable. COMPARISON:  Chest CT of 03/03/2019    HISTORY:  ORDERING SYSTEM PROVIDED HISTORY: large right pleural effusion, ? pneumo  TECHNOLOGIST PROVIDED HISTORY:  Reason for exam:->large right pleural effusion, ? pneumo  Decision Support Exception - unselect if not a suspected or confirmed  emergency medical condition->Emergency Medical Condition (MA)  Reason for Exam: large right pleural effusion, ? pneumo    FINDINGS:  CT chest:    Lines and tubes:  None. Lungs and Airways and Pleura:  Central airways are patent. . No pneumothorax. Severe right pleural effusion. The pleural effusion has a loculated  appearance surrounding anterior and posterior to the right upper lobe. The  pleural effusion also has an expansile configuration with significant  inferior displacement of right diaphragm. Right middle lobe and right lower  lobe are completely collapsed. Findings suggestive of interstitial pulmonary  edema with thickening of secondary pulmonary lobules. Small mediastinal  lymph nodes are unchanged. There is nodularity within the medial aspect of  the left upper lobe is unchanged ranging in size between 2-5 mm. Considering  long-term stability the findings are likely benign. Lymph nodes: Extensive subcentimeter mediastinal lymphadenopathy, slightly  increased since prior examination. .  No pathologically enlarged lymph nodes. Cardiovascular and Mediastinum: Moderate cardiomegaly. Unchanged  calcification of pericardium. .  Coronary arterial calcification. No  pericardial effusion. The thyroid gland is unremarkable. The esophagus is  unremarkable. Bones/Soft tissues: No fracture. No definite suspicious lytic or blastic  foci. Visualized upper abdomen: Unremarkable. Impression  Severe right pleural effusion. The pleural effusion has a loculated  appearance surrounding anterior and posterior to the right upper lobe.   The  pleural effusion also has an expansile configuration with significant  inferior displacement of right diaphragm. Right middle lobe and right lower  lobe are completely collapsed. No pneumothorax. Moderate cardiomegaly. Unchanged calcification of pericardium. Findings suggestive of interstitial pulmonary edema. Nodularity within the medial aspect of the left upper lobe is unchanged  ranging in size between 2-5 mm. Considering long-term stability the findings  are likely benign. Extensive subcentimeter mediastinal lymphadenopathy, slightly increased since  prior examination. . No pathologically enlarged lymph nodes. CTPA: No results found for this or any previous visit. CXR PA/LAT: No results found for this or any previous visit. CXR portable: Results for orders placed during the hospital encounter of 01/20/22    XR CHEST PORTABLE    Narrative  EXAMINATION:  ONE XRAY VIEW OF THE CHEST    1/20/2022 8:46 pm    COMPARISON:  03/01/2019    HISTORY:  ORDERING SYSTEM PROVIDED HISTORY: sob, outpt cxr reports a right pnuemo  TECHNOLOGIST PROVIDED HISTORY:  Reason for exam:->sob, outpt cxr reports a right pnuemo  Reason for Exam: sob, outpt cxr reports a right pnuemo    FINDINGS:  The heart is mildly enlarged but unchanged. The pulmonary vessels are  engorged centrally and there is hazy perihilar opacity on the left extending  inferiorly. There is a moderate right pleural effusion extending into the  upper chest with diffuse opacification extending along the right lung base  extending into the upper chest.  There is mild pleural fluid extending into  the right apex which is more apparent. There is no pneumothorax. The bones  are intact. Impression  Mild cardiomegaly and mild central pulmonary congestion.     Moderate to large right pleural effusion with probable moderate atelectasis  and consolidation throughout the lung base extending into the upper chest.    Questionable pulmonary interstitial edema vs early infiltrate or pulmonary  fibrosis scattered along the left lung base.

## 2022-01-21 NOTE — PROGRESS NOTES
[facility] collection changed previous chamber over flowed with over 3000 ml of thin purulent thin yellow orange fluid.

## 2022-01-21 NOTE — FLOWSHEET NOTE
Another  just came in and patient let him do blooddraw all his lab orders. Patient had 7 beats of Vtach according to [facility]. Update:     When another  came in to get a second set of blood cultures. HE refused again. Asked for the nurse and asked which doctor ordered this. He and questioned her why this has to be done again. Explained to him the logic but told him it is important and the doctor would talk to him today regarding his blood culture orders.
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
## 2022-01-21 NOTE — ED NOTES
Pt not wanting blood cultures at this time due to \"no longer having a fever\". Sheila Carmona NP made aware and antibiotic infusion started.      Yris Bosch RN  01/20/22 Margarita Finch RN  01/20/22 6647

## 2022-01-21 NOTE — ED PROVIDER NOTES
I independently performed a history and physical on [employer]. All diagnostic, treatment, and disposition decisions were made by myself in conjunction with the advanced practice provider. For further details of Vidhya Richards emergency department encounter, please see John Bullock NP's documentation. Patient reports 4 days of increasing shortness of breath. He denies any chest pain or any fevers, chills or sweats at home. He denies also any vomiting or diarrhea. He was sent to the ER because he was told he had a lung that was leaking air. He did have some coughing and thinks he may have coughed and caused a pneumothorax. On exam he is dyspneic and has diminished breath sounds on the right but on the left he has a few scattered rhonchi but otherwise clear. Heart is tachycardic and regular. EKG  The Ekg interpreted by me shows  atrial fibrillation with a rate of 115  Axis is   Normal  QTc is  normal  ST Segments: no acute change  No significant change from prior EKG dated 9/30/2020    CT and x-ray show a right-sided pleural effusion. There is practitioner discussed the case with pulmonology who recommended against chest tube or thoracentesis at this time. XR CHEST PORTABLE  Mild cardiomegaly and mild central pulmonary congestion. Moderate to large right pleural effusion with probable moderate atelectasis and consolidation throughout the lung base extending into the upper chest. Questionable pulmonary interstitial edema vs early infiltrate or pulmonary fibrosis scattered along the left lung base. CT CHEST WO CONTRAST  Severe right pleural effusion. The pleural effusion has a loculated appearance surrounding anterior and posterior to the right upper lobe. The pleural effusion also has an expansile configuration with significant inferior displacement of right diaphragm. Right middle lobe and right lower lobe are completely collapsed. No pneumothorax. Moderate cardiomegaly. Unchanged calcification of pericardium. Findings suggestive of interstitial pulmonary edema. Nodularity within the medial aspect of the left upper lobe is unchanged ranging in size between 2-5 mm. Considering long-term stability the findings are likely benign. Extensive subcentimeter mediastinal lymphadenopathy, slightly increased since prior examination. . No pathologically enlarged lymph nodes.        Results for orders placed or performed during the hospital encounter of 01/20/22   COVID-19, Rapid    Specimen: Nasopharyngeal Swab   Result Value Ref Range    SARS-CoV-2, NAAT Not Detected Not Detected   CBC Auto Differential   Result Value Ref Range    WBC 10.0 4.0 - 11.0 K/uL    RBC 4.35 4.20 - 5.90 M/uL    Hemoglobin 14.2 13.5 - 17.5 g/dL    Hematocrit 42.6 40.5 - 52.5 %    MCV 97.8 80.0 - 100.0 fL    MCH 32.7 26.0 - 34.0 pg    MCHC 33.4 31.0 - 36.0 g/dL    RDW 14.9 12.4 - 15.4 %    Platelets 097 567 - 405 K/uL    MPV 7.7 5.0 - 10.5 fL    Neutrophils % 82.8 %    Lymphocytes % 4.1 %    Monocytes % 13.0 %    Eosinophils % 0.0 %    Basophils % 0.1 %    Neutrophils Absolute 8.3 (H) 1.7 - 7.7 K/uL    Lymphocytes Absolute 0.4 (L) 1.0 - 5.1 K/uL    Monocytes Absolute 1.3 0.0 - 1.3 K/uL    Eosinophils Absolute 0.0 0.0 - 0.6 K/uL    Basophils Absolute 0.0 0.0 - 0.2 K/uL   Basic Metabolic Panel   Result Value Ref Range    Sodium 137 136 - 145 mmol/L    Potassium 4.1 3.5 - 5.1 mmol/L    Chloride 100 99 - 110 mmol/L    CO2 23 21 - 32 mmol/L    Anion Gap 14 3 - 16    Glucose 148 (H) 70 - 99 mg/dL    BUN 36 (H) 7 - 20 mg/dL    CREATININE 1.3 0.8 - 1.3 mg/dL    GFR Non-African American 54 (A) >60    GFR African American >60 >60    Calcium 9.7 8.3 - 10.6 mg/dL   Protime-INR   Result Value Ref Range    Protime 27.8 (H) 9.9 - 12.7 sec    INR 2.37 (H) 0.88 - 1.12          Marnie Rhodes MD  01/20/22 9704

## 2022-01-21 NOTE — FLOWSHEET NOTE
Patient arrived from the ED via stretcher to room 4118 at [facility] 1/21/22. Patient was able to transfer self to the bed without a problem. Insisted to keep his socks on as well as his shoes but was persuaded by this nurse to have his socks taken off and he could wear his non skid shoes when he needs to get up and go to the bathroom. He denies he has ever fallen. Daughter Hua Keenan came up with him and confirmed this information. Patient is here for right sided pleural effusion. He is on NPO order effective midnight for possible procedure. Progress note and ED nurse Anitha Shanks stated that chest tube / thoracentesis is not indicated at this time. Daughter stayed for an hour to help her dad get settled. She asked to be called first for any changes or updates. His friend Miguel Ángel Chapman is listed first in his emergency contact but daughter prefers to be called first. She's to visit tomorrow. Advised her to call first.     Patient slightly anxious and talkative. Joking but cooperative enough for quick assessment. \"You know I'm not gonna be staying here that long. \" Complained about laying in the ER on his back for \"four hours, etc.\"  \"Why do you have to weigh me again? I already got weighed in the ER. \"     Refused to get his blood culture drawn to be tested. He is afebrile at this time. VS now WNL. Telemonitor in place ( he has hx of afib, CHF, CVA, gout and CKD. HE is placed on O2 at 3L of which he is not dependent as he was able to go to the BR to urinate without difficulty. He just needed to take his time and rest once he is back in bed. Oriented to room including TV and call lights. He refused ICD and refused to change out of his underwear. Gown on. Not incontinent. Denies pain. IVPB azithromycin started. Patient denies he needs a sheet to cover his legs. Patient reassured. Monitor for comfort.

## 2022-01-21 NOTE — PLAN OF CARE
Problem: Breathing Pattern - Ineffective:  Goal: Ability to achieve and maintain a regular respiratory rate will improve  Description: Ability to achieve and maintain a regular respiratory rate will improve  Outcome: Ongoing     Problem: Infection:  Goal: Will remain free from infection  Description: Will remain free from infection  Outcome: Ongoing     Problem: Safety:  Goal: Free from accidental physical injury  Description: Free from accidental physical injury  Outcome: Ongoing  Goal: Free from intentional harm  Description: Free from intentional harm  Outcome: Ongoing     Problem: Daily Care:  Goal: Daily care needs are met  Description: Daily care needs are met  Outcome: Ongoing     Problem: Pain:  Goal: Patient's pain/discomfort is manageable  Description: Patient's pain/discomfort is manageable  Outcome: Ongoing     Problem: Skin Integrity:  Goal: Skin integrity will stabilize  Description: Skin integrity will stabilize  Outcome: Ongoing     Problem: Discharge Planning:  Goal: Patients continuum of care needs are met  Description: Patients continuum of care needs are met  Outcome: Ongoing

## 2022-01-21 NOTE — PROGRESS NOTES
Brief Postoperative Note    Amie Landaverde  YOB: 1945  [id number]    Pre-operative Diagnosis: R pleural fluid    Post-operative Diagnosis: Same    Procedure: US guided R chest tube    Anesthesia: Local    Surgeons/Assistants: Nina Medina MD    Estimated Blood Loss: less than 5    Complications: None    Specimens: Was Obtained: 20 cc thin yellow/orange fluid. Findings: Chylous versus thin purulent fluid drained from right hemithorax. 8 Fr chest tube in R pleural space.     Electronically signed by Nina Medina MD on 1/21/2022 at 1:44 PM
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
## 2022-01-21 NOTE — FLOWSHEET NOTE
Patient sat up in bed frequently during IVPB infusion. Then he asked the nurse if he could get 1mg of lorazepam if he needs it. He denies that he is worried about his upcoming procedure. Denies he needs it to relax and sleep. He stated that he took this medicine before \"2 years ago prescribed by his doctor\" and he said it was effective with no adverse effects. He said he would like to have this prn option whenever he needs it. Notified on call NP Celeste Hoyt and order was placed prn every 8 hrs as needed. Relayed this new order to the patient and he was asked if he would like to take it now. He stated. \"not right now. I will call you when I'm ready for  It. \"    (He also specified that he would like all his pills given to him still sealed in original packaged that he could inspect and read the label before he would take anything by mouth.)

## 2022-01-21 NOTE — PROGRESS NOTES
Medication Reconciliation     List of medications patient is currently taking is complete. Source of information:   1. Conversation with patient and daughter at bedside  2. EPIC records        Notes regarding home medications:  1. Patient received all of his AM home medications today (1/20) PTA. Due for his PM meds: Eliquis 5mg, Lipitor, and Toprol XL. 2. Patient is anticoagulated on Eliquis 5mg BID for Afib. Last dose was this morning. 3. Patient was started on a Z-marvin and a Medrol dose pack today - took 500mg of Azithromycin and 8mg of methylprednisolone. Denies any other OTC/herbal medications.      Harvinder Lucero, Pharmacy Intern

## 2022-01-21 NOTE — PROGRESS NOTES
Hospitalist  Progress Note       Neel Redmond is a 77 y.o. male   1945     SUBJECTIVE:     77 y.o. male Neel Redmond  - with PMHx of Atrial fib, HF, CVA, gout, CKD, hx of emoblic CVA without residual deficits, on anticoagulation therapy Eliquis  - c/o sob + cough x 2 days, started on zpack and streroids by pcp, outpatient cxr was done and was told to go to the ED as he has air leak in the lung  - per xr report from [address] [place] [address] right-sided pneumothorax present. Moderate size right pleural effusion present with associated compressive atelectasis or airspace disease involving mid to lower right lung.   -On arrival to the ED temp 100.1, heart rate 110s, 94% on room air. Labs fairly unremarkable, BUN of 26 creatinine 1.3 (around baseline), INR 2.37, COVID-19 rapid test was negative. X-ray of the chest was done which showed cardiomegaly pulmonary vascular congestion, moderate to large pleural effusion. CT chest was done which showed right large pleural effusion loculated surrounding the anterior and posterior right upper lobe, displacing the diaphragm inferiorly, and complete collapse of right middle and right lower lobe. There is no pneumothorax.   Noted subcentimeter mediastinal adenopathy  There is nodularity noted in the left upper lobe which is unchanged from prior findings and likely benign  1/21  Patient seen and examined daughter at the bedside pulmonology evaluation noted  Patient disoriented to place and person and currently not confused    OBJECTIVE:    Review of Systems:  General appearance: alert, appears stated age and cooperative  Skin: Skin color, texture, normal. No rashes or lesions  HEENT: No nose bleed, headache, vision problems  CV: C/O chest pain, tightness, pressure,   Respiratory: C/o no SOB, SOUZA, Orthopnea, PND  GI: No abdominal pain, black stool, bloating  Limbs: No c/o edema, pain, swelling, intermittent claudication, joint pains  Neuro: No dizziness, lightheadedness, syncope, gait problems, memory problems  Psych: grossly normal. No SI/depression. Vitals:   Blood pressure 134/74, pulse 96, temperature 98.8 °F (37.1 °C), temperature source Oral, resp. rate 18, height 6' 1\" (1.854 m), weight 259 lb 14.8 oz (117.9 kg), SpO2 96 %.     HEENT: AT, NC, PERRLA  Neck: No JVD  Heart: S1 S2 audible, no murmur   Lungs: Bilateral rhonchi with decreased breath sound    Abdomen: Nontender   Limbs: No edema   CNS: no focal deficit      Past Medical History:   Diagnosis Date    Arrhythmia     Atrial fib    [facility]     CH[facility]congest[facility] failure) ([facility])     Chronic kidney disease     Hypertension     Sciatica         Patient Active Problem List   Diagnosis    Community acquired pneumonia    Acute combined systolic and diastolic HF (heart failure) ([facility])    Persistent atrial fibrillation (HCC)    GISSELLE (acute kidney injury) ([address]    Essential hypertension    Acute on chronic systolic heart failure (HCC)    Paroxysmal atrial fibrillation (HCC)    Aortic atherosclerosis ([facility])    Morbid obesity with BMI of 40.0-44.9, adult ([facility])    Cerebrovascular accident (CVA) ([address]    Pleural effusion, right    Lung nodules    Pleural effusion        Allergies   Allergen Reactions    Spironolactone         Current Inpatient Medications:    Current Facility-Administered Medications   Medication Dose Route Frequency Provider Last Rate Last Admin    LORazepam (ATIVAN) tablet 1 mg  1 mg Oral Q8H PRN JOHN Patel - CNP        lidocaine-EPINEPHrine 2 percent-[id number] injection 20 mL  20 mL IntraDERmal Once Amy Ann MD        bupivacaine (MARCAINE) 0.5 % injection 150 mg  30 mL IntraDERmal Once Amy Ann MD        azithromycin (ZITHROMAX) 500 mg in D5W 250ml addavial  500 mg IntraVENous Q24H Amy Ann MD   Stopped at 01/21/22 0200    cefTRIAXone (ROCEPHIN) 1000 mg IVPB in 50 mL D5W minibag  1,000 mg IntraVENous Q24H Aura Barboza MD        atorvastatin (LIPITOR) tablet 40 mg  40 mg Oral Daily Aura Barboza MD   40 mg at 01/21/22 0810    lisinopril (PRINIVIL;ZESTRIL) tablet 10 mg  10 mg Oral Daily Aura Barboza MD   10 mg at 01/21/22 0810    sodium chloride flush 0.9 % injection 5-40 mL  5-40 mL IntraVENous 2 times per day Aura Barboza MD   10 mL at 01/21/22 3641    sodium chloride flush 0.9 % injection 5-40 mL  5-40 mL IntraVENous PRN Aura Barboza MD        0.9 % sodium chloride infusion  25 mL IntraVENous PRN Aura Barboza MD        ondansetron (ZOFRAN-ODT) disintegrating tablet 4 mg  4 mg Oral Q8H PRN Aura Barboza MD        Or    ondansetron [facility]) injection 4 mg  4 mg IntraVENous Q6H PRN Aura Barboza MD        polyethylene glycol (GLYCOLAX) packet 17 g  17 g Oral Daily PRN Aura Barboza MD        acetaminophen (TYLENOL) tablet 650 mg  650 mg Oral Q6H PRN Aura Barboza MD        Or    acetaminophen (TYLENOL) suppository 650 mg  650 mg Rectal Q6H PRN Aura Barboza MD        metoprolol succinate (TOPROL XL) extended release tablet 50 mg  50 mg Oral Nightly Aura Barboza MD   50 mg at 01/20/22 2349           Labs:  CBC with Differential:    Lab Results   Component Value Date    WBC 11.5 01/21/2022    RBC 3.97 01/21/2022    HGB 13.0 01/21/2022    HCT 38.9 01/21/2022     01/21/2022    MCV 98.1 01/21/2022    MCH 32.8 01/21/2022    MCHC 33.4 01/21/2022    RDW 14.5 01/21/2022    BANDSPCT 1 01/21/2022    LYMPHOPCT 5.0 01/21/2022    MONOPCT 10.0 01/21/2022    BASOPCT 0.0 01/21/2022    MONOSABS 1.2 01/21/2022    LYMPHSABS 0.6 01/21/2022    EOSABS 0.0 01/21/2022    BASOSABS 0.0 01/21/2022     CMP:    Lab Results   Component Value Date     01/21/2022    K 4.3 01/21/2022     01/21/2022    CO2 25 01/21/2022    BUN 37 01/21/2022    CREATININE 1.3 01/21/2022    GFRAA >60 01/21/2022    GFRAA >60 04/24/2012    AGRATIO 1.3 06/04/2020    LABGLOM 54 01/21/2022    GLUCOSE 114

## 2022-01-21 NOTE — FLOWSHEET NOTE
4 Eyes Skin Assessment     NAME:  Sigrid Robert  YOB: 1945  MEDICAL RECORD NUMBER:  [id number]    The patient is being assess for  Admission    I agree that 2 RN's have performed a thorough Head to Toe Skin Assessment on the patient. ALL assessment sites listed below have been assessed. Areas assessed by both nurses:    Head, Face, Ears, Shoulders, Back, Chest, Arms, Elbows, Hands, Sacrum. Buttock, Coccyx, Ischium and Legs. Feet and Heels        Does the Patient have a Wound?  No noted wound(s)       Tera Prevention initiated:  NA   Wound Care Orders initiated:  NA    Pressure Injury (Stage 3,4, Unstageable, DTI, NWPT, and Complex wounds) if present place consult order under [de-identified] NA    New and Established Ostomies if present place consult order under : NA      Nurse 1 eSignature: Electronically signed by Earlene Blanco RN on 1/21/22 at 2:40 AM EST    **SHARE this note so that the co-signing nurse is able to place an eSignature**    Nurse 2 eSignature: Electronically signed by North Shankar RN on 1/21/22 at 4:04 AM EST

## 2022-01-21 NOTE — ED NOTES
Report called to Ashley Fernandez on 4W. Denies further questions.      Kaitlynn Cleaning RN  01/20/22 8864

## 2022-01-21 NOTE — CARE COORDINATION
INITIAL CASE MANAGEMENT ASSESSMENT    Reviewed chart, met with patient to assess possible discharge needs. Explained Case Management role/services. Living Situation: Patient lives alone iin a house with one step to enter. ADLs: Independent     DME: None    PT/OT Recs: None in chart     Active Services: None     Transportation: Active /Daughter will transport     Medications: [facility] on [address]/No barriers    PCP: Vinny York      HD/PD: N/A    PLAN/COMMENTS: Patient plans to return to home. He currently has a chest tube. He has a history with [facility]. SW/CM provided contact information for patient or family to call with any questions. SW/CM will follow and assist as needed.   Electronically signed by Vandnaa Barron RN on 1/21/2022 at 4:17 PM

## 2022-01-22 ENCOUNTER — APPOINTMENT (OUTPATIENT)
Dept: GENERAL RADIOLOGY | Age: 77
DRG: 844 | End: 2022-01-22
Payer: MEDICARE

## 2022-01-22 PROBLEM — R74.01 TRANSAMINITIS: Status: ACTIVE | Noted: 2022-01-22

## 2022-01-22 PROBLEM — I50.42 CHRONIC COMBINED SYSTOLIC AND DIASTOLIC CHF (CONGESTIVE HEART FAILURE) (HCC): Status: ACTIVE | Noted: 2022-01-22

## 2022-01-22 LAB
ALBUMIN SERPL-MCNC: 2.8 G/DL (ref 3.4–5)
ALP BLD-CCNC: 155 U/L (ref 40–129)
ALT SERPL-CCNC: 84 U/L (ref 10–40)
AST SERPL-CCNC: 152 U/L (ref 15–37)
BILIRUB SERPL-MCNC: 1.3 MG/DL (ref 0–1)
BILIRUBIN DIRECT: 0.7 MG/DL (ref 0–0.3)
BILIRUBIN, INDIRECT: 0.6 MG/DL (ref 0–1)
FLUID TYPE: NORMAL
PH, BODY FLUID: 7.8
TOTAL PROTEIN: 7.3 G/DL (ref 6.4–8.2)

## 2022-01-22 PROCEDURE — 71045 X-RAY EXAM CHEST 1 VIEW: CPT

## 2022-01-22 PROCEDURE — 6370000000 HC RX 637 (ALT 250 FOR IP): Performed by: INTERNAL MEDICINE

## 2022-01-22 PROCEDURE — 99233 SBSQ HOSP IP/OBS HIGH 50: CPT | Performed by: INTERNAL MEDICINE

## 2022-01-22 PROCEDURE — 6370000000 HC RX 637 (ALT 250 FOR IP): Performed by: NURSE PRACTITIONER

## 2022-01-22 PROCEDURE — 94760 N-INVAS EAR/PLS OXIMETRY 1: CPT

## 2022-01-22 PROCEDURE — 6360000002 HC RX W HCPCS: Performed by: INTERNAL MEDICINE

## 2022-01-22 PROCEDURE — 1200000000 HC SEMI PRIVATE

## 2022-01-22 PROCEDURE — 36415 COLL VENOUS BLD VENIPUNCTURE: CPT

## 2022-01-22 PROCEDURE — 2580000003 HC RX 258: Performed by: INTERNAL MEDICINE

## 2022-01-22 PROCEDURE — 80076 HEPATIC FUNCTION PANEL: CPT

## 2022-01-22 RX ORDER — LISINOPRIL 10 MG/1
10 TABLET ORAL NIGHTLY
Status: DISCONTINUED | OUTPATIENT
Start: 2022-01-22 | End: 2022-01-24

## 2022-01-22 RX ORDER — ATORVASTATIN CALCIUM 40 MG/1
40 TABLET, FILM COATED ORAL NIGHTLY
Status: DISCONTINUED | OUTPATIENT
Start: 2022-01-22 | End: 2022-01-26 | Stop reason: HOSPADM

## 2022-01-22 RX ADMIN — METOPROLOL SUCCINATE 50 MG: 50 TABLET, EXTENDED RELEASE ORAL at 21:29

## 2022-01-22 RX ADMIN — LORAZEPAM 1 MG: 1 TABLET ORAL at 00:58

## 2022-01-22 RX ADMIN — SODIUM CHLORIDE, PRESERVATIVE FREE 10 ML: 5 INJECTION INTRAVENOUS at 23:21

## 2022-01-22 RX ADMIN — CEFTRIAXONE 1000 MG: 1 INJECTION, POWDER, FOR SOLUTION INTRAMUSCULAR; INTRAVENOUS at 21:33

## 2022-01-22 RX ADMIN — ATORVASTATIN CALCIUM 40 MG: 40 TABLET, FILM COATED ORAL at 21:29

## 2022-01-22 RX ADMIN — SODIUM CHLORIDE, PRESERVATIVE FREE 10 ML: 5 INJECTION INTRAVENOUS at 08:44

## 2022-01-22 RX ADMIN — ENOXAPARIN SODIUM 40 MG: 100 INJECTION SUBCUTANEOUS at 13:12

## 2022-01-22 RX ADMIN — AZITHROMYCIN MONOHYDRATE 500 MG: 500 INJECTION, POWDER, LYOPHILIZED, FOR SOLUTION INTRAVENOUS at 23:18

## 2022-01-22 ASSESSMENT — PAIN SCALES - GENERAL: PAINLEVEL_OUTOF10: 0

## 2022-01-22 NOTE — PROGRESS NOTES
Hospitalist Progress Note    CC: Pleural effusion    Hospital course:  78yo WM with PMhx sig for afib, CHF, gout, CKDIII, hx of embolic CVA, presnets with shortness of breath and cough x 2 days. CXR was done which showed pneumothorax. And R sided pleural effusion which CT scan confirms as large surrounding the anterior and posterior RUL and collapse of RML and RLL. Now s/p chest tube which has already drained over 3L. COVID19 negative. Flow cytometry and pathology is pending, but fluid is blood tinged. Admit date: 1/20/2022  Days in hospital:  2    24 Hour Events: draining a lot of fluid    Subjective: awaiting cytology - suspect malignancy vs cardiac    ROS:   A comprehensive review of systems was negative except for: denies pain in chest, just a bit uncomfortable . Objective:    BP (!) 132/90   Pulse 101   Temp 98.1 °F (36.7 °C) (Oral)   Resp 16   Ht 6' 1\" (1.854 m)   Wt 260 lb 0.9 oz (118 kg)   SpO2 97%   BMI 34.31 kg/m²     Gen: NAD  HEENT: NC/AT, moist mucous membranes, no oropharyngeal erythema or exudate  Neck: supple, trachea midline, no anterior cervical or SC LAD  Heart:  Normal s1/s2, RRR, no murmurs, gallops, or rubs. no leg edema  Lungs:  improved bilaterally, no wheeze, no rales, no rhonchi, no crackles, no use of accessory muscles  Abd: bowel sounds present, soft, nontender, nondistended, no masses  Extrem:  No clubbing, cyanosis,  no edema  Skin: no rashes or lesions  Psych: A & O x3  Neuro: grossly intact, moves all four extremities. Assessment:    Principal Problem:    Pleural effusion  Active Problems:    Persistent atrial fibrillation (HCC)    Essential hypertension    Chronic combined systolic and diastolic CHF (congestive heart failure) (HCC)  Resolved Problems:    * No resolved hospital problems. *      Plan:  1.   Pleural effusion and pneumothorax- s/p chest tube - now with over 3L effusion drained - likely needs VATS with chest tube - some concern for cancer  2. Forgetfulness - has had this for years, although he seems very intact currently - asking the right questions and daughter is at bedside - both verbalized understanding of treatment plan  3.  afib - was on eliquis - currently on hold - will start lovenox  4.  transaminitis - will recheck tomorrow - unclear of cause at present time - possible passive congestion from CHF  5 . Weight loss - pt previously had BMI>40, now down to 34 - attribute to possible malignancy  6  Chronic systolic and diastolic CHF - continue with ACE and beta blocker - last EF was 25-30%      Prognosis:  Fair    Code status:  Full code    DVT prophylaxis: Lovenox  GI prophylaxis: none  Antibiotic prophylaxis indicated:   no  Diet:  ADULT DIET; Regular; Low Sodium (2 gm); 1500 ml    Disposition:  Home with home health    Medications:  Scheduled Meds:   atorvastatin  40 mg Oral Nightly    lisinopril  10 mg Oral Nightly    lidocaine-EPINEPHrine  20 mL IntraDERmal Once    bupivacaine  30 mL IntraDERmal Once    azithromycin  500 mg IntraVENous Q24H    cefTRIAXone (ROCEPHIN) IV  1,000 mg IntraVENous Q24H    sodium chloride flush  5-40 mL IntraVENous 2 times per day    metoprolol succinate  50 mg Oral Nightly       PRN Meds:  LORazepam, sodium chloride flush, sodium chloride, ondansetron **OR** ondansetron, polyethylene glycol, acetaminophen **OR** acetaminophen    IV:   sodium chloride           Intake/Output Summary (Last 24 hours) at 1/22/2022 0943  Last data filed at 1/22/2022 0705  Gross per 24 hour   Intake --   Output 4045 ml   Net -4045 ml       Results:  CBC:   Recent Labs     01/20/22 2045 01/21/22  0647   WBC 10.0 11.5*   HGB 14.2 13.0*   HCT 42.6 38.9*   MCV 97.8 98.1    203     BMP:   Recent Labs     01/20/22 2045 01/21/22  0647    138   K 4.1 4.3    101   CO2 23 25   BUN 36* 37*   CREATININE 1.3 1.3     Mag: No results for input(s): MAG in the last 72 hours.   Phos: Lab Results   Component Value Date    PHOS 3.0 10/18/2018     No results found for: GLU    LIVER PROFILE:   Recent Labs     01/22/22  0556   *   ALT 84*   BILIDIR 0.7*   BILITOT 1.3*   ALKPHOS 155*     PT/INR:   Recent Labs     01/20/22 2045 01/21/22  0647   PROTIME 27.8* 25.5*   INR 2.37* 2.18*     APTT: No results for input(s): APTT in the last 72 hours. UA:No results for input(s): NITRITE, COLORU, PHUR, LABCAST, WBCUA, RBCUA, MUCUS, TRICHOMONAS, YEAST, BACTERIA, CLARITYU, SPECGRAV, LEUKOCYTESUR, UROBILINOGEN, BILIRUBINUR, BLOODU, GLUCOSEU, AMORPHOUS in the last 72 hours.     Invalid input(s): Cristiana Cheney input(s): ABG  Lab Results   Component Value Date    CALCIUM 9.6 01/21/2022    PHOS 3.0 10/18/2018               Electronically signed by Kaity Alcantara MD on 1/22/2022 at 9:43 AM

## 2022-01-22 NOTE — PLAN OF CARE
Problem: Breathing Pattern - Ineffective:  Goal: Ability to achieve and maintain a regular respiratory rate will improve  Description: Ability to achieve and maintain a regular respiratory rate will improve  Outcome: Ongoing     Problem: Infection:  Goal: Will remain free from infection  Description: Will remain free from infection  Outcome: Ongoing     Problem: Safety:  Goal: Free from accidental physical injury  Description: Free from accidental physical injury  Outcome: Ongoing  Goal: Free from intentional harm  Description: Free from intentional harm  Outcome: Ongoing     Problem: Daily Care:  Goal: Daily care needs are met  Description: Daily care needs are met  Outcome: Ongoing     Problem: Pain:  Goal: Patient's pain/discomfort is manageable  Description: Patient's pain/discomfort is manageable  Outcome: Ongoing  Goal: Pain level will decrease  Description: Pain level will decrease  Outcome: Ongoing  Goal: Control of acute pain  Description: Control of acute pain  Outcome: Ongoing  Goal: Control of chronic pain  Description: Control of chronic pain  Outcome: Ongoing     Problem: Skin Integrity:  Goal: Skin integrity will stabilize  Description: Skin integrity will stabilize  Outcome: Ongoing     Problem: Discharge Planning:  Goal: Patients continuum of care needs are met  Description: Patients continuum of care needs are met  Outcome: Ongoing

## 2022-01-22 NOTE — PROGRESS NOTES
Pulmonary Progress Note    CC:  Follow up pleural effusion, chest tube    Subjective:  Chest tube placed yesterday and >4 liters of output  Breathing is much improved  He is able to take a deep breath  Rare cough    ROS  Less SOB  Less coughing       Intake/Output Summary (Last 24 hours) at 1/22/2022 0959  Last data filed at 1/22/2022 0705  Gross per 24 hour   Intake --   Output 4045 ml   Net -4045 ml         PHYSICAL EXAM:  Blood pressure (!) 132/90, pulse 101, temperature 98.1 °F (36.7 °C), temperature source Oral, resp. rate 16, height 6' 1\" (1.854 m), weight 260 lb 0.9 oz (118 kg), SpO2 97 %.'  Gen: No distress. Chronically ill   Eyes: PERRL. No sclera icterus. No conjunctival injection. ENT: No discharge. Pharynx clear. External appearance of ears and nose normal.  Neck: Trachea midline. No obvious mass. Resp: Chest tube in place, essentially clear bilaterally   CV: Regular rate. Regular rhythm. No murmur or rub. GI: Non-tender. Non-distended. No hernia. Skin: Warm, dry, normal texture and turgor. No nodule on exposed extremities. Lymph: No cervical LAD. No supraclavicular LAD. M/S: No cyanosis. No clubbing. No joint deformity. Neuro: Moves all four extremities. CN 2-12 tested, no defect noted.   Ext:   no edema    Medications:    Scheduled Meds:   atorvastatin  40 mg Oral Nightly    lisinopril  10 mg Oral Nightly    lidocaine-EPINEPHrine  20 mL IntraDERmal Once    bupivacaine  30 mL IntraDERmal Once    azithromycin  500 mg IntraVENous Q24H    cefTRIAXone (ROCEPHIN) IV  1,000 mg IntraVENous Q24H    sodium chloride flush  5-40 mL IntraVENous 2 times per day    metoprolol succinate  50 mg Oral Nightly       Continuous Infusions:   sodium chloride         PRN Meds:  LORazepam, sodium chloride flush, sodium chloride, ondansetron **OR** ondansetron, polyethylene glycol, acetaminophen **OR** acetaminophen    Labs:  CBC:   Recent Labs     01/20/22  2045 01/21/22  0647   WBC 10.0 11.5*   HGB 14.2 13.0*   HCT 42.6 38.9*   MCV 97.8 98.1    203     BMP:   Recent Labs     01/20/22 2045 01/21/22  0647    138   K 4.1 4.3    101   CO2 23 25   BUN 36* 37*   CREATININE 1.3 1.3     LIVER PROFILE:   Recent Labs     01/22/22  0556   *   ALT 84*   BILIDIR 0.7*   BILITOT 1.3*   ALKPHOS 155*     PT/INR:   Recent Labs     01/20/22 2045 01/21/22  0647   PROTIME 27.8* 25.5*   INR 2.37* 2.18*     APTT: No results for input(s): APTT in the last 72 hours. UA:No results for input(s): NITRITE, COLORU, PHUR, LABCAST, WBCUA, RBCUA, MUCUS, TRICHOMONAS, YEAST, BACTERIA, CLARITYU, SPECGRAV, LEUKOCYTESUR, UROBILINOGEN, BILIRUBINUR, BLOODU, GLUCOSEU, AMORPHOUS in the last 72 hours. Invalid input(s): Mary Soda  No results for input(s): PH, PCO2, PO2 in the last 72 hours. Films:  Chest imaging reports were reviewed and imaging was reviewed by me and showed reduction in the right sided pleural effusion. Unable to visualize the chest tube itself    ABG:  None    Cultures:  Negative    I reviewed the labs and images listed above    Assessment/Plan:    Right pleural effusion, exudate and primarily lymphocytic with abnormal cells (34% macrophages are usually not macrophages but rather abnormal cells)  o Suspect malignancy and less likely infection however pleural fluid has low yield   o Chest tube to suction  o Cytology pending   o Daily CXR  o Doubt infection due to primarily lymphocytes in pleural fluid   · Abnormal CT Chest with large effusion and possible underlying tumor.     · May need repeat CT chest once the fluid has drained  · May need additional work up pending cytology on fluid    DVT prophylaxis  Lovenox     D/W Daughter at bedside     Ivan Caba DO  [facility]

## 2022-01-22 NOTE — PLAN OF CARE
Problem: Breathing Pattern - Ineffective:  Goal: Ability to achieve and maintain a regular respiratory rate will improve  Description: Ability to achieve and maintain a regular respiratory rate will improve  1/22/2022 1008 by Mono Lemus RN  Outcome: Ongoing  1/22/2022 0308 by Shahla Ruggiero RN  Outcome: Ongoing     Problem: Infection:  Goal: Will remain free from infection  Description: Will remain free from infection  1/22/2022 1008 by Mono Lemus RN  Outcome: Ongoing  1/22/2022 0308 by Shahla Ruggiero RN  Outcome: Ongoing     Problem: Safety:  Goal: Free from accidental physical injury  Description: Free from accidental physical injury  1/22/2022 1008 by Mono Lemus RN  Outcome: Ongoing  1/22/2022 0308 by Shahla Ruggiero RN  Outcome: Ongoing  Goal: Free from intentional harm  Description: Free from intentional harm  1/22/2022 1008 by Mono Lemus RN  Outcome: Ongoing  1/22/2022 0308 by Shahla Ruggiero RN  Outcome: Ongoing     Problem: Daily Care:  Goal: Daily care needs are met  Description: Daily care needs are met  1/22/2022 1008 by Mono Lemus RN  Outcome: Ongoing  1/22/2022 0308 by Shahla Ruggiero RN  Outcome: Ongoing     Problem: Pain:  Goal: Patient's pain/discomfort is manageable  Description: Patient's pain/discomfort is manageable  1/22/2022 1008 by Mono Lemus RN  Outcome: Ongoing  1/22/2022 0308 by Shahla Ruggiero RN  Outcome: Ongoing  Goal: Pain level will decrease  Description: Pain level will decrease  1/22/2022 1008 by Mono Lemus RN  Outcome: Ongoing  1/22/2022 0308 by Shahla Ruggiero RN  Outcome: Ongoing  Goal: Control of acute pain  Description: Control of acute pain  1/22/2022 1008 by Mono Lemus RN  Outcome: Ongoing  1/22/2022 0308 by Shahla Ruggiero RN  Outcome: Ongoing  Goal: Control of chronic pain  Description: Control of chronic pain  1/22/2022 1008 by Mono Lemus RN  Outcome: Ongoing  1/22/2022 0308 by Breanna Jurado Baron Siegel RN  Outcome: Ongoing     Problem: Skin Integrity:  Goal: Skin integrity will stabilize  Description: Skin integrity will stabilize  1/22/2022 1008 by Jeff Gonzalez RN  Outcome: Ongoing  1/22/2022 0308 by Cee Garg RN  Outcome: Ongoing     Problem: Discharge Planning:  Goal: Patients continuum of care needs are met  Description: Patients continuum of care needs are met  1/22/2022 1008 by Jeff Gonzalez RN  Outcome: Ongoing  1/22/2022 0308 by Cee Garg RN  Outcome: Ongoing

## 2022-01-23 ENCOUNTER — APPOINTMENT (OUTPATIENT)
Dept: GENERAL RADIOLOGY | Age: 77
DRG: 844 | End: 2022-01-23
Payer: MEDICARE

## 2022-01-23 LAB
A/G RATIO: 0.6 (ref 1.1–2.2)
ALBUMIN SERPL-MCNC: 2.4 G/DL (ref 3.4–5)
ALP BLD-CCNC: 149 U/L (ref 40–129)
ALT SERPL-CCNC: 84 U/L (ref 10–40)
ANION GAP SERPL CALCULATED.3IONS-SCNC: 14 MMOL/L (ref 3–16)
AST SERPL-CCNC: 140 U/L (ref 15–37)
BASOPHILS ABSOLUTE: 0 K/UL (ref 0–0.2)
BASOPHILS RELATIVE PERCENT: 0.6 %
BILIRUB SERPL-MCNC: 0.8 MG/DL (ref 0–1)
BUN BLDV-MCNC: 64 MG/DL (ref 7–20)
CALCIUM SERPL-MCNC: 8.8 MG/DL (ref 8.3–10.6)
CHLORIDE BLD-SCNC: 99 MMOL/L (ref 99–110)
CO2: 20 MMOL/L (ref 21–32)
CREAT SERPL-MCNC: 1.5 MG/DL (ref 0.8–1.3)
EOSINOPHILS ABSOLUTE: 0.2 K/UL (ref 0–0.6)
EOSINOPHILS RELATIVE PERCENT: 3 %
GFR AFRICAN AMERICAN: 55
GFR NON-AFRICAN AMERICAN: 46
GLUCOSE BLD-MCNC: 95 MG/DL (ref 70–99)
HCT VFR BLD CALC: 39.7 % (ref 40.5–52.5)
HEMOGLOBIN: 13.2 G/DL (ref 13.5–17.5)
LYMPHOCYTES ABSOLUTE: 0.7 K/UL (ref 1–5.1)
LYMPHOCYTES RELATIVE PERCENT: 9.6 %
MCH RBC QN AUTO: 32.7 PG (ref 26–34)
MCHC RBC AUTO-ENTMCNC: 33.3 G/DL (ref 31–36)
MCV RBC AUTO: 98.2 FL (ref 80–100)
MONOCYTES ABSOLUTE: 1 K/UL (ref 0–1.3)
MONOCYTES RELATIVE PERCENT: 13.4 %
NEUTROPHILS ABSOLUTE: 5.3 K/UL (ref 1.7–7.7)
NEUTROPHILS RELATIVE PERCENT: 73.4 %
PDW BLD-RTO: 14.7 % (ref 12.4–15.4)
PLATELET # BLD: 223 K/UL (ref 135–450)
PMV BLD AUTO: 7.5 FL (ref 5–10.5)
POTASSIUM SERPL-SCNC: 4 MMOL/L (ref 3.5–5.1)
RBC # BLD: 4.04 M/UL (ref 4.2–5.9)
SODIUM BLD-SCNC: 133 MMOL/L (ref 136–145)
TOTAL PROTEIN: 6.6 G/DL (ref 6.4–8.2)
WBC # BLD: 7.2 K/UL (ref 4–11)

## 2022-01-23 PROCEDURE — 80053 COMPREHEN METABOLIC PANEL: CPT

## 2022-01-23 PROCEDURE — 1200000000 HC SEMI PRIVATE

## 2022-01-23 PROCEDURE — 85025 COMPLETE CBC W/AUTO DIFF WBC: CPT

## 2022-01-23 PROCEDURE — 71045 X-RAY EXAM CHEST 1 VIEW: CPT

## 2022-01-23 PROCEDURE — 6370000000 HC RX 637 (ALT 250 FOR IP): Performed by: NURSE PRACTITIONER

## 2022-01-23 PROCEDURE — 6360000002 HC RX W HCPCS: Performed by: INTERNAL MEDICINE

## 2022-01-23 PROCEDURE — 6370000000 HC RX 637 (ALT 250 FOR IP): Performed by: INTERNAL MEDICINE

## 2022-01-23 PROCEDURE — 36415 COLL VENOUS BLD VENIPUNCTURE: CPT

## 2022-01-23 PROCEDURE — 2580000003 HC RX 258: Performed by: INTERNAL MEDICINE

## 2022-01-23 PROCEDURE — 99233 SBSQ HOSP IP/OBS HIGH 50: CPT | Performed by: INTERNAL MEDICINE

## 2022-01-23 RX ORDER — FUROSEMIDE 40 MG/1
40 TABLET ORAL DAILY
Status: DISCONTINUED | OUTPATIENT
Start: 2022-01-23 | End: 2022-01-26 | Stop reason: HOSPADM

## 2022-01-23 RX ORDER — ALLOPURINOL 100 MG/1
100 TABLET ORAL DAILY
Status: DISCONTINUED | OUTPATIENT
Start: 2022-01-23 | End: 2022-01-26 | Stop reason: HOSPADM

## 2022-01-23 RX ADMIN — ATORVASTATIN CALCIUM 40 MG: 40 TABLET, FILM COATED ORAL at 21:04

## 2022-01-23 RX ADMIN — ALLOPURINOL 100 MG: 100 TABLET ORAL at 11:46

## 2022-01-23 RX ADMIN — ENOXAPARIN SODIUM 40 MG: 100 INJECTION SUBCUTANEOUS at 09:21

## 2022-01-23 RX ADMIN — SODIUM CHLORIDE, PRESERVATIVE FREE 10 ML: 5 INJECTION INTRAVENOUS at 21:04

## 2022-01-23 RX ADMIN — METOPROLOL SUCCINATE 50 MG: 50 TABLET, EXTENDED RELEASE ORAL at 21:05

## 2022-01-23 RX ADMIN — CEFTRIAXONE 1000 MG: 1 INJECTION, POWDER, FOR SOLUTION INTRAMUSCULAR; INTRAVENOUS at 21:04

## 2022-01-23 RX ADMIN — LORAZEPAM 1 MG: 1 TABLET ORAL at 23:02

## 2022-01-23 RX ADMIN — AZITHROMYCIN MONOHYDRATE 500 MG: 500 INJECTION, POWDER, LYOPHILIZED, FOR SOLUTION INTRAVENOUS at 23:08

## 2022-01-23 RX ADMIN — LORAZEPAM 1 MG: 1 TABLET ORAL at 00:43

## 2022-01-23 RX ADMIN — FUROSEMIDE 40 MG: 40 TABLET ORAL at 11:46

## 2022-01-23 RX ADMIN — SODIUM CHLORIDE, PRESERVATIVE FREE 10 ML: 5 INJECTION INTRAVENOUS at 09:00

## 2022-01-23 NOTE — PROGRESS NOTES
Pulmonary Progress Note    CC:  Follow up pleural effusion, chest tube    Subjective:  Chest tube placed to suction yesterday  730 ml out in the past 24 hours with 4775 ml out since chest tube placed  Room air   Odd affect today and says he is not doing well but will not offer details    ROS  Breathing has remained improved       Intake/Output Summary (Last 24 hours) at 1/23/2022 1307  Last data filed at 1/23/2022 0734  Gross per 24 hour   Intake --   Output 1080 ml   Net -1080 ml         PHYSICAL EXAM:  Blood pressure 110/69, pulse 117, temperature 98.4 °F (36.9 °C), temperature source Oral, resp. rate 20, height 6' 1\" (1.854 m), weight 260 lb 0.9 oz (118 kg), SpO2 96 %.'  Gen: No distress. Chronically ill   Eyes: PERRL. No sclera icterus. No conjunctival injection. ENT: No discharge. Pharynx clear. External appearance of ears and nose normal.  Neck: Trachea midline. No obvious mass. Resp: Chest tube in place, essentially clear bilaterally, slightly diminished in the right base  CV: Regular rate. Regular rhythm. No murmur or rub. GI: Non-tender. Non-distended. No hernia. Skin: Warm, dry, normal texture and turgor. No nodule on exposed extremities. Lymph: No cervical LAD. No supraclavicular LAD. M/S: No cyanosis. No clubbing. No joint deformity. Neuro: Moves all four extremities. CN 2-12 tested, no defect noted.   Ext:   no edema    Medications:    Scheduled Meds:   allopurinol  100 mg Oral Daily    furosemide  40 mg Oral Daily    atorvastatin  40 mg Oral Nightly    lisinopril  10 mg Oral Nightly    enoxaparin  40 mg SubCUTAneous Daily    lidocaine-EPINEPHrine  20 mL IntraDERmal Once    bupivacaine  30 mL IntraDERmal Once    azithromycin  500 mg IntraVENous Q24H    cefTRIAXone (ROCEPHIN) IV  1,000 mg IntraVENous Q24H    sodium chloride flush  5-40 mL IntraVENous 2 times per day    metoprolol succinate  50 mg Oral Nightly       Continuous Infusions:   sodium chloride         PRN Meds:  LORazepam, sodium chloride flush, sodium chloride, ondansetron **OR** ondansetron, polyethylene glycol, acetaminophen **OR** acetaminophen    Labs:  CBC:   Recent Labs     01/20/22 2045 01/21/22  0647 01/23/22  0908   WBC 10.0 11.5* 7.2   HGB 14.2 13.0* 13.2*   HCT 42.6 38.9* 39.7*   MCV 97.8 98.1 98.2    203 223     BMP:   Recent Labs     01/20/22 2045 01/21/22  0647 01/23/22  0907    138 133*   K 4.1 4.3 4.0    101 99   CO2 23 25 20*   BUN 36* 37* 64*   CREATININE 1.3 1.3 1.5*     LIVER PROFILE:   Recent Labs     01/22/22  0556 01/23/22  0907   * 140*   ALT 84* 84*   BILIDIR 0.7*  --    BILITOT 1.3* 0.8   ALKPHOS 155* 149*     PT/INR:   Recent Labs     01/20/22 2045 01/21/22  0647   PROTIME 27.8* 25.5*   INR 2.37* 2.18*     APTT: No results for input(s): APTT in the last 72 hours. UA:No results for input(s): NITRITE, COLORU, PHUR, LABCAST, WBCUA, RBCUA, MUCUS, TRICHOMONAS, YEAST, BACTERIA, CLARITYU, SPECGRAV, LEUKOCYTESUR, UROBILINOGEN, BILIRUBINUR, BLOODU, GLUCOSEU, AMORPHOUS in the last 72 hours. Invalid input(s): Luisa Boxer  No results for input(s): PH, PCO2, PO2 in the last 72 hours. Films:  Chest imaging reports were reviewed and imaging was reviewed by me and showed reduction in the right effusion with improved aeration of the right lung. Chest tube appears at base of lung    ABG:  None    Cultures:  Negative    I reviewed the labs and images listed above    Assessment/Plan:    Right pleural effusion, exudate and primarily lymphocytic with abnormal cells (34% macrophages are usually not macrophages but rather abnormal cells)  o Chest tube to suction. Too much output to remove (needs to be less than ~250 ml in 24 hours to remove). Chest tube flushed with sterile saline   o Cytology pending   o Daily CXR  o Doubt infection due to primarily lymphocytes in pleural fluid   · Abnormal CT Chest with large effusion and possible underlying tumor.     · May need repeat CT chest once the fluid has drained  · May need additional work up pending cytology on fluid    DVT prophylaxis  Lovenox     D/W Daughter at bedside     Michell Bryant DO  [facility]

## 2022-01-23 NOTE — PROGRESS NOTES
Hospitalist Progress Note    CC: Recurrent right pleural effusion    Hospital course:  78yo WM with PMhx sig for afib, CHF, gout, CKDIII, hx of embolic CVA, presnets with shortness of breath and cough x 2 days. CXR was done which showed pneumothorax. And R sided pleural effusion which CT scan confirms as large surrounding the anterior and posterior RUL and collapse of RML and RLL. Now s/p chest tube which has already drained over 3L. COVID19 negative. Flow cytometry and pathology is pending, but fluid is blood tinged. Admit date: 1/20/2022  Days in hospital:  3    24 Hour Events: draining a lot of fluid    Subjective: awaiting cytology - suspect malignancy  - consider repeat imagine today or Monday - he feels much better with breathing overall    ROS:   A comprehensive review of systems was negative except for: denies pain in chest, just a bit uncomfortable . Objective:    /72   Pulse 88   Temp 98.3 °F (36.8 °C) (Oral)   Resp 18   Ht 6' 1\" (1.854 m)   Wt 260 lb 0.9 oz (118 kg)   SpO2 95%   BMI 34.31 kg/m²     Gen: NAD  HEENT: NC/AT, moist mucous membranes, no oropharyngeal erythema or exudate  Neck: supple, trachea midline, no anterior cervical or SC LAD  Heart:  Normal s1/s2, RRR, no murmurs, gallops, or rubs. no leg edema  Lungs:  improved bilaterally, no wheeze, no rales, no rhonchi, no crackles, no use of accessory muscles  Abd: bowel sounds present, soft, nontender, nondistended, no masses  Extrem:  No clubbing, cyanosis,  no edema  Skin: no rashes or lesions  Psych: A & O x3  Neuro: grossly intact, moves all four extremities.      Assessment:    Principal Problem:    Recurrent right pleural effusion  Active Problems:    Persistent atrial fibrillation (HCC)    Essential hypertension    Chronic combined systolic and diastolic CHF (congestive heart failure) (HCC)    Transaminitis    Abnormal CT of the chest  Resolved Problems:    * No resolved hospital problems. *      Plan:  1. Pleural effusion and pneumothorax- s/p chest tube - now with over 4L effusion drained - some concern for cancer- needs repeat imaging of chest    2.  afib - was on eliquis - currently on hold - will start lovenox  3.  transaminitis - will recheck tomorrow - unclear of cause at present time - possible passive congestion from CHF  4 . Weight loss - pt previously had BMI>40, now down to 34 - attribute to possible malignancy  5. Chronic systolic and diastolic CHF - continue with ACE and beta blocker - last EF was 25-30%  6. Forgetfulness - has had this for years, although he seems very intact currently - asking the right questions and daughter is at bedside - both verbalized understanding of treatment plan      Prognosis:  Fair    Code status:  Full code    DVT prophylaxis: Lovenox  GI prophylaxis: none  Antibiotic prophylaxis indicated:   no  Diet:  ADULT DIET; Regular;  Low Sodium (2 gm); 1500 ml    Disposition:  Home with home health    Medications:  Scheduled Meds:   atorvastatin  40 mg Oral Nightly    lisinopril  10 mg Oral Nightly    enoxaparin  40 mg SubCUTAneous Daily    lidocaine-EPINEPHrine  20 mL IntraDERmal Once    bupivacaine  30 mL IntraDERmal Once    azithromycin  500 mg IntraVENous Q24H    cefTRIAXone (ROCEPHIN) IV  1,000 mg IntraVENous Q24H    sodium chloride flush  5-40 mL IntraVENous 2 times per day    metoprolol succinate  50 mg Oral Nightly       PRN Meds:  LORazepam, sodium chloride flush, sodium chloride, ondansetron **OR** ondansetron, polyethylene glycol, acetaminophen **OR** acetaminophen    IV:   sodium chloride           Intake/Output Summary (Last 24 hours) at 1/23/2022 0749  Last data filed at 1/23/2022 0734  Gross per 24 hour   Intake 240 ml   Output 1080 ml   Net -840 ml       Results:  CBC:   Recent Labs     01/20/22  2045 01/21/22  0647   WBC 10.0 11.5*   HGB 14.2 13.0*   HCT 42.6 38.9*   MCV 97.8 98.1    203     BMP: Recent Labs     01/20/22 2045 01/21/22  0647    138   K 4.1 4.3    101   CO2 23 25   BUN 36* 37*   CREATININE 1.3 1.3     Mag: No results for input(s): MAG in the last 72 hours. Phos:   Lab Results   Component Value Date    PHOS 3.0 10/18/2018     No results found for: GLU    LIVER PROFILE:   Recent Labs     01/22/22  0556   *   ALT 84*   BILIDIR 0.7*   BILITOT 1.3*   ALKPHOS 155*     PT/INR:   Recent Labs     01/20/22 2045 01/21/22  0647   PROTIME 27.8* 25.5*   INR 2.37* 2.18*     APTT: No results for input(s): APTT in the last 72 hours. UA:No results for input(s): NITRITE, COLORU, PHUR, LABCAST, WBCUA, RBCUA, MUCUS, TRICHOMONAS, YEAST, BACTERIA, CLARITYU, SPECGRAV, LEUKOCYTESUR, UROBILINOGEN, BILIRUBINUR, BLOODU, GLUCOSEU, AMORPHOUS in the last 72 hours.     Invalid input(s): Para Dent input(s): ABG  Lab Results   Component Value Date    CALCIUM 9.6 01/21/2022    PHOS 3.0 10/18/2018               Electronically signed by Lita Osullivan MD on 1/23/2022 at 7:49 AM

## 2022-01-24 ENCOUNTER — APPOINTMENT (OUTPATIENT)
Dept: CT IMAGING | Age: 77
DRG: 844 | End: 2022-01-24
Payer: MEDICARE

## 2022-01-24 ENCOUNTER — APPOINTMENT (OUTPATIENT)
Dept: GENERAL RADIOLOGY | Age: 77
DRG: 844 | End: 2022-01-24
Payer: MEDICARE

## 2022-01-24 LAB
BODY FLUID CULTURE, STERILE: NORMAL
GRAM STAIN RESULT: NORMAL
HCT VFR BLD CALC: 39.2 % (ref 40.5–52.5)
HEMOGLOBIN: 13 G/DL (ref 13.5–17.5)

## 2022-01-24 PROCEDURE — 85014 HEMATOCRIT: CPT

## 2022-01-24 PROCEDURE — 6370000000 HC RX 637 (ALT 250 FOR IP): Performed by: NURSE PRACTITIONER

## 2022-01-24 PROCEDURE — 2580000003 HC RX 258: Performed by: INTERNAL MEDICINE

## 2022-01-24 PROCEDURE — 85018 HEMOGLOBIN: CPT

## 2022-01-24 PROCEDURE — 6370000000 HC RX 637 (ALT 250 FOR IP): Performed by: INTERNAL MEDICINE

## 2022-01-24 PROCEDURE — 6360000002 HC RX W HCPCS: Performed by: INTERNAL MEDICINE

## 2022-01-24 PROCEDURE — 99233 SBSQ HOSP IP/OBS HIGH 50: CPT | Performed by: INTERNAL MEDICINE

## 2022-01-24 PROCEDURE — 36415 COLL VENOUS BLD VENIPUNCTURE: CPT

## 2022-01-24 PROCEDURE — 1200000000 HC SEMI PRIVATE

## 2022-01-24 PROCEDURE — 71250 CT THORAX DX C-: CPT

## 2022-01-24 PROCEDURE — 32561 LYSE CHEST FIBRIN INIT DAY: CPT | Performed by: INTERNAL MEDICINE

## 2022-01-24 PROCEDURE — 0W9930Z DRAINAGE OF RIGHT PLEURAL CAVITY WITH DRAINAGE DEVICE, PERCUTANEOUS APPROACH: ICD-10-PCS | Performed by: INTERNAL MEDICINE

## 2022-01-24 PROCEDURE — 71045 X-RAY EXAM CHEST 1 VIEW: CPT

## 2022-01-24 RX ORDER — LISINOPRIL 10 MG/1
10 TABLET ORAL DAILY
Status: DISCONTINUED | OUTPATIENT
Start: 2022-01-24 | End: 2022-01-26 | Stop reason: HOSPADM

## 2022-01-24 RX ADMIN — SODIUM CHLORIDE, PRESERVATIVE FREE 10 ML: 5 INJECTION INTRAVENOUS at 09:00

## 2022-01-24 RX ADMIN — SODIUM CHLORIDE 25 ML: 9 INJECTION, SOLUTION INTRAVENOUS at 20:58

## 2022-01-24 RX ADMIN — DORNASE ALFA 5 MG: 1 SOLUTION RESPIRATORY (INHALATION) at 09:30

## 2022-01-24 RX ADMIN — ATORVASTATIN CALCIUM 40 MG: 40 TABLET, FILM COATED ORAL at 21:00

## 2022-01-24 RX ADMIN — ALLOPURINOL 100 MG: 100 TABLET ORAL at 09:15

## 2022-01-24 RX ADMIN — CEFTRIAXONE 1000 MG: 1 INJECTION, POWDER, FOR SOLUTION INTRAMUSCULAR; INTRAVENOUS at 20:59

## 2022-01-24 RX ADMIN — ALTEPLASE 10 MG: KIT at 14:33

## 2022-01-24 RX ADMIN — DORNASE ALFA 5 MG: 1 SOLUTION RESPIRATORY (INHALATION) at 14:31

## 2022-01-24 RX ADMIN — ENOXAPARIN SODIUM 40 MG: 100 INJECTION SUBCUTANEOUS at 09:15

## 2022-01-24 RX ADMIN — LORAZEPAM 1 MG: 1 TABLET ORAL at 22:07

## 2022-01-24 RX ADMIN — LISINOPRIL 10 MG: 10 TABLET ORAL at 09:15

## 2022-01-24 RX ADMIN — METOPROLOL SUCCINATE 50 MG: 50 TABLET, EXTENDED RELEASE ORAL at 22:11

## 2022-01-24 RX ADMIN — SODIUM CHLORIDE 25 ML: 9 INJECTION, SOLUTION INTRAVENOUS at 22:08

## 2022-01-24 RX ADMIN — ALTEPLASE 10 MG: KIT at 09:30

## 2022-01-24 RX ADMIN — AZITHROMYCIN MONOHYDRATE 500 MG: 500 INJECTION, POWDER, LYOPHILIZED, FOR SOLUTION INTRAVENOUS at 22:10

## 2022-01-24 RX ADMIN — FUROSEMIDE 40 MG: 40 TABLET ORAL at 09:15

## 2022-01-24 RX ADMIN — SODIUM CHLORIDE, PRESERVATIVE FREE 10 ML: 5 INJECTION INTRAVENOUS at 21:00

## 2022-01-24 ASSESSMENT — PAIN SCALES - GENERAL
PAINLEVEL_OUTOF10: 0

## 2022-01-24 NOTE — PLAN OF CARE
Problem: Breathing Pattern - Ineffective:  Goal: Ability to achieve and maintain a regular respiratory rate will improve  Description: Ability to achieve and maintain a regular respiratory rate will improve  1/24/2022 0210 by Seema Harris RN  Outcome: Ongoing     Problem: Infection:  Goal: Will remain free from infection  Description: Will remain free from infection  1/24/2022 0210 by Seema Harris RN  Outcome: Ongoing     Problem: Safety:  Goal: Free from accidental physical injury  Description: Free from accidental physical injury  1/24/2022 0210 by Seema Harris RN  Outcome: Ongoing     Problem: Safety:  Goal: Free from intentional harm  Description: Free from intentional harm  1/24/2022 0210 by Seema Harris RN  Outcome: Ongoing     Problem: Daily Care:  Goal: Daily care needs are met  Description: Daily care needs are met  1/24/2022 0210 by Seema Harris RN  Outcome: Ongoing     Problem: Pain:  Goal: Patient's pain/discomfort is manageable  Description: Patient's pain/discomfort is manageable  1/24/2022 0210 by Seema Harris RN  Outcome: Ongoing     Problem: Pain:  Goal: Pain level will decrease  Description: Pain level will decrease  1/24/2022 0210 by Seema Harris RN  Outcome: Ongoing     Problem: Pain:  Goal: Control of acute pain  Description: Control of acute pain  1/24/2022 0210 by Seema Harris RN  Outcome: Ongoing     Problem: Pain:  Goal: Control of chronic pain  Description: Control of chronic pain  1/24/2022 0210 by Seema Harris RN  Outcome: Ongoing     Problem: Skin Integrity:  Goal: Skin integrity will stabilize  Description: Skin integrity will stabilize  1/24/2022 0210 by Seema Harris RN  Outcome: Ongoing     Problem: Discharge Planning:  Goal: Patients continuum of care needs are met  Description: Patients continuum of care needs are met  1/24/2022 0210 by Seema Harris RN  Outcome: Ongoing

## 2022-01-24 NOTE — PROCEDURES
Diagnosis:  Loculated pleural effusion    10 mg of Alteplase and 5 mg of dornase alpha were instilled into a chest tube under sterile conditions. The patient experienced some burning sensation but tolerated it well. Will dwell for one hour and then release to drainage.   RN present in the room and was given instructions    DO [facility]  [phone]

## 2022-01-24 NOTE — PROGRESS NOTES
TPA/Dornase Instillation Note    Procedure for TPA/Dornase instillation explained to patient. Chest tube clamped. Luer lock prepped with alcohol. TPA/Dornase infused then flushed with saline. Nurse instructed to unclamp chest tube in one hour. Patient tolerated procedure well.     Heather Lacey, JENNIFERP  [facility], [facility]

## 2022-01-24 NOTE — PROGRESS NOTES
I reviewed CT from home. The chest tube is not in the pleural space. It has migrated outside of the pleural space. He has received two doses of TPA/Dornase and we have to assume the medication is subcutaneous. He is likely at risk for hematoma at the site. Will trend H&H moving forward and keep chest tube in place to allow a conduit for any drainage at this time due to dornase (not sure if liquefactive necrosis can occur). Unclear what to expect with these doses in subcutaneous tissue. I spoke with Dr. Alyssa Cazares and [id number] His pleural fluid collection has decreased significantly but is still present. He likely needs surgery to correct and a new chest tube will not be placed. Will need to monitor site of chest tube but I suspect some degree of hematoma to form there.       Jaspreet Ibrahim DO  [facility]  [phone]

## 2022-01-24 NOTE — PLAN OF CARE
Problem: Breathing Pattern - Ineffective:  Goal: Ability to achieve and maintain a regular respiratory rate will improve  Description: Ability to achieve and maintain a regular respiratory rate will improve  Outcome: Ongoing     Problem: Infection:  Goal: Will remain free from infection  Description: Will remain free from infection  Outcome: Ongoing     Problem: Safety:  Goal: Free from accidental physical injury  Description: Free from accidental physical injury  Outcome: Ongoing  Goal: Free from intentional harm  Description: Free from intentional harm  Outcome: Ongoing     Problem: Daily Care:  Goal: Daily care needs are met  Description: Daily care needs are met  Outcome: Ongoing     Problem: Pain:  Description: Pain management should include both nonpharmacologic and pharmacologic interventions.   Goal: Patient's pain/discomfort is manageable  Description: Patient's pain/discomfort is manageable  Outcome: Ongoing  Goal: Pain level will decrease  Description: Pain level will decrease  Outcome: Ongoing  Goal: Control of acute pain  Description: Control of acute pain  Outcome: Ongoing  Goal: Control of chronic pain  Description: Control of chronic pain  Outcome: Ongoing     Problem: Skin Integrity:  Goal: Skin integrity will stabilize  Description: Skin integrity will stabilize  Outcome: Ongoing     Problem: Discharge Planning:  Goal: Patients continuum of care needs are met  Description: Patients continuum of care needs are met  Outcome: Ongoing     Problem: Skin Integrity:  Goal: Will show no infection signs and symptoms  Description: Will show no infection signs and symptoms  Outcome: Ongoing  Goal: Absence of new skin breakdown  Description: Absence of new skin breakdown  Outcome: Ongoing

## 2022-01-24 NOTE — PROGRESS NOTES
Hospitalist Progress Note    CC: Recurrent right pleural effusion    Hospital course:    76yo WM never smoker with PMhx sig for afib, CHF, gout, CKDIII, hx of embolic CVA, presnets with shortness of breath and cough x 2 days. CXR was done which showed pneumothorax. And R sided pleural effusion which CT scan confirms as large surrounding the anterior and posterior RUL and collapse of RML and RLL. Now s/p chest tube which has already drained over 3L. COVID19 negative. Flow cytometry and pathology is pending, but fluid is blood tinged. Admit date: 1/20/2022  Days in hospital:  4    24 Hour Events: draining a lot of fluid    Subjective: awaiting cytology - suspect malignancy  - consider repeat imagine today or Monday - he feels much better with breathing overall    ROS:   A comprehensive review of systems was negative except for: denies pain in chest, just a bit uncomfortable . Objective:    /76   Pulse 100   Temp 98 °F (36.7 °C) (Oral)   Resp 17   Ht 6' 1\" (1.854 m)   Wt 260 lb 2.3 oz (118 kg)   SpO2 96%   BMI 34.32 kg/m²     Gen: NAD  HEENT: NC/AT, moist mucous membranes, no oropharyngeal erythema or exudate  Neck: supple, trachea midline, no anterior cervical or SC LAD  Heart:  Normal s1/s2, RRR, no murmurs, gallops, or rubs. no leg edema  Lungs:  improved bilaterally, no wheeze, no rales, no rhonchi, no crackles, no use of accessory muscles  Abd: bowel sounds present, soft, nontender, nondistended, no masses  Extrem:  No clubbing, cyanosis,  no edema  Skin: no rashes or lesions  Psych: A & O x3  Neuro: grossly intact, moves all four extremities.      Assessment:    Principal Problem:    Recurrent right pleural effusion  Active Problems:    Persistent atrial fibrillation (HCC)    Essential hypertension    Chronic combined systolic and diastolic CHF (congestive heart failure) (HCC)    Transaminitis    Abnormal CT of the chest  Resolved Problems:    * No resolved hospital problems. *      Plan:  1. Pleural effusion and pneumothorax- s/p chest tube. Over 4L effusion drained. -Flow cytometry shows no malignant cells  -concern for cancer, needs repeat imaging of chest      2.  AFib on eliquis now on hold. -cont lovenox    3. Transaminitis   -check acute viral hep panel  -LOGAN  -US liver      4 . Weight loss - pt previously had BMI>40, now down to 34   - attribute to possible malignancy though CHF can be a wasting condition as well    5. Chronic systolic and diastolic CHF- last EF was 25-30%  Continue on the CHF protocol;  - continue with ACE and beta blocker   -daily weights  -I/O charting  -Low salt/ fluid restrict    6. Forgetfulness - has had this for years, although he seems very intact currently - asking the right questions and daughter is at bedside - both verbalized understanding of treatment plan      Prognosis:  Fair    Code status:  Full code    DVT prophylaxis: Lovenox  GI prophylaxis: none  Antibiotic prophylaxis indicated:   no  Diet:  ADULT DIET; Regular;  Low Sodium (2 gm); 1500 ml    Disposition:  Home with home health    Medications:  Scheduled Meds:   lisinopril  10 mg Oral Daily    alteplase (ACTIVASE) syringe  10 mg IntraPLEUral BID    And    dornase (PULMOZYME) syringe  5 mg IntraPLEUral BID    allopurinol  100 mg Oral Daily    furosemide  40 mg Oral Daily    atorvastatin  40 mg Oral Nightly    enoxaparin  40 mg SubCUTAneous Daily    lidocaine-EPINEPHrine  20 mL IntraDERmal Once    bupivacaine  30 mL IntraDERmal Once    azithromycin  500 mg IntraVENous Q24H    cefTRIAXone (ROCEPHIN) IV  1,000 mg IntraVENous Q24H    sodium chloride flush  5-40 mL IntraVENous 2 times per day    metoprolol succinate  50 mg Oral Nightly       PRN Meds:  LORazepam, sodium chloride flush, sodium chloride, ondansetron **OR** ondansetron, polyethylene glycol, acetaminophen **OR** acetaminophen    IV:   sodium chloride Intake/Output Summary (Last 24 hours) at 1/24/2022 1106  Last data filed at 1/24/2022 0737  Gross per 24 hour   Intake 50 ml   Output 0 ml   Net 50 ml       Results:  CBC:   Recent Labs     01/23/22  0908   WBC 7.2   HGB 13.2*   HCT 39.7*   MCV 98.2        BMP:   Recent Labs     01/23/22  0907   *   K 4.0   CL 99   CO2 20*   BUN 64*   CREATININE 1.5*     Mag: No results for input(s): MAG in the last 72 hours. Phos:   Lab Results   Component Value Date    PHOS 3.0 10/18/2018     No results found for: GLU    LIVER PROFILE:   Recent Labs     01/22/22  0556 01/23/22  0907   * 140*   ALT 84* 84*   BILIDIR 0.7*  --    BILITOT 1.3* 0.8   ALKPHOS 155* 149*     PT/INR:   No results for input(s): PROTIME, INR in the last 72 hours. APTT: No results for input(s): APTT in the last 72 hours. UA:No results for input(s): NITRITE, COLORU, PHUR, LABCAST, WBCUA, RBCUA, MUCUS, TRICHOMONAS, YEAST, BACTERIA, CLARITYU, SPECGRAV, LEUKOCYTESUR, UROBILINOGEN, BILIRUBINUR, BLOODU, GLUCOSEU, AMORPHOUS in the last 72 hours.     Invalid input(s): Roman Drummond input(s): ABG  Lab Results   Component Value Date    CALCIUM 8.8 01/23/2022    PHOS 3.0 10/18/2018               Electronically signed by Debbi Manning MD on 1/24/2022 at 11:06 AM

## 2022-01-24 NOTE — PROGRESS NOTES
chloride, ondansetron **OR** ondansetron, polyethylene glycol, acetaminophen **OR** acetaminophen    Labs:  CBC:   Recent Labs     01/23/22  0908   WBC 7.2   HGB 13.2*   HCT 39.7*   MCV 98.2        BMP:   Recent Labs     01/23/22  0907   *   K 4.0   CL 99   CO2 20*   BUN 64*   CREATININE 1.5*     LIVER PROFILE:   Recent Labs     01/22/22  0556 01/23/22  0907   * 140*   ALT 84* 84*   BILIDIR 0.7*  --    BILITOT 1.3* 0.8   ALKPHOS 155* 149*     PT/INR:   No results for input(s): PROTIME, INR in the last 72 hours. APTT: No results for input(s): APTT in the last 72 hours. UA:No results for input(s): NITRITE, COLORU, PHUR, LABCAST, WBCUA, RBCUA, MUCUS, TRICHOMONAS, YEAST, BACTERIA, CLARITYU, SPECGRAV, LEUKOCYTESUR, UROBILINOGEN, BILIRUBINUR, BLOODU, GLUCOSEU, AMORPHOUS in the last 72 hours. Invalid input(s): Faithherrera Gaston  No results for input(s): PH, PCO2, PO2 in the last 72 hours. Films:  Chest imaging reports were reviewed and imaging was reviewed by me and showed worsening effusion and congestion in the right lung    ABG:  None    Cultures:  Negative    I reviewed the labs and images listed above    Assessment/Plan:    Right pleural effusion, exudate and primarily lymphocytic with abnormal cells (34% macrophages are usually not macrophages but rather abnormal cells)  o Will give TPA/DORNASE via chest tube today as I believe the tube is clogged   o Cytology pending   o Daily CXR  o Doubt infection due to primarily lymphocytes in pleural fluid   · Abnormal CT Chest with large effusion and possible underlying tumor.     · May need repeat CT chest once the fluid has drained  · May need additional work up pending cytology on fluid    DVT prophylaxis  Lovenox     D/W Daughter at bedside     Richie Silva [facility]

## 2022-01-24 NOTE — PROGRESS NOTES
Pt called for this nurse. Went in to see pt. Pt stating that he went to sit up and felt a sharp pain in his back and wanted to make sure he didn't pull his tube. Tubing not kinked. Pulled back dressing w/ another nurse to find suture in tact and chest tube still in pt. Reinforced dressing. Call placed to radiology to have chest tube placement checked.

## 2022-01-25 ENCOUNTER — APPOINTMENT (OUTPATIENT)
Dept: GENERAL RADIOLOGY | Age: 77
DRG: 844 | End: 2022-01-25
Payer: MEDICARE

## 2022-01-25 LAB
BLOOD CULTURE, ROUTINE: NORMAL
CULTURE, BLOOD 2: NORMAL
HCT VFR BLD CALC: 38.5 % (ref 40.5–52.5)
HCT VFR BLD CALC: 42.8 % (ref 40.5–52.5)
HEMOGLOBIN: 12.8 G/DL (ref 13.5–17.5)
HEMOGLOBIN: 14.2 G/DL (ref 13.5–17.5)
MISCELLANEOUS LAB TEST ORDER: NORMAL

## 2022-01-25 PROCEDURE — 2580000003 HC RX 258: Performed by: INTERNAL MEDICINE

## 2022-01-25 PROCEDURE — 71045 X-RAY EXAM CHEST 1 VIEW: CPT

## 2022-01-25 PROCEDURE — 36415 COLL VENOUS BLD VENIPUNCTURE: CPT

## 2022-01-25 PROCEDURE — 6370000000 HC RX 637 (ALT 250 FOR IP): Performed by: NURSE PRACTITIONER

## 2022-01-25 PROCEDURE — 85014 HEMATOCRIT: CPT

## 2022-01-25 PROCEDURE — 6370000000 HC RX 637 (ALT 250 FOR IP): Performed by: INTERNAL MEDICINE

## 2022-01-25 PROCEDURE — 1200000000 HC SEMI PRIVATE

## 2022-01-25 PROCEDURE — 6360000002 HC RX W HCPCS: Performed by: INTERNAL MEDICINE

## 2022-01-25 PROCEDURE — 85018 HEMOGLOBIN: CPT

## 2022-01-25 PROCEDURE — 99222 1ST HOSP IP/OBS MODERATE 55: CPT | Performed by: THORACIC SURGERY (CARDIOTHORACIC VASCULAR SURGERY)

## 2022-01-25 PROCEDURE — 94760 N-INVAS EAR/PLS OXIMETRY 1: CPT

## 2022-01-25 PROCEDURE — 99233 SBSQ HOSP IP/OBS HIGH 50: CPT | Performed by: INTERNAL MEDICINE

## 2022-01-25 RX ADMIN — SODIUM CHLORIDE, PRESERVATIVE FREE 10 ML: 5 INJECTION INTRAVENOUS at 22:08

## 2022-01-25 RX ADMIN — FUROSEMIDE 40 MG: 40 TABLET ORAL at 09:36

## 2022-01-25 RX ADMIN — AZITHROMYCIN MONOHYDRATE 500 MG: 500 INJECTION, POWDER, LYOPHILIZED, FOR SOLUTION INTRAVENOUS at 23:08

## 2022-01-25 RX ADMIN — ALLOPURINOL 100 MG: 100 TABLET ORAL at 09:36

## 2022-01-25 RX ADMIN — LISINOPRIL 10 MG: 10 TABLET ORAL at 09:36

## 2022-01-25 RX ADMIN — CEFTRIAXONE 1000 MG: 1 INJECTION, POWDER, FOR SOLUTION INTRAMUSCULAR; INTRAVENOUS at 22:07

## 2022-01-25 RX ADMIN — METOPROLOL SUCCINATE 50 MG: 50 TABLET, EXTENDED RELEASE ORAL at 22:03

## 2022-01-25 RX ADMIN — LORAZEPAM 1 MG: 1 TABLET ORAL at 23:06

## 2022-01-25 RX ADMIN — ATORVASTATIN CALCIUM 40 MG: 40 TABLET, FILM COATED ORAL at 22:03

## 2022-01-25 RX ADMIN — SODIUM CHLORIDE 25 ML: 9 INJECTION, SOLUTION INTRAVENOUS at 22:06

## 2022-01-25 RX ADMIN — SODIUM CHLORIDE 25 ML: 9 INJECTION, SOLUTION INTRAVENOUS at 23:06

## 2022-01-25 ASSESSMENT — PAIN SCALES - GENERAL
PAINLEVEL_OUTOF10: 0

## 2022-01-25 NOTE — PROGRESS NOTES
Pt AO x4 and lacks judgement to consider surgery. Doctor met with pt regarding potential surgery and does not understand significance of surgery. Pt in chair w/ minimal assistance and responded well to head-toe assessment. Pt tolerated medications. Requested lunch food tray. Chest tube removed by MD. Does not complain of pain or discomfort. IV assessed and dry, intact. Pt and daughter active and asked questions during assessment. Pt refused to have gown/linens changed along with bed bath. Call light within reach. Able to make needs known. Fall precautions in place. Will monitor.  Electronically signed by Mica Nixon on 1/25/2022 at 12:53 PM

## 2022-01-25 NOTE — PROGRESS NOTES
An order was received by Dr. Donna Brown to remove the patient's chest tube. Upon arrival to the room the process for removing the chest tube was explained to the patient and the patient had an opportunity to ask questions. Patient was agreeable to proceed with the chest tube removal.  The dressing was removed, and the suture was released. The patient was instructed to take a deep breath in, let it out and then hold their breath. The chest tube was then cut and quickly removed. The patient tolerated this well.     A Vaseline dressing was promptly applied, and a chest X-ray was ordered to evaluate the patients lungs, post chest tube removal.    Thank you,  Kaitlynn Odom, BSN, RN

## 2022-01-25 NOTE — PROGRESS NOTES
Pulmonary Progress Note    CC:  Follow up pleural effusion, chest tube    Subjective:  Received two doses of TPA/Dornase yesterday however chest tube not in place  He has no pain at the site and Hb is stable. No drainage from the chest tube anymore  Says he feels a lot better in terms of breathing  He wants to know the next step      ROS  Stable breathing       Intake/Output Summary (Last 24 hours) at 1/25/2022 0826  Last data filed at 1/25/2022 0620  Gross per 24 hour   Intake --   Output 1175 ml   Net -1175 ml         PHYSICAL EXAM:  Blood pressure 115/73, pulse 93, temperature 97.6 °F (36.4 °C), temperature source Oral, resp. rate 17, height 6' 1\" (1.854 m), weight 259 lb 4.2 oz (117.6 kg), SpO2 97 %.'  Gen: No distress. Chronically ill, impulsive   Eyes: PERRL. No sclera icterus. No conjunctival injection. ENT: No discharge. Pharynx clear. External appearance of ears and nose normal.  Neck: Trachea midline. No obvious mass. Resp: Chest tube in place on posterior right, dressing intact, no hematoma no obvious necrosis   CV: Regular rate. Regular rhythm. No murmur or rub. GI: Non-tender. Non-distended. No hernia. Skin: Warm, dry, normal texture and turgor. No nodule on exposed extremities. Lymph: No cervical LAD. No supraclavicular LAD. M/S: No cyanosis. No clubbing. No joint deformity. Neuro: Moves all four extremities. CN 2-12 tested, no defect noted.  Confused to details  Ext:   + edema    Medications:    Scheduled Meds:   lisinopril  10 mg Oral Daily    alteplase (ACTIVASE) syringe  10 mg IntraPLEUral BID    And    dornase (PULMOZYME) syringe  5 mg IntraPLEUral BID    allopurinol  100 mg Oral Daily    furosemide  40 mg Oral Daily    atorvastatin  40 mg Oral Nightly    enoxaparin  40 mg SubCUTAneous Daily    lidocaine-EPINEPHrine  20 mL IntraDERmal Once    bupivacaine  30 mL IntraDERmal Once    azithromycin  500 mg IntraVENous Q24H    cefTRIAXone (ROCEPHIN) IV  1,000 mg IntraVENous Q24H    sodium chloride flush  5-40 mL IntraVENous 2 times per day    metoprolol succinate  50 mg Oral Nightly       Continuous Infusions:   sodium chloride 25 mL (01/24/22 2208)       PRN Meds:  LORazepam, sodium chloride flush, sodium chloride, ondansetron **OR** ondansetron, polyethylene glycol, acetaminophen **OR** acetaminophen    Labs:  CBC:   Recent Labs     01/23/22  0908 01/24/22  2327 01/25/22  0717   WBC 7.2  --   --    HGB 13.2* 13.0* 12.8*   HCT 39.7* 39.2* 38.5*   MCV 98.2  --   --      --   --      BMP:   Recent Labs     01/23/22  0907   *   K 4.0   CL 99   CO2 20*   BUN 64*   CREATININE 1.5*     LIVER PROFILE:   Recent Labs     01/23/22  0907   *   ALT 84*   BILITOT 0.8   ALKPHOS 149*     PT/INR:   No results for input(s): PROTIME, INR in the last 72 hours. APTT: No results for input(s): APTT in the last 72 hours. UA:No results for input(s): NITRITE, COLORU, PHUR, LABCAST, WBCUA, RBCUA, MUCUS, TRICHOMONAS, YEAST, BACTERIA, CLARITYU, SPECGRAV, LEUKOCYTESUR, UROBILINOGEN, BILIRUBINUR, BLOODU, GLUCOSEU, AMORPHOUS in the last 72 hours. Invalid input(s): Faith Hun  No results for input(s): PH, PCO2, PO2 in the last 72 hours. Films:  Chest imaging reports were reviewed and imaging was reviewed by me and showed continued loculated effusions with air improved from previous CT Chest     ABG:  None    Cultures:  Negative    I reviewed the labs and images listed above    Assessment/Plan:    Right pleural effusion, exudate and primarily lymphocytic with abnormal cells (34% macrophages are usually not macrophages but rather abnormal cells)  o Chest tube has migrated out. Will have IR remove it today   o Cytology negative  o CVTS evaluation   o Doubt infection due to primarily lymphocytes in pleural fluid. Still risk for malignancy I suppose  o TPA/Dornase likely instilled in subcutaneous tissue.   Monitor site closely, serial H&H  · Hydropneumothorax   · Abnormal CT Chest with large effusion and possible underlying tumor however cytology negative  · CVTS evaluation  · Had effusion dating back to 2018 so this may have progressed since that time and likely not to resolve without more aggressive intervention     DVT prophylaxis  Lovenox     D/W Daughter at bedside     Mae Gomez DO  [facility]

## 2022-01-25 NOTE — CARE COORDINATION
ACP. 1/25 CT Surgical consult. Possible surgery. Chest tube d/c'd per pulmonary. From home alone. Need PT/OT when appropriate.  Electronically signed by Anel Gaspar RN on 1/25/2022 at 11:25 AM
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
## 2022-01-26 ENCOUNTER — APPOINTMENT (OUTPATIENT)
Dept: GENERAL RADIOLOGY | Age: 77
DRG: 844 | End: 2022-01-26
Payer: MEDICARE

## 2022-01-26 VITALS
OXYGEN SATURATION: 96 % | BODY MASS INDEX: 32.71 KG/M2 | HEIGHT: 73 IN | WEIGHT: 246.8 LBS | RESPIRATION RATE: 18 BRPM | DIASTOLIC BLOOD PRESSURE: 80 MMHG | HEART RATE: 77 BPM | SYSTOLIC BLOOD PRESSURE: 116 MMHG | TEMPERATURE: 98.4 F

## 2022-01-26 LAB
HCT VFR BLD CALC: 42.9 % (ref 40.5–52.5)
HEMOGLOBIN: 14.3 G/DL (ref 13.5–17.5)
LACTATE DEHYDROGENASE: 273 U/L (ref 100–190)

## 2022-01-26 PROCEDURE — 6370000000 HC RX 637 (ALT 250 FOR IP): Performed by: INTERNAL MEDICINE

## 2022-01-26 PROCEDURE — 99232 SBSQ HOSP IP/OBS MODERATE 35: CPT | Performed by: INTERNAL MEDICINE

## 2022-01-26 PROCEDURE — 85018 HEMOGLOBIN: CPT

## 2022-01-26 PROCEDURE — 36415 COLL VENOUS BLD VENIPUNCTURE: CPT

## 2022-01-26 PROCEDURE — 2580000003 HC RX 258: Performed by: INTERNAL MEDICINE

## 2022-01-26 PROCEDURE — 83615 LACTATE (LD) (LDH) ENZYME: CPT

## 2022-01-26 PROCEDURE — 71045 X-RAY EXAM CHEST 1 VIEW: CPT

## 2022-01-26 PROCEDURE — 85014 HEMATOCRIT: CPT

## 2022-01-26 RX ORDER — AMOXICILLIN AND CLAVULANATE POTASSIUM 875; 125 MG/1; MG/1
1 TABLET, FILM COATED ORAL 2 TIMES DAILY
Qty: 14 TABLET | Refills: 0 | Status: SHIPPED | OUTPATIENT
Start: 2022-01-26 | End: 2022-02-02

## 2022-01-26 RX ORDER — LORAZEPAM 1 MG/1
1 TABLET ORAL EVERY 8 HOURS PRN
Qty: 20 TABLET | Refills: 0 | Status: SHIPPED | OUTPATIENT
Start: 2022-01-26 | End: 2022-02-25

## 2022-01-26 RX ADMIN — SODIUM CHLORIDE, PRESERVATIVE FREE 10 ML: 5 INJECTION INTRAVENOUS at 09:55

## 2022-01-26 RX ADMIN — FUROSEMIDE 40 MG: 40 TABLET ORAL at 09:51

## 2022-01-26 RX ADMIN — ALLOPURINOL 100 MG: 100 TABLET ORAL at 09:51

## 2022-01-26 RX ADMIN — LISINOPRIL 10 MG: 10 TABLET ORAL at 09:51

## 2022-01-26 ASSESSMENT — PAIN SCALES - GENERAL: PAINLEVEL_OUTOF10: 0

## 2022-01-26 NOTE — PROGRESS NOTES
CVTS    R pleural effusion -  Plan unchanged from yesterday's note. Pt has my contact information and will inform me if he decides to pursue surgery.     Aurora Dumont MD  1/26/2022  9:21 AM

## 2022-01-26 NOTE — PROGRESS NOTES
Pulmonary Progress Note    CC:  Follow up pleural effusion, chest tube    Subjective:  Chest tube removed  Feels fine. He does not want surgery at this time   Says he is doing home        Intake/Output Summary (Last 24 hours) at 1/26/2022 0838  Last data filed at 1/25/2022 1805  Gross per 24 hour   Intake 240 ml   Output 5 ml   Net 235 ml         PHYSICAL EXAM:  Blood pressure 108/62, pulse 65, temperature 98.5 °F (36.9 °C), temperature source Oral, resp. rate 20, height 6' 1\" (1.854 m), weight 246 lb 12.8 oz (111.9 kg), SpO2 93 %.'  Gen: No distress. Chronically ill, impulsive   Eyes: PERRL. No sclera icterus. No conjunctival injection. ENT: No discharge. Pharynx clear. External appearance of ears and nose normal.  Neck: Trachea midline. No obvious mass. Resp: Clear but slightly diminished   CV: Regular rate. Regular rhythm. No murmur or rub. GI: Non-tender. Non-distended. No hernia. Skin: Warm, dry, normal texture and turgor. No nodule on exposed extremities. Lymph: No cervical LAD. No supraclavicular LAD. M/S: No cyanosis. No clubbing. No joint deformity. Neuro: Moves all four extremities. CN 2-12 tested, no defect noted.  Confused to details  Ext:   + edema    Medications:    Scheduled Meds:   lisinopril  10 mg Oral Daily    allopurinol  100 mg Oral Daily    furosemide  40 mg Oral Daily    atorvastatin  40 mg Oral Nightly    [Held by provider] enoxaparin  40 mg SubCUTAneous Daily    lidocaine-EPINEPHrine  20 mL IntraDERmal Once    bupivacaine  30 mL IntraDERmal Once    azithromycin  500 mg IntraVENous Q24H    cefTRIAXone (ROCEPHIN) IV  1,000 mg IntraVENous Q24H    sodium chloride flush  5-40 mL IntraVENous 2 times per day    metoprolol succinate  50 mg Oral Nightly       Continuous Infusions:   sodium chloride 25 mL (01/25/22 8716)       PRN Meds:  LORazepam, sodium chloride flush, sodium chloride, ondansetron **OR** ondansetron, polyethylene glycol, acetaminophen **OR** acetaminophen    Labs:  CBC:   Recent Labs     01/23/22  0908 01/24/22  2327 01/25/22  0717 01/25/22  1200 01/26/22  0702   WBC 7.2  --   --   --   --    HGB 13.2*   < > 12.8* 14.2 14.3   HCT 39.7*   < > 38.5* 42.8 42.9   MCV 98.2  --   --   --   --      --   --   --   --     < > = values in this interval not displayed. BMP:   Recent Labs     01/23/22  0907   *   K 4.0   CL 99   CO2 20*   BUN 64*   CREATININE 1.5*     LIVER PROFILE:   Recent Labs     01/23/22  0907   *   ALT 84*   BILITOT 0.8   ALKPHOS 149*     PT/INR:   No results for input(s): PROTIME, INR in the last 72 hours. APTT: No results for input(s): APTT in the last 72 hours. UA:No results for input(s): NITRITE, COLORU, PHUR, LABCAST, WBCUA, RBCUA, MUCUS, TRICHOMONAS, YEAST, BACTERIA, CLARITYU, SPECGRAV, LEUKOCYTESUR, UROBILINOGEN, BILIRUBINUR, BLOODU, GLUCOSEU, AMORPHOUS in the last 72 hours. Invalid input(s): Darliss Knock  No results for input(s): PH, PCO2, PO2 in the last 72 hours.         Films:  Chest imaging reports were reviewed and imaging was reviewed by me and showed continued loculated effusions with air improved from previous CT Chest     ABG:  None    Cultures:  Negative    I reviewed the labs and images listed above    Assessment/Plan:    Right pleural effusion, exudate and primarily lymphocytic with abnormal cells (34% macrophages are usually not macrophages but rather abnormal cells)  o Chest tube removed 1/25  o Cytology negative  o CVTS evaluation appreciated  · Hydropneumothorax with likely trapped lung   · Abnormal CT Chest with large effusion and possible underlying tumor however cytology negative  · Will have to follow up with imaging as outpatient      DVT prophylaxis  Lovenox     D/W Daughter at bedside     [id number] [address] [facility], [facility]

## 2022-01-26 NOTE — PLAN OF CARE
Problem: Breathing Pattern - Ineffective:  Goal: Ability to achieve and maintain a regular respiratory rate will improve  Description: Ability to achieve and maintain a regular respiratory rate will improve  1/26/2022 1144 by Jasen Downs RN  Outcome: Ongoing     Problem: Infection:  Goal: Will remain free from infection  Description: Will remain free from infection  Outcome: Ongoing     Problem: Safety:  Goal: Free from accidental physical injury  Description: Free from accidental physical injury  1/26/2022 1144 by Jasen Downs RN  Outcome: Ongoing     Problem: Safety:  Goal: Free from intentional harm  Description: Free from intentional harm  Outcome: Ongoing     Problem: Daily Care:  Goal: Daily care needs are met  Description: Daily care needs are met  Outcome: Ongoing     Problem: Pain:  Description: Pain management should include both nonpharmacologic and pharmacologic interventions. Goal: Patient's pain/discomfort is manageable  Description: Patient's pain/discomfort is manageable  Outcome: Ongoing     Problem: Pain:  Description: Pain management should include both nonpharmacologic and pharmacologic interventions. Goal: Pain level will decrease  Description: Pain level will decrease  Outcome: Ongoing     Problem: Pain:  Description: Pain management should include both nonpharmacologic and pharmacologic interventions. Goal: Control of acute pain  Description: Control of acute pain  Outcome: Ongoing     Problem: Pain:  Description: Pain management should include both nonpharmacologic and pharmacologic interventions.   Goal: Control of chronic pain  Description: Control of chronic pain  Outcome: Ongoing     Problem: Skin Integrity:  Goal: Skin integrity will stabilize  Description: Skin integrity will stabilize  Outcome: Ongoing     Problem: Discharge Planning:  Goal: Patients continuum of care needs are met  Description: Patients continuum of care needs are met  Outcome: Ongoing Problem: Skin Integrity:  Goal: Will show no infection signs and symptoms  Description: Will show no infection signs and symptoms  Outcome: Ongoing     Problem: Skin Integrity:  Goal: Absence of new skin breakdown  Description: Absence of new skin breakdown  Outcome: Ongoing

## 2022-01-26 NOTE — DISCHARGE SUMMARY
Hospital Medicine Discharge Summary    Patient: Niels Celestin     Gender: male  : 1945   Age: 77 y.o. MRN: [id number]    Admitting Physician: Renee Browning MD  Discharge Physician: Evelina Au MD     Code Status: Full Code     Admit Date: 2022   Discharge Date:   22    Disposition:  Home    Discharge Diagnoses:  1. Loculated Pleural effusion and pneumothorax- s/p chest tube. Over 4L effusion drained. -Flow cytometry shows macrophages and no malignant ce     2.  AFib to restart eliquis on dc.  -discontinue lovenox     3. Transaminitis   -check acute viral hep panel  -LOGAN  -US liver        4 . Weight loss - pt previously had BMI>40, now down to 34   - attribute to possible malignancy though CHF can be a wasting condition as well     5. Chronic systolic and diastolic CHF- last EF was 25-30%  - continue with ACE and beta blocker   -daily weights  -I/O charting  -Low salt/ fluid restrict     6. Forgetfulness - has had this for years, although he seems very intact currently - asking the right questions and daughter is at bedside - both verbalized understanding of treatment plan    Follow-up appointments:   As arranged with pulmonology and cardiothoracic surgery    Outpatient to do list: Weekly labs    Condition at Discharge:  [address]:   76yo WM never smoker with PMhx sig for afib, CHF, gout, CKDIII, hx of embolic CVA, presnets with shortness of breath and cough x 2 days. CXR was done which showed pneumothorax. And R sided pleural effusion which CT scan confirms as large surrounding the anterior and posterior RUL and collapse of RML and RLL. Now s/p chest tube which has already drained over 3L. COVID19 negative. Flow cytometry and pathology showed  No malignancy. CT chest showed a loculated effusion and tPA instillation didn't help as tip of chest drain was extra pleural.  VATS/ thoracotomy + pleural biopsy planned but patient refused.  He opted for occasioanal pleural taps and will be FU as an OP for such procedures. Discharge Medications:   Current Discharge Medication List      START taking these medications    Details   amoxicillin-clavulanate (AUGMENTIN) 875-125 MG per tablet Take 1 tablet by mouth 2 times daily for 7 days  Qty: 14 tablet, Refills: 0      LORazepam (ATIVAN) 1 MG tablet Take 1 tablet by mouth every 8 hours as needed for Anxiety for up to 30 days. Qty: 20 tablet, Refills: 0    Associated Diagnoses: Abnormal CT of the chest           Current Discharge Medication List        Current Discharge Medication List      CONTINUE these medications which have NOT CHANGED    Details   allopurinol (ZYLOPRIM) 100 MG tablet TAKE 1 TABLET BY MOUTH DAILY      Saw Palmetto, Serenoa repens, (SAW PALMETTO BERRY PO) Take 1 tablet by mouth daily      Vitamin D (CHOLECALCIFEROL) 25 MCG (1000 UT) TABS tablet Take 1,000 Units by mouth daily      zinc sulfate (ZINCATE) 220 (50 Zn) MG capsule Take 50 mg by mouth daily      b complex vitamins capsule Take 1 capsule by mouth daily      methylPREDNISolone (MEDROL DOSEPACK) 4 MG tablet Take by mouth as per package directions.       lisinopril (PRINIVIL;ZESTRIL) 10 MG tablet TAKE 1 TABLET BY MOUTH DAILY  Qty: 90 tablet, Refills: 1      atorvastatin (LIPITOR) 40 MG tablet TAKE 1 TABLET BY MOUTH DAILY  Qty: 90 tablet, Refills: 1      apixaban (ELIQUIS) 5 MG TABS tablet Take 1 tablet by mouth 2 times daily  Qty: 180 tablet, Refills: 1      metoprolol succinate (TOPROL XL) 50 MG extended release tablet TAKE 1 TABLET BY MOUTH DAILY  Qty: 90 tablet, Refills: 3      furosemide (LASIX) 40 MG tablet TAKE 1 TABLET BY MOUTH DAILY  Qty: 90 tablet, Refills: 3    Comments: **Patient requests 90 days supply**      fluocinonide (LIDEX) 0.05 % gel Apply 1 each topically daily as needed for Other For psoriasis  Refills: 3      Multiple Vitamins-Minerals (MULTIVITAMIN ADULT PO) Take by mouth      vitamin C (ASCORBIC ACID) 500 MG tablet Take 500 mg by mouth daily           Current Discharge Medication List      STOP taking these medications       azithromycin (ZITHROMAX) 250 MG tablet Comments:   Reason for Stopping:               Discharge ROS:  A complete review of systems was asked and negative except for above    Discharge Exam:    /80   Pulse 77   Temp 98.4 °F (36.9 °C) (Oral)   Resp 18   Ht 6' 1\" (1.854 m)   Wt 246 lb 12.8 oz (111.9 kg)   SpO2 96%   BMI 32.56 kg/m²     Gen: NAD  HEENT: NC/AT, moist mucous membranes, no oropharyngeal erythema or exudate  Neck: supple, trachea midline, no anterior cervical or SC LAD  Heart:  Normal s1/s2, RRR, no murmurs, gallops, or rubs. no leg edema  Lungs:  improved bilaterally, right basal crackles, no use of accessory muscles  Abd: bowel sounds present, soft, nontender, nondistended, no masses  Extrem:  No clubbing, cyanosis,  no edema  Skin: no rashes or lesions  Psych: A & O x3  Neuro: grossly intact, moves all four extremities  Labs:  For convenience and continuity at follow-up the following most recent labs are provided:    Lab Results   Component Value Date    WBC 7.2 01/23/2022    HGB 14.3 01/26/2022    HCT 42.9 01/26/2022    MCV 98.2 01/23/2022     01/23/2022     01/23/2022    K 4.0 01/23/2022    K 4.3 01/21/2022    CL 99 01/23/2022    CO2 20 01/23/2022    BUN 64 01/23/2022    CREATININE 1.5 01/23/2022    CALCIUM 8.8 01/23/2022    PHOS 3.0 10/18/2018    ALKPHOS 149 01/23/2022    ALT 84 01/23/2022     01/23/2022    BILITOT 0.8 01/23/2022    BILIDIR 0.7 01/22/2022    LABALBU 2.4 01/23/2022    LDLCALC 48 06/04/2020    TRIG 56 06/04/2020     Lab Results   Component Value Date    INR 2.18 (H) 01/21/2022    INR 2.37 (H) 01/20/2022    INR 1.17 (H) 03/01/2019       Radiology:  CT CHEST WO CONTRAST    Result Date: 1/24/2022  EXAMINATION: CT OF THE CHEST WITHOUT CONTRAST 1/24/2022 3:50 pm TECHNIQUE: CT of the chest was performed without the administration of intravenous contrast. Multiplanar reformatted images are provided for review. Dose modulation, iterative reconstruction, and/or weight based adjustment of the mA/kV was utilized to reduce the radiation dose to as low as reasonably achievable. COMPARISON: Chest radiograph earlier same date, CT chest January 20, 2022 priors. HISTORY: ORDERING SYSTEM PROVIDED HISTORY: loculated pleural effusion TECHNOLOGIST PROVIDED HISTORY: Reason for exam:->loculated pleural effusion Reason for Exam: loculated pleural effusion FINDINGS: Mediastinum: Multiple small scattered mediastinal lymph nodes are again seen. No new or enlarging lymphadenopathy. There is atherosclerotic calcification involving the coronary arteries and some pericardial calcifications, similar to prior. The heart, pericardium, great vessels are without acute process on noncontrast evaluation. Lungs/pleura: The central airways are patent. Right-sided chest tube has been retracted with the pigtail extrapleural within the soft tissues. The loculated right-sided pleural effusion has significantly decreased in size compared to prior study. Multiple foci of gas are seen within the inferior aspect of the collection. There is persistent volume loss involving the right lung. Previously seen complete consolidation/volume loss involving the right middle lobe and right lower lobe is improved with some re-expansion. There is ground-glass opacification throughout the aerated right middle lobe and right lower lobe. Minimal patchy opacities seen within the posteromedial left apex, decreased compared to prior. No left-sided pneumothorax or pleural effusion. Upper Abdomen: Limited visualization of the upper abdomen is without acute process. Soft Tissues/Bones: Soft tissues and osseous structures are without acute process. The right-sided chest tube has been retracted and is now extrapleural with the tip in the posterior soft tissues.  Loculated right-sided hydropneumothorax has significantly decreased in size. There has been partial re-expansion of the right middle lobe and right lower lobe which demonstrate diffuse ground-glass opacity which may be related to atelectasis versus pneumonia. Mild mediastinal lymphadenopathy, similar in appearance to prior. Mild patchy nodularity in the medial posterior left apex has slightly decreased compared to prior, likely infectious. Follow-up to resolution recommended. CT CHEST WO CONTRAST    Result Date: 1/20/2022  EXAMINATION: CT OF THE CHEST WITHOUT CONTRAST 1/20/2022 9:31 pm TECHNIQUE: CT of the chest was performed without the administration of intravenous contrast. Multiplanar reformatted images are provided for review. Dose modulation, iterative reconstruction, and/or weight based adjustment of the mA/kV was utilized to reduce the radiation dose to as low as reasonably achievable. COMPARISON: Chest CT of 03/03/2019 HISTORY: ORDERING SYSTEM PROVIDED HISTORY: large right pleural effusion, ? pneumo TECHNOLOGIST PROVIDED HISTORY: Reason for exam:->large right pleural effusion, ? pneumo Decision Support Exception - unselect if not a suspected or confirmed emergency medical condition->Emergency Medical Condition (MA) Reason for Exam: large right pleural effusion, ? pneumo FINDINGS: CT chest: Lines and tubes:  None. Lungs and Airways and Pleura:  Central airways are patent. . No pneumothorax. Severe right pleural effusion. The pleural effusion has a loculated appearance surrounding anterior and posterior to the right upper lobe. The pleural effusion also has an expansile configuration with significant inferior displacement of right diaphragm. Right middle lobe and right lower lobe are completely collapsed. Findings suggestive of interstitial pulmonary edema with thickening of secondary pulmonary lobules. Small mediastinal lymph nodes are unchanged.   There is nodularity within the medial aspect of the left upper lobe is unchanged ranging in size between 2-5 mm. Considering long-term stability the findings are likely benign. Lymph nodes: Extensive subcentimeter mediastinal lymphadenopathy, slightly increased since prior examination. .  No pathologically enlarged lymph nodes. Cardiovascular and Mediastinum: Moderate cardiomegaly. Unchanged calcification of pericardium. .  Coronary arterial calcification. No pericardial effusion. The thyroid gland is unremarkable. The esophagus is unremarkable. Bones/Soft tissues: No fracture. No definite suspicious lytic or blastic foci. Visualized upper abdomen: Unremarkable. Severe right pleural effusion. The pleural effusion has a loculated appearance surrounding anterior and posterior to the right upper lobe. The pleural effusion also has an expansile configuration with significant inferior displacement of right diaphragm. Right middle lobe and right lower lobe are completely collapsed. No pneumothorax. Moderate cardiomegaly. Unchanged calcification of pericardium. Findings suggestive of interstitial pulmonary edema. Nodularity within the medial aspect of the left upper lobe is unchanged ranging in size between 2-5 mm. Considering long-term stability the findings are likely benign. Extensive subcentimeter mediastinal lymphadenopathy, slightly increased since prior examination. . No pathologically enlarged lymph nodes. XR CHEST PORTABLE    Result Date: 1/26/2022  EXAMINATION: ONE XRAY VIEW OF THE CHEST 1/26/2022 5:30 am COMPARISON: 1/25/2022 HISTORY: ORDERING SYSTEM PROVIDED HISTORY: respiratory failure TECHNOLOGIST PROVIDED HISTORY: Reason for exam:->respiratory failure Reason for Exam: respiratory failure FINDINGS: Heart size is not well evaluated. .  Patient is rotated.  Left lung is unchanged with subtle opacity at the left lung base Right-sided pleural effusion is seen with adjacent right lung consolidation, likely unchanged given differences in patient positioning There is subtle vertical lucency seen in the paramediastinal region on the right. Subtle vertical lucency seen in the paramediastinal region on the right. This could represent a tiny pneumothorax or be secondary to summation shadow artifact with air in the trachea. Recommend close attention on follow-up Pleuroparenchymal disease on the right, similar to prior. Left basilar opacity is also unchanged     XR CHEST PORTABLE    Result Date: 1/25/2022  EXAMINATION: ONE XRAY VIEW OF THE CHEST 1/25/2022 4:04 am COMPARISON: 01/24/2022 HISTORY: ORDERING SYSTEM PROVIDED HISTORY: respiratory failure TECHNOLOGIST PROVIDED HISTORY: Reason for exam:->respiratory failure Reason for Exam: respiratory failure FINDINGS: Cardiac size and mediastinal structures appear similar. Vascular congestion. Opacification in the right mid and lower lung field with perihilar edema. Osseous structures appear stable. Moderate-sized right pleural effusion. Again noted is cardiomegaly with a moderate right pleural effusion, perihilar edema and similar appearing right upper lobe airspace disease. XR CHEST PORTABLE    Result Date: 1/24/2022  EXAMINATION: ONE XRAY VIEW OF THE CHEST 1/24/2022 4:35 am COMPARISON: 01/23/2022 HISTORY: ORDERING SYSTEM PROVIDED HISTORY: respiratory failure TECHNOLOGIST PROVIDED HISTORY: Reason for exam:->respiratory failure Reason for Exam: respiratory failure FINDINGS: Cardiac size and mediastinal structures appear similar, with rightward patient rotation. Vascular congestion is again identified. Worsening right upper lobe and perihilar airspace disease. No acute osseous abnormality is identified. Cardiomegaly with pulmonary vascular congestion, and worsening right perihilar and upper lobe airspace disease as well as increased right pleural effusion.      XR CHEST PORTABLE    Result Date: 1/23/2022  EXAMINATION: ONE XRAY VIEW OF THE CHEST 1/23/2022 5:44 am COMPARISON: 01/22/2022 HISTORY: ORDERING SYSTEM PROVIDED HISTORY: respiratory failure TECHNOLOGIST PROVIDED HISTORY: Reason for exam:->respiratory failure Reason for Exam: respiratory failure FINDINGS: The percutaneous right chest tube overlies the right lower lung. There is improving with mild residual pulmonary vascular congestion. No change in the moderate right pleural effusion and atelectasis. Cardiomegaly is stable. There is no pneumothorax. 1. Improving congestive heart failure. XR CHEST PORTABLE    Result Date: 1/22/2022  EXAMINATION: ONE XRAY VIEW OF THE CHEST 1/22/2022 4:25 am COMPARISON: 01/20/2022 HISTORY: ORDERING SYSTEM PROVIDED HISTORY: hypoxemia TECHNOLOGIST PROVIDED HISTORY: Reason for exam:->hypoxemia Reason for Exam: hypoxemia FINDINGS: Cardiomegaly. Improved aeration in the right chest following evacuation of pleural fluid. Some persistent pleural effusion remains, as well as right perihilar and basilar airspace disease. Osseous structures appear similar. No pneumothorax is identified. The 8 [place] chest tube placed the previous day based on procedural port is not well seen radiographically at the right lung base. Improved aeration in the right chest following 8 [place] chest tube placement for pleural effusion, though the chest tube is not well seen radiographically. Otherwise similar chest.     XR CHEST PORTABLE    Result Date: 1/20/2022  EXAMINATION: ONE XRAY VIEW OF THE CHEST 1/20/2022 8:46 pm COMPARISON: 03/01/2019 HISTORY: ORDERING SYSTEM PROVIDED HISTORY: sob, outpt cxr reports a right pnuemo TECHNOLOGIST PROVIDED HISTORY: Reason for exam:->sob, outpt cxr reports a right pnuemo Reason for Exam: sob, outpt cxr reports a right pnuemo FINDINGS: The heart is mildly enlarged but unchanged. The pulmonary vessels are engorged centrally and there is hazy perihilar opacity on the left extending inferiorly.   There is a moderate right pleural effusion extending into the upper chest with diffuse opacification extending along the right lung base extending into the upper chest.  There is mild pleural fluid extending into the right apex which is more apparent. There is no pneumothorax. The bones are intact. Mild cardiomegaly and mild central pulmonary congestion. Moderate to large right pleural effusion with probable moderate atelectasis and consolidation throughout the lung base extending into the upper chest. Questionable pulmonary interstitial edema vs early infiltrate or pulmonary fibrosis scattered along the left lung base. XR CHEST 1 VIEW    Result Date: 1/25/2022  EXAMINATION: ONE XRAY VIEW OF THE CHEST 1/25/2022 11:12 am COMPARISON: Chest x-ray dated 01/25/2022. HISTORY: ORDERING SYSTEM PROVIDED HISTORY: post chest tube removal TECHNOLOGIST PROVIDED HISTORY: Reason for exam:->post chest tube removal FINDINGS: HEART/MEDIASTINUM: The cardiac silhouette is enlarged but stable. PLEURA/LUNGS: There is a moderate to large right pleural effusion, grossly unchanged. There is adjacent compressive atelectasis. There is no appreciable pneumothorax. BONES/SOFT TISSUE: No acute abnormality. Moderate to large right pleural effusion. No appreciable pneumothorax. IR CHEST TUBE INSERTION    Result Date: 1/21/2022  PROCEDURE: Ultrasound GUIDED CHEST TUBE PLACEMENT 1/21/2022 HISTORY: ORDERING SYSTEM PROVIDED HISTORY: right effusion. Last eliquis 1/20 9am TECHNOLOGIST PROVIDED HISTORY: Reason for exam:->right effusion. Last eliquis 1/20 9am Which side should the procedure be performed?->Right Pneumothorax SEDATION: None TECHNIQUE: Informed consent was obtained after a detailed explanation of the procedure including risks, benefits, and alternatives. Universal protocol was followed. A suitable skin site was prepped and draped in sterile fashion following ultrasound localization. A 5 [place] was advanced into the right pleural space and a 0.035 guidewire was used to place a 8 Vietnamese chest tube after the fascial tract was dilated.   The catheter was sutured to the skin and the patient tolerated the procedure well. The catheter was attached to Pleurevac drainage. EBL: Less than 5 cc FINDINGS: 1. Post procedure images demonstrate the chest tube in good position 2. About 20 cc of yellow/orange thin fluid was aspirated and sent for analysis. Successful ultrasound guided placement of an 8 [place] right-sided chest tube. EKG     Rhythm: atrial fibrillation - rapid  Rate: tachycardia  Clinical Impression: no acute changes        The patient was seen and examined on day of discharge and this discharge summary is in conjunction with any daily progress note from day of discharge. Time Spent on discharge is 30 minutes  in the examination, evaluation, counseling and review of medications and discharge plan. Note that more than 30 minutes was spent in preparing discharge papers, discussing discharge with patient, medication review, etc.       Signed:    Truong Vasquez MD   1/26/2022    Addendum;  Exudative pleural effusion POA likely sec to a malignancy, not pneumonia. Pneumonia ruled out after study      Thank you [facility] for the opportunity to be involved in this patient's care.  If you have any questions or concerns please feel free to contact me at [phone]

## 2022-01-26 NOTE — DISCHARGE INSTR - DIET
Good nutrition is important when healing from an illness, injury, or surgery. Follow any nutrition recommendations given to you during your hospital stay. If you were given an oral nutrition supplement while in the hospital, continue to take this supplement at home. You can take it with meals, in-between meals, and/or before bedtime. These supplements can be purchased at most local grocery stores, pharmacies, and chain [email or web address]. If you have any questions about your diet or nutrition, call the hospital and ask for the dietitian.   Low sodium; 1500 ml fluid restriction

## 2022-01-26 NOTE — PROGRESS NOTES
Hospitalist Progress Note    CC: Recurrent right pleural effusion    Hospital course:    76yo WM never smoker with PMhx sig for afib, CHF, gout, CKDIII, hx of embolic CVA, presnets with shortness of breath and cough x 2 days. CXR was done which showed pneumothorax. And R sided pleural effusion which CT scan confirms as large surrounding the anterior and posterior RUL and collapse of RML and RLL. Now s/p chest tube which has already drained over 3L. COVID19 negative. Flow cytometry and pathology showed  No malignancy. CT chest showed a loculated effusion and tPA instillation didn't help as tip of chest drain was extra pleural.  VATS/ thoracotomy + pleural biopsy planned. Admit date: 1/20/2022  Days in hospital:  5    24 Hour Events: draining a lot of fluid    Subjective:   Patient denies any symptoms. No F/C  No CP/SOB he says. awaiting cytology - suspect malignancy  - consider repeat imagine today or Monday - he feels much better with breathing overall    ROS:   A comprehensive review of systems was negative except for: denies pain in chest, just a bit uncomfortable . Objective:    /67   Pulse 84   Temp 98.2 °F (36.8 °C) (Oral)   Resp 18   Ht 6' 1\" (1.854 m)   Wt 259 lb 4.2 oz (117.6 kg)   SpO2 96%   BMI 34.21 kg/m²     Gen: NAD  HEENT: NC/AT, moist mucous membranes, no oropharyngeal erythema or exudate  Neck: supple, trachea midline, no anterior cervical or SC LAD  Heart:  Normal s1/s2, RRR, no murmurs, gallops, or rubs. no leg edema  Lungs:  improved bilaterally, no wheeze, no rales, no rhonchi, no crackles, no use of accessory muscles  Abd: bowel sounds present, soft, nontender, nondistended, no masses  Extrem:  No clubbing, cyanosis,  no edema  Skin: no rashes or lesions  Psych: A & O x3  Neuro: grossly intact, moves all four extremities.      Assessment:    Principal Problem:    Recurrent right pleural effusion  Active Problems: Persistent atrial fibrillation (HCC)    Essential hypertension    Chronic combined systolic and diastolic CHF (congestive heart failure) (HCC)    Transaminitis    Abnormal CT of the chest  Resolved Problems:    * No resolved hospital problems. *      Plan:  1. Pleural effusion and pneumothorax- s/p chest tube. Over 4L effusion drained. -Flow cytometry shows no malignant cells  -concern for cancer, needs repeat imaging of chest      2.  AFib on eliquis now on hold. -cont lovenox    3. Transaminitis   -check acute viral hep panel  -LOGAN  -US liver      4 . Weight loss - pt previously had BMI>40, now down to 34   - attribute to possible malignancy though CHF can be a wasting condition as well    5. Chronic systolic and diastolic CHF- last EF was 25-30%  Continue on the CHF protocol;  - continue with ACE and beta blocker   -daily weights  -I/O charting  -Low salt/ fluid restrict    6. Forgetfulness - has had this for years, although he seems very intact currently - asking the right questions and daughter is at bedside - both verbalized understanding of treatment plan      Prognosis:  Fair    Code status:  Full code    DVT prophylaxis: Lovenox  GI prophylaxis: none  Antibiotic prophylaxis indicated:   no  Diet:  ADULT DIET; Regular;  Low Sodium (2 gm); 1500 ml    Disposition:  Home with home health    Medications:  Scheduled Meds:   lisinopril  10 mg Oral Daily    allopurinol  100 mg Oral Daily    furosemide  40 mg Oral Daily    atorvastatin  40 mg Oral Nightly    [Held by provider] enoxaparin  40 mg SubCUTAneous Daily    lidocaine-EPINEPHrine  20 mL IntraDERmal Once    bupivacaine  30 mL IntraDERmal Once    azithromycin  500 mg IntraVENous Q24H    cefTRIAXone (ROCEPHIN) IV  1,000 mg IntraVENous Q24H    sodium chloride flush  5-40 mL IntraVENous 2 times per day    metoprolol succinate  50 mg Oral Nightly       PRN Meds:  LORazepam, sodium chloride flush, sodium chloride, ondansetron **OR** ondansetron, polyethylene glycol, acetaminophen **OR** acetaminophen    IV:   sodium chloride 25 mL (01/24/22 2208)         Intake/Output Summary (Last 24 hours) at 1/25/2022 2021  Last data filed at 1/25/2022 1805  Gross per 24 hour   Intake 240 ml   Output 10 ml   Net 230 ml       Results:  CBC:   Recent Labs     01/23/22  0908 01/23/22  0908 01/24/22  2327 01/25/22  0717 01/25/22  1200   WBC 7.2  --   --   --   --    HGB 13.2*   < > 13.0* 12.8* 14.2   HCT 39.7*   < > 39.2* 38.5* 42.8   MCV 98.2  --   --   --   --      --   --   --   --     < > = values in this interval not displayed. BMP:   Recent Labs     01/23/22  0907   *   K 4.0   CL 99   CO2 20*   BUN 64*   CREATININE 1.5*     Mag: No results for input(s): MAG in the last 72 hours. Phos:   Lab Results   Component Value Date    PHOS 3.0 10/18/2018     No results found for: GLU    LIVER PROFILE:   Recent Labs     01/23/22  0907   *   ALT 84*   BILITOT 0.8   ALKPHOS 149*     PT/INR:   No results for input(s): PROTIME, INR in the last 72 hours. APTT: No results for input(s): APTT in the last 72 hours. UA:No results for input(s): NITRITE, COLORU, PHUR, LABCAST, WBCUA, RBCUA, MUCUS, TRICHOMONAS, YEAST, BACTERIA, CLARITYU, SPECGRAV, LEUKOCYTESUR, UROBILINOGEN, BILIRUBINUR, BLOODU, GLUCOSEU, AMORPHOUS in the last 72 hours.     Invalid input(s): Lavonna Raudel input(s): ABG  Lab Results   Component Value Date    CALCIUM 8.8 01/23/2022    PHOS 3.0 10/18/2018               Electronically signed by Mark Lux MD on 1/25/2022 at 8:21 PM

## 2022-01-26 NOTE — CARE COORDINATION
DISCHARGE SUMMARY     DATE OF DISCHARGE: 1/26/22    DISCHARGE DESTINATION: Home    HOME CARE: No    HEMODIALYSIS: No    TRANSPORTATION: Private Car    NEW DME ORDERED: no    COMMENTS: Daughter will transport to home.   Electronically signed by Lara Liao RN on 1/26/2022 at 1:45 PM

## 2022-01-26 NOTE — PLAN OF CARE
Problem: Breathing Pattern - Ineffective:  Goal: Ability to achieve and maintain a regular respiratory rate will improve  Description: Ability to achieve and maintain a regular respiratory rate will improve  Outcome: Ongoing  Note: D: Pt. Breathing easily, lungs diminished throughout with crackles noted to R mid and lower base, SPO2 97-99% on room air. Small dressing to R back with small amount old brownish drainage   A: will continue to monitor     Problem: Safety:  Goal: Free from accidental physical injury  Description: Free from accidental physical injury  Outcome: Ongoing  Note: D: Pt. Alert and oriented, calls for assist as needed.  Call light in reach and bed alarm on   A: encouraged to continue calling for assist as needed  R: without falls this shift

## 2022-01-26 NOTE — PROGRESS NOTES
Patient given verbal and written discharge instructions. Questions answered. No concerns. Daughter at bedside. Call light in reach.

## 2022-01-27 NOTE — PROGRESS NOTES
Called for Ins referral for 2/1/22 appt   Left a voice mail with [facility] findings   suggestive of pulmonary interstitial edema.  Enlarged mediastinal lymph   nodes.  Cluster of pulmonary nodules within left lung apex measuring 15 x 9   mm.             Assessment/Plan  1. Atrial fibrillation with RVR  - Rate goes up to 110 BPM with exertion  - will increase toprol to 75 mg dailly. - Now on eliquis  2. Acute CVA  - better with no neurological deficit. 3. Chronic Systolic CHF, class 3   - Better  -on ACEI. BB & aldactone    Ok for discharge.  Has Appt to see us in 1 week             Signed:  Chon Denise MD

## 2022-02-02 ENCOUNTER — TELEPHONE (OUTPATIENT)
Dept: CARDIOLOGY CLINIC | Age: 77
End: 2022-02-02

## 2022-02-02 NOTE — TELEPHONE ENCOUNTER
Spoke with Gio Llamas. She states her father is overall doing well. She does wonder if he is not drinking enough some days as his BP has been on the lower end. Discussed that she may liberalize the amount he drinks some but to closely monitor his weight as well as his sodium intake. He v/u.

## 2022-02-02 NOTE — TELEPHONE ENCOUNTER
Unique Lopez is calling in wanting to know if her dad should still be on a fluid restriction of 55 ounces a day.      Unique Lopez can be reached at [phone]

## 2022-02-09 NOTE — PROGRESS NOTES
Physician Progress Note      PATIENT:               Malorie Bentley  CSN #:                  [id number]  :                       1945  ADMIT DATE:       2022 8:30 PM  [address] [place] DATE:        2022 3:09 PM  RESPONDING  PROVIDER #:        Sharifa Ruvalcaba MD          QUERY TEXT:    Patient admitted with recurrent right pleural effusion and pneumonia. Per ED   provider \"Patient had already been started on steroids and azithromycin today   (by PCP) for coverage of community-acquired pneumonia. Per CTS consult note   on  \"Admitted - pna, pleural effusion. \"  If possible, please document in   the progress notes and discharge summary if pneumonia was: The medical record reflects the following:  Risk Factors: 77 yr old, chronic illness  Clinical Indicators: CXR on admission shows \"moderate to large right pleural   effusion with probable moderate atelectasis  and consolidation throughout the lung base extending into the upper chest\" and   questionable early infiltrate; WBC 10.0; Procalcitonin 0.15; temp 100.1 on   arrival  Treatment: Rocephin, Zithromax, discharged on Augmentin    Thank you,  Brandy Pa, RN, BSN, CCDS  [email or web address]  Options provided:  -- Pneumonia was present on admission  -- Pneumonia ruled out after study  -- Other - I will add my own diagnosis  -- Disagree - Not applicable / Not valid  -- Disagree - Clinically unable to determine / Unknown  -- Refer to Clinical Documentation Reviewer    PROVIDER RESPONSE TEXT:    Pneumonia was ruled out after study. Query created by: Patito Carmona on 2022 6:24 AM      QUERY TEXT:    Patient admitted with right-sided pleural effusion. Pulmonology and CTS were   consulted and chest tube was placed. If possible, please document in progress   notes and d/c summary further specificity regarding the type/underlying cause   of pleural effusion:     The medical record reflects the following:  Risk Factors: possible pneumonia  Clinical Indicators: Cytology showed no malignant cells; per pulmonology   consult note on 1/26 \"Abnormal CT Chest with large effusion and possible   underlying tumor however cytology negative. \"  Treatment: pulmonary and CTS consults, insertion of chest tube, Rocephin,   Zithromax, discharged on Augmentin    Thank you,  Renee Murphy RN, BSN, CCDS  [email or web address]  Options provided:  -- Exudative pleural effusion secondary to pneumonia  -- Pleural effusion due to malignancy  -- Other - I will add my own diagnosis  -- Disagree - Not applicable / Not valid  -- Disagree - Clinically unable to determine / Unknown  -- Refer to Clinical Documentation Reviewer    PROVIDER RESPONSE TEXT:    Patient has pleural effusion due malignancy.     Query created by: Susan Kelly on 2/1/2022 6:24 AM      Electronically signed by:  Otis Mota MD 2/9/2022 10:45 AM

## 2022-03-31 DIAGNOSIS — E78.5 HYPERLIPIDEMIA LDL GOAL <70: Primary | ICD-10-CM

## 2022-04-01 RX ORDER — ATORVASTATIN CALCIUM 40 MG/1
40 TABLET, FILM COATED ORAL DAILY
Qty: 30 TABLET | Refills: 0 | Status: SHIPPED | OUTPATIENT
Start: 2022-04-01 | End: 2022-05-04

## 2022-04-22 RX ORDER — FUROSEMIDE 40 MG/1
40 TABLET ORAL DAILY
Qty: 90 TABLET | Refills: 3 | Status: SHIPPED | OUTPATIENT
Start: 2022-04-22

## 2022-04-22 RX ORDER — METOPROLOL SUCCINATE 50 MG/1
50 TABLET, EXTENDED RELEASE ORAL DAILY
Qty: 90 TABLET | Refills: 3 | Status: SHIPPED | OUTPATIENT
Start: 2022-04-22

## 2022-04-22 NOTE — TELEPHONE ENCOUNTER
Last OV: 4/7/21  Next OV: 5/20/22  Last refill:4/22/22  Most recent Labs: 1/23/22  Last EKG (if needed):1/20/22

## 2022-04-26 RX ORDER — FUROSEMIDE 40 MG/1
40 TABLET ORAL DAILY
Qty: 90 TABLET | Refills: 3 | Status: SHIPPED | OUTPATIENT
Start: 2022-04-26 | End: 2022-05-20

## 2022-05-04 RX ORDER — ATORVASTATIN CALCIUM 40 MG/1
40 TABLET, FILM COATED ORAL DAILY
Qty: 30 TABLET | Refills: 0 | Status: SHIPPED | OUTPATIENT
Start: 2022-05-04 | End: 2022-06-02

## 2022-05-04 NOTE — TELEPHONE ENCOUNTER
Last O/V:  04/07/2021  Next O/V:  05/20/2022    Last Labs: CBC, CMP  : 01/23/2022                              Lipid:  04/07/2021

## 2022-05-12 DIAGNOSIS — E78.5 HYPERLIPIDEMIA LDL GOAL <70: ICD-10-CM

## 2022-05-12 LAB
CHOLESTEROL, TOTAL: 98 MG/DL (ref 0–199)
HDLC SERPL-MCNC: 47 MG/DL (ref 40–60)
LDL CHOLESTEROL CALCULATED: 41 MG/DL
TRIGL SERPL-MCNC: 49 MG/DL (ref 0–150)
VLDLC SERPL CALC-MCNC: 10 MG/DL
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
## 2022-05-20 ENCOUNTER — OFFICE VISIT (OUTPATIENT)
Dept: CARDIOLOGY CLINIC | Age: 77
End: 2022-05-20
Payer: MEDICARE

## 2022-05-20 VITALS
WEIGHT: 256.2 LBS | OXYGEN SATURATION: 98 % | SYSTOLIC BLOOD PRESSURE: 148 MMHG | BODY MASS INDEX: 33.95 KG/M2 | HEIGHT: 73 IN | DIASTOLIC BLOOD PRESSURE: 76 MMHG | HEART RATE: 76 BPM

## 2022-05-20 DIAGNOSIS — I25.10 CORONARY ARTERY CALCIFICATION SEEN ON CT SCAN: ICD-10-CM

## 2022-05-20 DIAGNOSIS — E78.5 HYPERLIPIDEMIA LDL GOAL <70: ICD-10-CM

## 2022-05-20 DIAGNOSIS — I50.22 CHRONIC SYSTOLIC CHF (CONGESTIVE HEART FAILURE), NYHA CLASS 2 (HCC): Primary | ICD-10-CM

## 2022-05-20 DIAGNOSIS — I48.20 CHRONIC ATRIAL FIBRILLATION (HCC): ICD-10-CM

## 2022-05-20 DIAGNOSIS — I10 ESSENTIAL HYPERTENSION: ICD-10-CM

## 2022-05-20 PROCEDURE — 99214 OFFICE O/P EST MOD 30 MIN: CPT | Performed by: INTERNAL MEDICINE

## 2022-05-20 PROCEDURE — 1123F ACP DISCUSS/DSCN MKR DOCD: CPT | Performed by: INTERNAL MEDICINE

## 2022-05-20 PROCEDURE — 93000 ELECTROCARDIOGRAM COMPLETE: CPT | Performed by: INTERNAL MEDICINE

## 2022-05-20 PROCEDURE — G8427 DOCREV CUR MEDS BY ELIG CLIN: HCPCS | Performed by: INTERNAL MEDICINE

## 2022-05-20 PROCEDURE — 1036F TOBACCO NON-USER: CPT | Performed by: INTERNAL MEDICINE

## 2022-05-20 PROCEDURE — G8417 CALC BMI ABV UP PARAM F/U: HCPCS | Performed by: INTERNAL MEDICINE

## 2022-05-23 ENCOUNTER — TELEPHONE (OUTPATIENT)
Dept: CARDIOLOGY CLINIC | Age: 77
End: 2022-05-23

## 2022-05-23 NOTE — TELEPHONE ENCOUNTER
Patient seen in the office 5/20/22 and left patient assistance paperwork. This has been completed and signed by Dr. Radha Rodriguez. Please call patient to bring his portion of paperwork to the office. Make copies of his and fax all to the [employer]. Patient requested to keep originals for his records. Office portion of paperwork placed in patient assistance folder.

## 2022-05-26 NOTE — TELEPHONE ENCOUNTER
Marla Ch called back in afternoon stating that he received the messages and will bring his paperwork in, in the next couple of days. I did remind him we are closed on Monday.

## 2022-05-31 NOTE — TELEPHONE ENCOUNTER
Miguel's daughter [employer] stopped by the office this afternoon dropping of Miguel's patient assistance application. Will place it in the folder in the clinical area. She asked for a call back once everything is faxed.     You can reach [employer] at #[phone]

## 2022-06-03 RX ORDER — ATORVASTATIN CALCIUM 40 MG/1
40 TABLET, FILM COATED ORAL DAILY
Qty: 90 TABLET | Refills: 3 | Status: SHIPPED | OUTPATIENT
Start: 2022-06-03

## 2022-06-06 ENCOUNTER — TELEPHONE (OUTPATIENT)
Dept: CARDIOLOGY CLINIC | Age: 77
End: 2022-06-06

## 2022-06-06 NOTE — TELEPHONE ENCOUNTER
Kenny Barrett from [employer] called in about Cece Killian stating that they need the Patients portion of the application fill out.       Kenny Barrett can be reached at [phone]

## 2022-06-06 NOTE — TELEPHONE ENCOUNTER
[phone] (home)   Left message on pts machine to call back  Pt will need to fill out his portion of the application

## 2022-07-20 RX ORDER — LISINOPRIL 10 MG/1
10 TABLET ORAL DAILY
Qty: 90 TABLET | Refills: 1 | Status: SHIPPED | OUTPATIENT
Start: 2022-07-20

## 2022-11-07 ENCOUNTER — TELEPHONE (OUTPATIENT)
Dept: CARDIOLOGY CLINIC | Age: 77
End: 2022-11-07

## 2022-11-07 DIAGNOSIS — I50.22 CHRONIC SYSTOLIC CHF (CONGESTIVE HEART FAILURE), NYHA CLASS 2 (HCC): Primary | ICD-10-CM

## 2022-11-07 DIAGNOSIS — R60.0 LOWER EXTREMITY EDEMA: ICD-10-CM

## 2022-11-07 DIAGNOSIS — R06.02 SOB (SHORTNESS OF BREATH): ICD-10-CM

## 2022-11-07 DIAGNOSIS — I10 ESSENTIAL HYPERTENSION: ICD-10-CM

## 2022-11-07 NOTE — TELEPHONE ENCOUNTER
Please contact daughter with the follow ing instructions:     1) Daily low sodium/salt diet-working towards 2400 mg/day. 2) Daily weight log upon rising each morning and keep a written log for review when needed. 3) Compression stockings upon rising each day and taking off at bedtime. 4) When seated resting, he should elevate his legs to waist high or higher. 5) walking program daily. 6) I seen no recent labs completed-BMP, CBC, BNP.  (Order's placed-if not completed somewhere else recent, need to obtain a copy)     7) Also upon further review, he was to have a stress test completed when he last saw Dr. Kathie Doyle 5/25/22 and then f/u in 6-7 months. They should get this rescheduled once the shortage of isotope for the stress test is over-scheduling (or our  staff) should be able to help with the timing of this and then f/u with Dr. Kathie Doyle afterwards. Any questions, let me know. Thanks.

## 2022-11-07 NOTE — TELEPHONE ENCOUNTER
Miguel's daughter Juan Vickers called in this morning,  she states he has occasional SOB and a little bit of swelling in his feet later in the day she would like to know is there something they should do. She can be reached at [phone].

## 2022-11-07 NOTE — TELEPHONE ENCOUNTER
Juanita Durham called back for [employer], she wanted to make sure she got called back today please.     Juanita Durham # [phone]

## 2022-11-07 NOTE — TELEPHONE ENCOUNTER
Sary Loredo called into the office to [employer] [facility] [place] call in regards to her father Michael Plata. Melody Vega know that David Aldridge will call her back when she becomes available.       Sary Loredo can be reached at: [phone]

## 2022-11-07 NOTE — TELEPHONE ENCOUNTER
Attempted to return call to daughter Jazlyn Bonilla. Left message on voicemail to please return call to our office.

## 2022-11-07 NOTE — TELEPHONE ENCOUNTER
Returned call to Jonathan Trent. Relayed message and instructions from Federico Sanchez. Jonathan Trent verbalized and confirmed understanding. She will call back office to schedule NM stress test and OV.

## 2022-12-06 ENCOUNTER — APPOINTMENT (OUTPATIENT)
Dept: GENERAL RADIOLOGY | Age: 77
End: 2022-12-06
Payer: MEDICARE

## 2022-12-06 ENCOUNTER — APPOINTMENT (OUTPATIENT)
Dept: CT IMAGING | Age: 77
End: 2022-12-06
Payer: MEDICARE

## 2022-12-06 ENCOUNTER — HOSPITAL ENCOUNTER (EMERGENCY)
Age: 77
Discharge: HOME | End: 2022-12-06
Payer: MEDICARE

## 2022-12-06 VITALS
DIASTOLIC BLOOD PRESSURE: 78 MMHG | RESPIRATION RATE: 25 BRPM | OXYGEN SATURATION: 97 % | HEART RATE: 96 BPM | BODY MASS INDEX: 36.41 KG/M2 | SYSTOLIC BLOOD PRESSURE: 136 MMHG | WEIGHT: 268.8 LBS | HEIGHT: 72 IN | TEMPERATURE: 98 F

## 2022-12-06 DIAGNOSIS — R06.02 SHORTNESS OF BREATH: Primary | ICD-10-CM

## 2022-12-06 DIAGNOSIS — Z86.79 HISTORY OF ATRIAL FIBRILLATION: ICD-10-CM

## 2022-12-06 DIAGNOSIS — I49.3 FREQUENT PVCS: ICD-10-CM

## 2022-12-06 LAB
ANION GAP SERPL CALCULATED.3IONS-SCNC: 13 MMOL/L (ref 3–16)
BASOPHILS ABSOLUTE: 0 K/UL (ref 0–0.2)
BASOPHILS RELATIVE PERCENT: 0.5 %
BUN BLDV-MCNC: 27 MG/DL (ref 7–20)
CALCIUM SERPL-MCNC: 9.7 MG/DL (ref 8.3–10.6)
CHLORIDE BLD-SCNC: 102 MMOL/L (ref 99–110)
CO2: 24 MMOL/L (ref 21–32)
CREAT SERPL-MCNC: 1.2 MG/DL (ref 0.8–1.3)
EKG DIAGNOSIS: NORMAL
EKG Q-T INTERVAL: 380 MS
EKG QRS DURATION: 128 MS
EKG QTC CALCULATION (BAZETT): 467 MS
EKG R AXIS: 39 DEGREES
EKG T AXIS: 260 DEGREES
EKG VENTRICULAR RATE: 91 BPM
EOSINOPHILS ABSOLUTE: 0.1 K/UL (ref 0–0.6)
EOSINOPHILS RELATIVE PERCENT: 1 %
GFR SERPL CREATININE-BSD FRML MDRD: >60 ML/MIN/{1.73_M2}
GLUCOSE BLD-MCNC: 117 MG/DL (ref 70–99)
HCT VFR BLD CALC: 41.7 % (ref 40.5–52.5)
HEMOGLOBIN: 13.4 G/DL (ref 13.5–17.5)
LYMPHOCYTES ABSOLUTE: 1 K/UL (ref 1–5.1)
LYMPHOCYTES RELATIVE PERCENT: 13.3 %
MCH RBC QN AUTO: 31.8 PG (ref 26–34)
MCHC RBC AUTO-ENTMCNC: 32.1 G/DL (ref 31–36)
MCV RBC AUTO: 99.3 FL (ref 80–100)
MONOCYTES ABSOLUTE: 0.9 K/UL (ref 0–1.3)
MONOCYTES RELATIVE PERCENT: 11.3 %
NEUTROPHILS ABSOLUTE: 5.6 K/UL (ref 1.7–7.7)
NEUTROPHILS RELATIVE PERCENT: 73.9 %
PDW BLD-RTO: 16.8 % (ref 12.4–15.4)
PLATELET # BLD: 201 K/UL (ref 135–450)
PMV BLD AUTO: 9.1 FL (ref 5–10.5)
POTASSIUM REFLEX MAGNESIUM: 4.6 MMOL/L (ref 3.5–5.1)
PRO-BNP: 2850 PG/ML (ref 0–449)
RBC # BLD: 4.2 M/UL (ref 4.2–5.9)
SODIUM BLD-SCNC: 139 MMOL/L (ref 136–145)
TROPONIN: <0.01 NG/ML
WBC # BLD: 7.5 K/UL (ref 4–11)

## 2022-12-06 PROCEDURE — 71250 CT THORAX DX C-: CPT

## 2022-12-06 PROCEDURE — 93010 ELECTROCARDIOGRAM REPORT: CPT | Performed by: INTERNAL MEDICINE

## 2022-12-06 PROCEDURE — 83880 ASSAY OF NATRIURETIC PEPTIDE: CPT

## 2022-12-06 PROCEDURE — 96374 THER/PROPH/DIAG INJ IV PUSH: CPT

## 2022-12-06 PROCEDURE — 80048 BASIC METABOLIC PNL TOTAL CA: CPT

## 2022-12-06 PROCEDURE — 71046 X-RAY EXAM CHEST 2 VIEWS: CPT

## 2022-12-06 PROCEDURE — 84484 ASSAY OF TROPONIN QUANT: CPT

## 2022-12-06 PROCEDURE — 93005 ELECTROCARDIOGRAM TRACING: CPT | Performed by: EMERGENCY MEDICINE

## 2022-12-06 PROCEDURE — 85025 COMPLETE CBC W/AUTO DIFF WBC: CPT

## 2022-12-06 PROCEDURE — 99285 EMERGENCY DEPT VISIT HI MDM: CPT

## 2022-12-06 PROCEDURE — 6360000002 HC RX W HCPCS: Performed by: PHYSICIAN ASSISTANT

## 2022-12-06 RX ORDER — FUROSEMIDE 10 MG/ML
40 INJECTION INTRAMUSCULAR; INTRAVENOUS ONCE
Status: DISCONTINUED | OUTPATIENT
Start: 2022-12-06 | End: 2022-12-06

## 2022-12-06 RX ORDER — FUROSEMIDE 10 MG/ML
60 INJECTION INTRAMUSCULAR; INTRAVENOUS ONCE
Status: COMPLETED | OUTPATIENT
Start: 2022-12-06 | End: 2022-12-06

## 2022-12-06 RX ADMIN — FUROSEMIDE 60 MG: 10 INJECTION, SOLUTION INTRAMUSCULAR; INTRAVENOUS at 14:58

## 2022-12-06 ASSESSMENT — ENCOUNTER SYMPTOMS
COUGH: 0
BACK PAIN: 0
SHORTNESS OF BREATH: 1
SORE THROAT: 0
VOMITING: 0
EYE PAIN: 0
NAUSEA: 0
ABDOMINAL PAIN: 0

## 2022-12-06 ASSESSMENT — PAIN - FUNCTIONAL ASSESSMENT: PAIN_FUNCTIONAL_ASSESSMENT: NONE - DENIES PAIN

## 2022-12-06 ASSESSMENT — PAIN SCALES - GENERAL: PAINLEVEL_OUTOF10: 0
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
## 2022-12-06 NOTE — DISCHARGE INSTRUCTIONS
Continue your home medication as prescribed. Follow-up with his cardiologist Dr. Catherine Masters  Return for any worsening. This includes chest pain, shortness of breath, leg swelling, fever.

## 2022-12-06 NOTE — ED NOTES
Discharge and education instructions reviewed. Patient verbalized understanding, teach-back successful. Patient denied questions at this time. No acute distress noted. Patient instructed to follow-up as noted - return to emergency department if symptoms worsen. Patient verbalized understanding. Discharged per EDMD with discharge instructions.         Luci Cornejo RN  12/06/22 2140

## 2022-12-06 NOTE — ED NOTES
Notified Grzegorz Vo PA-C of frequent PVC's. Pt denies SOB or chest pain and remains on telemetry monitor.       Stone Real RN  12/06/22 1915

## 2022-12-06 NOTE — ED PROVIDER NOTES
EKG: Atrial fibrillation, rate of 91, PVCs, nonspecific interventricular conduction delay. Rhythm strip shows atrial fibrillation with a rate of 91,  ms with PVCs, no other ectopy as interpreted by me. This compared to an EKG dated/20/22, no significant changes noted.      Malvin Zamora MD  12/06/22 2515

## 2022-12-13 ENCOUNTER — OFFICE VISIT (OUTPATIENT)
Dept: CARDIOLOGY CLINIC | Age: 77
End: 2022-12-13

## 2022-12-13 VITALS
BODY MASS INDEX: 36.38 KG/M2 | HEIGHT: 72 IN | HEART RATE: 52 BPM | WEIGHT: 268.6 LBS | OXYGEN SATURATION: 96 % | SYSTOLIC BLOOD PRESSURE: 138 MMHG | DIASTOLIC BLOOD PRESSURE: 82 MMHG

## 2022-12-13 DIAGNOSIS — I50.9 ACUTE ON CHRONIC CONGESTIVE HEART FAILURE, UNSPECIFIED HEART FAILURE TYPE (HCC): ICD-10-CM

## 2022-12-13 DIAGNOSIS — I42.9 CARDIOMYOPATHY, UNSPECIFIED TYPE (HCC): ICD-10-CM

## 2022-12-13 DIAGNOSIS — R93.89 ABNORMAL CT OF THE CHEST: ICD-10-CM

## 2022-12-13 DIAGNOSIS — R06.02 SHORTNESS OF BREATH: Primary | ICD-10-CM

## 2022-12-13 RX ORDER — TORSEMIDE 20 MG/1
20 TABLET ORAL DAILY
Qty: 30 TABLET | Refills: 3 | Status: SHIPPED | OUTPATIENT
Start: 2022-12-13
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
## 2022-12-14 RX ORDER — TORSEMIDE 20 MG/1
20 TABLET ORAL DAILY
Qty: 90 TABLET | OUTPATIENT
Start: 2022-12-14

## 2022-12-15 PROBLEM — I50.9 ACUTE ON CHRONIC CONGESTIVE HEART FAILURE (HCC): Status: ACTIVE | Noted: 2022-12-15

## 2022-12-22 ENCOUNTER — TELEPHONE (OUTPATIENT)
Dept: CARDIOLOGY CLINIC | Age: 77
End: 2022-12-22

## 2022-12-22 NOTE — TELEPHONE ENCOUNTER
Spoke with Kaci Giles and [facility] regarding diuretic  Pt agreeable to continue torsemide as discussed during 12/13/2022 OV    Stress test 01/09/20223  ECHO scheduled 01/18/2023    Pt will schedule appointment based off of results

## 2022-12-22 NOTE — TELEPHONE ENCOUNTER
Megan Schultz and his daughter Tomeka Webster called today wanting to speak with Nicolasa Bullock NP in regards to her switching Miguel's medication. Megan Schultz states that he wants to know her opinion on if he could continue taking the furosemide, he just had it refilled before seeing her in office. He is fine with staying on the current medication but it seems that he has some underlying questions. Tomeka Webster (daughter) asked to be the point of contact (will do a 3-way call) and her number is: [phone]        Per Nicolasa Bullock NP, 12/13 AVS:  START taking: Torsemide 20 MG (DEMADEX)  STOP taking:  furosemide 40 MG tablet (LASIX)  Accurate as of December 13, 2022 2:24 PM.  Review your updated medication list below.

## 2022-12-27 NOTE — TELEPHONE ENCOUNTER
Discussed with Galo and she would like him to increase torsemide to 40 mg daily. Called and spoke to David and let her know the recommendations. She is agreeable and will call on Thursday with an update.

## 2022-12-27 NOTE — TELEPHONE ENCOUNTER
Miguel's daughter Jayna Chou called in this morning, she states she feels that the torsemide is making his condition worse, he is having swelling of his ankles and feet, inability to sleep while laying flat, and he has a cough. She would like to speak with someone concerning this. She can be reached at [phone].

## 2022-12-28 ENCOUNTER — TELEPHONE (OUTPATIENT)
Dept: CARDIOLOGY CLINIC | Age: 77
End: 2022-12-28

## 2022-12-28 DIAGNOSIS — I50.41 ACUTE COMBINED SYSTOLIC AND DIASTOLIC HF (HEART FAILURE) (HCC): ICD-10-CM

## 2022-12-28 DIAGNOSIS — N18.32 STAGE 3B CHRONIC KIDNEY DISEASE (HCC): Primary | ICD-10-CM

## 2022-12-28 NOTE — TELEPHONE ENCOUNTER
Lissette Gonzalez called in today about Sofya Hernandez she stated that she had some questions about his cough with him having CHF and how to tell if he may just have a cold or if it is because of the CHF     Lissette Gonzalez can be reached at [phone]

## 2022-12-28 NOTE — TELEPHONE ENCOUNTER
Spoke to daughter Paddy Blackburn    Patient has not been weighing himself. He reports slight decrease in LE edema. + continued orthopnea  He has productive cough with white/yellowish color. No fevers. + chills   No known sick exposures. + nausea however daughter is not sure if this started before or after change to demadex. Continue increased dosage of demadex for now  Take with food to see if helps with nausea   Renal panel to check electrolytes  Reinforce need for daily weights  Continue low Na diet and fluid restrictions    Patient is scheduled to see pulmonary on 12/30/22. If symptoms have not started to improve and pulm feels still CHF OK to schedule pt for early next week.     Daughter verbalized understanding of instructions

## 2022-12-28 NOTE — TELEPHONE ENCOUNTER
Multiple calls regarding Torsemide (please see encounters)  Please advise per further recommendation

## 2022-12-28 NOTE — TELEPHONE ENCOUNTER
Raza Davis called in this afternoon, he has some questions about his torsemide, he states he is getting a upset stomach when taking the medication. Daisy Winters can be reached at [phone].

## 2022-12-30 ENCOUNTER — OFFICE VISIT (OUTPATIENT)
Dept: PULMONOLOGY | Age: 77
End: 2022-12-30
Payer: MEDICARE

## 2022-12-30 VITALS
DIASTOLIC BLOOD PRESSURE: 80 MMHG | HEIGHT: 72 IN | OXYGEN SATURATION: 97 % | WEIGHT: 267.8 LBS | RESPIRATION RATE: 16 BRPM | SYSTOLIC BLOOD PRESSURE: 124 MMHG | HEART RATE: 56 BPM | TEMPERATURE: 98.4 F | BODY MASS INDEX: 36.27 KG/M2

## 2022-12-30 DIAGNOSIS — J90 RECURRENT RIGHT PLEURAL EFFUSION: Primary | ICD-10-CM

## 2022-12-30 DIAGNOSIS — J18.9 PNEUMONIA OF RIGHT UPPER LOBE DUE TO INFECTIOUS ORGANISM: ICD-10-CM

## 2022-12-30 PROCEDURE — 99214 OFFICE O/P EST MOD 30 MIN: CPT | Performed by: INTERNAL MEDICINE

## 2022-12-30 PROCEDURE — G8484 FLU IMMUNIZE NO ADMIN: HCPCS | Performed by: INTERNAL MEDICINE

## 2022-12-30 PROCEDURE — 3074F SYST BP LT 130 MM HG: CPT | Performed by: INTERNAL MEDICINE

## 2022-12-30 PROCEDURE — 3079F DIAST BP 80-89 MM HG: CPT | Performed by: INTERNAL MEDICINE

## 2022-12-30 PROCEDURE — 1036F TOBACCO NON-USER: CPT | Performed by: INTERNAL MEDICINE

## 2022-12-30 PROCEDURE — G8417 CALC BMI ABV UP PARAM F/U: HCPCS | Performed by: INTERNAL MEDICINE

## 2022-12-30 PROCEDURE — 1123F ACP DISCUSS/DSCN MKR DOCD: CPT | Performed by: INTERNAL MEDICINE

## 2022-12-30 PROCEDURE — G8427 DOCREV CUR MEDS BY ELIG CLIN: HCPCS | Performed by: INTERNAL MEDICINE

## 2022-12-30 RX ORDER — AMOXICILLIN AND CLAVULANATE POTASSIUM 875; 125 MG/1; MG/1
1 TABLET, FILM COATED ORAL 2 TIMES DAILY
Qty: 28 TABLET | Refills: 0 | Status: SHIPPED | OUTPATIENT
Start: 2022-12-30 | End: 2023-01-09

## 2022-12-30 ASSESSMENT — ENCOUNTER SYMPTOMS: COUGH: 1

## 2022-12-30 NOTE — PROGRESS NOTES
[address], SLEEP, AND CRITICAL CARE    Simona Maki (:  1945) is a 69 y.o. male,New patient, here for evaluation of the following chief complaint(s):  New Patient, Follow-Up from Hospital, and Shortness of Breath         ASSESSMENT/PLAN:  1. Recurrent right pleural effusion  Assessment & Plan:   - Thickened pleura on the right otherwise effusion does not look loculated. 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 Now with nonexpandable lung would anticipate. At this time no role for repeat drainage as would only reaccumulate. Orders:  -     amoxicillin-clavulanate (AUGMENTIN) 875-125 MG per tablet; Take 1 tablet by mouth 2 times daily for 10 days, Disp-28 tablet, R-0Normal  2. Pneumonia of right upper lobe due to infectious organism  Assessment & Plan:   - Radiographic pattern much more consistent with pulmonary edema though patient does have increased sputum production and chills we will empirically cover with 10 days of Augmentin. If there is no's improvement patient will let us know we will likely need to repeat chest x-ray and change antibiotics. At that time if he is no better would also warrant reinvestigation of other causes such as pulmonary edema. Return in about 4 weeks (around 2023). Future Appointments   Date Time Provider Jessi Covarrubias   2023 11:00 AM [facility] RM 2 [facility] STRESS [facility]   2023 12:00 PM SCHEDULE, [facility] STRESS ROOM 2 [facility] STRESS [facility]   2023  2:30 PM SCHEDULE, [facility] ECHO ROOM 2 [facility] ECHO Bozena Severin HOD   2023 12:00 PM Blanca Flores MD [facility] PULM [facility]            Subjective   SUBJECTIVE/OBJECTIVE:  67-year-old presents for follow-up chronic pleural effusion. Patient was seen in the hospital earlier this year by my partners diagnosed with a complicated pleural effusion. Underwent drainage for 1 day with improvement in symptoms but chest tube fell out.   Cytology was negative cultures were negative but pleural fluid analysis consistent with exudate. He deferred further intervention including surgery at that time he was discharged. Recently presented back to the ER with shortness of breath productive cough and chills. Repeat CT scan at that time shows bilateral groundglass consistent with fluid overload versus acute inflammatory process. Persistent right-sided pleural effusion though evidence of loculations and air pockets had resolved. Patient states she has associated sputum production denies fevers has chills. Review of Systems   Constitutional:  Positive for chills. Negative for fever. Respiratory:  Positive for cough. Neurological: Negative. Psychiatric/Behavioral: Negative. Objective   Physical Exam     Gen:  No acute distress. Eyes: EOMI. Anicteric sclera. No conjunctival injection. ENT: No discharge. External appearance of ears and nose normal.  Neck: Trachea midline. No mass   Resp:  No crackles. Left-sided wheezing diminished breath sounds on the right wheezes. No rhonchi. No dullness on percussion. CV: Regular rate. Regular rhythm. No murmur or rub. No edema. Neuro:  no focal neurologic deficit. Moves all extremities  Psych: Awake and alert, Oriented x 3. Judgement and insight appropriate. Mood stable. CT chest images reviewed personally by me, interpretation as follows:  -Bilateral hazy groundglass consistent with acute pulmonary edema. Cannot rule out acute viral pneumonia that would be atypical.  There is a right-sided moderate pleural effusion with thickened pleura suspicious for an entrapped lung. An electronic signature was used to authenticate this note.     --Roger Jackson MD

## 2022-12-30 NOTE — PROGRESS NOTES
Called complains of stomach upset. He is not clear of his medications    Advised to stop the medication new antibiotics (augmentin). He is concerned another medication may be causing this. States torsemide causes nausea every day.    Time 8:30

## 2022-12-30 NOTE — ASSESSMENT & PLAN NOTE
- Radiographic pattern much more consistent with pulmonary edema though patient does have increased sputum production and chills we will empirically cover with 10 days of Augmentin. If there is no's improvement patient will let us know we will likely need to repeat chest x-ray and change antibiotics. At that time if he is no better would also warrant reinvestigation of other causes such as pulmonary edema.

## 2022-12-30 NOTE — TELEPHONE ENCOUNTER
Spoke with patient and his daughter, advised per Lashay Adrian to stop lisinopril, continue increased dose of Demadex today and tomorrow then reduce Demadex to 20 mg daily on Sunday (see results notes). Med list updated.

## 2022-12-30 NOTE — ASSESSMENT & PLAN NOTE
- Thickened pleura on the right otherwise effusion does not look loculated. 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 Now with nonexpandable lung would anticipate. At this time no role for repeat drainage as would only reaccumulate.

## 2022-12-30 NOTE — TELEPHONE ENCOUNTER
Miguel's daughter Tyrone Mccall called in this afternoon, she states they are calling to give an update on his torsemide. She can be reached at [phone].

## 2023-01-01 ENCOUNTER — APPOINTMENT (OUTPATIENT)
Dept: CT IMAGING | Age: 78
DRG: 216 | End: 2023-01-01
Attending: INTERNAL MEDICINE
Payer: MEDICARE

## 2023-01-01 ENCOUNTER — APPOINTMENT (OUTPATIENT)
Dept: GENERAL RADIOLOGY | Age: 78
DRG: 216 | End: 2023-01-01
Attending: INTERNAL MEDICINE
Payer: MEDICARE

## 2023-01-01 ENCOUNTER — APPOINTMENT (OUTPATIENT)
Dept: MRI IMAGING | Age: 78
DRG: 216 | End: 2023-01-01
Attending: INTERNAL MEDICINE
Payer: MEDICARE

## 2023-01-01 ENCOUNTER — HOSPITAL ENCOUNTER (INPATIENT)
Dept: CARDIAC CATH/INVASIVE PROCEDURES | Age: 78
LOS: 15 days | DRG: 216 | End: 2023-02-23
Attending: INTERNAL MEDICINE | Admitting: HOSPITALIST
Payer: MEDICARE

## 2023-01-01 VITALS
OXYGEN SATURATION: 95 % | WEIGHT: 234.13 LBS | HEART RATE: 79 BPM | BODY MASS INDEX: 31.03 KG/M2 | DIASTOLIC BLOOD PRESSURE: 36 MMHG | SYSTOLIC BLOOD PRESSURE: 108 MMHG | HEIGHT: 73 IN | RESPIRATION RATE: 18 BRPM | TEMPERATURE: 97.1 F

## 2023-01-01 DIAGNOSIS — R06.02 SOB (SHORTNESS OF BREATH): ICD-10-CM

## 2023-01-01 DIAGNOSIS — I50.22 CHRONIC SYSTOLIC CHF (CONGESTIVE HEART FAILURE), NYHA CLASS 2 (HCC): ICD-10-CM

## 2023-01-01 DIAGNOSIS — R60.0 LOWER EXTREMITY EDEMA: ICD-10-CM

## 2023-01-01 DIAGNOSIS — I10 ESSENTIAL HYPERTENSION: ICD-10-CM

## 2023-01-01 LAB
A/G RATIO: 0.9 (ref 1.1–2.2)
A/G RATIO: 1 (ref 1.1–2.2)
A/G RATIO: 1.1 (ref 1.1–2.2)
ACANTHOCYTES: ABNORMAL
ALBUMIN SERPL-MCNC: 2.9 G/DL (ref 3.4–5)
ALBUMIN SERPL-MCNC: 3.1 G/DL (ref 3.4–5)
ALBUMIN SERPL-MCNC: 3.1 G/DL (ref 3.4–5)
ALBUMIN SERPL-MCNC: 3.2 G/DL (ref 3.4–5)
ALBUMIN SERPL-MCNC: 3.2 G/DL (ref 3.4–5)
ALBUMIN SERPL-MCNC: 3.3 G/DL (ref 3.4–5)
ALBUMIN SERPL-MCNC: 3.4 G/DL (ref 3.4–5)
ALBUMIN SERPL-MCNC: 3.5 G/DL (ref 3.4–5)
ALP BLD-CCNC: 111 U/L (ref 40–129)
ALP BLD-CCNC: 114 U/L (ref 40–129)
ALP BLD-CCNC: 132 U/L (ref 40–129)
ALP BLD-CCNC: 143 U/L (ref 40–129)
ALT SERPL-CCNC: 14 U/L (ref 10–40)
ALT SERPL-CCNC: 15 U/L (ref 10–40)
ALT SERPL-CCNC: 21 U/L (ref 10–40)
ALT SERPL-CCNC: 22 U/L (ref 10–40)
AMMONIA: 44 UMOL/L (ref 16–60)
AMORPHOUS: ABNORMAL /HPF
ANION GAP SERPL CALCULATED.3IONS-SCNC: 11 MMOL/L (ref 3–16)
ANION GAP SERPL CALCULATED.3IONS-SCNC: 12 MMOL/L (ref 3–16)
ANION GAP SERPL CALCULATED.3IONS-SCNC: 13 MMOL/L (ref 3–16)
ANION GAP SERPL CALCULATED.3IONS-SCNC: 14 MMOL/L (ref 3–16)
ANION GAP SERPL CALCULATED.3IONS-SCNC: 15 MMOL/L (ref 3–16)
ANION GAP SERPL CALCULATED.3IONS-SCNC: 17 MMOL/L (ref 3–16)
ANION GAP SERPL CALCULATED.3IONS-SCNC: 18 MMOL/L (ref 3–16)
ANION GAP SERPL CALCULATED.3IONS-SCNC: 22 MMOL/L (ref 3–16)
ANION GAP SERPL CALCULATED.3IONS-SCNC: 9 MMOL/L (ref 3–16)
ANISOCYTOSIS: ABNORMAL
AST SERPL-CCNC: 36 U/L (ref 15–37)
AST SERPL-CCNC: 40 U/L (ref 15–37)
AST SERPL-CCNC: 40 U/L (ref 15–37)
AST SERPL-CCNC: 42 U/L (ref 15–37)
BACTERIA: ABNORMAL /HPF
BACTERIA: ABNORMAL /HPF
BANDED NEUTROPHILS RELATIVE PERCENT: 2 % (ref 0–7)
BANDED NEUTROPHILS RELATIVE PERCENT: 2 % (ref 0–7)
BASE EXCESS ARTERIAL: -0.3 MMOL/L (ref -3–3)
BASE EXCESS ARTERIAL: -1 (ref -3–3)
BASE EXCESS ARTERIAL: -2 (ref -3–3)
BASE EXCESS ARTERIAL: -3.4 MMOL/L (ref -3–3)
BASE EXCESS ARTERIAL: -4 (ref -3–3)
BASE EXCESS ARTERIAL: -4.1 MMOL/L (ref -3–3)
BASE EXCESS ARTERIAL: 0 (ref -3–3)
BASE EXCESS ARTERIAL: 0.9 MMOL/L (ref -3–3)
BASE EXCESS ARTERIAL: 1 (ref -3–3)
BASE EXCESS ARTERIAL: 1 MMOL/L (ref -3–3)
BASE EXCESS ARTERIAL: 1.4 MMOL/L (ref -3–3)
BASE EXCESS ARTERIAL: 1.4 MMOL/L (ref -3–3)
BASE EXCESS ARTERIAL: 3.4 MMOL/L (ref -3–3)
BASE EXCESS VENOUS: -2 (ref -3–3)
BASE EXCESS VENOUS: -3 (ref -3–3)
BASE EXCESS VENOUS: 0.2 MMOL/L
BASE EXCESS VENOUS: 1 MMOL/L
BASOPHILIC STIPPLING: ABNORMAL
BASOPHILS ABSOLUTE: 0 K/UL (ref 0–0.2)
BASOPHILS ABSOLUTE: 0.1 K/UL (ref 0–0.2)
BASOPHILS ABSOLUTE: 0.1 K/UL (ref 0–0.2)
BASOPHILS RELATIVE PERCENT: 0 %
BASOPHILS RELATIVE PERCENT: 0.1 %
BASOPHILS RELATIVE PERCENT: 0.1 %
BASOPHILS RELATIVE PERCENT: 0.2 %
BASOPHILS RELATIVE PERCENT: 0.2 %
BASOPHILS RELATIVE PERCENT: 0.3 %
BASOPHILS RELATIVE PERCENT: 0.4 %
BILIRUB SERPL-MCNC: 0.7 MG/DL (ref 0–1)
BILIRUB SERPL-MCNC: 0.8 MG/DL (ref 0–1)
BILIRUB SERPL-MCNC: 1.4 MG/DL (ref 0–1)
BILIRUB SERPL-MCNC: 1.7 MG/DL (ref 0–1)
BILIRUBIN DIRECT: 0.9 MG/DL (ref 0–0.3)
BILIRUBIN URINE: ABNORMAL
BILIRUBIN URINE: NEGATIVE
BILIRUBIN, INDIRECT: 0.8 MG/DL (ref 0–1)
BLOOD, URINE: ABNORMAL
BLOOD, URINE: ABNORMAL
BUN BLDV-MCNC: 100 MG/DL (ref 7–20)
BUN BLDV-MCNC: 111 MG/DL (ref 7–20)
BUN BLDV-MCNC: 136 MG/DL (ref 7–20)
BUN BLDV-MCNC: 137 MG/DL (ref 7–20)
BUN BLDV-MCNC: 147 MG/DL (ref 7–20)
BUN BLDV-MCNC: 160 MG/DL (ref 7–20)
BUN BLDV-MCNC: 24 MG/DL (ref 7–20)
BUN BLDV-MCNC: 25 MG/DL (ref 7–20)
BUN BLDV-MCNC: 26 MG/DL (ref 7–20)
BUN BLDV-MCNC: 29 MG/DL (ref 7–20)
BUN BLDV-MCNC: 31 MG/DL (ref 7–20)
BUN BLDV-MCNC: 32 MG/DL (ref 7–20)
BUN BLDV-MCNC: 32 MG/DL (ref 7–20)
BUN BLDV-MCNC: 33 MG/DL (ref 7–20)
BUN BLDV-MCNC: 36 MG/DL (ref 7–20)
BUN BLDV-MCNC: 37 MG/DL (ref 7–20)
BUN BLDV-MCNC: 40 MG/DL (ref 7–20)
BUN BLDV-MCNC: 40 MG/DL (ref 7–20)
BUN BLDV-MCNC: 45 MG/DL (ref 7–20)
BUN BLDV-MCNC: 46 MG/DL (ref 7–20)
BUN BLDV-MCNC: 48 MG/DL (ref 7–20)
BUN BLDV-MCNC: 50 MG/DL (ref 7–20)
BUN BLDV-MCNC: 60 MG/DL (ref 7–20)
BUN BLDV-MCNC: 79 MG/DL (ref 7–20)
BUN BLDV-MCNC: 83 MG/DL (ref 7–20)
BUN BLDV-MCNC: 83 MG/DL (ref 7–20)
CALCIUM IONIZED: 1.13 MMOL/L (ref 1.12–1.32)
CALCIUM IONIZED: 1.17 MMOL/L (ref 1.12–1.32)
CALCIUM IONIZED: 1.18 MMOL/L (ref 1.12–1.32)
CALCIUM IONIZED: 1.18 MMOL/L (ref 1.12–1.32)
CALCIUM IONIZED: 1.19 MMOL/L (ref 1.12–1.32)
CALCIUM IONIZED: 1.19 MMOL/L (ref 1.12–1.32)
CALCIUM IONIZED: 1.2 MMOL/L (ref 1.12–1.32)
CALCIUM IONIZED: 1.21 MMOL/L (ref 1.12–1.32)
CALCIUM IONIZED: 1.23 MMOL/L (ref 1.12–1.32)
CALCIUM IONIZED: 1.23 MMOL/L (ref 1.12–1.32)
CALCIUM IONIZED: 1.37 MMOL/L (ref 1.12–1.32)
CALCIUM SERPL-MCNC: 8.6 MG/DL (ref 8.3–10.6)
CALCIUM SERPL-MCNC: 8.7 MG/DL (ref 8.3–10.6)
CALCIUM SERPL-MCNC: 8.8 MG/DL (ref 8.3–10.6)
CALCIUM SERPL-MCNC: 8.8 MG/DL (ref 8.3–10.6)
CALCIUM SERPL-MCNC: 8.9 MG/DL (ref 8.3–10.6)
CALCIUM SERPL-MCNC: 9 MG/DL (ref 8.3–10.6)
CALCIUM SERPL-MCNC: 9.1 MG/DL (ref 8.3–10.6)
CALCIUM SERPL-MCNC: 9.2 MG/DL (ref 8.3–10.6)
CALCIUM SERPL-MCNC: 9.3 MG/DL (ref 8.3–10.6)
CALCIUM SERPL-MCNC: 9.3 MG/DL (ref 8.3–10.6)
CALCIUM SERPL-MCNC: 9.4 MG/DL (ref 8.3–10.6)
CALCIUM SERPL-MCNC: 9.4 MG/DL (ref 8.3–10.6)
CALCIUM SERPL-MCNC: 9.5 MG/DL (ref 8.3–10.6)
CARBOXYHEMOGLOBIN ARTERIAL: 0.5 % (ref 0–1.5)
CARBOXYHEMOGLOBIN ARTERIAL: 0.7 % (ref 0–1.5)
CARBOXYHEMOGLOBIN ARTERIAL: 0.8 % (ref 0–1.5)
CARBOXYHEMOGLOBIN ARTERIAL: 0.9 % (ref 0–1.5)
CARBOXYHEMOGLOBIN ARTERIAL: 1.2 % (ref 0–1.5)
CARBOXYHEMOGLOBIN ARTERIAL: 1.3 % (ref 0–1.5)
CARBOXYHEMOGLOBIN: 1.5 %
CARBOXYHEMOGLOBIN: 1.6 %
CHLORIDE BLD-SCNC: 100 MMOL/L (ref 99–110)
CHLORIDE BLD-SCNC: 101 MMOL/L (ref 99–110)
CHLORIDE BLD-SCNC: 102 MMOL/L (ref 99–110)
CHLORIDE BLD-SCNC: 103 MMOL/L (ref 99–110)
CHLORIDE BLD-SCNC: 103 MMOL/L (ref 99–110)
CHLORIDE BLD-SCNC: 104 MMOL/L (ref 99–110)
CHLORIDE BLD-SCNC: 95 MMOL/L (ref 99–110)
CHLORIDE BLD-SCNC: 95 MMOL/L (ref 99–110)
CHLORIDE BLD-SCNC: 96 MMOL/L (ref 99–110)
CHLORIDE BLD-SCNC: 97 MMOL/L (ref 99–110)
CHLORIDE BLD-SCNC: 98 MMOL/L (ref 99–110)
CHLORIDE BLD-SCNC: 99 MMOL/L (ref 99–110)
CHOLESTEROL, TOTAL: 84 MG/DL (ref 0–199)
CLARITY: ABNORMAL
CLARITY: ABNORMAL
CO2: 18 MMOL/L (ref 21–32)
CO2: 20 MMOL/L (ref 21–32)
CO2: 21 MMOL/L (ref 21–32)
CO2: 22 MMOL/L (ref 21–32)
CO2: 23 MMOL/L (ref 21–32)
CO2: 24 MMOL/L (ref 21–32)
CO2: 25 MMOL/L (ref 21–32)
CO2: 26 MMOL/L (ref 21–32)
CO2: 26 MMOL/L (ref 21–32)
CO2: 27 MMOL/L (ref 21–32)
COARSE CASTS, UA: ABNORMAL /LPF (ref 0–2)
COLOR: ABNORMAL
COLOR: YELLOW
COMMENT UA: ABNORMAL
COMMENT UA: ABNORMAL
CREAT SERPL-MCNC: 1.3 MG/DL (ref 0.8–1.3)
CREAT SERPL-MCNC: 1.6 MG/DL (ref 0.8–1.3)
CREAT SERPL-MCNC: 1.6 MG/DL (ref 0.8–1.3)
CREAT SERPL-MCNC: 1.7 MG/DL (ref 0.8–1.3)
CREAT SERPL-MCNC: 1.7 MG/DL (ref 0.8–1.3)
CREAT SERPL-MCNC: 1.8 MG/DL (ref 0.8–1.3)
CREAT SERPL-MCNC: 1.9 MG/DL (ref 0.8–1.3)
CREAT SERPL-MCNC: 2.1 MG/DL (ref 0.8–1.3)
CREAT SERPL-MCNC: 2.3 MG/DL (ref 0.8–1.3)
CREAT SERPL-MCNC: 2.3 MG/DL (ref 0.8–1.3)
CREAT SERPL-MCNC: 2.4 MG/DL (ref 0.8–1.3)
CREAT SERPL-MCNC: 2.4 MG/DL (ref 0.8–1.3)
CREAT SERPL-MCNC: 2.6 MG/DL (ref 0.8–1.3)
CREAT SERPL-MCNC: 2.6 MG/DL (ref 0.8–1.3)
CREAT SERPL-MCNC: 2.7 MG/DL (ref 0.8–1.3)
CREAT SERPL-MCNC: 2.7 MG/DL (ref 0.8–1.3)
CREAT SERPL-MCNC: 2.8 MG/DL (ref 0.8–1.3)
CREAT SERPL-MCNC: 2.8 MG/DL (ref 0.8–1.3)
CREAT SERPL-MCNC: 2.9 MG/DL (ref 0.8–1.3)
CREAT SERPL-MCNC: 3.5 MG/DL (ref 0.8–1.3)
CREAT SERPL-MCNC: 3.7 MG/DL (ref 0.8–1.3)
CREAT SERPL-MCNC: 4 MG/DL (ref 0.8–1.3)
CREAT SERPL-MCNC: 4.2 MG/DL (ref 0.8–1.3)
CREAT SERPL-MCNC: 4.6 MG/DL (ref 0.8–1.3)
CREAT SERPL-MCNC: 4.8 MG/DL (ref 0.8–1.3)
CULTURE, RESPIRATORY: NORMAL
CULTURE, RESPIRATORY: NORMAL
EOSINOPHILS ABSOLUTE: 0 K/UL (ref 0–0.6)
EOSINOPHILS ABSOLUTE: 0.1 K/UL (ref 0–0.6)
EOSINOPHILS RELATIVE PERCENT: 0 %
EOSINOPHILS RELATIVE PERCENT: 0.1 %
EOSINOPHILS RELATIVE PERCENT: 0.9 %
EPITHELIAL CELLS, UA: 1 /HPF (ref 0–5)
EPITHELIAL CELLS, UA: 10 /HPF (ref 0–5)
ESTIMATED AVERAGE GLUCOSE: 105.4 MG/DL
FERRITIN: 222.8 NG/ML (ref 30–400)
GFR SERPL CREATININE-BSD FRML MDRD: 12 ML/MIN/{1.73_M2}
GFR SERPL CREATININE-BSD FRML MDRD: 12 ML/MIN/{1.73_M2}
GFR SERPL CREATININE-BSD FRML MDRD: 14 ML/MIN/{1.73_M2}
GFR SERPL CREATININE-BSD FRML MDRD: 15 ML/MIN/{1.73_M2}
GFR SERPL CREATININE-BSD FRML MDRD: 16 ML/MIN/{1.73_M2}
GFR SERPL CREATININE-BSD FRML MDRD: 17 ML/MIN/{1.73_M2}
GFR SERPL CREATININE-BSD FRML MDRD: 22 ML/MIN/{1.73_M2}
GFR SERPL CREATININE-BSD FRML MDRD: 23 ML/MIN/{1.73_M2}
GFR SERPL CREATININE-BSD FRML MDRD: 23 ML/MIN/{1.73_M2}
GFR SERPL CREATININE-BSD FRML MDRD: 24 ML/MIN/{1.73_M2}
GFR SERPL CREATININE-BSD FRML MDRD: 24 ML/MIN/{1.73_M2}
GFR SERPL CREATININE-BSD FRML MDRD: 25 ML/MIN/{1.73_M2}
GFR SERPL CREATININE-BSD FRML MDRD: 25 ML/MIN/{1.73_M2}
GFR SERPL CREATININE-BSD FRML MDRD: 27 ML/MIN/{1.73_M2}
GFR SERPL CREATININE-BSD FRML MDRD: 27 ML/MIN/{1.73_M2}
GFR SERPL CREATININE-BSD FRML MDRD: 29 ML/MIN/{1.73_M2}
GFR SERPL CREATININE-BSD FRML MDRD: 29 ML/MIN/{1.73_M2}
GFR SERPL CREATININE-BSD FRML MDRD: 32 ML/MIN/{1.73_M2}
GFR SERPL CREATININE-BSD FRML MDRD: 36 ML/MIN/{1.73_M2}
GFR SERPL CREATININE-BSD FRML MDRD: 38 ML/MIN/{1.73_M2}
GFR SERPL CREATININE-BSD FRML MDRD: 41 ML/MIN/{1.73_M2}
GFR SERPL CREATININE-BSD FRML MDRD: 41 ML/MIN/{1.73_M2}
GFR SERPL CREATININE-BSD FRML MDRD: 44 ML/MIN/{1.73_M2}
GFR SERPL CREATININE-BSD FRML MDRD: 44 ML/MIN/{1.73_M2}
GFR SERPL CREATININE-BSD FRML MDRD: 57 ML/MIN/{1.73_M2}
GLUCOSE BLD-MCNC: 100 MG/DL (ref 70–99)
GLUCOSE BLD-MCNC: 104 MG/DL (ref 70–99)
GLUCOSE BLD-MCNC: 104 MG/DL (ref 70–99)
GLUCOSE BLD-MCNC: 105 MG/DL (ref 70–99)
GLUCOSE BLD-MCNC: 106 MG/DL (ref 70–99)
GLUCOSE BLD-MCNC: 109 MG/DL (ref 70–99)
GLUCOSE BLD-MCNC: 110 MG/DL (ref 70–99)
GLUCOSE BLD-MCNC: 111 MG/DL (ref 70–99)
GLUCOSE BLD-MCNC: 111 MG/DL (ref 70–99)
GLUCOSE BLD-MCNC: 112 MG/DL (ref 70–99)
GLUCOSE BLD-MCNC: 117 MG/DL (ref 70–99)
GLUCOSE BLD-MCNC: 118 MG/DL (ref 70–99)
GLUCOSE BLD-MCNC: 119 MG/DL (ref 70–99)
GLUCOSE BLD-MCNC: 121 MG/DL (ref 70–99)
GLUCOSE BLD-MCNC: 122 MG/DL (ref 70–99)
GLUCOSE BLD-MCNC: 122 MG/DL (ref 70–99)
GLUCOSE BLD-MCNC: 126 MG/DL (ref 70–99)
GLUCOSE BLD-MCNC: 127 MG/DL (ref 70–99)
GLUCOSE BLD-MCNC: 131 MG/DL (ref 70–99)
GLUCOSE BLD-MCNC: 132 MG/DL (ref 70–99)
GLUCOSE BLD-MCNC: 135 MG/DL (ref 70–99)
GLUCOSE BLD-MCNC: 138 MG/DL (ref 70–99)
GLUCOSE BLD-MCNC: 139 MG/DL (ref 70–99)
GLUCOSE BLD-MCNC: 142 MG/DL (ref 70–99)
GLUCOSE BLD-MCNC: 152 MG/DL (ref 70–99)
GLUCOSE BLD-MCNC: 154 MG/DL (ref 70–99)
GLUCOSE BLD-MCNC: 156 MG/DL (ref 70–99)
GLUCOSE BLD-MCNC: 161 MG/DL (ref 70–99)
GLUCOSE BLD-MCNC: 170 MG/DL (ref 70–99)
GLUCOSE BLD-MCNC: 79 MG/DL (ref 70–99)
GLUCOSE BLD-MCNC: 98 MG/DL (ref 70–99)
GLUCOSE URINE: NEGATIVE MG/DL
GLUCOSE URINE: NEGATIVE MG/DL
GRAM STAIN RESULT: NORMAL
GRAM STAIN RESULT: NORMAL
HAV IGM SER IA-ACNC: NORMAL
HBA1C MFR BLD: 5.3 %
HBV SURFACE AB TITR SER: <3.5 MIU/ML
HCO3 ARTERIAL: 23 MMOL/L (ref 21–29)
HCO3 ARTERIAL: 23.3 MMOL/L (ref 21–29)
HCO3 ARTERIAL: 23.3 MMOL/L (ref 21–29)
HCO3 ARTERIAL: 23.5 MMOL/L (ref 21–29)
HCO3 ARTERIAL: 24.1 MMOL/L (ref 21–29)
HCO3 ARTERIAL: 24.2 MMOL/L (ref 21–29)
HCO3 ARTERIAL: 24.3 MMOL/L (ref 21–29)
HCO3 ARTERIAL: 24.4 MMOL/L (ref 21–29)
HCO3 ARTERIAL: 24.5 MMOL/L (ref 21–29)
HCO3 ARTERIAL: 25.1 MMOL/L (ref 21–29)
HCO3 ARTERIAL: 25.2 MMOL/L (ref 21–29)
HCO3 ARTERIAL: 25.5 MMOL/L (ref 21–29)
HCO3 ARTERIAL: 25.7 MMOL/L (ref 21–29)
HCO3 ARTERIAL: 25.8 MMOL/L (ref 21–29)
HCO3 ARTERIAL: 25.9 MMOL/L (ref 21–29)
HCO3 ARTERIAL: 26.1 MMOL/L (ref 21–29)
HCO3 ARTERIAL: 26.1 MMOL/L (ref 21–29)
HCO3 ARTERIAL: 27.1 MMOL/L (ref 21–29)
HCO3 ARTERIAL: 27.4 MMOL/L (ref 21–29)
HCO3 ARTERIAL: 27.5 MMOL/L (ref 21–29)
HCO3 ARTERIAL: 30.2 MMOL/L (ref 21–29)
HCO3 VENOUS: 24.4 MMOL/L (ref 23–29)
HCO3 VENOUS: 25.1 MMOL/L (ref 23–29)
HCO3 VENOUS: 27 MMOL/L (ref 23–29)
HCO3 VENOUS: 28 MMOL/L (ref 23–29)
HCT VFR BLD CALC: 26 % (ref 40.5–52.5)
HCT VFR BLD CALC: 29.5 % (ref 40.5–52.5)
HCT VFR BLD CALC: 30.8 % (ref 40.5–52.5)
HCT VFR BLD CALC: 31.3 % (ref 40.5–52.5)
HCT VFR BLD CALC: 31.5 % (ref 40.5–52.5)
HCT VFR BLD CALC: 31.7 % (ref 40.5–52.5)
HCT VFR BLD CALC: 32.1 % (ref 40.5–52.5)
HCT VFR BLD CALC: 32.2 % (ref 40.5–52.5)
HCT VFR BLD CALC: 32.4 % (ref 40.5–52.5)
HCT VFR BLD CALC: 32.6 % (ref 40.5–52.5)
HCT VFR BLD CALC: 32.9 % (ref 40.5–52.5)
HCT VFR BLD CALC: 35.4 % (ref 40.5–52.5)
HCT VFR BLD CALC: 35.4 % (ref 40.5–52.5)
HCT VFR BLD CALC: 36.6 % (ref 40.5–52.5)
HCT VFR BLD CALC: 40.5 % (ref 40.5–52.5)
HCT VFR BLD CALC: 42.3 % (ref 40.5–52.5)
HDLC SERPL-MCNC: 34 MG/DL (ref 40–60)
HEMATOLOGY PATH CONSULT: NO
HEMATOLOGY PATH CONSULT: NO
HEMOGLOBIN, ART, EXTENDED: 10.2 G/DL (ref 13.5–17.5)
HEMOGLOBIN, ART, EXTENDED: 10.4 G/DL (ref 13.5–17.5)
HEMOGLOBIN, ART, EXTENDED: 11.3 G/DL (ref 13.5–17.5)
HEMOGLOBIN, ART, EXTENDED: 11.5 G/DL (ref 13.5–17.5)
HEMOGLOBIN, ART, EXTENDED: 11.6 G/DL (ref 13.5–17.5)
HEMOGLOBIN, ART, EXTENDED: 11.9 G/DL (ref 13.5–17.5)
HEMOGLOBIN, ART, EXTENDED: 12.4 G/DL (ref 13.5–17.5)
HEMOGLOBIN, ART, EXTENDED: 13.2 G/DL (ref 13.5–17.5)
HEMOGLOBIN: 10.1 G/DL (ref 13.5–17.5)
HEMOGLOBIN: 10.1 G/DL (ref 13.5–17.5)
HEMOGLOBIN: 10.3 G/DL (ref 13.5–17.5)
HEMOGLOBIN: 10.3 G/DL (ref 13.5–17.5)
HEMOGLOBIN: 10.4 G/DL (ref 13.5–17.5)
HEMOGLOBIN: 10.5 G/DL (ref 13.5–17.5)
HEMOGLOBIN: 10.6 G/DL (ref 13.5–17.5)
HEMOGLOBIN: 10.7 G/DL (ref 13.5–17.5)
HEMOGLOBIN: 10.8 G/DL (ref 13.5–17.5)
HEMOGLOBIN: 10.9 G/DL (ref 13.5–17.5)
HEMOGLOBIN: 11.3 G/DL (ref 13.5–17.5)
HEMOGLOBIN: 11.6 G/DL (ref 13.5–17.5)
HEMOGLOBIN: 13 G/DL (ref 13.5–17.5)
HEMOGLOBIN: 13.5 G/DL (ref 13.5–17.5)
HEMOGLOBIN: 13.7 GM/DL (ref 13.5–17.5)
HEMOGLOBIN: 14 GM/DL (ref 13.5–17.5)
HEMOGLOBIN: 8.4 G/DL (ref 13.5–17.5)
HEMOGLOBIN: 9.4 G/DL (ref 13.5–17.5)
HEPARIN INDUCED PLATELET ANTIBODY: NEGATIVE
HEPATITIS B CORE IGM ANTIBODY: NORMAL
HEPATITIS B SURFACE ANTIGEN INTERPRETATION: NORMAL
HEPATITIS C ANTIBODY INTERPRETATION: NORMAL
HYALINE CASTS: 2 /LPF (ref 0–8)
HYALINE CASTS: 6 /LPF (ref 0–8)
INR BLD: 1.62 (ref 0.87–1.14)
INR BLD: 1.73 (ref 0.87–1.14)
IRON SATURATION: 16 % (ref 20–50)
IRON: 42 UG/DL (ref 59–158)
KETONES, URINE: ABNORMAL MG/DL
KETONES, URINE: NEGATIVE MG/DL
LACTATE: 1.04 MMOL/L (ref 0.4–2)
LACTATE: 2.51 MMOL/L (ref 0.4–2)
LDL CHOLESTEROL CALCULATED: 38 MG/DL
LEUKOCYTE ESTERASE, URINE: ABNORMAL
LEUKOCYTE ESTERASE, URINE: ABNORMAL
LYMPHOCYTES ABSOLUTE: 0 K/UL (ref 1–5.1)
LYMPHOCYTES ABSOLUTE: 0.2 K/UL (ref 1–5.1)
LYMPHOCYTES ABSOLUTE: 0.3 K/UL (ref 1–5.1)
LYMPHOCYTES ABSOLUTE: 0.3 K/UL (ref 1–5.1)
LYMPHOCYTES ABSOLUTE: 0.4 K/UL (ref 1–5.1)
LYMPHOCYTES ABSOLUTE: 0.4 K/UL (ref 1–5.1)
LYMPHOCYTES ABSOLUTE: 0.6 K/UL (ref 1–5.1)
LYMPHOCYTES ABSOLUTE: 0.8 K/UL (ref 1–5.1)
LYMPHOCYTES ABSOLUTE: 0.8 K/UL (ref 1–5.1)
LYMPHOCYTES RELATIVE PERCENT: 0 %
LYMPHOCYTES RELATIVE PERCENT: 1.4 %
LYMPHOCYTES RELATIVE PERCENT: 1.5 %
LYMPHOCYTES RELATIVE PERCENT: 1.8 %
LYMPHOCYTES RELATIVE PERCENT: 2 %
LYMPHOCYTES RELATIVE PERCENT: 2.2 %
LYMPHOCYTES RELATIVE PERCENT: 2.7 %
LYMPHOCYTES RELATIVE PERCENT: 3 %
LYMPHOCYTES RELATIVE PERCENT: 3.5 %
LYMPHOCYTES RELATIVE PERCENT: 5 %
LYMPHOCYTES RELATIVE PERCENT: 5.1 %
MAGNESIUM: 2 MG/DL (ref 1.8–2.4)
MAGNESIUM: 2 MG/DL (ref 1.8–2.4)
MAGNESIUM: 2.3 MG/DL (ref 1.8–2.4)
MAGNESIUM: 2.4 MG/DL (ref 1.8–2.4)
MAGNESIUM: 2.5 MG/DL (ref 1.8–2.4)
MAGNESIUM: 2.6 MG/DL (ref 1.8–2.4)
MAGNESIUM: 2.6 MG/DL (ref 1.8–2.4)
MAGNESIUM: 2.7 MG/DL (ref 1.8–2.4)
MAGNESIUM: 2.7 MG/DL (ref 1.8–2.4)
MCH RBC QN AUTO: 30.3 PG (ref 26–34)
MCH RBC QN AUTO: 30.5 PG (ref 26–34)
MCH RBC QN AUTO: 30.7 PG (ref 26–34)
MCH RBC QN AUTO: 30.8 PG (ref 26–34)
MCH RBC QN AUTO: 30.8 PG (ref 26–34)
MCH RBC QN AUTO: 31 PG (ref 26–34)
MCH RBC QN AUTO: 31 PG (ref 26–34)
MCH RBC QN AUTO: 31.2 PG (ref 26–34)
MCH RBC QN AUTO: 31.2 PG (ref 26–34)
MCH RBC QN AUTO: 31.3 PG (ref 26–34)
MCH RBC QN AUTO: 31.4 PG (ref 26–34)
MCH RBC QN AUTO: 31.5 PG (ref 26–34)
MCH RBC QN AUTO: 32.1 PG (ref 26–34)
MCH RBC QN AUTO: 32.9 PG (ref 26–34)
MCHC RBC AUTO-ENTMCNC: 30.6 G/DL (ref 31–36)
MCHC RBC AUTO-ENTMCNC: 31.8 G/DL (ref 31–36)
MCHC RBC AUTO-ENTMCNC: 31.9 G/DL (ref 31–36)
MCHC RBC AUTO-ENTMCNC: 31.9 G/DL (ref 31–36)
MCHC RBC AUTO-ENTMCNC: 32 G/DL (ref 31–36)
MCHC RBC AUTO-ENTMCNC: 32 G/DL (ref 31–36)
MCHC RBC AUTO-ENTMCNC: 32.1 G/DL (ref 31–36)
MCHC RBC AUTO-ENTMCNC: 32.1 G/DL (ref 31–36)
MCHC RBC AUTO-ENTMCNC: 32.3 G/DL (ref 31–36)
MCHC RBC AUTO-ENTMCNC: 32.5 G/DL (ref 31–36)
MCHC RBC AUTO-ENTMCNC: 32.7 G/DL (ref 31–36)
MCHC RBC AUTO-ENTMCNC: 32.7 G/DL (ref 31–36)
MCHC RBC AUTO-ENTMCNC: 32.8 G/DL (ref 31–36)
MCHC RBC AUTO-ENTMCNC: 34.5 G/DL (ref 31–36)
MCV RBC AUTO: 100.2 FL (ref 80–100)
MCV RBC AUTO: 94.7 FL (ref 80–100)
MCV RBC AUTO: 94.8 FL (ref 80–100)
MCV RBC AUTO: 95 FL (ref 80–100)
MCV RBC AUTO: 95 FL (ref 80–100)
MCV RBC AUTO: 95.4 FL (ref 80–100)
MCV RBC AUTO: 95.4 FL (ref 80–100)
MCV RBC AUTO: 95.7 FL (ref 80–100)
MCV RBC AUTO: 96.4 FL (ref 80–100)
MCV RBC AUTO: 96.7 FL (ref 80–100)
MCV RBC AUTO: 96.8 FL (ref 80–100)
MCV RBC AUTO: 97.7 FL (ref 80–100)
MCV RBC AUTO: 97.8 FL (ref 80–100)
MCV RBC AUTO: 98 FL (ref 80–100)
MCV RBC AUTO: 99 FL (ref 80–100)
MCV RBC AUTO: 99 FL (ref 80–100)
METAMYELOCYTES RELATIVE PERCENT: 1 %
METHEMOGLOBIN ARTERIAL: 0.2 %
METHEMOGLOBIN ARTERIAL: 0.2 %
METHEMOGLOBIN ARTERIAL: 0.4 %
METHEMOGLOBIN ARTERIAL: 0.4 %
METHEMOGLOBIN ARTERIAL: 0.5 %
METHEMOGLOBIN ARTERIAL: 0.6 %
METHEMOGLOBIN ARTERIAL: 0.7 %
METHEMOGLOBIN ARTERIAL: 0.7 %
METHEMOGLOBIN VENOUS: 0.5 %
METHEMOGLOBIN VENOUS: 0.6 %
MICROSCOPIC EXAMINATION: YES
MICROSCOPIC EXAMINATION: YES
MONOCYTES ABSOLUTE: 0.6 K/UL (ref 0–1.3)
MONOCYTES ABSOLUTE: 0.7 K/UL (ref 0–1.3)
MONOCYTES ABSOLUTE: 0.8 K/UL (ref 0–1.3)
MONOCYTES ABSOLUTE: 0.9 K/UL (ref 0–1.3)
MONOCYTES ABSOLUTE: 1.4 K/UL (ref 0–1.3)
MONOCYTES ABSOLUTE: 1.5 K/UL (ref 0–1.3)
MONOCYTES ABSOLUTE: 1.8 K/UL (ref 0–1.3)
MONOCYTES RELATIVE PERCENT: 10 %
MONOCYTES RELATIVE PERCENT: 10.2 %
MONOCYTES RELATIVE PERCENT: 4 %
MONOCYTES RELATIVE PERCENT: 4.4 %
MONOCYTES RELATIVE PERCENT: 4.8 %
MONOCYTES RELATIVE PERCENT: 4.8 %
MONOCYTES RELATIVE PERCENT: 5.6 %
MONOCYTES RELATIVE PERCENT: 6 %
MONOCYTES RELATIVE PERCENT: 6.4 %
MONOCYTES RELATIVE PERCENT: 6.8 %
MONOCYTES RELATIVE PERCENT: 9 %
MRSA SCREEN RT-PCR: NORMAL
MUCUS: ABNORMAL /LPF
MUCUS: ABNORMAL /LPF
MYELOCYTE PERCENT: 3 %
NEUTROPHILS ABSOLUTE: 12.1 K/UL (ref 1.7–7.7)
NEUTROPHILS ABSOLUTE: 12.2 K/UL (ref 1.7–7.7)
NEUTROPHILS ABSOLUTE: 12.4 K/UL (ref 1.7–7.7)
NEUTROPHILS ABSOLUTE: 12.9 K/UL (ref 1.7–7.7)
NEUTROPHILS ABSOLUTE: 13.5 K/UL (ref 1.7–7.7)
NEUTROPHILS ABSOLUTE: 13.5 K/UL (ref 1.7–7.7)
NEUTROPHILS ABSOLUTE: 14 K/UL (ref 1.7–7.7)
NEUTROPHILS ABSOLUTE: 15 K/UL (ref 1.7–7.7)
NEUTROPHILS ABSOLUTE: 15.1 K/UL (ref 1.7–7.7)
NEUTROPHILS ABSOLUTE: 15.1 K/UL (ref 1.7–7.7)
NEUTROPHILS ABSOLUTE: 9.2 K/UL (ref 1.7–7.7)
NEUTROPHILS RELATIVE PERCENT: 82 %
NEUTROPHILS RELATIVE PERCENT: 83.7 %
NEUTROPHILS RELATIVE PERCENT: 85.8 %
NEUTROPHILS RELATIVE PERCENT: 89 %
NEUTROPHILS RELATIVE PERCENT: 90.7 %
NEUTROPHILS RELATIVE PERCENT: 91.1 %
NEUTROPHILS RELATIVE PERCENT: 91.9 %
NEUTROPHILS RELATIVE PERCENT: 93.2 %
NEUTROPHILS RELATIVE PERCENT: 93.5 %
NEUTROPHILS RELATIVE PERCENT: 94 %
NEUTROPHILS RELATIVE PERCENT: 94 %
NITRITE, URINE: NEGATIVE
NITRITE, URINE: NEGATIVE
O2 SAT, ARTERIAL: 100 % (ref 93–100)
O2 SAT, ARTERIAL: 87.6 %
O2 SAT, ARTERIAL: 90 % (ref 93–100)
O2 SAT, ARTERIAL: 92 % (ref 93–100)
O2 SAT, ARTERIAL: 95 % (ref 93–100)
O2 SAT, ARTERIAL: 96 % (ref 93–100)
O2 SAT, ARTERIAL: 96.2 %
O2 SAT, ARTERIAL: 97.9 %
O2 SAT, ARTERIAL: 98 % (ref 93–100)
O2 SAT, ARTERIAL: 98.6 %
O2 SAT, ARTERIAL: 98.7 %
O2 SAT, ARTERIAL: 98.8 %
O2 SAT, ARTERIAL: 99 % (ref 93–100)
O2 SAT, ARTERIAL: 99.5 %
O2 SAT, ARTERIAL: >100 %
O2 SAT, VEN: 71 %
O2 SAT, VEN: 73 %
O2 SAT, VEN: 74 %
O2 SAT, VEN: 75 %
O2 THERAPY: ABNORMAL
OCCULT BLOOD SCREENING: ABNORMAL
OVALOCYTES: ABNORMAL
PCO2 ARTERIAL: 32.2 MM HG (ref 35–45)
PCO2 ARTERIAL: 34 MM HG (ref 35–45)
PCO2 ARTERIAL: 38.8 MMHG (ref 35–45)
PCO2 ARTERIAL: 39.4 MMHG (ref 35–45)
PCO2 ARTERIAL: 40.8 MM HG (ref 35–45)
PCO2 ARTERIAL: 41 MMHG (ref 35–45)
PCO2 ARTERIAL: 44.1 MM HG (ref 35–45)
PCO2 ARTERIAL: 46.3 MM HG (ref 35–45)
PCO2 ARTERIAL: 46.8 MMHG (ref 35–45)
PCO2 ARTERIAL: 47 MM HG (ref 35–45)
PCO2 ARTERIAL: 47.3 MM HG (ref 35–45)
PCO2 ARTERIAL: 47.7 MMHG (ref 35–45)
PCO2 ARTERIAL: 48.7 MM HG (ref 35–45)
PCO2 ARTERIAL: 48.8 MMHG (ref 35–45)
PCO2 ARTERIAL: 49.5 MM HG (ref 35–45)
PCO2 ARTERIAL: 50 MMHG (ref 35–45)
PCO2 ARTERIAL: 51.7 MM HG (ref 35–45)
PCO2 ARTERIAL: 53.5 MMHG (ref 35–45)
PCO2 ARTERIAL: 54.7 MM HG (ref 35–45)
PCO2 ARTERIAL: 54.8 MM HG (ref 35–45)
PCO2 ARTERIAL: 56.5 MM HG (ref 35–45)
PCO2 ARTERIAL: 57.9 MM HG (ref 35–45)
PCO2 ARTERIAL: 95.4 MM HG (ref 35–45)
PCO2, VEN: 49.3 MMHG (ref 40–50)
PCO2, VEN: 53.9 MMHG (ref 40–50)
PCO2, VEN: 55.7 MM HG (ref 40–50)
PCO2, VEN: 59.3 MM HG (ref 40–50)
PDW BLD-RTO: 16.8 % (ref 12.4–15.4)
PDW BLD-RTO: 16.9 % (ref 12.4–15.4)
PDW BLD-RTO: 17.2 % (ref 12.4–15.4)
PDW BLD-RTO: 17.2 % (ref 12.4–15.4)
PDW BLD-RTO: 17.3 % (ref 12.4–15.4)
PDW BLD-RTO: 17.5 % (ref 12.4–15.4)
PDW BLD-RTO: 17.6 % (ref 12.4–15.4)
PDW BLD-RTO: 17.7 % (ref 12.4–15.4)
PDW BLD-RTO: 17.8 % (ref 12.4–15.4)
PERFORMED ON: ABNORMAL
PERFORMED ON: NORMAL
PH ARTERIAL: 7.11 (ref 7.35–7.45)
PH ARTERIAL: 7.23 (ref 7.35–7.45)
PH ARTERIAL: 7.25 (ref 7.35–7.45)
PH ARTERIAL: 7.27 (ref 7.35–7.45)
PH ARTERIAL: 7.27 (ref 7.35–7.45)
PH ARTERIAL: 7.28 (ref 7.35–7.45)
PH ARTERIAL: 7.29 (ref 7.35–7.45)
PH ARTERIAL: 7.3 (ref 7.35–7.45)
PH ARTERIAL: 7.3 (ref 7.35–7.45)
PH ARTERIAL: 7.32 (ref 7.35–7.45)
PH ARTERIAL: 7.32 (ref 7.35–7.45)
PH ARTERIAL: 7.34 (ref 7.35–7.45)
PH ARTERIAL: 7.34 (ref 7.35–7.45)
PH ARTERIAL: 7.35 (ref 7.35–7.45)
PH ARTERIAL: 7.35 (ref 7.35–7.45)
PH ARTERIAL: 7.36 (ref 7.35–7.45)
PH ARTERIAL: 7.36 (ref 7.35–7.45)
PH ARTERIAL: 7.38 (ref 7.35–7.45)
PH ARTERIAL: 7.41 (ref 7.35–7.45)
PH ARTERIAL: 7.42 (ref 7.35–7.45)
PH ARTERIAL: 7.44 (ref 7.35–7.45)
PH ARTERIAL: 7.46 (ref 7.35–7.45)
PH ARTERIAL: 7.47 (ref 7.35–7.45)
PH UA: 5 (ref 5–8)
PH UA: 5 (ref 5–8)
PH VENOUS: 7.21 (ref 7.35–7.45)
PH VENOUS: 7.22 (ref 7.35–7.45)
PH VENOUS: 7.26 (ref 7.35–7.45)
PH VENOUS: 7.29 (ref 7.35–7.45)
PH VENOUS: 7.29 (ref 7.35–7.45)
PH VENOUS: 7.3 (ref 7.35–7.45)
PH VENOUS: 7.31 (ref 7.35–7.45)
PH VENOUS: 7.32 (ref 7.35–7.45)
PH VENOUS: 7.33 (ref 7.35–7.45)
PH VENOUS: 7.34 (ref 7.35–7.45)
PH VENOUS: 7.39 (ref 7.35–7.45)
PH VENOUS: 7.4 (ref 7.35–7.45)
PH VENOUS: 7.4 (ref 7.35–7.45)
PH VENOUS: 7.43 (ref 7.35–7.45)
PH VENOUS: 7.44 (ref 7.35–7.45)
PHOSPHORUS: 2.6 MG/DL (ref 2.5–4.9)
PHOSPHORUS: 2.6 MG/DL (ref 2.5–4.9)
PHOSPHORUS: 2.8 MG/DL (ref 2.5–4.9)
PHOSPHORUS: 2.9 MG/DL (ref 2.5–4.9)
PHOSPHORUS: 3.2 MG/DL (ref 2.5–4.9)
PHOSPHORUS: 3.4 MG/DL (ref 2.5–4.9)
PHOSPHORUS: 3.4 MG/DL (ref 2.5–4.9)
PHOSPHORUS: 3.7 MG/DL (ref 2.5–4.9)
PHOSPHORUS: 3.7 MG/DL (ref 2.5–4.9)
PHOSPHORUS: 3.8 MG/DL (ref 2.5–4.9)
PHOSPHORUS: 3.8 MG/DL (ref 2.5–4.9)
PHOSPHORUS: 3.9 MG/DL (ref 2.5–4.9)
PHOSPHORUS: 4.4 MG/DL (ref 2.5–4.9)
PHOSPHORUS: 4.8 MG/DL (ref 2.5–4.9)
PHOSPHORUS: 5.3 MG/DL (ref 2.5–4.9)
PHOSPHORUS: 5.4 MG/DL (ref 2.5–4.9)
PHOSPHORUS: 6.7 MG/DL (ref 2.5–4.9)
PLATELET # BLD: 104 K/UL (ref 135–450)
PLATELET # BLD: 110 K/UL (ref 135–450)
PLATELET # BLD: 128 K/UL (ref 135–450)
PLATELET # BLD: 132 K/UL (ref 135–450)
PLATELET # BLD: 155 K/UL (ref 135–450)
PLATELET # BLD: 161 K/UL (ref 135–450)
PLATELET # BLD: 171 K/UL (ref 135–450)
PLATELET # BLD: 183 K/UL (ref 135–450)
PLATELET # BLD: 205 K/UL (ref 135–450)
PLATELET # BLD: 211 K/UL (ref 135–450)
PLATELET # BLD: 219 K/UL (ref 135–450)
PLATELET # BLD: 247 K/UL (ref 135–450)
PLATELET # BLD: 66 K/UL (ref 135–450)
PLATELET # BLD: 89 K/UL (ref 135–450)
PLATELET # BLD: 92 K/UL (ref 135–450)
PLATELET # BLD: 97 K/UL (ref 135–450)
PLATELET SLIDE REVIEW: ABNORMAL
PMV BLD AUTO: 7.9 FL (ref 5–10.5)
PMV BLD AUTO: 8.1 FL (ref 5–10.5)
PMV BLD AUTO: 8.3 FL (ref 5–10.5)
PMV BLD AUTO: 8.4 FL (ref 5–10.5)
PMV BLD AUTO: 8.4 FL (ref 5–10.5)
PMV BLD AUTO: 8.8 FL (ref 5–10.5)
PMV BLD AUTO: 8.9 FL (ref 5–10.5)
PMV BLD AUTO: 9.2 FL (ref 5–10.5)
PMV BLD AUTO: 9.2 FL (ref 5–10.5)
PMV BLD AUTO: 9.4 FL (ref 5–10.5)
PMV BLD AUTO: 9.5 FL (ref 5–10.5)
PMV BLD AUTO: 9.7 FL (ref 5–10.5)
PMV BLD AUTO: 9.8 FL (ref 5–10.5)
PMV BLD AUTO: 9.9 FL (ref 5–10.5)
PNEUMONIA PANEL MOLECULAR: NORMAL
PO2 ARTERIAL: 102 MMHG (ref 75–108)
PO2 ARTERIAL: 103 MMHG (ref 75–108)
PO2 ARTERIAL: 104.8 MM HG (ref 75–108)
PO2 ARTERIAL: 109.9 MM HG (ref 75–108)
PO2 ARTERIAL: 119 MMHG (ref 75–108)
PO2 ARTERIAL: 122 MMHG (ref 75–108)
PO2 ARTERIAL: 123.1 MM HG (ref 75–108)
PO2 ARTERIAL: 153.3 MM HG (ref 75–108)
PO2 ARTERIAL: 153.3 MM HG (ref 75–108)
PO2 ARTERIAL: 182.1 MM HG (ref 75–108)
PO2 ARTERIAL: 196 MMHG (ref 75–108)
PO2 ARTERIAL: 239.7 MM HG (ref 75–108)
PO2 ARTERIAL: 278.1 MM HG (ref 75–108)
PO2 ARTERIAL: 312.5 MM HG (ref 75–108)
PO2 ARTERIAL: 343 MMHG (ref 75–108)
PO2 ARTERIAL: 370.1 MM HG (ref 75–108)
PO2 ARTERIAL: 52.3 MMHG (ref 75–108)
PO2 ARTERIAL: 62.8 MM HG (ref 75–108)
PO2 ARTERIAL: 72.2 MM HG (ref 75–108)
PO2 ARTERIAL: 77.5 MM HG (ref 75–108)
PO2 ARTERIAL: 90.5 MM HG (ref 75–108)
PO2 ARTERIAL: 91 MMHG (ref 75–108)
PO2 ARTERIAL: 92.3 MM HG (ref 75–108)
PO2, VEN: 38 MMHG
PO2, VEN: 40 MMHG
PO2, VEN: 45 MM HG
PO2, VEN: 48 MM HG
POC ACT LR: 265 SEC
POC ACT LR: 352 SEC
POC FIO2: 100
POC FIO2: 15
POC FIO2: 30
POC FIO2: 40
POC FIO2: 80
POC FIO2: 80
POC HEMATOCRIT: 40 % (ref 40.5–52.5)
POC HEMATOCRIT: 41 % (ref 40.5–52.5)
POC POTASSIUM: 4.8 MMOL/L (ref 3.5–5.1)
POC POTASSIUM: 5.7 MMOL/L (ref 3.5–5.1)
POC SAMPLE TYPE: ABNORMAL
POC SAMPLE TYPE: NORMAL
POC SODIUM: 140 MMOL/L (ref 136–145)
POIKILOCYTES: ABNORMAL
POLYCHROMASIA: ABNORMAL
POLYCHROMASIA: ABNORMAL
POTASSIUM REFLEX MAGNESIUM: 3.7 MMOL/L (ref 3.5–5.1)
POTASSIUM REFLEX MAGNESIUM: 3.9 MMOL/L (ref 3.5–5.1)
POTASSIUM REFLEX MAGNESIUM: 4.2 MMOL/L (ref 3.5–5.1)
POTASSIUM REFLEX MAGNESIUM: 4.4 MMOL/L (ref 3.5–5.1)
POTASSIUM SERPL-SCNC: 4 MMOL/L (ref 3.5–5.1)
POTASSIUM SERPL-SCNC: 4.1 MMOL/L (ref 3.5–5.1)
POTASSIUM SERPL-SCNC: 4.1 MMOL/L (ref 3.5–5.1)
POTASSIUM SERPL-SCNC: 4.2 MMOL/L (ref 3.5–5.1)
POTASSIUM SERPL-SCNC: 4.3 MMOL/L (ref 3.5–5.1)
POTASSIUM SERPL-SCNC: 4.4 MMOL/L (ref 3.5–5.1)
POTASSIUM SERPL-SCNC: 4.5 MMOL/L (ref 3.5–5.1)
POTASSIUM SERPL-SCNC: 4.5 MMOL/L (ref 3.5–5.1)
POTASSIUM SERPL-SCNC: 4.7 MMOL/L (ref 3.5–5.1)
POTASSIUM SERPL-SCNC: 4.7 MMOL/L (ref 3.5–5.1)
POTASSIUM SERPL-SCNC: 4.8 MMOL/L (ref 3.5–5.1)
POTASSIUM SERPL-SCNC: 4.9 MMOL/L (ref 3.5–5.1)
POTASSIUM SERPL-SCNC: 5 MMOL/L (ref 3.5–5.1)
POTASSIUM SERPL-SCNC: 5 MMOL/L (ref 3.5–5.1)
POTASSIUM SERPL-SCNC: 5.1 MMOL/L (ref 3.5–5.1)
POTASSIUM SERPL-SCNC: 5.2 MMOL/L (ref 3.5–5.1)
POTASSIUM SERPL-SCNC: 5.2 MMOL/L (ref 3.5–5.1)
POTASSIUM SERPL-SCNC: 5.3 MMOL/L (ref 3.5–5.1)
PRO-BNP: 3737 PG/ML (ref 0–449)
PRO-BNP: 4623 PG/ML (ref 0–449)
PROCALCITONIN: 0.17 NG/ML (ref 0–0.15)
PROCALCITONIN: 0.25 NG/ML (ref 0–0.15)
PROCALCITONIN: 0.47 NG/ML (ref 0–0.15)
PROCALCITONIN: 0.61 NG/ML (ref 0–0.15)
PROTEIN UA: 100 MG/DL
PROTEIN UA: 100 MG/DL
PROTHROMBIN TIME: 19.3 SEC (ref 11.7–14.5)
PROTHROMBIN TIME: 20.2 SEC (ref 11.7–14.5)
RBC # BLD: 2.74 M/UL (ref 4.2–5.9)
RBC # BLD: 3.05 M/UL (ref 4.2–5.9)
RBC # BLD: 3.25 M/UL (ref 4.2–5.9)
RBC # BLD: 3.27 M/UL (ref 4.2–5.9)
RBC # BLD: 3.29 M/UL (ref 4.2–5.9)
RBC # BLD: 3.31 M/UL (ref 4.2–5.9)
RBC # BLD: 3.32 M/UL (ref 4.2–5.9)
RBC # BLD: 3.32 M/UL (ref 4.2–5.9)
RBC # BLD: 3.4 M/UL (ref 4.2–5.9)
RBC # BLD: 3.42 M/UL (ref 4.2–5.9)
RBC # BLD: 3.44 M/UL (ref 4.2–5.9)
RBC # BLD: 3.57 M/UL (ref 4.2–5.9)
RBC # BLD: 3.63 M/UL (ref 4.2–5.9)
RBC # BLD: 3.7 M/UL (ref 4.2–5.9)
RBC # BLD: 4.05 M/UL (ref 4.2–5.9)
RBC # BLD: 4.31 M/UL (ref 4.2–5.9)
RBC UA: 137 /HPF (ref 0–4)
RBC UA: 684 /HPF (ref 0–4)
RENAL EPITHELIAL, UA: ABNORMAL /HPF (ref 0–1)
REPORT: NORMAL
SLIDE REVIEW: ABNORMAL
SMUDGE CELLS: PRESENT
SODIUM BLD-SCNC: 131 MMOL/L (ref 136–145)
SODIUM BLD-SCNC: 132 MMOL/L (ref 136–145)
SODIUM BLD-SCNC: 132 MMOL/L (ref 136–145)
SODIUM BLD-SCNC: 133 MMOL/L (ref 136–145)
SODIUM BLD-SCNC: 134 MMOL/L (ref 136–145)
SODIUM BLD-SCNC: 135 MMOL/L (ref 136–145)
SODIUM BLD-SCNC: 136 MMOL/L (ref 136–145)
SODIUM BLD-SCNC: 137 MMOL/L (ref 136–145)
SODIUM BLD-SCNC: 138 MMOL/L (ref 136–145)
SODIUM BLD-SCNC: 139 MMOL/L (ref 136–145)
SODIUM BLD-SCNC: 140 MMOL/L (ref 136–145)
SODIUM BLD-SCNC: 142 MMOL/L (ref 136–145)
SODIUM BLD-SCNC: 143 MMOL/L (ref 136–145)
SPECIFIC GRAVITY UA: 1.02 (ref 1–1.03)
SPECIFIC GRAVITY UA: 1.07 (ref 1–1.03)
T4 FREE: 1.4 NG/DL (ref 0.9–1.8)
TCO2 ARTERIAL: 24 MMOL/L
TCO2 ARTERIAL: 24 MMOL/L
TCO2 ARTERIAL: 24.7 MMOL/L
TCO2 ARTERIAL: 25.2 MMOL/L
TCO2 ARTERIAL: 26 MMOL/L
TCO2 ARTERIAL: 26.7 MMOL/L
TCO2 ARTERIAL: 27 MMOL/L
TCO2 ARTERIAL: 27.2 MMOL/L
TCO2 ARTERIAL: 27.4 MMOL/L
TCO2 ARTERIAL: 28 MMOL/L
TCO2 ARTERIAL: 28 MMOL/L
TCO2 ARTERIAL: 28.7 MMOL/L
TCO2 ARTERIAL: 28.7 MMOL/L
TCO2 ARTERIAL: 28.9 MMOL/L
TCO2 ARTERIAL: 33 MMOL/L
TCO2 CALC VENOUS: 26 MMOL/L
TCO2 CALC VENOUS: 27 MMOL/L
TCO2 CALC VENOUS: 28 MMOL/L
TCO2 CALC VENOUS: 30 MMOL/L
TOTAL IRON BINDING CAPACITY: 260 UG/DL (ref 260–445)
TOTAL PROTEIN: 5.8 G/DL (ref 6.4–8.2)
TOTAL PROTEIN: 6.2 G/DL (ref 6.4–8.2)
TOTAL PROTEIN: 6.6 G/DL (ref 6.4–8.2)
TOTAL PROTEIN: 6.9 G/DL (ref 6.4–8.2)
TRIGL SERPL-MCNC: 61 MG/DL (ref 0–150)
TRIGL SERPL-MCNC: 67 MG/DL (ref 0–150)
TROPONIN: 0.03 NG/ML
TSH REFLEX FT4: 7.76 UIU/ML (ref 0.27–4.2)
URINE CULTURE, ROUTINE: NORMAL
URINE CULTURE, ROUTINE: NORMAL
URINE REFLEX TO CULTURE: YES
URINE REFLEX TO CULTURE: YES
URINE TYPE: ABNORMAL
URINE TYPE: ABNORMAL
UROBILINOGEN, URINE: 1 E.U./DL
UROBILINOGEN, URINE: 1 E.U./DL
VLDLC SERPL CALC-MCNC: 12 MG/DL
WBC # BLD: 10 K/UL (ref 4–11)
WBC # BLD: 13.2 K/UL (ref 4–11)
WBC # BLD: 13.3 K/UL (ref 4–11)
WBC # BLD: 13.4 K/UL (ref 4–11)
WBC # BLD: 13.5 K/UL (ref 4–11)
WBC # BLD: 14.8 K/UL (ref 4–11)
WBC # BLD: 14.9 K/UL (ref 4–11)
WBC # BLD: 15.4 K/UL (ref 4–11)
WBC # BLD: 15.6 K/UL (ref 4–11)
WBC # BLD: 15.7 K/UL (ref 4–11)
WBC # BLD: 16.3 K/UL (ref 4–11)
WBC # BLD: 17.6 K/UL (ref 4–11)
WBC # BLD: 7 K/UL (ref 4–11)
WBC # BLD: 7.2 K/UL (ref 4–11)
WBC # BLD: 9 K/UL (ref 4–11)
WBC # BLD: 9.4 K/UL (ref 4–11)
WBC UA: 27 /HPF (ref 0–5)
WBC UA: 39 /HPF (ref 0–5)
YEAST: PRESENT /HPF
YEAST: PRESENT /HPF

## 2023-01-01 PROCEDURE — 9990000010 HC NO CHARGE VISIT

## 2023-01-01 PROCEDURE — 6370000000 HC RX 637 (ALT 250 FOR IP): Performed by: INTERNAL MEDICINE

## 2023-01-01 PROCEDURE — 2700000000 HC OXYGEN THERAPY PER DAY

## 2023-01-01 PROCEDURE — 87205 SMEAR GRAM STAIN: CPT

## 2023-01-01 PROCEDURE — 2500000003 HC RX 250 WO HCPCS: Performed by: STUDENT IN AN ORGANIZED HEALTH CARE EDUCATION/TRAINING PROGRAM

## 2023-01-01 PROCEDURE — 6360000002 HC RX W HCPCS: Performed by: INTERNAL MEDICINE

## 2023-01-01 PROCEDURE — 2580000003 HC RX 258: Performed by: INTERNAL MEDICINE

## 2023-01-01 PROCEDURE — 85025 COMPLETE CBC W/AUTO DIFF WBC: CPT

## 2023-01-01 PROCEDURE — 83540 ASSAY OF IRON: CPT

## 2023-01-01 PROCEDURE — 6370000000 HC RX 637 (ALT 250 FOR IP): Performed by: STUDENT IN AN ORGANIZED HEALTH CARE EDUCATION/TRAINING PROGRAM

## 2023-01-01 PROCEDURE — 87070 CULTURE OTHR SPECIMN AEROBIC: CPT

## 2023-01-01 PROCEDURE — 90945 DIALYSIS ONE EVALUATION: CPT

## 2023-01-01 PROCEDURE — 94761 N-INVAS EAR/PLS OXIMETRY MLT: CPT

## 2023-01-01 PROCEDURE — 80076 HEPATIC FUNCTION PANEL: CPT

## 2023-01-01 PROCEDURE — A4216 STERILE WATER/SALINE, 10 ML: HCPCS | Performed by: INTERNAL MEDICINE

## 2023-01-01 PROCEDURE — 82803 BLOOD GASES ANY COMBINATION: CPT

## 2023-01-01 PROCEDURE — 80048 BASIC METABOLIC PNL TOTAL CA: CPT

## 2023-01-01 PROCEDURE — 94002 VENT MGMT INPAT INIT DAY: CPT

## 2023-01-01 PROCEDURE — 94640 AIRWAY INHALATION TREATMENT: CPT

## 2023-01-01 PROCEDURE — 84484 ASSAY OF TROPONIN QUANT: CPT

## 2023-01-01 PROCEDURE — 85347 COAGULATION TIME ACTIVATED: CPT

## 2023-01-01 PROCEDURE — 2500000003 HC RX 250 WO HCPCS: Performed by: INTERNAL MEDICINE

## 2023-01-01 PROCEDURE — 83735 ASSAY OF MAGNESIUM: CPT

## 2023-01-01 PROCEDURE — 82330 ASSAY OF CALCIUM: CPT

## 2023-01-01 PROCEDURE — 83605 ASSAY OF LACTIC ACID: CPT

## 2023-01-01 PROCEDURE — 6370000000 HC RX 637 (ALT 250 FOR IP): Performed by: HOSPITALIST

## 2023-01-01 PROCEDURE — 71045 X-RAY EXAM CHEST 1 VIEW: CPT

## 2023-01-01 PROCEDURE — 2100000000 HC CCU R&B

## 2023-01-01 PROCEDURE — 99233 SBSQ HOSP IP/OBS HIGH 50: CPT | Performed by: INTERNAL MEDICINE

## 2023-01-01 PROCEDURE — 99291 CRITICAL CARE FIRST HOUR: CPT | Performed by: INTERNAL MEDICINE

## 2023-01-01 PROCEDURE — 0BH18EZ INSERTION OF ENDOTRACHEAL AIRWAY INTO TRACHEA, VIA NATURAL OR ARTIFICIAL OPENING ENDOSCOPIC: ICD-10-PCS | Performed by: INTERNAL MEDICINE

## 2023-01-01 PROCEDURE — 97129 THER IVNTJ 1ST 15 MIN: CPT

## 2023-01-01 PROCEDURE — 80061 LIPID PANEL: CPT

## 2023-01-01 PROCEDURE — 5A0221D ASSISTANCE WITH CARDIAC OUTPUT USING IMPELLER PUMP, CONTINUOUS: ICD-10-PCS | Performed by: INTERNAL MEDICINE

## 2023-01-01 PROCEDURE — 80053 COMPREHEN METABOLIC PANEL: CPT

## 2023-01-01 PROCEDURE — C9113 INJ PANTOPRAZOLE SODIUM, VIA: HCPCS | Performed by: INTERNAL MEDICINE

## 2023-01-01 PROCEDURE — 6360000002 HC RX W HCPCS

## 2023-01-01 PROCEDURE — 85610 PROTHROMBIN TIME: CPT

## 2023-01-01 PROCEDURE — 94003 VENT MGMT INPAT SUBQ DAY: CPT

## 2023-01-01 PROCEDURE — 92610 EVALUATE SWALLOWING FUNCTION: CPT

## 2023-01-01 PROCEDURE — 0BD48ZX EXTRACTION OF RIGHT UPPER LOBE BRONCHUS, VIA NATURAL OR ARTIFICIAL OPENING ENDOSCOPIC, DIAGNOSTIC: ICD-10-PCS | Performed by: INTERNAL MEDICINE

## 2023-01-01 PROCEDURE — 2500000003 HC RX 250 WO HCPCS

## 2023-01-01 PROCEDURE — 4A03X5D MEASUREMENT OF ARTERIAL FLOW, INTRACRANIAL, EXTERNAL APPROACH: ICD-10-PCS | Performed by: RADIOLOGY

## 2023-01-01 PROCEDURE — 5A1D70Z PERFORMANCE OF URINARY FILTRATION, INTERMITTENT, LESS THAN 6 HOURS PER DAY: ICD-10-PCS | Performed by: INTERNAL MEDICINE

## 2023-01-01 PROCEDURE — 70450 CT HEAD/BRAIN W/O DYE: CPT

## 2023-01-01 PROCEDURE — 84145 PROCALCITONIN (PCT): CPT

## 2023-01-01 PROCEDURE — 84100 ASSAY OF PHOSPHORUS: CPT

## 2023-01-01 PROCEDURE — 31500 INSERT EMERGENCY AIRWAY: CPT

## 2023-01-01 PROCEDURE — 36592 COLLECT BLOOD FROM PICC: CPT

## 2023-01-01 PROCEDURE — 90935 HEMODIALYSIS ONE EVALUATION: CPT

## 2023-01-01 PROCEDURE — 99292 CRITICAL CARE ADDL 30 MIN: CPT | Performed by: INTERNAL MEDICINE

## 2023-01-01 PROCEDURE — 82270 OCCULT BLOOD FECES: CPT

## 2023-01-01 PROCEDURE — 86706 HEP B SURFACE ANTIBODY: CPT

## 2023-01-01 PROCEDURE — C1769 GUIDE WIRE: HCPCS

## 2023-01-01 PROCEDURE — 97530 THERAPEUTIC ACTIVITIES: CPT

## 2023-01-01 PROCEDURE — P9047 ALBUMIN (HUMAN), 25%, 50ML: HCPCS | Performed by: INTERNAL MEDICINE

## 2023-01-01 PROCEDURE — 31645 BRNCHSC W/THER ASPIR 1ST: CPT | Performed by: INTERNAL MEDICINE

## 2023-01-01 PROCEDURE — 6360000002 HC RX W HCPCS: Performed by: STUDENT IN AN ORGANIZED HEALTH CARE EDUCATION/TRAINING PROGRAM

## 2023-01-01 PROCEDURE — 94660 CPAP INITIATION&MGMT: CPT

## 2023-01-01 PROCEDURE — 87641 MR-STAPH DNA AMP PROBE: CPT

## 2023-01-01 PROCEDURE — 85027 COMPLETE CBC AUTOMATED: CPT

## 2023-01-01 PROCEDURE — 37799 UNLISTED PX VASCULAR SURGERY: CPT

## 2023-01-01 PROCEDURE — 0BC48ZZ EXTIRPATION OF MATTER FROM RIGHT UPPER LOBE BRONCHUS, VIA NATURAL OR ARTIFICIAL OPENING ENDOSCOPIC: ICD-10-PCS | Performed by: INTERNAL MEDICINE

## 2023-01-01 PROCEDURE — 33990 INSJ PERQ VAD L HRT ARTERIAL: CPT

## 2023-01-01 PROCEDURE — 95819 EEG AWAKE AND ASLEEP: CPT

## 2023-01-01 PROCEDURE — 84132 ASSAY OF SERUM POTASSIUM: CPT

## 2023-01-01 PROCEDURE — 2580000003 HC RX 258: Performed by: STUDENT IN AN ORGANIZED HEALTH CARE EDUCATION/TRAINING PROGRAM

## 2023-01-01 PROCEDURE — 81001 URINALYSIS AUTO W/SCOPE: CPT

## 2023-01-01 PROCEDURE — 6360000002 HC RX W HCPCS: Performed by: HOSPITALIST

## 2023-01-01 PROCEDURE — C1760 CLOSURE DEV, VASC: HCPCS

## 2023-01-01 PROCEDURE — 36415 COLL VENOUS BLD VENIPUNCTURE: CPT

## 2023-01-01 PROCEDURE — 84443 ASSAY THYROID STIM HORMONE: CPT

## 2023-01-01 PROCEDURE — 92526 ORAL FUNCTION THERAPY: CPT

## 2023-01-01 PROCEDURE — 84478 ASSAY OF TRIGLYCERIDES: CPT

## 2023-01-01 PROCEDURE — 31622 DX BRONCHOSCOPE/WASH: CPT | Performed by: INTERNAL MEDICINE

## 2023-01-01 PROCEDURE — 82728 ASSAY OF FERRITIN: CPT

## 2023-01-01 PROCEDURE — 36620 INSERTION CATHETER ARTERY: CPT

## 2023-01-01 PROCEDURE — 87040 BLOOD CULTURE FOR BACTERIA: CPT

## 2023-01-01 PROCEDURE — 80069 RENAL FUNCTION PANEL: CPT

## 2023-01-01 PROCEDURE — 86022 PLATELET ANTIBODIES: CPT

## 2023-01-01 PROCEDURE — 76937 US GUIDE VASCULAR ACCESS: CPT | Performed by: INTERNAL MEDICINE

## 2023-01-01 PROCEDURE — 83880 ASSAY OF NATRIURETIC PEPTIDE: CPT

## 2023-01-01 PROCEDURE — C1887 CATHETER, GUIDING: HCPCS

## 2023-01-01 PROCEDURE — 82947 ASSAY GLUCOSE BLOOD QUANT: CPT

## 2023-01-01 PROCEDURE — 99152 MOD SED SAME PHYS/QHP 5/>YRS: CPT

## 2023-01-01 PROCEDURE — 94760 N-INVAS EAR/PLS OXIMETRY 1: CPT

## 2023-01-01 PROCEDURE — 6370000000 HC RX 637 (ALT 250 FOR IP)

## 2023-01-01 PROCEDURE — C1894 INTRO/SHEATH, NON-LASER: HCPCS

## 2023-01-01 PROCEDURE — 31500 INSERT EMERGENCY AIRWAY: CPT | Performed by: INTERNAL MEDICINE

## 2023-01-01 PROCEDURE — 6360000004 HC RX CONTRAST MEDICATION: Performed by: INTERNAL MEDICINE

## 2023-01-01 PROCEDURE — 85014 HEMATOCRIT: CPT

## 2023-01-01 PROCEDURE — 0BC38ZZ EXTIRPATION OF MATTER FROM RIGHT MAIN BRONCHUS, VIA NATURAL OR ARTIFICIAL OPENING ENDOSCOPIC: ICD-10-PCS | Performed by: INTERNAL MEDICINE

## 2023-01-01 PROCEDURE — 6360000002 HC RX W HCPCS: Performed by: ANESTHESIOLOGY

## 2023-01-01 PROCEDURE — 2720000010 HC SURG SUPPLY STERILE

## 2023-01-01 PROCEDURE — 82140 ASSAY OF AMMONIA: CPT

## 2023-01-01 PROCEDURE — 87086 URINE CULTURE/COLONY COUNT: CPT

## 2023-01-01 PROCEDURE — 5A1935Z RESPIRATORY VENTILATION, LESS THAN 24 CONSECUTIVE HOURS: ICD-10-PCS | Performed by: INTERNAL MEDICINE

## 2023-01-01 PROCEDURE — 2709999900 HC NON-CHARGEABLE SUPPLY

## 2023-01-01 PROCEDURE — B2111ZZ FLUOROSCOPY OF MULTIPLE CORONARY ARTERIES USING LOW OSMOLAR CONTRAST: ICD-10-PCS | Performed by: INTERNAL MEDICINE

## 2023-01-01 PROCEDURE — 94669 MECHANICAL CHEST WALL OSCILL: CPT

## 2023-01-01 PROCEDURE — 6360000004 HC RX CONTRAST MEDICATION

## 2023-01-01 PROCEDURE — 87633 RESP VIRUS 12-25 TARGETS: CPT

## 2023-01-01 PROCEDURE — 0BD38ZX EXTRACTION OF RIGHT MAIN BRONCHUS, VIA NATURAL OR ARTIFICIAL OPENING ENDOSCOPIC, DIAGNOSTIC: ICD-10-PCS | Performed by: INTERNAL MEDICINE

## 2023-01-01 PROCEDURE — 36556 INSERT NON-TUNNEL CV CATH: CPT | Performed by: INTERNAL MEDICINE

## 2023-01-01 PROCEDURE — 027136Z DILATION OF CORONARY ARTERY, TWO ARTERIES WITH THREE DRUG-ELUTING INTRALUMINAL DEVICES, PERCUTANEOUS APPROACH: ICD-10-PCS | Performed by: INTERNAL MEDICINE

## 2023-01-01 PROCEDURE — 83550 IRON BINDING TEST: CPT

## 2023-01-01 PROCEDURE — 83036 HEMOGLOBIN GLYCOSYLATED A1C: CPT

## 2023-01-01 PROCEDURE — 99233 SBSQ HOSP IP/OBS HIGH 50: CPT | Performed by: STUDENT IN AN ORGANIZED HEALTH CARE EDUCATION/TRAINING PROGRAM

## 2023-01-01 PROCEDURE — 02HA3RJ INSERTION OF SHORT-TERM EXTERNAL HEART ASSIST SYSTEM INTO HEART, INTRAOPERATIVE, PERCUTANEOUS APPROACH: ICD-10-PCS | Performed by: INTERNAL MEDICINE

## 2023-01-01 PROCEDURE — 84295 ASSAY OF SERUM SODIUM: CPT

## 2023-01-01 PROCEDURE — C1874 STENT, COATED/COV W/DEL SYS: HCPCS

## 2023-01-01 PROCEDURE — 5A1945Z RESPIRATORY VENTILATION, 24-96 CONSECUTIVE HOURS: ICD-10-PCS | Performed by: INTERNAL MEDICINE

## 2023-01-01 PROCEDURE — 36600 WITHDRAWAL OF ARTERIAL BLOOD: CPT

## 2023-01-01 PROCEDURE — 97162 PT EVAL MOD COMPLEX 30 MIN: CPT

## 2023-01-01 PROCEDURE — 4A023N7 MEASUREMENT OF CARDIAC SAMPLING AND PRESSURE, LEFT HEART, PERCUTANEOUS APPROACH: ICD-10-PCS | Performed by: INTERNAL MEDICINE

## 2023-01-01 PROCEDURE — 02HV33Z INSERTION OF INFUSION DEVICE INTO SUPERIOR VENA CAVA, PERCUTANEOUS APPROACH: ICD-10-PCS | Performed by: INTERNAL MEDICINE

## 2023-01-01 PROCEDURE — 80074 ACUTE HEPATITIS PANEL: CPT

## 2023-01-01 PROCEDURE — 70496 CT ANGIOGRAPHY HEAD: CPT

## 2023-01-01 PROCEDURE — 0BJ08ZZ INSPECTION OF TRACHEOBRONCHIAL TREE, VIA NATURAL OR ARTIFICIAL OPENING ENDOSCOPIC: ICD-10-PCS | Performed by: INTERNAL MEDICINE

## 2023-01-01 PROCEDURE — 2580000003 HC RX 258

## 2023-01-01 PROCEDURE — 31720 CLEARANCE OF AIRWAYS: CPT

## 2023-01-01 PROCEDURE — C1725 CATH, TRANSLUMIN NON-LASER: HCPCS

## 2023-01-01 PROCEDURE — 70551 MRI BRAIN STEM W/O DYE: CPT

## 2023-01-01 PROCEDURE — 84439 ASSAY OF FREE THYROXINE: CPT

## 2023-01-01 PROCEDURE — 99153 MOD SED SAME PHYS/QHP EA: CPT

## 2023-01-01 PROCEDURE — C9600 PERC DRUG-EL COR STENT SING: HCPCS

## 2023-01-01 PROCEDURE — 99232 SBSQ HOSP IP/OBS MODERATE 35: CPT | Performed by: INTERNAL MEDICINE

## 2023-01-01 RX ORDER — ALBUMIN (HUMAN) 12.5 G/50ML
25 SOLUTION INTRAVENOUS
Status: COMPLETED | OUTPATIENT
Start: 2023-01-01 | End: 2023-01-01

## 2023-01-01 RX ORDER — CLOPIDOGREL 300 MG/1
300 TABLET, FILM COATED ORAL ONCE
Status: COMPLETED | OUTPATIENT
Start: 2023-01-01 | End: 2023-01-01

## 2023-01-01 RX ORDER — ASPIRIN 81 MG/1
81 TABLET, CHEWABLE ORAL DAILY
Status: DISCONTINUED | OUTPATIENT
Start: 2023-01-01 | End: 2023-02-23 | Stop reason: HOSPADM

## 2023-01-01 RX ORDER — FENTANYL CITRATE 50 UG/ML
INJECTION, SOLUTION INTRAMUSCULAR; INTRAVENOUS
Status: COMPLETED
Start: 2023-01-01 | End: 2023-01-01

## 2023-01-01 RX ORDER — THIAMINE HYDROCHLORIDE 100 MG/ML
100 INJECTION, SOLUTION INTRAMUSCULAR; INTRAVENOUS DAILY
Status: DISCONTINUED | OUTPATIENT
Start: 2023-01-01 | End: 2023-01-01

## 2023-01-01 RX ORDER — ASPIRIN 81 MG/1
81 TABLET ORAL DAILY
Status: DISCONTINUED | OUTPATIENT
Start: 2023-01-01 | End: 2023-01-01

## 2023-01-01 RX ORDER — MIDODRINE HYDROCHLORIDE 10 MG/1
10 TABLET ORAL
Status: COMPLETED | OUTPATIENT
Start: 2023-01-01 | End: 2023-01-01

## 2023-01-01 RX ORDER — SODIUM CHLORIDE 9 MG/ML
INJECTION, SOLUTION INTRAVENOUS PRN
Status: DISCONTINUED | OUTPATIENT
Start: 2023-01-01 | End: 2023-01-01 | Stop reason: SDUPTHER

## 2023-01-01 RX ORDER — LIDOCAINE HYDROCHLORIDE 10 MG/ML
5 INJECTION, SOLUTION EPIDURAL; INFILTRATION; INTRACAUDAL; PERINEURAL ONCE
Status: DISCONTINUED | OUTPATIENT
Start: 2023-01-01 | End: 2023-01-01

## 2023-01-01 RX ORDER — MAGNESIUM SULFATE 1 G/100ML
1000 INJECTION INTRAVENOUS PRN
Status: DISCONTINUED | OUTPATIENT
Start: 2023-01-01 | End: 2023-01-01 | Stop reason: ALTCHOICE

## 2023-01-01 RX ORDER — NOREPINEPHRINE BIT/0.9 % NACL 16MG/250ML
INFUSION BOTTLE (ML) INTRAVENOUS
Status: COMPLETED
Start: 2023-01-01 | End: 2023-01-01

## 2023-01-01 RX ORDER — POTASSIUM CHLORIDE 29.8 MG/ML
20 INJECTION INTRAVENOUS PRN
Status: DISCONTINUED | OUTPATIENT
Start: 2023-01-01 | End: 2023-02-23 | Stop reason: HOSPADM

## 2023-01-01 RX ORDER — MAGNESIUM SULFATE 1 G/100ML
1000 INJECTION INTRAVENOUS PRN
Status: DISCONTINUED | OUTPATIENT
Start: 2023-01-01 | End: 2023-02-23 | Stop reason: HOSPADM

## 2023-01-01 RX ORDER — IPRATROPIUM BROMIDE AND ALBUTEROL SULFATE 2.5; .5 MG/3ML; MG/3ML
1 SOLUTION RESPIRATORY (INHALATION) 2 TIMES DAILY
Status: DISCONTINUED | OUTPATIENT
Start: 2023-01-01 | End: 2023-02-23 | Stop reason: HOSPADM

## 2023-01-01 RX ORDER — FUROSEMIDE 10 MG/ML
40 INJECTION INTRAMUSCULAR; INTRAVENOUS ONCE
Status: COMPLETED | OUTPATIENT
Start: 2023-01-01 | End: 2023-01-01

## 2023-01-01 RX ORDER — HEPARIN SODIUM 1000 [USP'U]/ML
1000 INJECTION, SOLUTION INTRAVENOUS; SUBCUTANEOUS
Status: COMPLETED | OUTPATIENT
Start: 2023-01-01 | End: 2023-01-01

## 2023-01-01 RX ORDER — ONDANSETRON 4 MG/1
4 TABLET, ORALLY DISINTEGRATING ORAL EVERY 8 HOURS PRN
Status: DISCONTINUED | OUTPATIENT
Start: 2023-01-01 | End: 2023-02-23 | Stop reason: HOSPADM

## 2023-01-01 RX ORDER — IPRATROPIUM BROMIDE AND ALBUTEROL SULFATE 2.5; .5 MG/3ML; MG/3ML
1 SOLUTION RESPIRATORY (INHALATION) EVERY 4 HOURS PRN
Status: DISCONTINUED | OUTPATIENT
Start: 2023-01-01 | End: 2023-02-23 | Stop reason: HOSPADM

## 2023-01-01 RX ORDER — POTASSIUM CHLORIDE 29.8 MG/ML
20 INJECTION INTRAVENOUS PRN
Status: DISCONTINUED | OUTPATIENT
Start: 2023-01-01 | End: 2023-01-01 | Stop reason: ALTCHOICE

## 2023-01-01 RX ORDER — FENTANYL CITRATE-0.9 % NACL/PF 10 MCG/ML
50 PLASTIC BAG, INJECTION (ML) INTRAVENOUS EVERY 30 MIN PRN
Status: DISCONTINUED | OUTPATIENT
Start: 2023-01-01 | End: 2023-01-01

## 2023-01-01 RX ORDER — PROPOFOL 10 MG/ML
5-50 INJECTION, EMULSION INTRAVENOUS CONTINUOUS
Status: DISCONTINUED | OUTPATIENT
Start: 2023-01-01 | End: 2023-01-01

## 2023-01-01 RX ORDER — SODIUM CHLORIDE 0.9 % (FLUSH) 0.9 %
5-40 SYRINGE (ML) INJECTION PRN
Status: DISCONTINUED | OUTPATIENT
Start: 2023-01-01 | End: 2023-01-01 | Stop reason: SDUPTHER

## 2023-01-01 RX ORDER — ACETAMINOPHEN 650 MG/1
650 SUPPOSITORY RECTAL EVERY 4 HOURS PRN
Status: DISCONTINUED | OUTPATIENT
Start: 2023-01-01 | End: 2023-02-23 | Stop reason: HOSPADM

## 2023-01-01 RX ORDER — PROPOFOL 10 MG/ML
INJECTION, EMULSION INTRAVENOUS
Status: DISPENSED
Start: 2023-01-01 | End: 2023-01-01

## 2023-01-01 RX ORDER — CALCIUM GLUCONATE 20 MG/ML
1000 INJECTION, SOLUTION INTRAVENOUS PRN
Status: DISCONTINUED | OUTPATIENT
Start: 2023-01-01 | End: 2023-02-23 | Stop reason: HOSPADM

## 2023-01-01 RX ORDER — PROPOFOL 10 MG/ML
INJECTION, EMULSION INTRAVENOUS
Status: COMPLETED
Start: 2023-01-01 | End: 2023-01-01

## 2023-01-01 RX ORDER — SODIUM CHLORIDE 0.9 % (FLUSH) 0.9 %
5-40 SYRINGE (ML) INJECTION EVERY 12 HOURS SCHEDULED
Status: DISCONTINUED | OUTPATIENT
Start: 2023-01-01 | End: 2023-02-23 | Stop reason: HOSPADM

## 2023-01-01 RX ORDER — METHYLPREDNISOLONE SODIUM SUCCINATE 40 MG/ML
40 INJECTION, POWDER, LYOPHILIZED, FOR SOLUTION INTRAMUSCULAR; INTRAVENOUS DAILY
Status: COMPLETED | OUTPATIENT
Start: 2023-01-01 | End: 2023-01-01

## 2023-01-01 RX ORDER — AMIODARONE HYDROCHLORIDE 200 MG/1
400 TABLET ORAL 2 TIMES DAILY
Status: DISCONTINUED | OUTPATIENT
Start: 2023-01-01 | End: 2023-01-01

## 2023-01-01 RX ORDER — ATORVASTATIN CALCIUM 80 MG/1
80 TABLET, FILM COATED ORAL NIGHTLY
Status: DISCONTINUED | OUTPATIENT
Start: 2023-01-01 | End: 2023-02-23 | Stop reason: HOSPADM

## 2023-01-01 RX ORDER — 0.9 % SODIUM CHLORIDE 0.9 %
250 INTRAVENOUS SOLUTION INTRAVENOUS ONCE
Status: COMPLETED | OUTPATIENT
Start: 2023-01-01 | End: 2023-01-01

## 2023-01-01 RX ORDER — SODIUM CHLORIDE 0.9 % (FLUSH) 0.9 %
5-40 SYRINGE (ML) INJECTION PRN
Status: DISCONTINUED | OUTPATIENT
Start: 2023-01-01 | End: 2023-02-23 | Stop reason: HOSPADM

## 2023-01-01 RX ORDER — IPRATROPIUM BROMIDE AND ALBUTEROL SULFATE 2.5; .5 MG/3ML; MG/3ML
1 SOLUTION RESPIRATORY (INHALATION) 3 TIMES DAILY
Status: DISCONTINUED | OUTPATIENT
Start: 2023-01-01 | End: 2023-01-01

## 2023-01-01 RX ORDER — DEXMEDETOMIDINE HYDROCHLORIDE 4 UG/ML
.1-1.5 INJECTION, SOLUTION INTRAVENOUS CONTINUOUS
Status: DISCONTINUED | OUTPATIENT
Start: 2023-01-01 | End: 2023-01-01

## 2023-01-01 RX ORDER — MIDAZOLAM HYDROCHLORIDE 1 MG/ML
2 INJECTION INTRAMUSCULAR; INTRAVENOUS
Status: DISCONTINUED | OUTPATIENT
Start: 2023-01-01 | End: 2023-01-01

## 2023-01-01 RX ORDER — MIDODRINE HYDROCHLORIDE 10 MG/1
10 TABLET ORAL PRN
Status: DISCONTINUED | OUTPATIENT
Start: 2023-01-01 | End: 2023-01-01

## 2023-01-01 RX ORDER — LORAZEPAM 2 MG/ML
0.5 INJECTION INTRAMUSCULAR ONCE
Status: COMPLETED | OUTPATIENT
Start: 2023-01-01 | End: 2023-01-01

## 2023-01-01 RX ORDER — ACETAMINOPHEN 325 MG/1
650 TABLET ORAL EVERY 4 HOURS PRN
Status: DISCONTINUED | OUTPATIENT
Start: 2023-01-01 | End: 2023-02-23 | Stop reason: HOSPADM

## 2023-01-01 RX ORDER — DIGOXIN 0.25 MG/ML
500 INJECTION INTRAMUSCULAR; INTRAVENOUS ONCE
Status: COMPLETED | OUTPATIENT
Start: 2023-01-01 | End: 2023-01-01

## 2023-01-01 RX ORDER — SUCCINYLCHOLINE/SOD CL,ISO/PF 200MG/10ML
100 SYRINGE (ML) INTRAVENOUS ONCE
Status: DISCONTINUED | OUTPATIENT
Start: 2023-01-01 | End: 2023-01-01

## 2023-01-01 RX ORDER — ENOXAPARIN SODIUM 100 MG/ML
30 INJECTION SUBCUTANEOUS 2 TIMES DAILY
Status: DISCONTINUED | OUTPATIENT
Start: 2023-01-01 | End: 2023-01-01

## 2023-01-01 RX ORDER — MILRINONE LACTATE 0.2 MG/ML
0.2 INJECTION, SOLUTION INTRAVENOUS CONTINUOUS
Status: DISCONTINUED | OUTPATIENT
Start: 2023-01-01 | End: 2023-01-01

## 2023-01-01 RX ORDER — CHLORHEXIDINE GLUCONATE 0.12 MG/ML
15 RINSE ORAL 2 TIMES DAILY
Status: DISCONTINUED | OUTPATIENT
Start: 2023-01-01 | End: 2023-02-23 | Stop reason: HOSPADM

## 2023-01-01 RX ORDER — MIDAZOLAM HYDROCHLORIDE 1 MG/ML
2 INJECTION INTRAMUSCULAR; INTRAVENOUS ONCE
Status: COMPLETED | OUTPATIENT
Start: 2023-01-01 | End: 2023-01-01

## 2023-01-01 RX ORDER — LORAZEPAM 1 MG/1
1 TABLET ORAL ONCE
Status: DISCONTINUED | OUTPATIENT
Start: 2023-01-01 | End: 2023-01-01

## 2023-01-01 RX ORDER — HEPARIN SODIUM 5000 [USP'U]/ML
5000 INJECTION, SOLUTION INTRAVENOUS; SUBCUTANEOUS EVERY 8 HOURS SCHEDULED
Status: DISCONTINUED | OUTPATIENT
Start: 2023-01-01 | End: 2023-02-23 | Stop reason: HOSPADM

## 2023-01-01 RX ORDER — SODIUM CHLORIDE 9 MG/ML
25 INJECTION, SOLUTION INTRAVENOUS PRN
Status: DISCONTINUED | OUTPATIENT
Start: 2023-01-01 | End: 2023-02-23 | Stop reason: HOSPADM

## 2023-01-01 RX ORDER — HEPARIN SODIUM 1000 [USP'U]/ML
2800 INJECTION, SOLUTION INTRAVENOUS; SUBCUTANEOUS PRN
Status: DISCONTINUED | OUTPATIENT
Start: 2023-01-01 | End: 2023-02-23 | Stop reason: HOSPADM

## 2023-01-01 RX ORDER — NOREPINEPHRINE BIT/0.9 % NACL 16MG/250ML
1-100 INFUSION BOTTLE (ML) INTRAVENOUS CONTINUOUS
Status: DISCONTINUED | OUTPATIENT
Start: 2023-01-01 | End: 2023-02-23 | Stop reason: HOSPADM

## 2023-01-01 RX ORDER — LIDOCAINE HYDROCHLORIDE 10 MG/ML
INJECTION, SOLUTION INFILTRATION; PERINEURAL
Status: COMPLETED
Start: 2023-01-01 | End: 2023-01-01

## 2023-01-01 RX ORDER — CALCIUM GLUCONATE 20 MG/ML
2000 INJECTION, SOLUTION INTRAVENOUS PRN
Status: DISCONTINUED | OUTPATIENT
Start: 2023-01-01 | End: 2023-02-23 | Stop reason: HOSPADM

## 2023-01-01 RX ORDER — ASPIRIN 325 MG
325 TABLET ORAL ONCE
Status: DISCONTINUED | OUTPATIENT
Start: 2023-01-01 | End: 2023-01-01

## 2023-01-01 RX ORDER — MIDAZOLAM HYDROCHLORIDE 1 MG/ML
1-10 INJECTION, SOLUTION INTRAVENOUS CONTINUOUS
Status: DISCONTINUED | OUTPATIENT
Start: 2023-01-01 | End: 2023-01-01

## 2023-01-01 RX ORDER — HYDRALAZINE HYDROCHLORIDE 20 MG/ML
10 INJECTION INTRAMUSCULAR; INTRAVENOUS
Status: DISCONTINUED | OUTPATIENT
Start: 2023-01-01 | End: 2023-02-23 | Stop reason: HOSPADM

## 2023-01-01 RX ORDER — METOPROLOL SUCCINATE 25 MG/1
25 TABLET, EXTENDED RELEASE ORAL DAILY
Status: DISCONTINUED | OUTPATIENT
Start: 2023-01-01 | End: 2023-01-01

## 2023-01-01 RX ORDER — CLOPIDOGREL BISULFATE 75 MG/1
75 TABLET ORAL DAILY
Status: DISCONTINUED | OUTPATIENT
Start: 2023-01-01 | End: 2023-02-23 | Stop reason: HOSPADM

## 2023-01-01 RX ORDER — FUROSEMIDE 10 MG/ML
80 INJECTION INTRAMUSCULAR; INTRAVENOUS ONCE
Status: COMPLETED | OUTPATIENT
Start: 2023-01-01 | End: 2023-01-01

## 2023-01-01 RX ORDER — AMIODARONE HYDROCHLORIDE 200 MG/1
200 TABLET ORAL 2 TIMES DAILY
Status: DISCONTINUED | OUTPATIENT
Start: 2023-01-01 | End: 2023-02-23 | Stop reason: HOSPADM

## 2023-01-01 RX ORDER — ASPIRIN 300 MG/1
300 SUPPOSITORY RECTAL DAILY
Status: DISCONTINUED | OUTPATIENT
Start: 2023-01-01 | End: 2023-01-01

## 2023-01-01 RX ORDER — 0.9 % SODIUM CHLORIDE 0.9 %
1000 INTRAVENOUS SOLUTION INTRAVENOUS
Status: DISCONTINUED | OUTPATIENT
Start: 2023-01-01 | End: 2023-02-23 | Stop reason: HOSPADM

## 2023-01-01 RX ORDER — MIDODRINE HYDROCHLORIDE 10 MG/1
10 TABLET ORAL
Status: DISCONTINUED | OUTPATIENT
Start: 2023-01-01 | End: 2023-02-23 | Stop reason: HOSPADM

## 2023-01-01 RX ORDER — SODIUM CHLORIDE 9 MG/ML
INJECTION, SOLUTION INTRAVENOUS CONTINUOUS
Status: DISCONTINUED | OUTPATIENT
Start: 2023-01-01 | End: 2023-01-01

## 2023-01-01 RX ORDER — FENTANYL CITRATE 50 UG/ML
50 INJECTION, SOLUTION INTRAMUSCULAR; INTRAVENOUS ONCE
Status: COMPLETED | OUTPATIENT
Start: 2023-01-01 | End: 2023-01-01

## 2023-01-01 RX ORDER — NOREPINEPHRINE BIT/0.9 % NACL 16MG/250ML
1-100 INFUSION BOTTLE (ML) INTRAVENOUS CONTINUOUS
Status: DISCONTINUED | OUTPATIENT
Start: 2023-01-01 | End: 2023-01-01

## 2023-01-01 RX ORDER — SODIUM CHLORIDE 0.9 % (FLUSH) 0.9 %
5-40 SYRINGE (ML) INJECTION EVERY 12 HOURS SCHEDULED
Status: DISCONTINUED | OUTPATIENT
Start: 2023-01-01 | End: 2023-01-01 | Stop reason: SDUPTHER

## 2023-01-01 RX ORDER — FENTANYL CITRATE-0.9 % NACL/PF 10 MCG/ML
25-200 PLASTIC BAG, INJECTION (ML) INTRAVENOUS CONTINUOUS
Status: DISCONTINUED | OUTPATIENT
Start: 2023-01-01 | End: 2023-01-01

## 2023-01-01 RX ORDER — AMIODARONE HYDROCHLORIDE 200 MG/1
200 TABLET ORAL DAILY
Status: DISCONTINUED | OUTPATIENT
Start: 2023-01-01 | End: 2023-01-01

## 2023-01-01 RX ORDER — NALOXONE HYDROCHLORIDE 0.4 MG/ML
0.4 INJECTION, SOLUTION INTRAMUSCULAR; INTRAVENOUS; SUBCUTANEOUS PRN
Status: DISCONTINUED | OUTPATIENT
Start: 2023-01-01 | End: 2023-02-23 | Stop reason: HOSPADM

## 2023-01-01 RX ORDER — FUROSEMIDE 40 MG/1
40 TABLET ORAL DAILY
Status: DISCONTINUED | OUTPATIENT
Start: 2023-01-01 | End: 2023-01-01

## 2023-01-01 RX ORDER — ONDANSETRON 2 MG/ML
4 INJECTION INTRAMUSCULAR; INTRAVENOUS EVERY 6 HOURS PRN
Status: DISCONTINUED | OUTPATIENT
Start: 2023-01-01 | End: 2023-02-23 | Stop reason: HOSPADM

## 2023-01-01 RX ORDER — NOREPINEPHRINE BIT/0.9 % NACL 16MG/250ML
INFUSION BOTTLE (ML) INTRAVENOUS
Status: DISPENSED
Start: 2023-01-01 | End: 2023-01-01

## 2023-01-01 RX ORDER — DIGOXIN 0.25 MG/ML
125 INJECTION INTRAMUSCULAR; INTRAVENOUS ONCE
Status: DISCONTINUED | OUTPATIENT
Start: 2023-01-01 | End: 2023-01-01

## 2023-01-01 RX ADMIN — HEPARIN SODIUM 5000 UNITS: 5000 INJECTION INTRAVENOUS; SUBCUTANEOUS at 14:26

## 2023-01-01 RX ADMIN — SODIUM CHLORIDE 25 ML: 9 INJECTION, SOLUTION INTRAVENOUS at 12:28

## 2023-01-01 RX ADMIN — Medication 40 MCG/MIN: at 09:47

## 2023-01-01 RX ADMIN — IPRATROPIUM BROMIDE AND ALBUTEROL SULFATE 1 AMPULE: 2.5; .5 SOLUTION RESPIRATORY (INHALATION) at 09:47

## 2023-01-01 RX ADMIN — AMIODARONE HYDROCHLORIDE 400 MG: 200 TABLET ORAL at 12:31

## 2023-01-01 RX ADMIN — IPRATROPIUM BROMIDE AND ALBUTEROL SULFATE 1 AMPULE: 2.5; .5 SOLUTION RESPIRATORY (INHALATION) at 20:41

## 2023-01-01 RX ADMIN — CLOPIDOGREL BISULFATE 300 MG: 300 TABLET, FILM COATED ORAL at 14:04

## 2023-01-01 RX ADMIN — VASOPRESSIN 0.04 UNITS/MIN: 20 INJECTION INTRAVENOUS at 08:40

## 2023-01-01 RX ADMIN — Medication: at 09:21

## 2023-01-01 RX ADMIN — SODIUM CHLORIDE 8 MG/HR: 9 INJECTION, SOLUTION INTRAVENOUS at 19:53

## 2023-01-01 RX ADMIN — Medication: at 14:05

## 2023-01-01 RX ADMIN — HYDROCORTISONE SODIUM SUCCINATE 50 MG: 100 INJECTION, POWDER, FOR SOLUTION INTRAMUSCULAR; INTRAVENOUS at 02:45

## 2023-01-01 RX ADMIN — SODIUM CHLORIDE, PRESERVATIVE FREE 10 ML: 5 INJECTION INTRAVENOUS at 20:01

## 2023-01-01 RX ADMIN — VASOPRESSIN 0.04 UNITS/MIN: 20 INJECTION INTRAVENOUS at 05:01

## 2023-01-01 RX ADMIN — AMIODARONE HYDROCHLORIDE 400 MG: 200 TABLET ORAL at 20:57

## 2023-01-01 RX ADMIN — ACETAMINOPHEN 650 MG: 325 TABLET ORAL at 20:10

## 2023-01-01 RX ADMIN — HYDROCORTISONE SODIUM SUCCINATE 50 MG: 100 INJECTION, POWDER, FOR SOLUTION INTRAMUSCULAR; INTRAVENOUS at 15:26

## 2023-01-01 RX ADMIN — HEPARIN SODIUM 5000 UNITS: 5000 INJECTION INTRAVENOUS; SUBCUTANEOUS at 06:04

## 2023-01-01 RX ADMIN — Medication 20 MG: at 08:20

## 2023-01-01 RX ADMIN — MIDAZOLAM 2 MG: 1 INJECTION INTRAMUSCULAR; INTRAVENOUS at 16:12

## 2023-01-01 RX ADMIN — LORAZEPAM 0.5 MG: 2 INJECTION INTRAMUSCULAR; INTRAVENOUS at 20:48

## 2023-01-01 RX ADMIN — CLOPIDOGREL BISULFATE 75 MG: 75 TABLET ORAL at 08:20

## 2023-01-01 RX ADMIN — MIDODRINE HYDROCHLORIDE 10 MG: 10 TABLET ORAL at 17:11

## 2023-01-01 RX ADMIN — Medication 20 MG: at 11:11

## 2023-01-01 RX ADMIN — Medication: at 22:34

## 2023-01-01 RX ADMIN — AMIODARONE HYDROCHLORIDE 200 MG: 200 TABLET ORAL at 08:07

## 2023-01-01 RX ADMIN — CHLORHEXIDINE GLUCONATE 0.12% ORAL RINSE 15 ML: 1.2 LIQUID ORAL at 19:55

## 2023-01-01 RX ADMIN — HYDROCORTISONE SODIUM SUCCINATE 50 MG: 100 INJECTION, POWDER, FOR SOLUTION INTRAMUSCULAR; INTRAVENOUS at 15:22

## 2023-01-01 RX ADMIN — AMIODARONE HYDROCHLORIDE 200 MG: 200 TABLET ORAL at 08:20

## 2023-01-01 RX ADMIN — HEPARIN SODIUM 5000 UNITS: 5000 INJECTION INTRAVENOUS; SUBCUTANEOUS at 20:13

## 2023-01-01 RX ADMIN — ALBUMIN (HUMAN) 25 G: 0.25 INJECTION, SOLUTION INTRAVENOUS at 09:54

## 2023-01-01 RX ADMIN — PIPERACILLIN AND TAZOBACTAM 4500 MG: 4; .5 INJECTION, POWDER, FOR SOLUTION INTRAVENOUS at 12:36

## 2023-01-01 RX ADMIN — MIDAZOLAM 2 MG: 1 INJECTION INTRAMUSCULAR; INTRAVENOUS at 02:19

## 2023-01-01 RX ADMIN — SODIUM CHLORIDE, PRESERVATIVE FREE 10 ML: 5 INJECTION INTRAVENOUS at 11:13

## 2023-01-01 RX ADMIN — IPRATROPIUM BROMIDE AND ALBUTEROL SULFATE 1 AMPULE: 2.5; .5 SOLUTION RESPIRATORY (INHALATION) at 19:58

## 2023-01-01 RX ADMIN — HEPARIN SODIUM 5000 UNITS: 5000 INJECTION INTRAVENOUS; SUBCUTANEOUS at 14:53

## 2023-01-01 RX ADMIN — SODIUM CHLORIDE, PRESERVATIVE FREE 10 ML: 5 INJECTION INTRAVENOUS at 09:34

## 2023-01-01 RX ADMIN — SODIUM CHLORIDE 8 MG/HR: 9 INJECTION, SOLUTION INTRAVENOUS at 15:33

## 2023-01-01 RX ADMIN — AMIODARONE HYDROCHLORIDE 0.5 MG/MIN: 50 INJECTION, SOLUTION INTRAVENOUS at 15:12

## 2023-01-01 RX ADMIN — AMIODARONE HYDROCHLORIDE 400 MG: 200 TABLET ORAL at 08:49

## 2023-01-01 RX ADMIN — ASPIRIN 81 MG: 81 TABLET, COATED ORAL at 08:07

## 2023-01-01 RX ADMIN — CHLORHEXIDINE GLUCONATE 0.12% ORAL RINSE 15 ML: 1.2 LIQUID ORAL at 20:36

## 2023-01-01 RX ADMIN — CHLORHEXIDINE GLUCONATE 0.12% ORAL RINSE 15 ML: 1.2 LIQUID ORAL at 08:21

## 2023-01-01 RX ADMIN — HEPARIN SODIUM 5000 UNITS: 5000 INJECTION INTRAVENOUS; SUBCUTANEOUS at 22:07

## 2023-01-01 RX ADMIN — SODIUM CHLORIDE 25 ML: 9 INJECTION, SOLUTION INTRAVENOUS at 17:30

## 2023-01-01 RX ADMIN — AMIODARONE HYDROCHLORIDE 200 MG: 200 TABLET ORAL at 20:54

## 2023-01-01 RX ADMIN — IPRATROPIUM BROMIDE AND ALBUTEROL SULFATE 1 AMPULE: 2.5; .5 SOLUTION RESPIRATORY (INHALATION) at 20:49

## 2023-01-01 RX ADMIN — DEXMEDETOMIDINE HYDROCHLORIDE 0.2 MCG/KG/HR: 400 INJECTION INTRAVENOUS at 21:19

## 2023-01-01 RX ADMIN — CHLORHEXIDINE GLUCONATE 0.12% ORAL RINSE 15 ML: 1.2 LIQUID ORAL at 08:45

## 2023-01-01 RX ADMIN — PIPERACILLIN AND TAZOBACTAM 3375 MG: 3; .375 INJECTION, POWDER, LYOPHILIZED, FOR SOLUTION INTRAVENOUS at 17:36

## 2023-01-01 RX ADMIN — Medication 45 MCG/MIN: at 18:05

## 2023-01-01 RX ADMIN — Medication: at 22:35

## 2023-01-01 RX ADMIN — CHLORHEXIDINE GLUCONATE 0.12% ORAL RINSE 15 ML: 1.2 LIQUID ORAL at 20:54

## 2023-01-01 RX ADMIN — ASPIRIN 81 MG 81 MG: 81 TABLET ORAL at 08:40

## 2023-01-01 RX ADMIN — ASPIRIN 81 MG 81 MG: 81 TABLET ORAL at 08:20

## 2023-01-01 RX ADMIN — CHLORHEXIDINE GLUCONATE 0.12% ORAL RINSE 15 ML: 1.2 LIQUID ORAL at 23:08

## 2023-01-01 RX ADMIN — ASPIRIN 81 MG: 81 TABLET, COATED ORAL at 08:20

## 2023-01-01 RX ADMIN — METOPROLOL SUCCINATE 25 MG: 25 TABLET, EXTENDED RELEASE ORAL at 09:08

## 2023-01-01 RX ADMIN — VASOPRESSIN 0.04 UNITS/MIN: 20 INJECTION INTRAVENOUS at 14:08

## 2023-01-01 RX ADMIN — HEPARIN SODIUM 5000 UNITS: 5000 INJECTION INTRAVENOUS; SUBCUTANEOUS at 13:56

## 2023-01-01 RX ADMIN — HYDROCORTISONE SODIUM SUCCINATE 50 MG: 100 INJECTION, POWDER, FOR SOLUTION INTRAMUSCULAR; INTRAVENOUS at 20:53

## 2023-01-01 RX ADMIN — SODIUM CHLORIDE, PRESERVATIVE FREE 10 ML: 5 INJECTION INTRAVENOUS at 20:45

## 2023-01-01 RX ADMIN — MILRINONE LACTATE IN DEXTROSE 0.2 MCG/KG/MIN: 200 INJECTION, SOLUTION INTRAVENOUS at 20:32

## 2023-01-01 RX ADMIN — HYDROCORTISONE SODIUM SUCCINATE 50 MG: 100 INJECTION, POWDER, FOR SOLUTION INTRAMUSCULAR; INTRAVENOUS at 02:37

## 2023-01-01 RX ADMIN — DEXMEDETOMIDINE HYDROCHLORIDE 0.2 MCG/KG/HR: 400 INJECTION INTRAVENOUS at 21:42

## 2023-01-01 RX ADMIN — Medication 10 ML: at 21:45

## 2023-01-01 RX ADMIN — MIDAZOLAM 2 MG: 1 INJECTION INTRAMUSCULAR; INTRAVENOUS at 02:26

## 2023-01-01 RX ADMIN — Medication: at 13:47

## 2023-01-01 RX ADMIN — AMIODARONE HYDROCHLORIDE 400 MG: 200 TABLET ORAL at 21:42

## 2023-01-01 RX ADMIN — PANTOPRAZOLE SODIUM 40 MG: 40 INJECTION, POWDER, FOR SOLUTION INTRAVENOUS at 09:34

## 2023-01-01 RX ADMIN — Medication: at 04:55

## 2023-01-01 RX ADMIN — CHLORHEXIDINE GLUCONATE 0.12% ORAL RINSE 15 ML: 1.2 LIQUID ORAL at 21:29

## 2023-01-01 RX ADMIN — HEPARIN SODIUM 5000 UNITS: 5000 INJECTION INTRAVENOUS; SUBCUTANEOUS at 13:53

## 2023-01-01 RX ADMIN — Medication 13 MCG/MIN: at 15:56

## 2023-01-01 RX ADMIN — SODIUM CHLORIDE, PRESERVATIVE FREE 10 ML: 5 INJECTION INTRAVENOUS at 08:50

## 2023-01-01 RX ADMIN — SODIUM CHLORIDE, PRESERVATIVE FREE 10 ML: 5 INJECTION INTRAVENOUS at 20:02

## 2023-01-01 RX ADMIN — IPRATROPIUM BROMIDE AND ALBUTEROL SULFATE 1 AMPULE: 2.5; .5 SOLUTION RESPIRATORY (INHALATION) at 11:45

## 2023-01-01 RX ADMIN — PIPERACILLIN AND TAZOBACTAM 3375 MG: 3; .375 INJECTION, POWDER, LYOPHILIZED, FOR SOLUTION INTRAVENOUS at 06:09

## 2023-01-01 RX ADMIN — HYDROCORTISONE SODIUM SUCCINATE 50 MG: 100 INJECTION, POWDER, FOR SOLUTION INTRAMUSCULAR; INTRAVENOUS at 20:11

## 2023-01-01 RX ADMIN — IPRATROPIUM BROMIDE AND ALBUTEROL SULFATE 1 AMPULE: 2.5; .5 SOLUTION RESPIRATORY (INHALATION) at 08:59

## 2023-01-01 RX ADMIN — MIDODRINE HYDROCHLORIDE 10 MG: 10 TABLET ORAL at 09:55

## 2023-01-01 RX ADMIN — ASPIRIN 81 MG: 81 TABLET, COATED ORAL at 11:11

## 2023-01-01 RX ADMIN — ATORVASTATIN CALCIUM 80 MG: 80 TABLET, FILM COATED ORAL at 20:54

## 2023-01-01 RX ADMIN — HEPARIN SODIUM 5000 UNITS: 5000 INJECTION INTRAVENOUS; SUBCUTANEOUS at 22:10

## 2023-01-01 RX ADMIN — AMIODARONE HYDROCHLORIDE 200 MG: 200 TABLET ORAL at 08:51

## 2023-01-01 RX ADMIN — PHENYLEPHRINE HYDROCHLORIDE 30 MCG/MIN: 10 INJECTION INTRAVENOUS at 20:31

## 2023-01-01 RX ADMIN — PIPERACILLIN AND TAZOBACTAM 3375 MG: 3; .375 INJECTION, POWDER, LYOPHILIZED, FOR SOLUTION INTRAVENOUS at 17:42

## 2023-01-01 RX ADMIN — CHLORHEXIDINE GLUCONATE 0.12% ORAL RINSE 15 ML: 1.2 LIQUID ORAL at 08:50

## 2023-01-01 RX ADMIN — IPRATROPIUM BROMIDE AND ALBUTEROL SULFATE 1 AMPULE: 2.5; .5 SOLUTION RESPIRATORY (INHALATION) at 14:28

## 2023-01-01 RX ADMIN — SODIUM CHLORIDE, PRESERVATIVE FREE 10 ML: 5 INJECTION INTRAVENOUS at 19:54

## 2023-01-01 RX ADMIN — HYDROCORTISONE SODIUM SUCCINATE 50 MG: 100 INJECTION, POWDER, FOR SOLUTION INTRAMUSCULAR; INTRAVENOUS at 21:19

## 2023-01-01 RX ADMIN — MIDAZOLAM 2 MG: 1 INJECTION INTRAMUSCULAR; INTRAVENOUS at 10:21

## 2023-01-01 RX ADMIN — HEPARIN SODIUM 5000 UNITS: 5000 INJECTION INTRAVENOUS; SUBCUTANEOUS at 05:48

## 2023-01-01 RX ADMIN — PANTOPRAZOLE SODIUM 40 MG: 40 INJECTION, POWDER, FOR SOLUTION INTRAVENOUS at 09:32

## 2023-01-01 RX ADMIN — PIPERACILLIN AND TAZOBACTAM 3375 MG: 3; .375 INJECTION, POWDER, LYOPHILIZED, FOR SOLUTION INTRAVENOUS at 05:47

## 2023-01-01 RX ADMIN — AMIODARONE HYDROCHLORIDE 0.5 MG/MIN: 50 INJECTION, SOLUTION INTRAVENOUS at 05:53

## 2023-01-01 RX ADMIN — HEPARIN SODIUM 5000 UNITS: 5000 INJECTION INTRAVENOUS; SUBCUTANEOUS at 21:55

## 2023-01-01 RX ADMIN — Medication 25 MCG/HR: at 13:58

## 2023-01-01 RX ADMIN — Medication: at 12:04

## 2023-01-01 RX ADMIN — SODIUM CHLORIDE, PRESERVATIVE FREE 10 ML: 5 INJECTION INTRAVENOUS at 09:57

## 2023-01-01 RX ADMIN — Medication: at 09:59

## 2023-01-01 RX ADMIN — SODIUM CHLORIDE 25 ML: 9 INJECTION, SOLUTION INTRAVENOUS at 15:22

## 2023-01-01 RX ADMIN — IPRATROPIUM BROMIDE AND ALBUTEROL SULFATE 1 AMPULE: 2.5; .5 SOLUTION RESPIRATORY (INHALATION) at 07:57

## 2023-01-01 RX ADMIN — HEPARIN SODIUM 5000 UNITS: 5000 INJECTION INTRAVENOUS; SUBCUTANEOUS at 06:57

## 2023-01-01 RX ADMIN — HEPARIN SODIUM 5000 UNITS: 5000 INJECTION INTRAVENOUS; SUBCUTANEOUS at 22:26

## 2023-01-01 RX ADMIN — MIDAZOLAM 2 MG: 1 INJECTION INTRAMUSCULAR; INTRAVENOUS at 13:50

## 2023-01-01 RX ADMIN — CHLORHEXIDINE GLUCONATE 0.12% ORAL RINSE 15 ML: 1.2 LIQUID ORAL at 20:02

## 2023-01-01 RX ADMIN — SODIUM CHLORIDE 8 MG/HR: 9 INJECTION, SOLUTION INTRAVENOUS at 04:49

## 2023-01-01 RX ADMIN — HEPARIN SODIUM 5000 UNITS: 5000 INJECTION INTRAVENOUS; SUBCUTANEOUS at 05:41

## 2023-01-01 RX ADMIN — HEPARIN SODIUM 5000 UNITS: 5000 INJECTION INTRAVENOUS; SUBCUTANEOUS at 06:09

## 2023-01-01 RX ADMIN — PROPOFOL 30 MG: 10 INJECTION, EMULSION INTRAVENOUS at 15:03

## 2023-01-01 RX ADMIN — SODIUM CHLORIDE, PRESERVATIVE FREE 10 ML: 5 INJECTION INTRAVENOUS at 20:53

## 2023-01-01 RX ADMIN — CHLORHEXIDINE GLUCONATE 0.12% ORAL RINSE 15 ML: 1.2 LIQUID ORAL at 09:38

## 2023-01-01 RX ADMIN — HYDROCORTISONE SODIUM SUCCINATE 25 MG: 100 INJECTION, POWDER, FOR SOLUTION INTRAMUSCULAR; INTRAVENOUS at 20:06

## 2023-01-01 RX ADMIN — CHLORHEXIDINE GLUCONATE 0.12% ORAL RINSE 15 ML: 1.2 LIQUID ORAL at 20:34

## 2023-01-01 RX ADMIN — SODIUM CHLORIDE, PRESERVATIVE FREE 10 ML: 5 INJECTION INTRAVENOUS at 21:33

## 2023-01-01 RX ADMIN — AMIODARONE HYDROCHLORIDE 400 MG: 200 TABLET ORAL at 11:10

## 2023-01-01 RX ADMIN — HYDRALAZINE HYDROCHLORIDE 10 MG: 20 INJECTION INTRAMUSCULAR; INTRAVENOUS at 14:23

## 2023-01-01 RX ADMIN — VASOPRESSIN 0.04 UNITS/MIN: 20 INJECTION INTRAVENOUS at 21:16

## 2023-01-01 RX ADMIN — Medication 125 MCG/HR: at 03:09

## 2023-01-01 RX ADMIN — SODIUM CHLORIDE, PRESERVATIVE FREE 10 ML: 5 INJECTION INTRAVENOUS at 10:04

## 2023-01-01 RX ADMIN — HYDROCORTISONE SODIUM SUCCINATE 50 MG: 100 INJECTION, POWDER, FOR SOLUTION INTRAMUSCULAR; INTRAVENOUS at 08:06

## 2023-01-01 RX ADMIN — IOPAMIDOL 190 ML: 755 INJECTION, SOLUTION INTRAVENOUS at 12:51

## 2023-01-01 RX ADMIN — IPRATROPIUM BROMIDE AND ALBUTEROL SULFATE 1 AMPULE: 2.5; .5 SOLUTION RESPIRATORY (INHALATION) at 07:34

## 2023-01-01 RX ADMIN — Medication 16 MCG/MIN: at 01:23

## 2023-01-01 RX ADMIN — CLOPIDOGREL BISULFATE 75 MG: 75 TABLET ORAL at 09:14

## 2023-01-01 RX ADMIN — ATORVASTATIN CALCIUM 80 MG: 80 TABLET, FILM COATED ORAL at 19:54

## 2023-01-01 RX ADMIN — Medication: at 15:04

## 2023-01-01 RX ADMIN — SODIUM CHLORIDE, PRESERVATIVE FREE 10 ML: 5 INJECTION INTRAVENOUS at 09:39

## 2023-01-01 RX ADMIN — CHLORHEXIDINE GLUCONATE 0.12% ORAL RINSE 15 ML: 1.2 LIQUID ORAL at 20:53

## 2023-01-01 RX ADMIN — ATORVASTATIN CALCIUM 80 MG: 80 TABLET, FILM COATED ORAL at 20:06

## 2023-01-01 RX ADMIN — PIPERACILLIN AND TAZOBACTAM 3375 MG: 3; .375 INJECTION, POWDER, LYOPHILIZED, FOR SOLUTION INTRAVENOUS at 18:33

## 2023-01-01 RX ADMIN — SODIUM CHLORIDE, PRESERVATIVE FREE 10 ML: 5 INJECTION INTRAVENOUS at 12:29

## 2023-01-01 RX ADMIN — ASPIRIN 81 MG: 81 TABLET, COATED ORAL at 09:14

## 2023-01-01 RX ADMIN — HEPARIN SODIUM 5000 UNITS: 5000 INJECTION INTRAVENOUS; SUBCUTANEOUS at 17:19

## 2023-01-01 RX ADMIN — Medication: at 18:41

## 2023-01-01 RX ADMIN — Medication 20 MG: at 15:55

## 2023-01-01 RX ADMIN — HEPARIN SODIUM 5000 UNITS: 5000 INJECTION INTRAVENOUS; SUBCUTANEOUS at 14:37

## 2023-01-01 RX ADMIN — PROPOFOL 20 MCG/KG/MIN: 10 INJECTION, EMULSION INTRAVENOUS at 12:43

## 2023-01-01 RX ADMIN — ENOXAPARIN SODIUM 30 MG: 100 INJECTION SUBCUTANEOUS at 08:10

## 2023-01-01 RX ADMIN — FENTANYL CITRATE 100 MCG: 50 INJECTION, SOLUTION INTRAMUSCULAR; INTRAVENOUS at 13:52

## 2023-01-01 RX ADMIN — IPRATROPIUM BROMIDE AND ALBUTEROL SULFATE 1 AMPULE: 2.5; .5 SOLUTION RESPIRATORY (INHALATION) at 19:23

## 2023-01-01 RX ADMIN — Medication 34 MCG/MIN: at 17:41

## 2023-01-01 RX ADMIN — DEXMEDETOMIDINE HYDROCHLORIDE 0.2 MCG/KG/HR: 400 INJECTION INTRAVENOUS at 19:43

## 2023-01-01 RX ADMIN — Medication: at 17:15

## 2023-01-01 RX ADMIN — HYDROCORTISONE SODIUM SUCCINATE 50 MG: 100 INJECTION, POWDER, FOR SOLUTION INTRAMUSCULAR; INTRAVENOUS at 22:26

## 2023-01-01 RX ADMIN — SODIUM CHLORIDE, PRESERVATIVE FREE 10 ML: 5 INJECTION INTRAVENOUS at 09:03

## 2023-01-01 RX ADMIN — LIDOCAINE HYDROCHLORIDE 200 MG: 10 INJECTION, SOLUTION INFILTRATION; PERINEURAL at 09:33

## 2023-01-01 RX ADMIN — AMIODARONE HYDROCHLORIDE 200 MG: 200 TABLET ORAL at 09:32

## 2023-01-01 RX ADMIN — HEPARIN SODIUM 5000 UNITS: 5000 INJECTION INTRAVENOUS; SUBCUTANEOUS at 14:00

## 2023-01-01 RX ADMIN — MIDAZOLAM 2 MG: 1 INJECTION INTRAMUSCULAR; INTRAVENOUS at 00:17

## 2023-01-01 RX ADMIN — HYDROCORTISONE SODIUM SUCCINATE 25 MG: 100 INJECTION, POWDER, FOR SOLUTION INTRAMUSCULAR; INTRAVENOUS at 14:23

## 2023-01-01 RX ADMIN — ATORVASTATIN CALCIUM 80 MG: 80 TABLET, FILM COATED ORAL at 20:53

## 2023-01-01 RX ADMIN — SODIUM CHLORIDE, PRESERVATIVE FREE 10 ML: 5 INJECTION INTRAVENOUS at 08:48

## 2023-01-01 RX ADMIN — Medication: at 18:38

## 2023-01-01 RX ADMIN — CLOPIDOGREL BISULFATE 75 MG: 75 TABLET ORAL at 12:27

## 2023-01-01 RX ADMIN — CHLORHEXIDINE GLUCONATE 0.12% ORAL RINSE 15 ML: 1.2 LIQUID ORAL at 11:10

## 2023-01-01 RX ADMIN — ATORVASTATIN CALCIUM 80 MG: 80 TABLET, FILM COATED ORAL at 20:16

## 2023-01-01 RX ADMIN — SODIUM CHLORIDE 25 ML: 9 INJECTION, SOLUTION INTRAVENOUS at 17:40

## 2023-01-01 RX ADMIN — Medication 175 MCG/HR: at 18:11

## 2023-01-01 RX ADMIN — HEPARIN SODIUM 5000 UNITS: 5000 INJECTION INTRAVENOUS; SUBCUTANEOUS at 14:06

## 2023-01-01 RX ADMIN — Medication: at 17:24

## 2023-01-01 RX ADMIN — Medication: at 03:42

## 2023-01-01 RX ADMIN — CHLORHEXIDINE GLUCONATE 0.12% ORAL RINSE 15 ML: 1.2 LIQUID ORAL at 20:13

## 2023-01-01 RX ADMIN — VASOPRESSIN 0.04 UNITS/MIN: 20 INJECTION INTRAVENOUS at 00:33

## 2023-01-01 RX ADMIN — IPRATROPIUM BROMIDE AND ALBUTEROL SULFATE 1 AMPULE: 2.5; .5 SOLUTION RESPIRATORY (INHALATION) at 08:04

## 2023-01-01 RX ADMIN — HEPARIN SODIUM 5000 UNITS: 5000 INJECTION INTRAVENOUS; SUBCUTANEOUS at 21:42

## 2023-01-01 RX ADMIN — Medication 16 MCG/MIN: at 04:36

## 2023-01-01 RX ADMIN — SODIUM CHLORIDE, PRESERVATIVE FREE 10 ML: 5 INJECTION INTRAVENOUS at 20:12

## 2023-01-01 RX ADMIN — IOPAMIDOL 75 ML: 755 INJECTION, SOLUTION INTRAVENOUS at 12:59

## 2023-01-01 RX ADMIN — PIPERACILLIN AND TAZOBACTAM 3375 MG: 3; .375 INJECTION, POWDER, LYOPHILIZED, FOR SOLUTION INTRAVENOUS at 17:48

## 2023-01-01 RX ADMIN — PANTOPRAZOLE SODIUM 40 MG: 40 INJECTION, POWDER, FOR SOLUTION INTRAVENOUS at 08:11

## 2023-01-01 RX ADMIN — AMIODARONE HYDROCHLORIDE 150 MG: 50 INJECTION, SOLUTION INTRAVENOUS at 10:18

## 2023-01-01 RX ADMIN — CEFEPIME 2000 MG: 2 INJECTION, POWDER, FOR SOLUTION INTRAVENOUS at 15:43

## 2023-01-01 RX ADMIN — HEPARIN SODIUM 5000 UNITS: 5000 INJECTION INTRAVENOUS; SUBCUTANEOUS at 05:53

## 2023-01-01 RX ADMIN — IPRATROPIUM BROMIDE AND ALBUTEROL SULFATE 1 AMPULE: 2.5; .5 SOLUTION RESPIRATORY (INHALATION) at 12:55

## 2023-01-01 RX ADMIN — ASPIRIN 81 MG: 81 TABLET, COATED ORAL at 10:44

## 2023-01-01 RX ADMIN — Medication 4 MCG/MIN: at 10:29

## 2023-01-01 RX ADMIN — SODIUM CHLORIDE 250 ML: 9 INJECTION, SOLUTION INTRAVENOUS at 01:33

## 2023-01-01 RX ADMIN — PROPOFOL 10 MCG/KG/MIN: 10 INJECTION, EMULSION INTRAVENOUS at 09:29

## 2023-01-01 RX ADMIN — Medication: at 21:30

## 2023-01-01 RX ADMIN — SODIUM CHLORIDE 250 ML: 9 INJECTION, SOLUTION INTRAVENOUS at 19:25

## 2023-01-01 RX ADMIN — ATORVASTATIN CALCIUM 80 MG: 80 TABLET, FILM COATED ORAL at 20:57

## 2023-01-01 RX ADMIN — HEPARIN SODIUM 5000 UNITS: 5000 INJECTION INTRAVENOUS; SUBCUTANEOUS at 12:26

## 2023-01-01 RX ADMIN — Medication 10 MCG/MIN: at 04:23

## 2023-01-01 RX ADMIN — PANTOPRAZOLE SODIUM 40 MG: 40 INJECTION, POWDER, FOR SOLUTION INTRAVENOUS at 08:06

## 2023-01-01 RX ADMIN — CHLORHEXIDINE GLUCONATE 0.12% ORAL RINSE 15 ML: 1.2 LIQUID ORAL at 08:06

## 2023-01-01 RX ADMIN — CHLORHEXIDINE GLUCONATE 0.12% ORAL RINSE 15 ML: 1.2 LIQUID ORAL at 20:06

## 2023-01-01 RX ADMIN — IPRATROPIUM BROMIDE AND ALBUTEROL SULFATE 1 AMPULE: 2.5; .5 SOLUTION RESPIRATORY (INHALATION) at 11:52

## 2023-01-01 RX ADMIN — Medication: at 15:05

## 2023-01-01 RX ADMIN — CLOPIDOGREL BISULFATE 75 MG: 75 TABLET ORAL at 09:54

## 2023-01-01 RX ADMIN — AMIODARONE HYDROCHLORIDE 400 MG: 200 TABLET ORAL at 08:20

## 2023-01-01 RX ADMIN — ATORVASTATIN CALCIUM 80 MG: 80 TABLET, FILM COATED ORAL at 21:42

## 2023-01-01 RX ADMIN — SODIUM CHLORIDE, PRESERVATIVE FREE 10 ML: 5 INJECTION INTRAVENOUS at 08:21

## 2023-01-01 RX ADMIN — ATORVASTATIN CALCIUM 80 MG: 80 TABLET, FILM COATED ORAL at 20:36

## 2023-01-01 RX ADMIN — ATORVASTATIN CALCIUM 80 MG: 80 TABLET, FILM COATED ORAL at 20:02

## 2023-01-01 RX ADMIN — PIPERACILLIN AND TAZOBACTAM 3375 MG: 3; .375 INJECTION, POWDER, LYOPHILIZED, FOR SOLUTION INTRAVENOUS at 17:52

## 2023-01-01 RX ADMIN — ATORVASTATIN CALCIUM 80 MG: 80 TABLET, FILM COATED ORAL at 20:58

## 2023-01-01 RX ADMIN — METHYLPREDNISOLONE SODIUM SUCCINATE 40 MG: 40 INJECTION, POWDER, FOR SOLUTION INTRAMUSCULAR; INTRAVENOUS at 14:36

## 2023-01-01 RX ADMIN — PROPOFOL 20 MCG/KG/MIN: 10 INJECTION, EMULSION INTRAVENOUS at 01:37

## 2023-01-01 RX ADMIN — SODIUM CHLORIDE, PRESERVATIVE FREE 10 ML: 5 INJECTION INTRAVENOUS at 20:58

## 2023-01-01 RX ADMIN — PROPOFOL 10 MCG/KG/MIN: 10 INJECTION, EMULSION INTRAVENOUS at 21:12

## 2023-01-01 RX ADMIN — SODIUM CHLORIDE, PRESERVATIVE FREE 10 ML: 5 INJECTION INTRAVENOUS at 08:20

## 2023-01-01 RX ADMIN — Medication: at 08:41

## 2023-01-01 RX ADMIN — DEXMEDETOMIDINE HYDROCHLORIDE 0.3 MCG/KG/HR: 400 INJECTION INTRAVENOUS at 09:33

## 2023-01-01 RX ADMIN — CHLORHEXIDINE GLUCONATE 0.12% ORAL RINSE 15 ML: 1.2 LIQUID ORAL at 08:20

## 2023-01-01 RX ADMIN — CHLORHEXIDINE GLUCONATE 0.12% ORAL RINSE 15 ML: 1.2 LIQUID ORAL at 09:55

## 2023-01-01 RX ADMIN — SODIUM CHLORIDE, PRESERVATIVE FREE 10 ML: 5 INJECTION INTRAVENOUS at 10:11

## 2023-01-01 RX ADMIN — ATORVASTATIN CALCIUM 80 MG: 80 TABLET, FILM COATED ORAL at 20:13

## 2023-01-01 RX ADMIN — HEPARIN SODIUM 5000 UNITS: 5000 INJECTION INTRAVENOUS; SUBCUTANEOUS at 06:21

## 2023-01-01 RX ADMIN — MIDAZOLAM 2 MG: 1 INJECTION INTRAMUSCULAR; INTRAVENOUS at 10:31

## 2023-01-01 RX ADMIN — CHLORHEXIDINE GLUCONATE 0.12% ORAL RINSE 15 ML: 1.2 LIQUID ORAL at 09:32

## 2023-01-01 RX ADMIN — IPRATROPIUM BROMIDE AND ALBUTEROL SULFATE 1 AMPULE: 2.5; .5 SOLUTION RESPIRATORY (INHALATION) at 08:01

## 2023-01-01 RX ADMIN — Medication: at 00:02

## 2023-01-01 RX ADMIN — CEFEPIME 2000 MG: 2 INJECTION, POWDER, FOR SOLUTION INTRAVENOUS at 02:37

## 2023-01-01 RX ADMIN — CLOPIDOGREL BISULFATE 75 MG: 75 TABLET ORAL at 08:07

## 2023-01-01 RX ADMIN — HEPARIN SODIUM 5000 UNITS: 5000 INJECTION INTRAVENOUS; SUBCUTANEOUS at 11:11

## 2023-01-01 RX ADMIN — DIGOXIN 500 MCG: 0.25 INJECTION INTRAMUSCULAR; INTRAVENOUS at 15:21

## 2023-01-01 RX ADMIN — METHYLPREDNISOLONE SODIUM SUCCINATE 40 MG: 40 INJECTION, POWDER, FOR SOLUTION INTRAMUSCULAR; INTRAVENOUS at 09:14

## 2023-01-01 RX ADMIN — PERFLUTREN: 6.52 INJECTION, SUSPENSION INTRAVENOUS at 13:50

## 2023-01-01 RX ADMIN — PIPERACILLIN AND TAZOBACTAM 3375 MG: 3; .375 INJECTION, POWDER, LYOPHILIZED, FOR SOLUTION INTRAVENOUS at 17:31

## 2023-01-01 RX ADMIN — THIAMINE HYDROCHLORIDE 100 MG: 100 INJECTION, SOLUTION INTRAMUSCULAR; INTRAVENOUS at 15:25

## 2023-01-01 RX ADMIN — IPRATROPIUM BROMIDE AND ALBUTEROL SULFATE 1 AMPULE: 2.5; .5 SOLUTION RESPIRATORY (INHALATION) at 19:56

## 2023-01-01 RX ADMIN — IPRATROPIUM BROMIDE AND ALBUTEROL SULFATE 1 AMPULE: 2.5; .5 SOLUTION RESPIRATORY (INHALATION) at 19:53

## 2023-01-01 RX ADMIN — PHENYLEPHRINE HYDROCHLORIDE 130 MCG/MIN: 10 INJECTION INTRAVENOUS at 04:15

## 2023-01-01 RX ADMIN — PIPERACILLIN AND TAZOBACTAM 3375 MG: 3; .375 INJECTION, POWDER, LYOPHILIZED, FOR SOLUTION INTRAVENOUS at 05:54

## 2023-01-01 RX ADMIN — ASPIRIN 81 MG: 81 TABLET, COATED ORAL at 09:55

## 2023-01-01 RX ADMIN — AMIODARONE HYDROCHLORIDE 200 MG: 200 TABLET ORAL at 09:13

## 2023-01-01 RX ADMIN — PANTOPRAZOLE SODIUM 40 MG: 40 INJECTION, POWDER, FOR SOLUTION INTRAVENOUS at 09:56

## 2023-01-01 RX ADMIN — AMIODARONE HYDROCHLORIDE 400 MG: 200 TABLET ORAL at 10:09

## 2023-01-01 RX ADMIN — PIPERACILLIN AND TAZOBACTAM 3375 MG: 3; .375 INJECTION, POWDER, LYOPHILIZED, FOR SOLUTION INTRAVENOUS at 05:35

## 2023-01-01 RX ADMIN — DEXMEDETOMIDINE HYDROCHLORIDE 0.6 MCG/KG/HR: 400 INJECTION INTRAVENOUS at 04:35

## 2023-01-01 RX ADMIN — ATORVASTATIN CALCIUM 80 MG: 80 TABLET, FILM COATED ORAL at 21:33

## 2023-01-01 RX ADMIN — AMIODARONE HYDROCHLORIDE 1 MG/MIN: 50 INJECTION, SOLUTION INTRAVENOUS at 23:25

## 2023-01-01 RX ADMIN — Medication: at 02:30

## 2023-01-01 RX ADMIN — IPRATROPIUM BROMIDE AND ALBUTEROL SULFATE 1 AMPULE: .5; 2.5 SOLUTION RESPIRATORY (INHALATION) at 12:35

## 2023-01-01 RX ADMIN — CHLORHEXIDINE GLUCONATE 0.12% ORAL RINSE 15 ML: 1.2 LIQUID ORAL at 08:31

## 2023-01-01 RX ADMIN — CHLORHEXIDINE GLUCONATE 0.12% ORAL RINSE 15 ML: 1.2 LIQUID ORAL at 21:42

## 2023-01-01 RX ADMIN — SODIUM CHLORIDE 25 ML: 9 INJECTION, SOLUTION INTRAVENOUS at 17:47

## 2023-01-01 RX ADMIN — IPRATROPIUM BROMIDE AND ALBUTEROL SULFATE 1 AMPULE: 2.5; .5 SOLUTION RESPIRATORY (INHALATION) at 20:14

## 2023-01-01 RX ADMIN — HEPARIN SODIUM 2000 UNITS: 1000 INJECTION INTRAVENOUS; SUBCUTANEOUS at 11:07

## 2023-01-01 RX ADMIN — CLOPIDOGREL BISULFATE 75 MG: 75 TABLET ORAL at 08:40

## 2023-01-01 RX ADMIN — HYDROCORTISONE SODIUM SUCCINATE 50 MG: 100 INJECTION, POWDER, FOR SOLUTION INTRAMUSCULAR; INTRAVENOUS at 03:42

## 2023-01-01 RX ADMIN — PIPERACILLIN AND TAZOBACTAM 3375 MG: 3; .375 INJECTION, POWDER, LYOPHILIZED, FOR SOLUTION INTRAVENOUS at 06:04

## 2023-01-01 RX ADMIN — Medication: at 12:16

## 2023-01-01 RX ADMIN — HYDROCORTISONE SODIUM SUCCINATE 25 MG: 100 INJECTION, POWDER, FOR SOLUTION INTRAMUSCULAR; INTRAVENOUS at 08:21

## 2023-01-01 RX ADMIN — SODIUM CHLORIDE, PRESERVATIVE FREE 10 ML: 5 INJECTION INTRAVENOUS at 20:48

## 2023-01-01 RX ADMIN — HEPARIN SODIUM 5000 UNITS: 5000 INJECTION INTRAVENOUS; SUBCUTANEOUS at 05:36

## 2023-01-01 RX ADMIN — CLOPIDOGREL BISULFATE 75 MG: 75 TABLET ORAL at 08:51

## 2023-01-01 RX ADMIN — ASPIRIN 81 MG 81 MG: 81 TABLET ORAL at 12:27

## 2023-01-01 RX ADMIN — AMIODARONE HYDROCHLORIDE 0.5 MG/MIN: 50 INJECTION, SOLUTION INTRAVENOUS at 05:00

## 2023-01-01 RX ADMIN — FUROSEMIDE 80 MG: 10 INJECTION, SOLUTION INTRAMUSCULAR; INTRAVENOUS at 08:40

## 2023-01-01 RX ADMIN — METOPROLOL SUCCINATE 25 MG: 25 TABLET, EXTENDED RELEASE ORAL at 14:30

## 2023-01-01 RX ADMIN — HEPARIN SODIUM 5000 UNITS: 5000 INJECTION INTRAVENOUS; SUBCUTANEOUS at 21:43

## 2023-01-01 RX ADMIN — MIDODRINE HYDROCHLORIDE 10 MG: 10 TABLET ORAL at 13:10

## 2023-01-01 RX ADMIN — MIDAZOLAM 2 MG: 1 INJECTION INTRAMUSCULAR; INTRAVENOUS at 05:39

## 2023-01-01 RX ADMIN — Medication: at 18:42

## 2023-01-01 RX ADMIN — SODIUM CHLORIDE, PRESERVATIVE FREE 10 ML: 5 INJECTION INTRAVENOUS at 20:36

## 2023-01-01 RX ADMIN — IPRATROPIUM BROMIDE AND ALBUTEROL SULFATE 1 AMPULE: 2.5; .5 SOLUTION RESPIRATORY (INHALATION) at 20:50

## 2023-01-01 RX ADMIN — FUROSEMIDE 40 MG: 40 TABLET ORAL at 08:20

## 2023-01-01 RX ADMIN — METHYLPREDNISOLONE SODIUM SUCCINATE 40 MG: 40 INJECTION, POWDER, FOR SOLUTION INTRAMUSCULAR; INTRAVENOUS at 08:23

## 2023-01-01 RX ADMIN — CHLORHEXIDINE GLUCONATE 0.12% ORAL RINSE 15 ML: 1.2 LIQUID ORAL at 20:40

## 2023-01-01 RX ADMIN — AMIODARONE HYDROCHLORIDE 200 MG: 200 TABLET ORAL at 21:19

## 2023-01-01 RX ADMIN — ASPIRIN 81 MG 81 MG: 81 TABLET ORAL at 10:11

## 2023-01-01 RX ADMIN — HEPARIN SODIUM 2800 UNITS: 1000 INJECTION INTRAVENOUS; SUBCUTANEOUS at 11:31

## 2023-01-01 RX ADMIN — HYDROCORTISONE SODIUM SUCCINATE 50 MG: 100 INJECTION, POWDER, FOR SOLUTION INTRAMUSCULAR; INTRAVENOUS at 14:31

## 2023-01-01 RX ADMIN — Medication: at 13:46

## 2023-01-01 RX ADMIN — CLOPIDOGREL BISULFATE 75 MG: 75 TABLET ORAL at 08:45

## 2023-01-01 RX ADMIN — VASOPRESSIN 0.04 UNITS/MIN: 20 INJECTION INTRAVENOUS at 14:56

## 2023-01-01 RX ADMIN — HYDROCORTISONE SODIUM SUCCINATE 50 MG: 100 INJECTION, POWDER, FOR SOLUTION INTRAMUSCULAR; INTRAVENOUS at 09:34

## 2023-01-01 RX ADMIN — HEPARIN SODIUM 5000 UNITS: 5000 INJECTION INTRAVENOUS; SUBCUTANEOUS at 16:27

## 2023-01-01 RX ADMIN — CHLORHEXIDINE GLUCONATE 0.12% ORAL RINSE 15 ML: 1.2 LIQUID ORAL at 12:26

## 2023-01-01 RX ADMIN — SODIUM CHLORIDE 25 ML: 9 INJECTION, SOLUTION INTRAVENOUS at 19:25

## 2023-01-01 RX ADMIN — ATORVASTATIN CALCIUM 80 MG: 80 TABLET, FILM COATED ORAL at 21:19

## 2023-01-01 RX ADMIN — ENOXAPARIN SODIUM 30 MG: 100 INJECTION SUBCUTANEOUS at 20:42

## 2023-01-01 RX ADMIN — HYDROCORTISONE SODIUM SUCCINATE 50 MG: 100 INJECTION, POWDER, FOR SOLUTION INTRAMUSCULAR; INTRAVENOUS at 14:04

## 2023-01-01 RX ADMIN — Medication 20 MG: at 08:40

## 2023-01-01 RX ADMIN — IPRATROPIUM BROMIDE AND ALBUTEROL SULFATE 1 AMPULE: 2.5; .5 SOLUTION RESPIRATORY (INHALATION) at 08:37

## 2023-01-01 RX ADMIN — AMIODARONE HYDROCHLORIDE 400 MG: 200 TABLET ORAL at 20:13

## 2023-01-01 RX ADMIN — SODIUM CHLORIDE, PRESERVATIVE FREE 10 ML: 5 INJECTION INTRAVENOUS at 19:53

## 2023-01-01 RX ADMIN — IPRATROPIUM BROMIDE AND ALBUTEROL SULFATE 1 AMPULE: 2.5; .5 SOLUTION RESPIRATORY (INHALATION) at 13:27

## 2023-01-01 RX ADMIN — MIDAZOLAM 2 MG: 1 INJECTION INTRAMUSCULAR; INTRAVENOUS at 22:53

## 2023-01-01 RX ADMIN — HEPARIN SODIUM 5000 UNITS: 5000 INJECTION INTRAVENOUS; SUBCUTANEOUS at 21:33

## 2023-01-01 RX ADMIN — CHLORHEXIDINE GLUCONATE 0.12% ORAL RINSE 15 ML: 1.2 LIQUID ORAL at 08:17

## 2023-01-01 RX ADMIN — Medication 20 MG: at 12:26

## 2023-01-01 RX ADMIN — HYDROCORTISONE SODIUM SUCCINATE 50 MG: 100 INJECTION, POWDER, FOR SOLUTION INTRAMUSCULAR; INTRAVENOUS at 09:32

## 2023-01-01 RX ADMIN — HYDRALAZINE HYDROCHLORIDE 10 MG: 20 INJECTION INTRAMUSCULAR; INTRAVENOUS at 20:01

## 2023-01-01 RX ADMIN — PIPERACILLIN AND TAZOBACTAM 3375 MG: 3; .375 INJECTION, POWDER, LYOPHILIZED, FOR SOLUTION INTRAVENOUS at 17:35

## 2023-01-01 RX ADMIN — CHLORHEXIDINE GLUCONATE 0.12% ORAL RINSE 15 ML: 1.2 LIQUID ORAL at 08:04

## 2023-01-01 RX ADMIN — MIDAZOLAM 2 MG: 1 INJECTION INTRAMUSCULAR; INTRAVENOUS at 08:38

## 2023-01-01 RX ADMIN — ENOXAPARIN SODIUM 30 MG: 100 INJECTION SUBCUTANEOUS at 20:12

## 2023-01-01 RX ADMIN — CEFEPIME 2000 MG: 2 INJECTION, POWDER, FOR SOLUTION INTRAVENOUS at 03:03

## 2023-01-01 RX ADMIN — AMIODARONE HYDROCHLORIDE 400 MG: 200 TABLET ORAL at 21:33

## 2023-01-01 RX ADMIN — CHLORHEXIDINE GLUCONATE 0.12% ORAL RINSE 15 ML: 1.2 LIQUID ORAL at 19:54

## 2023-01-01 RX ADMIN — HYDROCORTISONE SODIUM SUCCINATE 50 MG: 100 INJECTION, POWDER, FOR SOLUTION INTRAMUSCULAR; INTRAVENOUS at 03:20

## 2023-01-01 RX ADMIN — Medication 20 MG: at 08:46

## 2023-01-01 RX ADMIN — SODIUM CHLORIDE 250 ML: 9 INJECTION, SOLUTION INTRAVENOUS at 11:03

## 2023-01-01 RX ADMIN — AMIODARONE HYDROCHLORIDE 200 MG: 200 TABLET ORAL at 09:54

## 2023-01-01 RX ADMIN — MIDAZOLAM 2 MG: 1 INJECTION INTRAMUSCULAR; INTRAVENOUS at 22:00

## 2023-01-01 RX ADMIN — Medication: at 22:43

## 2023-01-01 RX ADMIN — MILRINONE LACTATE IN DEXTROSE 0.25 MCG/KG/MIN: 200 INJECTION, SOLUTION INTRAVENOUS at 10:38

## 2023-01-01 RX ADMIN — CLOPIDOGREL BISULFATE 75 MG: 75 TABLET ORAL at 10:43

## 2023-01-01 RX ADMIN — Medication: at 17:21

## 2023-01-01 RX ADMIN — CHLORHEXIDINE GLUCONATE 0.12% ORAL RINSE 15 ML: 1.2 LIQUID ORAL at 20:12

## 2023-01-01 RX ADMIN — SODIUM CHLORIDE, PRESERVATIVE FREE 10 ML: 5 INJECTION INTRAVENOUS at 20:16

## 2023-01-01 RX ADMIN — HEPARIN SODIUM 5000 UNITS: 5000 INJECTION INTRAVENOUS; SUBCUTANEOUS at 06:06

## 2023-01-01 RX ADMIN — CHLORHEXIDINE GLUCONATE 0.12% ORAL RINSE 15 ML: 1.2 LIQUID ORAL at 20:58

## 2023-01-01 RX ADMIN — PANTOPRAZOLE SODIUM 40 MG: 40 INJECTION, POWDER, FOR SOLUTION INTRAVENOUS at 08:21

## 2023-01-01 RX ADMIN — MIDAZOLAM 2 MG: 1 INJECTION INTRAMUSCULAR; INTRAVENOUS at 18:47

## 2023-01-01 RX ADMIN — ASPIRIN 81 MG: 81 TABLET, COATED ORAL at 13:56

## 2023-01-01 RX ADMIN — CLOPIDOGREL BISULFATE 75 MG: 75 TABLET ORAL at 09:32

## 2023-01-01 RX ADMIN — MIDAZOLAM HYDROCHLORIDE 2 MG/HR: 1 INJECTION, SOLUTION INTRAVENOUS at 13:59

## 2023-01-01 RX ADMIN — VANCOMYCIN HYDROCHLORIDE 1000 MG: 1 INJECTION, POWDER, LYOPHILIZED, FOR SOLUTION INTRAVENOUS at 16:25

## 2023-01-01 RX ADMIN — HEPARIN SODIUM 5000 UNITS: 5000 INJECTION INTRAVENOUS; SUBCUTANEOUS at 05:45

## 2023-01-01 RX ADMIN — IPRATROPIUM BROMIDE AND ALBUTEROL SULFATE 1 AMPULE: 2.5; .5 SOLUTION RESPIRATORY (INHALATION) at 08:18

## 2023-01-01 RX ADMIN — Medication 20 MG: at 08:21

## 2023-01-01 RX ADMIN — PROPOFOL 25 MCG/KG/MIN: 10 INJECTION, EMULSION INTRAVENOUS at 16:09

## 2023-01-01 RX ADMIN — Medication: at 08:50

## 2023-01-01 RX ADMIN — SODIUM CHLORIDE, PRESERVATIVE FREE 10 ML: 5 INJECTION INTRAVENOUS at 08:07

## 2023-01-01 RX ADMIN — PANTOPRAZOLE SODIUM 40 MG: 40 INJECTION, POWDER, FOR SOLUTION INTRAVENOUS at 16:01

## 2023-01-01 RX ADMIN — IPRATROPIUM BROMIDE AND ALBUTEROL SULFATE 1 AMPULE: 2.5; .5 SOLUTION RESPIRATORY (INHALATION) at 20:23

## 2023-01-01 RX ADMIN — Medication 10 ML: at 14:05

## 2023-01-01 RX ADMIN — ASPIRIN 81 MG: 81 TABLET, COATED ORAL at 08:51

## 2023-01-01 RX ADMIN — CLOPIDOGREL BISULFATE 75 MG: 75 TABLET ORAL at 10:11

## 2023-01-01 RX ADMIN — CEFEPIME 2000 MG: 2 INJECTION, POWDER, FOR SOLUTION INTRAVENOUS at 15:45

## 2023-01-01 RX ADMIN — Medication 30 MCG/MIN: at 01:30

## 2023-01-01 RX ADMIN — SODIUM CHLORIDE, PRESERVATIVE FREE 10 ML: 5 INJECTION INTRAVENOUS at 08:31

## 2023-01-01 RX ADMIN — FUROSEMIDE 40 MG: 10 INJECTION, SOLUTION INTRAMUSCULAR; INTRAVENOUS at 10:33

## 2023-01-01 RX ADMIN — CLOPIDOGREL BISULFATE 75 MG: 75 TABLET ORAL at 11:12

## 2023-01-01 RX ADMIN — Medication 10 MCG/MIN: at 11:58

## 2023-01-01 RX ADMIN — DEXMEDETOMIDINE HYDROCHLORIDE 0.2 MCG/KG/HR: 400 INJECTION INTRAVENOUS at 05:50

## 2023-01-01 RX ADMIN — HEPARIN SODIUM 2800 UNITS: 1000 INJECTION INTRAVENOUS; SUBCUTANEOUS at 10:44

## 2023-01-01 RX ADMIN — METHYLPREDNISOLONE SODIUM SUCCINATE 40 MG: 40 INJECTION, POWDER, FOR SOLUTION INTRAMUSCULAR; INTRAVENOUS at 08:46

## 2023-01-01 RX ADMIN — PIPERACILLIN AND TAZOBACTAM 3375 MG: 3; .375 INJECTION, POWDER, LYOPHILIZED, FOR SOLUTION INTRAVENOUS at 05:32

## 2023-01-01 RX ADMIN — SODIUM CHLORIDE 250 ML: 9 INJECTION, SOLUTION INTRAVENOUS at 10:30

## 2023-01-01 RX ADMIN — HEPARIN SODIUM 5000 UNITS: 5000 INJECTION INTRAVENOUS; SUBCUTANEOUS at 14:31

## 2023-01-01 RX ADMIN — MIDAZOLAM 2 MG: 1 INJECTION INTRAMUSCULAR; INTRAVENOUS at 00:45

## 2023-01-01 RX ADMIN — METHYLPREDNISOLONE SODIUM SUCCINATE 40 MG: 40 INJECTION, POWDER, FOR SOLUTION INTRAMUSCULAR; INTRAVENOUS at 11:10

## 2023-01-01 RX ADMIN — HEPARIN SODIUM 5000 UNITS: 5000 INJECTION INTRAVENOUS; SUBCUTANEOUS at 13:10

## 2023-01-01 RX ADMIN — PROPOFOL 25 MCG/KG/MIN: 10 INJECTION, EMULSION INTRAVENOUS at 20:17

## 2023-01-01 RX ADMIN — SODIUM CHLORIDE, PRESERVATIVE FREE 10 ML: 5 INJECTION INTRAVENOUS at 21:42

## 2023-01-01 RX ADMIN — PROPOFOL 10 MCG/KG/MIN: 10 INJECTION, EMULSION INTRAVENOUS at 10:57

## 2023-01-01 RX ADMIN — MIDAZOLAM 2 MG: 1 INJECTION INTRAMUSCULAR; INTRAVENOUS at 21:44

## 2023-01-01 RX ADMIN — PROPOFOL 10 MCG/KG/MIN: 10 INJECTION, EMULSION INTRAVENOUS at 22:17

## 2023-01-01 ASSESSMENT — PAIN SCALES - WONG BAKER
WONGBAKER_NUMERICALRESPONSE: 2

## 2023-01-01 ASSESSMENT — PULMONARY FUNCTION TESTS
PIF_VALUE: 10
PIF_VALUE: 20
PIF_VALUE: 31
PIF_VALUE: 31
PIF_VALUE: 23
PIF_VALUE: 23
PIF_VALUE: 10
PIF_VALUE: 23
PIF_VALUE: 30
PIF_VALUE: 18
PIF_VALUE: 31
PIF_VALUE: 17
PIF_VALUE: 16
PIF_VALUE: 17
PIF_VALUE: 35
PIF_VALUE: 21
PIF_VALUE: 23
PIF_VALUE: 31
PIF_VALUE: 20
PIF_VALUE: 28
PIF_VALUE: 40
PIF_VALUE: 21
PIF_VALUE: 32
PIF_VALUE: 20
PIF_VALUE: 31
PIF_VALUE: 28
PIF_VALUE: 30
PIF_VALUE: 21
PIF_VALUE: 20
PIF_VALUE: 17
PIF_VALUE: 24
PIF_VALUE: 26
PIF_VALUE: 18
PIF_VALUE: 23
PIF_VALUE: 23
PIF_VALUE: 31
PIF_VALUE: 24
PIF_VALUE: 31
PIF_VALUE: 22
PIF_VALUE: 22
PIF_VALUE: 20
PIF_VALUE: 22
PIF_VALUE: 30
PIF_VALUE: 31
PIF_VALUE: 20
PIF_VALUE: 29
PIF_VALUE: 27
PIF_VALUE: 30
PIF_VALUE: 23
PIF_VALUE: 26
PIF_VALUE: 27
PIF_VALUE: 30
PIF_VALUE: 28
PIF_VALUE: 30
PIF_VALUE: 31
PIF_VALUE: 24
PIF_VALUE: 31
PIF_VALUE: 23
PIF_VALUE: 30
PIF_VALUE: 28
PIF_VALUE: 25
PIF_VALUE: 16
PIF_VALUE: 25
PIF_VALUE: 10
PIF_VALUE: 23
PIF_VALUE: 20
PIF_VALUE: 16
PIF_VALUE: 20
PIF_VALUE: 24
PIF_VALUE: 17
PIF_VALUE: 0
PIF_VALUE: 23
PIF_VALUE: 21
PIF_VALUE: 10
PIF_VALUE: 21
PIF_VALUE: 16
PIF_VALUE: 21
PIF_VALUE: 26
PIF_VALUE: 32
PIF_VALUE: 20
PIF_VALUE: 22
PIF_VALUE: 27
PIF_VALUE: 22
PIF_VALUE: 31
PIF_VALUE: 29
PIF_VALUE: 25
PIF_VALUE: 13
PIF_VALUE: 29
PIF_VALUE: 20
PIF_VALUE: 21
PIF_VALUE: 23
PIF_VALUE: 21
PIF_VALUE: 29
PIF_VALUE: 21
PIF_VALUE: 30
PIF_VALUE: 31
PIF_VALUE: 28
PIF_VALUE: 21
PIF_VALUE: 27
PIF_VALUE: 31
PIF_VALUE: 26
PIF_VALUE: 23
PIF_VALUE: 25
PIF_VALUE: 23
PIF_VALUE: 31
PIF_VALUE: 15
PIF_VALUE: 24
PIF_VALUE: 16
PIF_VALUE: 31
PIF_VALUE: 16
PIF_VALUE: 21
PIF_VALUE: 26
PIF_VALUE: 31
PIF_VALUE: 24
PIF_VALUE: 23
PIF_VALUE: 28
PIF_VALUE: 31
PIF_VALUE: 31
PIF_VALUE: 10
PIF_VALUE: 20
PIF_VALUE: 26
PIF_VALUE: 28
PIF_VALUE: 19
PIF_VALUE: 33
PIF_VALUE: 23
PIF_VALUE: 16
PIF_VALUE: 31
PIF_VALUE: 20
PIF_VALUE: 24
PIF_VALUE: 27
PIF_VALUE: 31
PIF_VALUE: 24
PIF_VALUE: 29
PIF_VALUE: 27
PIF_VALUE: 31
PIF_VALUE: 26
PIF_VALUE: 31
PIF_VALUE: 23
PIF_VALUE: 36
PIF_VALUE: 27
PIF_VALUE: 28
PIF_VALUE: 24
PIF_VALUE: 31
PIF_VALUE: 14
PIF_VALUE: 20
PIF_VALUE: 31
PIF_VALUE: 26
PIF_VALUE: 17
PIF_VALUE: 20
PIF_VALUE: 30
PIF_VALUE: 30
PIF_VALUE: 29
PIF_VALUE: 16
PIF_VALUE: 23
PIF_VALUE: 24
PIF_VALUE: 27
PIF_VALUE: 28
PIF_VALUE: 16
PIF_VALUE: 13
PIF_VALUE: 24
PIF_VALUE: 28
PIF_VALUE: 24
PIF_VALUE: 20
PIF_VALUE: 26
PIF_VALUE: 21
PIF_VALUE: 20
PIF_VALUE: 26
PIF_VALUE: 10
PIF_VALUE: 17
PIF_VALUE: 33
PIF_VALUE: 27
PIF_VALUE: 20
PIF_VALUE: 23
PIF_VALUE: 34
PIF_VALUE: 16
PIF_VALUE: 31
PIF_VALUE: 29
PIF_VALUE: 17
PIF_VALUE: 23
PIF_VALUE: 31
PIF_VALUE: 28
PIF_VALUE: 31
PIF_VALUE: 21
PIF_VALUE: 17
PIF_VALUE: 25
PIF_VALUE: 31
PIF_VALUE: 23

## 2023-01-01 ASSESSMENT — PAIN SCALES - GENERAL
PAINLEVEL_OUTOF10: 0
PAINLEVEL_OUTOF10: 0
PAINLEVEL_OUTOF10: 1
PAINLEVEL_OUTOF10: 0
PAINLEVEL_OUTOF10: 3
PAINLEVEL_OUTOF10: 0
PAINLEVEL_OUTOF10: 1
PAINLEVEL_OUTOF10: 0
PAINLEVEL_OUTOF10: 2
PAINLEVEL_OUTOF10: 0
PAINLEVEL_OUTOF10: 0
PAINLEVEL_OUTOF10: 1
PAINLEVEL_OUTOF10: 0

## 2023-01-01 ASSESSMENT — PAIN - FUNCTIONAL ASSESSMENT: PAIN_FUNCTIONAL_ASSESSMENT: ACTIVITIES ARE NOT PREVENTED

## 2023-01-01 ASSESSMENT — PAIN DESCRIPTION - DESCRIPTORS: DESCRIPTORS: SORE

## 2023-01-01 ASSESSMENT — PAIN DESCRIPTION - LOCATION: LOCATION: GENERALIZED

## 2023-01-03 ENCOUNTER — TELEPHONE (OUTPATIENT)
Dept: PULMONOLOGY | Age: 78
End: 2023-01-03

## 2023-01-03 DIAGNOSIS — J18.9 ATYPICAL PNEUMONIA: Primary | ICD-10-CM

## 2023-01-03 RX ORDER — LEVOFLOXACIN 500 MG/1
500 TABLET, FILM COATED ORAL DAILY
Qty: 10 TABLET | Refills: 0 | Status: ON HOLD | OUTPATIENT
Start: 2023-01-03 | End: 2023-01-13

## 2023-01-03 NOTE — TELEPHONE ENCOUNTER
Patient calling about his antibiotic (Amoxicillin), he stated it is making him vomit tried it twice and happened twice with food. He states he has tried Azithromycin in the past which he did OK with. Please advise.   He uses [place]

## 2023-01-05 ENCOUNTER — HOSPITAL ENCOUNTER (INPATIENT)
Age: 78
LOS: 3 days | Discharge: HOME | DRG: 193 | End: 2023-01-09
Attending: INTERNAL MEDICINE | Admitting: INTERNAL MEDICINE
Payer: MEDICARE

## 2023-01-05 DIAGNOSIS — R77.8 ELEVATED TROPONIN: ICD-10-CM

## 2023-01-05 DIAGNOSIS — R06.02 SHORTNESS OF BREATH: ICD-10-CM

## 2023-01-05 DIAGNOSIS — J18.9 PNEUMONIA OF RIGHT LUNG DUE TO INFECTIOUS ORGANISM, UNSPECIFIED PART OF LUNG: Primary | ICD-10-CM

## 2023-01-05 DIAGNOSIS — J90 PLEURAL EFFUSION: ICD-10-CM

## 2023-01-05 PROCEDURE — 96374 THER/PROPH/DIAG INJ IV PUSH: CPT

## 2023-01-05 PROCEDURE — 99285 EMERGENCY DEPT VISIT HI MDM: CPT

## 2023-01-05 ASSESSMENT — PAIN - FUNCTIONAL ASSESSMENT: PAIN_FUNCTIONAL_ASSESSMENT: 0-10

## 2023-01-05 ASSESSMENT — PAIN SCALES - GENERAL: PAINLEVEL_OUTOF10: 0

## 2023-01-06 ENCOUNTER — APPOINTMENT (OUTPATIENT)
Dept: GENERAL RADIOLOGY | Age: 78
DRG: 193 | End: 2023-01-06
Payer: MEDICARE

## 2023-01-06 PROBLEM — R06.00 DYSPNEA: Status: ACTIVE | Noted: 2023-01-06

## 2023-01-06 LAB
A/G RATIO: 0.9 (ref 1.1–2.2)
ALBUMIN SERPL-MCNC: 3.8 G/DL (ref 3.4–5)
ALP BLD-CCNC: 127 U/L (ref 40–129)
ALT SERPL-CCNC: 21 U/L (ref 10–40)
ANION GAP SERPL CALCULATED.3IONS-SCNC: 13 MMOL/L (ref 3–16)
AST SERPL-CCNC: 35 U/L (ref 15–37)
BASOPHILS ABSOLUTE: 0 K/UL (ref 0–0.2)
BASOPHILS RELATIVE PERCENT: 0.5 %
BILIRUB SERPL-MCNC: 1.1 MG/DL (ref 0–1)
BUN BLDV-MCNC: 40 MG/DL (ref 7–20)
C-REACTIVE PROTEIN: 14 MG/L (ref 0–5.1)
CALCIUM SERPL-MCNC: 9.6 MG/DL (ref 8.3–10.6)
CHLORIDE BLD-SCNC: 101 MMOL/L (ref 99–110)
CO2: 27 MMOL/L (ref 21–32)
CREAT SERPL-MCNC: 1.6 MG/DL (ref 0.8–1.3)
EKG DIAGNOSIS: NORMAL
EKG Q-T INTERVAL: 372 MS
EKG QRS DURATION: 128 MS
EKG QTC CALCULATION (BAZETT): 462 MS
EKG R AXIS: 29 DEGREES
EKG T AXIS: 223 DEGREES
EKG VENTRICULAR RATE: 93 BPM
EOSINOPHILS ABSOLUTE: 0.1 K/UL (ref 0–0.6)
EOSINOPHILS RELATIVE PERCENT: 1.1 %
GFR SERPL CREATININE-BSD FRML MDRD: 44 ML/MIN/{1.73_M2}
GLUCOSE BLD-MCNC: 117 MG/DL (ref 70–99)
HCT VFR BLD CALC: 40.7 % (ref 40.5–52.5)
HEMOGLOBIN: 13.4 G/DL (ref 13.5–17.5)
LV EF: 23 %
LVEF MODALITY: NORMAL
LYMPHOCYTES ABSOLUTE: 0.7 K/UL (ref 1–5.1)
LYMPHOCYTES RELATIVE PERCENT: 10.1 %
MCH RBC QN AUTO: 32.5 PG (ref 26–34)
MCHC RBC AUTO-ENTMCNC: 33 G/DL (ref 31–36)
MCV RBC AUTO: 98.7 FL (ref 80–100)
MONOCYTES ABSOLUTE: 0.8 K/UL (ref 0–1.3)
MONOCYTES RELATIVE PERCENT: 11.2 %
NEUTROPHILS ABSOLUTE: 5.5 K/UL (ref 1.7–7.7)
NEUTROPHILS RELATIVE PERCENT: 77.1 %
PDW BLD-RTO: 16.7 % (ref 12.4–15.4)
PLATELET # BLD: 150 K/UL (ref 135–450)
PMV BLD AUTO: 8.4 FL (ref 5–10.5)
POTASSIUM SERPL-SCNC: 3.9 MMOL/L (ref 3.5–5.1)
PRO-BNP: 4237 PG/ML (ref 0–449)
PROCALCITONIN: 0.07 NG/ML (ref 0–0.15)
RBC # BLD: 4.12 M/UL (ref 4.2–5.9)
SODIUM BLD-SCNC: 141 MMOL/L (ref 136–145)
TOTAL PROTEIN: 7.9 G/DL (ref 6.4–8.2)
TROPONIN: 0.02 NG/ML
WBC # BLD: 7.1 K/UL (ref 4–11)

## 2023-01-06 PROCEDURE — C8929 TTE W OR WO FOL WCON,DOPPLER: HCPCS

## 2023-01-06 PROCEDURE — 84484 ASSAY OF TROPONIN QUANT: CPT

## 2023-01-06 PROCEDURE — 93010 ELECTROCARDIOGRAM REPORT: CPT | Performed by: INTERNAL MEDICINE

## 2023-01-06 PROCEDURE — 93005 ELECTROCARDIOGRAM TRACING: CPT | Performed by: PHYSICIAN ASSISTANT

## 2023-01-06 PROCEDURE — 71045 X-RAY EXAM CHEST 1 VIEW: CPT

## 2023-01-06 PROCEDURE — 87040 BLOOD CULTURE FOR BACTERIA: CPT

## 2023-01-06 PROCEDURE — 80053 COMPREHEN METABOLIC PANEL: CPT

## 2023-01-06 PROCEDURE — 6360000004 HC RX CONTRAST MEDICATION: Performed by: INTERNAL MEDICINE

## 2023-01-06 PROCEDURE — 6360000002 HC RX W HCPCS: Performed by: FAMILY MEDICINE

## 2023-01-06 PROCEDURE — 6370000000 HC RX 637 (ALT 250 FOR IP): Performed by: INTERNAL MEDICINE

## 2023-01-06 PROCEDURE — 1200000000 HC SEMI PRIVATE

## 2023-01-06 PROCEDURE — 2580000003 HC RX 258: Performed by: PHYSICIAN ASSISTANT

## 2023-01-06 PROCEDURE — 2580000003 HC RX 258: Performed by: INTERNAL MEDICINE

## 2023-01-06 PROCEDURE — 85025 COMPLETE CBC W/AUTO DIFF WBC: CPT

## 2023-01-06 PROCEDURE — 84145 PROCALCITONIN (PCT): CPT

## 2023-01-06 PROCEDURE — 83880 ASSAY OF NATRIURETIC PEPTIDE: CPT

## 2023-01-06 PROCEDURE — 86140 C-REACTIVE PROTEIN: CPT

## 2023-01-06 PROCEDURE — 6370000000 HC RX 637 (ALT 250 FOR IP): Performed by: PHYSICIAN ASSISTANT

## 2023-01-06 PROCEDURE — 6360000002 HC RX W HCPCS: Performed by: PHYSICIAN ASSISTANT

## 2023-01-06 PROCEDURE — 36415 COLL VENOUS BLD VENIPUNCTURE: CPT

## 2023-01-06 PROCEDURE — 99222 1ST HOSP IP/OBS MODERATE 55: CPT | Performed by: NURSE PRACTITIONER

## 2023-01-06 PROCEDURE — 94760 N-INVAS EAR/PLS OXIMETRY 1: CPT

## 2023-01-06 RX ORDER — ASPIRIN 81 MG/1
324 TABLET, CHEWABLE ORAL ONCE
Status: COMPLETED | OUTPATIENT
Start: 2023-01-06 | End: 2023-01-06

## 2023-01-06 RX ORDER — METOPROLOL SUCCINATE 50 MG/1
50 TABLET, EXTENDED RELEASE ORAL DAILY
Status: DISCONTINUED | OUTPATIENT
Start: 2023-01-06 | End: 2023-01-09 | Stop reason: HOSPADM

## 2023-01-06 RX ORDER — SODIUM CHLORIDE 9 MG/ML
INJECTION, SOLUTION INTRAVENOUS PRN
Status: DISCONTINUED | OUTPATIENT
Start: 2023-01-06 | End: 2023-01-09 | Stop reason: HOSPADM

## 2023-01-06 RX ORDER — ONDANSETRON 4 MG/1
4 TABLET, ORALLY DISINTEGRATING ORAL EVERY 8 HOURS PRN
Status: DISCONTINUED | OUTPATIENT
Start: 2023-01-06 | End: 2023-01-09 | Stop reason: HOSPADM

## 2023-01-06 RX ORDER — ACETAMINOPHEN 325 MG/1
650 TABLET ORAL EVERY 6 HOURS PRN
Status: DISCONTINUED | OUTPATIENT
Start: 2023-01-06 | End: 2023-01-09 | Stop reason: HOSPADM

## 2023-01-06 RX ORDER — ONDANSETRON 2 MG/ML
4 INJECTION INTRAMUSCULAR; INTRAVENOUS EVERY 6 HOURS PRN
Status: DISCONTINUED | OUTPATIENT
Start: 2023-01-06 | End: 2023-01-09 | Stop reason: HOSPADM

## 2023-01-06 RX ORDER — ATORVASTATIN CALCIUM 40 MG/1
40 TABLET, FILM COATED ORAL DAILY
Status: DISCONTINUED | OUTPATIENT
Start: 2023-01-06 | End: 2023-01-09 | Stop reason: HOSPADM

## 2023-01-06 RX ORDER — POLYETHYLENE GLYCOL 3350 17 G/17G
17 POWDER, FOR SOLUTION ORAL DAILY PRN
Status: DISCONTINUED | OUTPATIENT
Start: 2023-01-06 | End: 2023-01-09 | Stop reason: HOSPADM

## 2023-01-06 RX ORDER — ACETAMINOPHEN 650 MG/1
650 SUPPOSITORY RECTAL EVERY 6 HOURS PRN
Status: DISCONTINUED | OUTPATIENT
Start: 2023-01-06 | End: 2023-01-09 | Stop reason: HOSPADM

## 2023-01-06 RX ORDER — SODIUM CHLORIDE 0.9 % (FLUSH) 0.9 %
5-40 SYRINGE (ML) INJECTION EVERY 12 HOURS SCHEDULED
Status: DISCONTINUED | OUTPATIENT
Start: 2023-01-06 | End: 2023-01-09 | Stop reason: HOSPADM

## 2023-01-06 RX ORDER — SODIUM CHLORIDE 0.9 % (FLUSH) 0.9 %
5-40 SYRINGE (ML) INJECTION PRN
Status: DISCONTINUED | OUTPATIENT
Start: 2023-01-06 | End: 2023-01-09 | Stop reason: HOSPADM

## 2023-01-06 RX ORDER — FUROSEMIDE 10 MG/ML
40 INJECTION INTRAMUSCULAR; INTRAVENOUS DAILY
Status: DISCONTINUED | OUTPATIENT
Start: 2023-01-06 | End: 2023-01-08

## 2023-01-06 RX ADMIN — SODIUM CHLORIDE, PRESERVATIVE FREE 10 ML: 5 INJECTION INTRAVENOUS at 08:48

## 2023-01-06 RX ADMIN — ASPIRIN 324 MG: 81 TABLET, CHEWABLE ORAL at 02:21

## 2023-01-06 RX ADMIN — PERFLUTREN 1.5 ML: 6.52 INJECTION, SUSPENSION INTRAVENOUS at 15:38

## 2023-01-06 RX ADMIN — CEFTRIAXONE 1000 MG: 1 INJECTION, POWDER, FOR SOLUTION INTRAMUSCULAR; INTRAVENOUS at 02:20

## 2023-01-06 RX ADMIN — FUROSEMIDE 40 MG: 10 INJECTION, SOLUTION INTRAMUSCULAR; INTRAVENOUS at 16:46

## 2023-01-06 RX ADMIN — APIXABAN 5 MG: 5 TABLET, FILM COATED ORAL at 20:01

## 2023-01-06 RX ADMIN — APIXABAN 5 MG: 5 TABLET, FILM COATED ORAL at 11:02

## 2023-01-06 RX ADMIN — AZITHROMYCIN 500 MG: 500 INJECTION, POWDER, LYOPHILIZED, FOR SOLUTION INTRAVENOUS at 02:56

## 2023-01-06 RX ADMIN — ATORVASTATIN CALCIUM 40 MG: 40 TABLET, FILM COATED ORAL at 08:47

## 2023-01-06 RX ADMIN — SODIUM CHLORIDE, PRESERVATIVE FREE 10 ML: 5 INJECTION INTRAVENOUS at 20:01

## 2023-01-06 RX ADMIN — METOPROLOL SUCCINATE 50 MG: 50 TABLET, EXTENDED RELEASE ORAL at 08:47

## 2023-01-06 ASSESSMENT — ENCOUNTER SYMPTOMS
COUGH: 1
COLOR CHANGE: 0
CHEST TIGHTNESS: 1
VOMITING: 0
ABDOMINAL PAIN: 0
SHORTNESS OF BREATH: 1

## 2023-01-06 ASSESSMENT — PAIN SCALES - GENERAL: PAINLEVEL_OUTOF10: 0

## 2023-01-06 NOTE — ED TRIAGE NOTES
Pt presents to ED via self with a c/o of SOB and cough. Pt states he may have pneumonia. Pt states he has never had pneumonia. Pt states his cough started about 3-4 weeks ago, but has gotten worse. Pt states he does not wear oxygen at home. Pt states he has a hx of CHF. Pt denies fevers or chills. Pt denies chest pain. No acute distress is noted at this time. Vs stable. O2 sat is 97% on RA. Pt is afebrile; temp 97.4.

## 2023-01-06 NOTE — PLAN OF CARE
Plan of care, pending admission    77-year-old male presents with cough and shortness of breath, recently has been treated with antibiotics with no improvement.  On arrival to the hospital tachycardic heart rate 104, is 97% on room air, creatinine is stable, his proBNP is 4237, troponin 0.02.  He is on apixaban for anticoagulation for paroxysmal atrial fibrillation.  He has an effusion on imaging.  Pro-Joe is negative.  We will hold off antibiotics.  We will check echocardiogram for further evaluation.  Cardiology consulted for evaluation

## 2023-01-06 NOTE — PROGRESS NOTES
Admission    Pt arrived alert and oriented x 4 with all belongings. Oriented pt to room and call light. Assessment complete and VSS. Pt ambulates independently, denies any history of falls. Orders and plan of care reviewed with pt. All questions answered.

## 2023-01-06 NOTE — PROGRESS NOTES
Pharmacy Medication Reconciliation Note     List of medications patient is currently taking is complete. Source of information:   1. Conversation with patient and patient's daughter at bedside  2. EMR    Notes regarding home medications:   1. Patient reports taking torsemide 20 mg daily. Patient states he takes with food to limit nausea/stomach upset. 2. Patient started on levofloxacin 500 mg daily x 10 days on 1/3/23 for PNA. Patient reports he only had one dose prior to admission. Pt previously prescribed Augmentin but patient reports the Augmentin made him vomit.       [facility] Brice Barnhart  1/6/2023 2:15 PM
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
## 2023-01-06 NOTE — PROGRESS NOTES
Hospitalist Progress Note      PCP: Hortencia Loja    Date of Admission: 1/5/2023    Chief Complaint: SOB    Hospital Course:     Impression: This is a 36-year-old male medical history significant for A. fib on Eliquis CHF CKD HLD HTN right-sided pleural effusion, CVA. Presented with shortness of breath and cough found to have pneumonia & CHF. A/P:    1. Suspected community-acquired pneumonia: Rocephin azithromycin empirically requested WBC count is within normal limits not septic by criteria. He does have a chronic right pleural effusion which has increased with worsening airspace disease on the right. Could possibly be viral as his procalcitonin is 0.07 we will check a viral panel    2. Acute exacerbation of systolic heart failure: Echocardiogram pending Lasix IV. Cardiology services consulted. 3.  A. fib: Continue Eliquis    4. HTN: Continue home meds    CODE STATUS: Full code    DVT prophylaxis: Anticoagulated with Eliquis    Transition of care hopefully home    Anticipated date of discharge: Possibly 2 to 3 days    24-hour care plan:    Lasix antibiotics echocardiogram pending. Hopefully he will improved to his baseline in the next few days and be able to be discharged home. Subjective:  sitting u at bedside, his breathing is improved somewhat.         Medications:  Reviewed    Infusion Medications    sodium chloride       Scheduled Medications    apixaban  5 mg Oral BID    atorvastatin  40 mg Oral Daily    metoprolol succinate  50 mg Oral Daily    sodium chloride flush  5-40 mL IntraVENous 2 times per day    [START ON 1/7/2023] azithromycin  500 mg IntraVENous Q24H    [START ON 1/7/2023] cefTRIAXone (ROCEPHIN) IV  1,000 mg IntraVENous Q24H    furosemide  40 mg IntraVENous Daily     PRN Meds: perflutren lipid microspheres, sodium chloride flush, sodium chloride, ondansetron **OR** ondansetron, polyethylene glycol, acetaminophen **OR** acetaminophen      Intake/Output Summary (Last 24 hours) at 1/6/2023 1508  Last data filed at 1/6/2023 1195  Gross per 24 hour   Intake 240 ml   Output --   Net 240 ml       Exam:    /76   Pulse (!) 102   Temp 97.7 °F (36.5 °C) (Axillary)   Resp 16   Ht 6' 1\" (1.854 m)   Wt 269 lb 6.4 oz (122.2 kg)   SpO2 96%   BMI 35.54 kg/m²     General appearance: No apparent distress, appears stated age and cooperative. HEENT: Pupils equal, round, and reactive to light. Conjunctivae/corneas clear. Neck: Supple, with full range of motion. No jugular venous distention. Trachea midline. Respiratory:  Normal respiratory effort. Clear to auscultation, bilaterally without Rales/Wheezes/Rhonchi. Cardiovascular: Regular rate and rhythm with normal S1/S2 without murmurs, rubs or gallops. Abdomen: Soft, non-tender, non-distended with normal bowel sounds. Musculoskeletal: No clubbing, cyanosis or edema bilaterally. Full range of motion without deformity. Skin: Skin color, texture, turgor normal.  No rashes or lesions. Neurologic:  Neurovascularly intact without any focal sensory/motor deficits. Cranial nerves: II-XII intact, grossly non-focal.  Psychiatric: Alert and oriented, thought content appropriate, normal insight  Capillary Refill: Brisk,< 3 seconds   Peripheral Pulses: +2 palpable, equal bilaterally       Labs:   Recent Labs     01/06/23  0004   WBC 7.1   HGB 13.4*   HCT 40.7        Recent Labs     01/06/23  0004      K 3.9      CO2 27   BUN 40*   CREATININE 1.6*   CALCIUM 9.6     Recent Labs     01/06/23  0004   AST 35   ALT 21   BILITOT 1.1*   ALKPHOS 127     No results for input(s): INR in the last 72 hours.   Recent Labs     01/06/23  0004   TROPONINI 0.02*       Urinalysis:      Lab Results   Component Value Date/Time    NITRU Negative 03/01/2019 12:11 PM    WBCUA 6 03/01/2019 12:11 PM    RBCUA 1 03/01/2019 12:11 PM    BLOODU Negative 03/01/2019 12:11 PM    SPECGRAV >1.030 03/01/2019 12:11 PM    GLUCOSEU Negative 03/01/2019 12:11 PM Radiology:  XR CHEST PORTABLE   Final Result   Increasing moderate right pleural effusion and worsening right basilar   airspace disease.                  Assessment/Plan:    Active Hospital Problems    Diagnosis Date Noted    Dyspnea [R06.00] 01/06/2023     Priority: [address], DO

## 2023-01-06 NOTE — CONSULTS
[facility]  Cardiology Consult              Patient Name: Oumar Avendano  Date of admission: 1/5/2023 11:43 PM  Patient's age: 69 y.o., 1945  Admission Dx: Shortness of breath [R06.02]  Dyspnea [R06.00]  Pleural effusion [J90]  Elevated troponin [R77.8]  Pneumonia of right lung due to infectious organism, unspecified part of lung [J18.9]    Reason for Consult:  pericardial effusion   Requesting Physician: Stephanie Unger MD  Code Status: Full Code   CHIEF COMPLAINT:  shortness of breath     History Obtained From:  patient, electronic medical record    HISTORY OF PRESENT ILLNESS:      The patient is a 69 y.o.  male who is admitted to the hospital for worsening cough and shortenss of breath. past medical history significant for chronic atrial fibrillation on Eliquis,  chronic systolic CHF, hypertension, h/o CVA and coronary artery calcifications seen on chest CT. He was admitted 1/2022 with pleural effusion. Patient with recent PNA and was started on oral ABX as an outpatient. He reports he has not been able to tolerate oral ABX. He denies fever or chills. Patient reports he has been compliant with medications. He has been on Demadex as an outpatient and was started on IV lasix in the ED. He reports he is feeling better today. Less coughing. Less shortness of breath. Past Medical History/Past Surgical History/Social History/Family History/Living Situation: Reviewed per record            Home Medications:    Prior to Admission medications    Medication Sig Start Date End Date Taking?  Authorizing Provider   levoFLOXacin (LEVAQUIN) 500 MG tablet Take 1 tablet by mouth daily for 10 days 1/3/23 1/13/23  Rik Gary MD   amoxicillin-clavulanate (AUGMENTIN) 875-125 MG per tablet Take 1 tablet by mouth 2 times daily for 10 days 12/30/22 1/9/23  Rik Gary MD   torsemide [facility]) 20 MG tablet Take 1 tablet by mouth daily 12/13/22   JOHN Bingham - GERRY   atorvastatin (LIPITOR) 40 MG tablet TAKE 1 TABLET BY MOUTH DAILY 6/3/22   Mindy Fajardo MD   apixaban [place] [place]) 5 MG TABS tablet Take 1 tablet by mouth 2 times daily 5/31/22   Mindy Fajardo MD   ALFALFA PO Take by mouth daily    Historical Provider, MD   metoprolol succinate (TOPROL XL) 50 MG extended release tablet TAKE 1 TABLET BY MOUTH DAILY 4/22/22   JOHN Amaya - CNP   allopurinol (ZYLOPRIM) 100 MG tablet Take 100 mg by mouth daily 11/24/21   Historical Provider, MD Oliver Blas Serenoa repens, (SAW PALMETTO BERRY PO) Take 1 tablet by mouth daily    Historical Provider, MD   Vitamin D (CHOLECALCIFEROL) 25 MCG (1000 UT) TABS tablet Take 1,000 Units by mouth daily    Historical Provider, MD   zinc sulfate (ZINCATE) 220 (50 Zn) MG capsule Take 50 mg by mouth daily    Historical Provider, MD   b complex vitamins capsule Take 1 capsule by mouth daily    Historical Provider, MD   fluocinonide (LIDEX) 0.05 % gel Apply 1 each topically daily as needed for Other For psoriasis 2/8/19   Historical Provider, MD   Multiple Vitamins-Minerals (MULTIVITAMIN ADULT PO) Take by mouth    Historical Provider, MD   vitamin C (ASCORBIC ACID) 500 MG tablet Take 500 mg by mouth daily    Historical Provider, MD       Current Medications:  Scheduled Meds:   apixaban  5 mg Oral BID    atorvastatin  40 mg Oral Daily    metoprolol succinate  50 mg Oral Daily    sodium chloride flush  5-40 mL IntraVENous 2 times per day     Continuous Infusions:   sodium chloride       PRN Meds:.perflutren lipid microspheres, sodium chloride flush, sodium chloride, ondansetron **OR** ondansetron, polyethylene glycol, acetaminophen **OR** acetaminophen    Allergies:  Spironolactone      REVIEW OF SYSTEMS:    Constitutional:No change in weight  Yes change in energy level, No change in sleep pattern, Yes change in  activity level. No fevers, chills. Eyes: No visual changes or diplopia. No scleral icterus.   ENT: No Headaches, hearing loss or vertigo. No mouth sores or sore throat. Cardiovascular: No for chest pain, Yes for dyspnea on exertion, No for palpitations or No for loss of consciousness. Yes cough, No hemoptysis, No for pleuritic pain, or phlebitis. Respiratory: Yes for cough or [place]zing, Yes sputum production. Gastrointestinal: No abdominal pain, appetite loss, blood in stools. No change in bowel or bladder habits. Genitourinary: No dysuria, trouble voiding, or hematuria. Musculoskeletal:  No gait disturbance, No for weakness or joint complaints. Integumentary: No rash or pruritis. Neurological: No headache, diplopia, change in muscle strength, numbness or tingling. No change in gait, balance, coordination, mood, affect, memory, mentation, behavior. Psychiatric: No anxiety, or depression. Endocrine: No temperature intolerance. No excessive thirst, fluid intake, or urination. No tremor. Hematologic/Lymphatic: No abnormal bruising or bleeding, blood clots or swollen lymph nodes. Allergic/Immunologic: No nasal congestion or hives. PHYSICAL EXAM:      /76   Pulse (!) 102   Temp 97.7 °F (36.5 °C) (Axillary)   Resp 16   Ht 6' 1\" (1.854 m)   Wt 269 lb 6.4 oz (122.2 kg)   SpO2 96%   BMI 35.54 kg/m²    Constitutional and General Appearance: alert, no distress, and appears stated age  Eyes: PERRL, non-icteric  HENT: no cervical lymphadenopathy. No masses palpable. Normal oral mucosa, no neck stiffness  Skin: Normal turgor, no rashes, no bruising  Respiratory:   Normal excursion and expansion without use of accessory muscles   Resp Auscultation: Diminished breath sounds R > L   Cardiovascular:   Heart tones are crisp and normal. irregular S1 and S2.     No murmurs, rubs or gallops   Jugular venous pulsation Normal    Peripheral pulses are symmetrical and full  Gastrointestinal:   Soft, non-tender              No masses     Bowel sounds normal active x4  Musculoskeletal:  No muscle wasting or decreased range of motion  Extremities:   No Cyanosis or Clubbing   Lower extremity edema: Yes-+1 BLE    Skin: Warm and dry  Neurological:   CN 3-12 grossly intact   No gross sensory deficits   No tremors   Psychiatric:   Alert oriented to person, place and time. Normal insight and normal affect. DATA:      Telemetry: AF rate controlled  EC23  AF   Radiology/Imaging:  CXR:  FINDINGS:   No pneumothorax. Worsening right basilar airspace disease and moderate right   pleural effusion. Heart size is stable. Patchy consolidation at the left   lung base remains. Impression   Increasing moderate right pleural effusion and worsening right basilar   airspace disease. CT Chest 22  1. Chronic loculated right pleural effusion with similar volume to prior CT   2022.   2. Worsening atelectasis and airspace opacification in the right middle and   lower lobes. Chronic atelectasis or fibrotic change is suspected. Chronic   infectious or inflammatory process also suspected due to progression. 3. There is also a new small left pleural effusion with mild ground-glass   opacification on the left. Asymmetric edema or pneumonitis may be considered. 4. Worsening mediastinal lymphadenopathy. 5. Cardiomegaly with calcification of pericardium suggesting a prior history   of pericarditis. Stable appearance from prior exam.       Echo: pending     2019   Summary   Suboptimal image quality. Ejection fraction is visually estimated to be 25-30%, though likely   underestimated due to atrial fibrillation. Indeterminate diastolic function. Mitral valve leaflets appear mildly thickened. Mild to moderate mitral regurgitation is present. The left atrium is severely dilated. Trivial tricuspid regurgitation. PA systolic pressure is 30 mm Hg.   Stress Testing:   Cardiac Cath:  ICD/PPM:       In: 240 [P.O.:240]  Out: -    Wt Readings from Last 7 Encounters:   23 269 lb 6.4 oz (122.2 kg)   22 267 lb 12.8 oz (121.5 kg)   12/13/22 268 lb 9.6 oz (121.8 kg)   12/06/22 268 lb 12.8 oz (121.9 kg)   05/20/22 256 lb 3.2 oz (116.2 kg)   01/26/22 246 lb 12.8 oz (111.9 kg)   04/07/21 260 lb 6.4 oz (118.1 kg)         IMPRESSION:        RECOMMENDATIONS:  Heart failure  Acute on chronic systolic   Patient mildly fluid elevated   On lasix IVP     Continue today hopefully transition to oral by tomorrow  On Toprol XL-continue  No ACE/ARB/aldactone/ARNI secondary to CKD   Continue fluid and Na restrictions   Repeat echo pending      2. PNA    Likely community acquired    Defer to primary     3. Atrial Fibrillation    Paroxsymal   Remains in SR    Continue Eliquis   4. HTN   Stable   Continue home meds           All questions and concerns were addressed to the patient/family. Alternatives to my treatment were discussed. The note was completed using EMR. Every effort was made to ensure accuracy; however, inadvertent computerized transcription errors may be present. Discussed with patient and family and Nurse.     Isabel Rosa, [address]  1/6/2023  1:28 PM

## 2023-01-06 NOTE — PLAN OF CARE
Problem: Discharge Planning  Goal: Discharge to home or other facility with appropriate resources  Outcome: Progressing     Problem: Safety - Adult  Goal: Free from fall injury  1/6/2023 1051 by Melinda Vila RN  Outcome: Progressing  1/6/2023 0511 by Nayan Freeman RN  Outcome: Progressing     Problem: Chronic Conditions and Co-morbidities  Goal: Patient's chronic conditions and co-morbidity symptoms are monitored and maintained or improved  1/6/2023 1051 by Melinda Vila RN  Outcome: Progressing  1/6/2023 0511 by Nayan Freeman RN  Outcome: Progressing     Problem: ABCDS Injury Assessment  Goal: Absence of physical injury  Outcome: Progressing

## 2023-01-06 NOTE — ED NOTES
.ED SBAR report provider to [facility]. Patient to be transported to Room 4105 via stretcher by ED tech. Patient transported with bedside cardiac monitor and with IV medications infusing. IV site clean, dry, and intact. MEWS score and pain assessed and documented. Patient's score on the ZELAYA Fall scale reviewed with receiving RN. Updated patient and family on plan of care.        Padma Mejia RN  01/06/23 0037

## 2023-01-07 LAB
ANION GAP SERPL CALCULATED.3IONS-SCNC: 13 MMOL/L (ref 3–16)
BASOPHILS ABSOLUTE: 0.1 K/UL (ref 0–0.2)
BASOPHILS RELATIVE PERCENT: 0.9 %
BUN BLDV-MCNC: 43 MG/DL (ref 7–20)
CALCIUM SERPL-MCNC: 9 MG/DL (ref 8.3–10.6)
CHLORIDE BLD-SCNC: 102 MMOL/L (ref 99–110)
CO2: 24 MMOL/L (ref 21–32)
CREAT SERPL-MCNC: 1.4 MG/DL (ref 0.8–1.3)
EOSINOPHILS ABSOLUTE: 0.1 K/UL (ref 0–0.6)
EOSINOPHILS RELATIVE PERCENT: 2.2 %
GFR SERPL CREATININE-BSD FRML MDRD: 52 ML/MIN/{1.73_M2}
GLUCOSE BLD-MCNC: 98 MG/DL (ref 70–99)
HCT VFR BLD CALC: 36.7 % (ref 40.5–52.5)
HEMOGLOBIN: 11.9 G/DL (ref 13.5–17.5)
LYMPHOCYTES ABSOLUTE: 0.9 K/UL (ref 1–5.1)
LYMPHOCYTES RELATIVE PERCENT: 14.4 %
MCH RBC QN AUTO: 32.4 PG (ref 26–34)
MCHC RBC AUTO-ENTMCNC: 32.4 G/DL (ref 31–36)
MCV RBC AUTO: 100 FL (ref 80–100)
MONOCYTES ABSOLUTE: 0.8 K/UL (ref 0–1.3)
MONOCYTES RELATIVE PERCENT: 14 %
NEUTROPHILS ABSOLUTE: 4 K/UL (ref 1.7–7.7)
NEUTROPHILS RELATIVE PERCENT: 68.5 %
PDW BLD-RTO: 17.1 % (ref 12.4–15.4)
PLATELET # BLD: 125 K/UL (ref 135–450)
PMV BLD AUTO: 8.9 FL (ref 5–10.5)
POTASSIUM REFLEX MAGNESIUM: 3.9 MMOL/L (ref 3.5–5.1)
RBC # BLD: 3.67 M/UL (ref 4.2–5.9)
SODIUM BLD-SCNC: 139 MMOL/L (ref 136–145)
WBC # BLD: 5.9 K/UL (ref 4–11)

## 2023-01-07 PROCEDURE — 94760 N-INVAS EAR/PLS OXIMETRY 1: CPT

## 2023-01-07 PROCEDURE — 6370000000 HC RX 637 (ALT 250 FOR IP): Performed by: INTERNAL MEDICINE

## 2023-01-07 PROCEDURE — P9047 ALBUMIN (HUMAN), 25%, 50ML: HCPCS | Performed by: FAMILY MEDICINE

## 2023-01-07 PROCEDURE — 85025 COMPLETE CBC W/AUTO DIFF WBC: CPT

## 2023-01-07 PROCEDURE — 1200000000 HC SEMI PRIVATE

## 2023-01-07 PROCEDURE — 6360000002 HC RX W HCPCS: Performed by: FAMILY MEDICINE

## 2023-01-07 PROCEDURE — 80048 BASIC METABOLIC PNL TOTAL CA: CPT

## 2023-01-07 PROCEDURE — 99233 SBSQ HOSP IP/OBS HIGH 50: CPT | Performed by: NURSE PRACTITIONER

## 2023-01-07 PROCEDURE — 2580000003 HC RX 258: Performed by: FAMILY MEDICINE

## 2023-01-07 PROCEDURE — 6370000000 HC RX 637 (ALT 250 FOR IP): Performed by: NURSE PRACTITIONER

## 2023-01-07 PROCEDURE — 2580000003 HC RX 258: Performed by: INTERNAL MEDICINE

## 2023-01-07 PROCEDURE — 36415 COLL VENOUS BLD VENIPUNCTURE: CPT

## 2023-01-07 RX ORDER — ASPIRIN 81 MG/1
81 TABLET, CHEWABLE ORAL DAILY
Status: DISCONTINUED | OUTPATIENT
Start: 2023-01-07 | End: 2023-01-09 | Stop reason: HOSPADM

## 2023-01-07 RX ORDER — ALBUMIN (HUMAN) 12.5 G/50ML
25 SOLUTION INTRAVENOUS ONCE
Status: COMPLETED | OUTPATIENT
Start: 2023-01-07 | End: 2023-01-07

## 2023-01-07 RX ADMIN — AZITHROMYCIN MONOHYDRATE 500 MG: 500 INJECTION, POWDER, LYOPHILIZED, FOR SOLUTION INTRAVENOUS at 03:04

## 2023-01-07 RX ADMIN — METOPROLOL SUCCINATE 50 MG: 50 TABLET, EXTENDED RELEASE ORAL at 08:55

## 2023-01-07 RX ADMIN — CEFTRIAXONE 1000 MG: 1 INJECTION, POWDER, FOR SOLUTION INTRAMUSCULAR; INTRAVENOUS at 02:30

## 2023-01-07 RX ADMIN — ASPIRIN 81 MG: 81 TABLET, CHEWABLE ORAL at 14:58

## 2023-01-07 RX ADMIN — ATORVASTATIN CALCIUM 40 MG: 40 TABLET, FILM COATED ORAL at 08:55

## 2023-01-07 RX ADMIN — SODIUM CHLORIDE, PRESERVATIVE FREE 10 ML: 5 INJECTION INTRAVENOUS at 20:24

## 2023-01-07 RX ADMIN — ALBUMIN (HUMAN) 25 G: 0.25 INJECTION, SOLUTION INTRAVENOUS at 10:11

## 2023-01-07 RX ADMIN — FUROSEMIDE 40 MG: 10 INJECTION, SOLUTION INTRAMUSCULAR; INTRAVENOUS at 08:56

## 2023-01-07 RX ADMIN — SODIUM CHLORIDE, PRESERVATIVE FREE 10 ML: 5 INJECTION INTRAVENOUS at 08:58

## 2023-01-07 RX ADMIN — APIXABAN 5 MG: 5 TABLET, FILM COATED ORAL at 08:55

## 2023-01-07 RX ADMIN — APIXABAN 5 MG: 5 TABLET, FILM COATED ORAL at 20:23

## 2023-01-07 NOTE — PROGRESS NOTES
Hospitalist Progress Note      PCP: Arline Cooks    Date of Admission: 1/5/2023    Chief Complaint: SOB    Hospital Course:     Impression: This is a 60-year-old male medical history significant for A. fib on Eliquis CHF CKD HLD HTN right-sided pleural effusion, CVA. Presented with shortness of breath and cough found to have pneumonia & CHF. 1/7 feeling a bit better today. He is agreeable to continuing inpatient treatment today. A/P:    1. Suspected community-acquired pneumonia: Rocephin azithromycin empirically requested WBC count is within normal limits not septic by criteria. He does have a chronic right pleural effusion which has increased with worsening airspace disease on the right. Could possibly be viral as his procalcitonin is 0.07 we will check a viral panel 1/7 cont empiric abx for now. 2.  Acute exacerbation of systolic heart failure: Echocardiogram pending Lasix IV. Cardiology services consulted. 1/7 HFrEF on echo, approaching euvolemia, cont lasix, will give dose albumin as well this morning. 3.  A. fib: Continue Eliquis    4. HTN: Continue home meds    CODE STATUS: Full code    DVT prophylaxis: Anticoagulated with Eliquis    Transition of care hopefully home    Anticipated date of discharge: Possibly 1-2 days    24-hour care plan:    Lasix antibiotics, albumin today. Discharge home in AM if improved.           Medications:  Reviewed    Infusion Medications    sodium chloride       Scheduled Medications    aspirin  81 mg Oral Daily    apixaban  5 mg Oral BID    atorvastatin  40 mg Oral Daily    metoprolol succinate  50 mg Oral Daily    sodium chloride flush  5-40 mL IntraVENous 2 times per day    azithromycin  500 mg IntraVENous Q24H    cefTRIAXone (ROCEPHIN) IV  1,000 mg IntraVENous Q24H    furosemide  40 mg IntraVENous Daily     PRN Meds: sodium chloride flush, sodium chloride, ondansetron **OR** ondansetron, polyethylene glycol, acetaminophen **OR** acetaminophen      Intake/Output Summary (Last 24 hours) at 1/7/2023 0952  Last data filed at 1/6/2023 1958  Gross per 24 hour   Intake 720 ml   Output 100 ml   Net 620 ml         Exam:    /82   Pulse (!) 108   Temp 98.1 °F (36.7 °C) (Oral)   Resp 18   Ht 6' 1\" (1.854 m)   Wt 266 lb 9.6 oz (120.9 kg)   SpO2 96%   BMI 35.17 kg/m²     General appearance: No apparent distress, appears stated age and cooperative. HEENT: Pupils equal, round, and reactive to light. Conjunctivae/corneas clear. Neck: Supple, with full range of motion. No jugular venous distention. Trachea midline. Respiratory:  Normal respiratory effort. Clear to auscultation, bilaterally without Rales/Wheezes/Rhonchi. Cardiovascular: Regular rate and rhythm with normal S1/S2 without murmurs, rubs or gallops. Abdomen: Soft, non-tender, non-distended with normal bowel sounds. Musculoskeletal: No clubbing, cyanosis or edema bilaterally. Full range of motion without deformity. Skin: Skin color, texture, turgor normal.  No rashes or lesions. Neurologic:  Neurovascularly intact without any focal sensory/motor deficits. Cranial nerves: II-XII intact, grossly non-focal.  Psychiatric: Alert and oriented, thought content appropriate, normal insight  Capillary Refill: Brisk,< 3 seconds   Peripheral Pulses: +2 palpable, equal bilaterally       Labs:   Recent Labs     01/06/23  0004 01/07/23  0542   WBC 7.1 5.9   HGB 13.4* 11.9*   HCT 40.7 36.7*    125*       Recent Labs     01/06/23  0004 01/07/23  0542    139   K 3.9 3.9    102   CO2 27 24   BUN 40* 43*   CREATININE 1.6* 1.4*   CALCIUM 9.6 9.0       Recent Labs     01/06/23  0004   AST 35   ALT 21   BILITOT 1.1*   ALKPHOS 127       No results for input(s): INR in the last 72 hours.   Recent Labs     01/06/23  0004   TROPONINI 0.02*         Urinalysis:      Lab Results   Component Value Date/Time    NITRU Negative 03/01/2019 12:11 PM    WBCUA 6 03/01/2019 12:11 PM    RBCUA 1 03/01/2019 12:11 PM    BLOODU Negative 03/01/2019 12:11 PM    SPECGRAV >1.030 03/01/2019 12:11 PM    GLUCOSEU Negative 03/01/2019 12:11 PM       Radiology:  XR CHEST PORTABLE   Final Result   Increasing moderate right pleural effusion and worsening right basilar   airspace disease.                  Assessment/Plan:    Active Hospital Problems    Diagnosis Date Noted    Dyspnea [R06.00] 01/06/2023     Priority: Medium    CHF (congestive heart failure), NYHA class II, acute on chronic, systolic (HCC) [H47.31]     Primary hypertension [I10]     Pneumonia of right lung due to infectious organism [J18.9] 10/07/2018          Georgina James DO

## 2023-01-07 NOTE — PROGRESS NOTES
Cardiology - PROGRESS NOTE    Admit Date: 1/5/2023     Reason for follow up: CHF    The patient is a 77 y.o.  male who is admitted to the hospital for worsening cough and shortenss of breath.   past medical history significant for chronic atrial fibrillation on Eliquis,  chronic systolic CHF, hypertension, h/o CVA and coronary artery calcifications seen on chest CT. He was admitted 1/2022 with pleural effusion.     Patient with recent PNA and was started on oral ABX as an outpatient. He reports he has not been able to tolerate oral ABX. He denies fever or chills.  Patient reports he has been compliant with medications.   He has been on Demadex as an outpatient and was started on IV lasix in the ED.   He reports he is feeling better today. Less coughing. Less shortness of breath      Social History:   reports that he has never smoked. He has never used smokeless tobacco. He reports that he does not currently use alcohol. He reports that he does not use drugs.   Family History: family history includes Heart Disease in his mother; Hypertension in his father and mother; Kidney Disease in his brother; Stroke in his mother.  Living Situation: with daughter      Interval History:   Patient seen and examined and notes reviewed   Creatinine stable 1.4  I/O do not correlate   Sitting on side of bed   Feels better   Less short of breath   All other systems reviewed and negative except as above.        Diet: ADULT DIET; Regular; Low Fat/Low Chol/High Fiber/2 gm Na; 1500 ml  Pain is:None  Nausea:None    In: 960 [P.O.:960]  Out: 100    Patient Vitals for the past 96 hrs (Last 3 readings):   Weight   01/07/23 0539 266 lb 9.6 oz (120.9 kg)   01/05/23 2345 269 lb 6.4 oz (122.2 kg)           Data:   Scheduled Meds:   Scheduled Meds:   apixaban  5 mg Oral BID    atorvastatin  40 mg Oral Daily    metoprolol succinate  50 mg Oral Daily    sodium chloride flush  5-40 mL  IntraVENous 2 times per day    azithromycin  500 mg IntraVENous Q24H    cefTRIAXone (ROCEPHIN) IV  1,000 mg IntraVENous Q24H    furosemide  40 mg IntraVENous Daily     Continuous Infusions:   sodium chloride       PRN Meds:.sodium chloride flush, sodium chloride, ondansetron **OR** ondansetron, polyethylene glycol, acetaminophen **OR** acetaminophen  Continuous Infusions:   sodium chloride         Intake/Output Summary (Last 24 hours) at 1/7/2023 0832  Last data filed at 1/6/2023 1958  Gross per 24 hour   Intake 720 ml   Output 100 ml   Net 620 ml       Lab Results   Component Value Date    ALT 21 01/06/2023    AST 35 01/06/2023    ALKPHOS 127 01/06/2023    BILITOT 1.1 (H) 01/06/2023     Lab Results   Component Value Date    CREATININE 1.4 (H) 01/07/2023    BUN 43 (H) 01/07/2023     01/07/2023    K 3.9 01/07/2023     01/07/2023    CO2 24 01/07/2023     No results found for: TSH, H0ZOVOV, R0LQYIK, THYROIDAB  Lab Results   Component Value Date    WBC 5.9 01/07/2023    HGB 11.9 (L) 01/07/2023    HCT 36.7 (L) 01/07/2023    .0 01/07/2023     (L) 01/07/2023     No components found for: CHLPL  Lab Results   Component Value Date    TRIG 49 05/12/2022    TRIG 56 06/04/2020    TRIG 63 03/02/2019     Lab Results   Component Value Date    HDL 47 05/12/2022    HDL 49 06/04/2020    HDL 50 03/02/2019     Lab Results   Component Value Date    LDLCALC 41 05/12/2022    LDLCALC 48 06/04/2020    LDLCALC 57 03/02/2019     Lab Results   Component Value Date    LABVLDL 10 05/12/2022    LABVLDL 11 06/04/2020    LABVLDL 13 03/02/2019         -----------------------------------------------------------------  Telemetry: personally reviewed   AF with frequent PVC   Radiology:     CT Chest: 12/6/22  1. Chronic loculated right pleural effusion with similar volume to prior CT   01/24/2022.   2. Worsening atelectasis and airspace opacification in the right middle and   lower lobes.   Chronic atelectasis or fibrotic change is suspected. Chronic   infectious or inflammatory process also suspected due to progression. 3. There is also a new small left pleural effusion with mild ground-glass   opacification on the left. Asymmetric edema or pneumonitis may be considered. 4. Worsening mediastinal lymphadenopathy. 5. Cardiomegaly with calcification of pericardium suggesting a prior history   of pericarditis. Stable appearance from prior exam.     EKG:   AF with PVC     Echo:   Summary   Poor image quality. Definity contrast administered. Patient appears to be in atrial fibrillation. Overall left ventricular systolic function appears severely reduced. Ejection fraction is visually estimated to be 20-25% with diffuse   hypokinesis and regional abnormalities including inferior akinesis. Normal   left ventricular wall thickness and cavity size. The right ventricle appears normal in size with severely reduced systolic   function. The left and right atria are moderately dilated. Possibly severe mitral regurgitation. The aortic valve leaflets are not well visualized. Mild aortic stenosis   based on a peak velocity of 2.6 m/s and a mean pressure gradient of 20 mmHg. Gradients may be underestimated with reduced LV function. Trivial aortic   regurgitation. Trivial pericardial effusion. IVC size is dilated (>2.1 cm) and collapses < 50% with respiration   consistent with elevated RA pressure (15 mmHg). Objective:   Vitals: /82   Pulse (!) 108   Temp 98.1 °F (36.7 °C) (Oral)   Resp 18   Ht 6' 1\" (1.854 m)   Wt 266 lb 9.6 oz (120.9 kg)   SpO2 96%   BMI 35.17 kg/m²   General appearance: alert, appears stated age, anxious,. No acute distress   Skin: Skin color, texture, turgor normal. No rashes or ecchymosis. HEENT: Head: Normal, normocephalic, atraumatic.   Neck: no carotid bruit, no JVD, supple, symmetrical, trachea midline, and thyroid not enlarged, symmetric, no tenderness/mass/nodules  Lungs: diminished breath sounds bilaterally, no accessory muscle use, no respiratory distress, on RA  Heart: irregularly irregular rhythm and S1, S2 normal  Abdomen: soft, non-tender; bowel sounds normal; no masses,  no organomegaly  Extremities:  chronic venous stasis, +1-2 BLE , pulses: DP +2/+2, PT +2/+2,   Neurologic: Mental status: Alert, oriented, thought content appropriate, no tremors, no gross sensory motor deficit,   Psychiatric: normal insight and affect      Assessment & Plan:    Patient Active Problem List:          Plan:  1. Heart failure     Acute on chronic systolic   EF 79%    Repeat echo shows wall new wall motion abnormalities from last Echo     Discussed with Dr Vasu Gauthier on 1/9/23 to further assess coronary anatomy   On IV lasix    Continue    Continue Toprol XL    No ACE/ARB/aldactone/ARNI secondary to CKD    Continue fluid and Na restrictions     Patient would 3 months of GMDT with repeat echo prior to consideration for ICD therapy     2. Cardiomyopathy   Worsening EF on repeat echo    Need to r/u ischemic source    Discussed at length with patient and daughter echo results and recommendation for [facility]     Patient is very anxious about procedure     He is not sure if he wants it done as an inpt      He will discuss with his daughter and let cardiology know in AM     Start ASA    Check lipid profile     Start statin     3. PNA-Community acquired   Atypical   Defer to primary     4.  Atrial Fibrillation    Paroxsymal   Rate controlled     Continue beta-blocker    Continue anticoagulation     If patient agreeable to [address] hold Eliquis starting night of  1/8/23    Time spent in patient and family education and direct patient care 51 minutes     JOHN Pagan-GERRY   [facility]  Cardiology   1/7/2023  8:32 AM

## 2023-01-07 NOTE — PLAN OF CARE
Problem: Discharge Planning  Goal: Discharge to home or other facility with appropriate resources  1/7/2023 1116 by Aminah Montez RN  Outcome: Progressing  1/7/2023 0416 by Zeinab Morales RN  Outcome: Progressing     Problem: Safety - Adult  Goal: Free from fall injury  1/7/2023 1116 by Aminah Montez RN  Outcome: Progressing  1/7/2023 0416 by Zeinab Morales RN  Outcome: Progressing     Problem: Chronic Conditions and Co-morbidities  Goal: Patient's chronic conditions and co-morbidity symptoms are monitored and maintained or improved  1/7/2023 1116 by Aminah Montez RN  Outcome: Progressing  1/7/2023 0416 by Zeinab Morales RN  Outcome: Progressing     Problem: ABCDS Injury Assessment  Goal: Absence of physical injury  1/7/2023 1116 by Aminah Montez RN  Outcome: Progressing  1/7/2023 0416 by Zeinab Morales RN  Outcome: Progressing     Problem: Pain  Goal: Verbalizes/displays adequate comfort level or baseline comfort level  1/7/2023 1116 by Aminah Montez RN  Outcome: Progressing  1/7/2023 0416 by Zeinab Morales RN  Outcome: Progressing

## 2023-01-08 LAB
ANION GAP SERPL CALCULATED.3IONS-SCNC: 12 MMOL/L (ref 3–16)
BASOPHILS ABSOLUTE: 0.1 K/UL (ref 0–0.2)
BASOPHILS RELATIVE PERCENT: 0.8 %
BUN BLDV-MCNC: 41 MG/DL (ref 7–20)
CALCIUM SERPL-MCNC: 9.3 MG/DL (ref 8.3–10.6)
CHLORIDE BLD-SCNC: 99 MMOL/L (ref 99–110)
CHOLESTEROL, TOTAL: 96 MG/DL (ref 0–199)
CO2: 28 MMOL/L (ref 21–32)
CREAT SERPL-MCNC: 1.4 MG/DL (ref 0.8–1.3)
EOSINOPHILS ABSOLUTE: 0.1 K/UL (ref 0–0.6)
EOSINOPHILS RELATIVE PERCENT: 1.7 %
GFR SERPL CREATININE-BSD FRML MDRD: 52 ML/MIN/{1.73_M2}
GLUCOSE BLD-MCNC: 94 MG/DL (ref 70–99)
HCT VFR BLD CALC: 39.9 % (ref 40.5–52.5)
HDLC SERPL-MCNC: 37 MG/DL (ref 40–60)
HEMOGLOBIN: 12.7 G/DL (ref 13.5–17.5)
LDL CHOLESTEROL CALCULATED: 45 MG/DL
LYMPHOCYTES ABSOLUTE: 1 K/UL (ref 1–5.1)
LYMPHOCYTES RELATIVE PERCENT: 13.3 %
MCH RBC QN AUTO: 31.6 PG (ref 26–34)
MCHC RBC AUTO-ENTMCNC: 31.9 G/DL (ref 31–36)
MCV RBC AUTO: 98.9 FL (ref 80–100)
MONOCYTES ABSOLUTE: 1 K/UL (ref 0–1.3)
MONOCYTES RELATIVE PERCENT: 13.1 %
NEUTROPHILS ABSOLUTE: 5.2 K/UL (ref 1.7–7.7)
NEUTROPHILS RELATIVE PERCENT: 71.1 %
PDW BLD-RTO: 16.9 % (ref 12.4–15.4)
PLATELET # BLD: 159 K/UL (ref 135–450)
PMV BLD AUTO: 9.5 FL (ref 5–10.5)
POTASSIUM REFLEX MAGNESIUM: 4.1 MMOL/L (ref 3.5–5.1)
RBC # BLD: 4.03 M/UL (ref 4.2–5.9)
SODIUM BLD-SCNC: 139 MMOL/L (ref 136–145)
TRIGL SERPL-MCNC: 70 MG/DL (ref 0–150)
VLDLC SERPL CALC-MCNC: 14 MG/DL
WBC # BLD: 7.3 K/UL (ref 4–11)

## 2023-01-08 PROCEDURE — 80048 BASIC METABOLIC PNL TOTAL CA: CPT

## 2023-01-08 PROCEDURE — 80061 LIPID PANEL: CPT

## 2023-01-08 PROCEDURE — 36415 COLL VENOUS BLD VENIPUNCTURE: CPT

## 2023-01-08 PROCEDURE — 85025 COMPLETE CBC W/AUTO DIFF WBC: CPT

## 2023-01-08 PROCEDURE — 2580000003 HC RX 258: Performed by: FAMILY MEDICINE

## 2023-01-08 PROCEDURE — 2580000003 HC RX 258: Performed by: INTERNAL MEDICINE

## 2023-01-08 PROCEDURE — 6370000000 HC RX 637 (ALT 250 FOR IP): Performed by: NURSE PRACTITIONER

## 2023-01-08 PROCEDURE — 1200000000 HC SEMI PRIVATE

## 2023-01-08 PROCEDURE — 94760 N-INVAS EAR/PLS OXIMETRY 1: CPT

## 2023-01-08 PROCEDURE — 99233 SBSQ HOSP IP/OBS HIGH 50: CPT | Performed by: INTERNAL MEDICINE

## 2023-01-08 PROCEDURE — 6360000002 HC RX W HCPCS: Performed by: INTERNAL MEDICINE

## 2023-01-08 PROCEDURE — 6360000002 HC RX W HCPCS: Performed by: FAMILY MEDICINE

## 2023-01-08 PROCEDURE — 6370000000 HC RX 637 (ALT 250 FOR IP): Performed by: INTERNAL MEDICINE

## 2023-01-08 RX ORDER — FUROSEMIDE 10 MG/ML
60 INJECTION INTRAMUSCULAR; INTRAVENOUS ONCE
Status: COMPLETED | OUTPATIENT
Start: 2023-01-08 | End: 2023-01-08

## 2023-01-08 RX ADMIN — ATORVASTATIN CALCIUM 40 MG: 40 TABLET, FILM COATED ORAL at 08:29

## 2023-01-08 RX ADMIN — APIXABAN 5 MG: 5 TABLET, FILM COATED ORAL at 20:21

## 2023-01-08 RX ADMIN — SODIUM CHLORIDE, PRESERVATIVE FREE 10 ML: 5 INJECTION INTRAVENOUS at 20:22

## 2023-01-08 RX ADMIN — AZITHROMYCIN MONOHYDRATE 500 MG: 500 INJECTION, POWDER, LYOPHILIZED, FOR SOLUTION INTRAVENOUS at 01:07

## 2023-01-08 RX ADMIN — METOPROLOL SUCCINATE 50 MG: 50 TABLET, EXTENDED RELEASE ORAL at 08:29

## 2023-01-08 RX ADMIN — SODIUM CHLORIDE, PRESERVATIVE FREE 10 ML: 5 INJECTION INTRAVENOUS at 08:29

## 2023-01-08 RX ADMIN — FUROSEMIDE 60 MG: 10 INJECTION, SOLUTION INTRAMUSCULAR; INTRAVENOUS at 14:05

## 2023-01-08 RX ADMIN — ASPIRIN 81 MG: 81 TABLET, CHEWABLE ORAL at 08:29

## 2023-01-08 RX ADMIN — CEFTRIAXONE 1000 MG: 1 INJECTION, POWDER, FOR SOLUTION INTRAMUSCULAR; INTRAVENOUS at 00:32

## 2023-01-08 RX ADMIN — APIXABAN 5 MG: 5 TABLET, FILM COATED ORAL at 08:29

## 2023-01-08 RX ADMIN — FUROSEMIDE 40 MG: 10 INJECTION, SOLUTION INTRAMUSCULAR; INTRAVENOUS at 08:29

## 2023-01-08 NOTE — PROGRESS NOTES
[facility]   Daily Progress Note      Admit Date:  1/5/2023      Subjective:   Mr. Morro Vidales is a 75yo male with a past medical history significant for chronic atrial fibrillation, chronic systolic CHF, hypertension, h/o CVA and coronary artery calcifications seen on chest CT. He was admitted 1/2022 with pleural effusion. He was admitted this time on 1/5/23 with shortness of breath. Interval History:  Rob Negrete thinks his breathing and leg swelling are a little better. No events overnight. His daughter is present in the room. Rate controlled atrial fibrillation on telemetry.      Objective:     /66   Pulse 89   Temp 98.1 °F (36.7 °C) (Oral)   Resp 17   Ht 6' 1\" (1.854 m)   Wt 264 lb 12.8 oz (120.1 kg)   SpO2 97%   BMI 34.94 kg/m²      Intake/Output Summary (Last 24 hours) at 1/8/2023 0900  Last data filed at 1/7/2023 1808  Gross per 24 hour   Intake 120 ml   Output 800 ml   Net -680 ml       Physical Exam:  General:  Awake, alert, NAD  Skin:  Warm and dry  Neck:  JVD<8, no carotid bruits  Chest:  Clear to auscultation, no wheezes/rhonchi/rales  Cardiovascular:  Irreg irreg, normal S1/S2, no M/R/G  Abdomen:  Soft, nontender, +bowel sounds  Extremities:  No edema  Pulses: 2+ bilat carotid    2+ bilat radial    2+ bilat femoral        Medications:    aspirin  81 mg Oral Daily    apixaban  5 mg Oral BID    atorvastatin  40 mg Oral Daily    metoprolol succinate  50 mg Oral Daily    sodium chloride flush  5-40 mL IntraVENous 2 times per day    azithromycin  500 mg IntraVENous Q24H    cefTRIAXone (ROCEPHIN) IV  1,000 mg IntraVENous Q24H    furosemide  40 mg IntraVENous Daily      sodium chloride         Lab Data:  CBC:   Recent Labs     01/06/23  0004 01/07/23  0542   WBC 7.1 5.9   HGB 13.4* 11.9*    125*     BMP:    Recent Labs     01/06/23  0004 01/07/23  0542    139   K 3.9 3.9   CO2 27 24   BUN 40* 43*   CREATININE 1.6* 1.4*     LIVR:   Recent Labs     01/06/23  0004   AST 35 ALT 21     PT/INR:   Recent Labs     01/06/23  0004   PROT 7.9     Recent Labs     01/06/23  0004   TROPONINI 0.02*     FASTING LIPID PANEL:  Lab Results   Component Value Date/Time    CHOL 98 05/12/2022 01:33 PM    HDL 47 05/12/2022 01:33 PM    TRIG 49 05/12/2022 01:33 PM       Echo 3/1/19:  Suboptimal image quality. Ejection fraction is visually estimated to be 25-30%, though likely   underestimated due to atrial fibrillation. Indeterminate diastolic function. Mitral valve leaflets appear mildly thickened. Mild to moderate mitral regurgitation is present. The left atrium is severely dilated. Trivial tricuspid regurgitation. PA systolic pressure is 30 mm Hg. Echo 1/6/23:  Poor image quality. Definity contrast administered. Patient appears to be in atrial fibrillation. Overall left ventricular systolic function appears severely reduced. Ejection fraction is visually estimated to be 20-25% with diffuse  hypokinesis and regional abnormalities including inferior akinesis. Normal left ventricular wall thickness and cavity size. The right ventricle appears normal in size with severely reduced systolic function. The left and right atria are moderately dilated. Possibly severe mitral regurgitation. The aortic valve leaflets are not well visualized. Mild aortic stenosis based on a peak velocity of 2.6 m/s and a mean pressure gradient  of 20 mmHg. Gradients may be underestimated with reduced LV function. Trivial aortic regurgitation. Trivial pericardial effusion. IVC size is dilated (>2.1 cm) and collapses < 50% with respiration consistent with elevated RA pressure (15 mmHg). Assessment / Plan: 1. Acute on chronic systolic CHF, class 3  He seems improved. I would give him an additional dose of IV lasix 60mg once now. Continue Toprol XL for him. He has not been on an ACEI or ARB therapy because of his renal function.  If his creatinine is stable tomorrow, we may try to introduce this or aldactone. Considering Jardiance as an outpatient or tomorrow if his GFR is >20. He is very reluctant to undergo a LHC and we discussed the risks and benefits at length. I have tried to order testing for [place] in the past and he has not had it done. I suspect anxiety plays a large role. We will proceed with a Lexiscan stress tomorrow. He had many questions about this. He will likely need supplemental oxygen to lay flat in the scanner. He has not warranted an AICD due to poor complaince with diagnostic testing. We can consider this as an outpatient. 2.Loculated right Pleural Effusion  He has been seen by Pulmonary. This would really need surgical drainage and decortication I imagine to allow any expansion of the lung. I doubt that Megan Schultz would ever have this done. 3.Chronic Atrial Fibrillation  Continue anticoagulation with Eliquis 5mg bid. His rate is controlled.            Signed:  Jan Huynh MD

## 2023-01-08 NOTE — PROGRESS NOTES
Hospitalist Progress Note      PCP: Ruthy Christopher    Date of Admission: 1/5/2023    Chief Complaint: SOB    Hospital Course:     Impression: This is a 52-year-old male medical history significant for A. fib on Eliquis CHF CKD HLD HTN right-sided pleural effusion, CVA. Presented with shortness of breath and cough found to have pneumonia & CHF. 1/7 feeling a bit better today. He is agreeable to continuing inpatient treatment today. A/P:    1. Suspected community-acquired pneumonia: Rocephin azithromycin empirically requested WBC count is within normal limits not septic by criteria. He does have a chronic right pleural effusion which has increased with worsening airspace disease on the right. Could possibly be viral as his procalcitonin is 0.07 we will check a viral panel 1/8 cont empiric abx for 1 week total.     2.  Acute exacerbation of systolic heart failure: Echocardiogram pending Lasix IV. Cardiology services consulted. 1/8 HFrEF on echo, approaching euvolemia, cont lasix. New WMAs on echo, cardio considering LHC if pt is agreeable tomorrow. 3.  A. fib: Continue Eliquis    4. HTN: Continue home meds    CODE STATUS: Full code    DVT prophylaxis: Anticoagulated with Eliquis    Transition of care hopefully home    Anticipated date of discharge: Possibly 1-2 days    24-hour care plan:    Lasix antibiotics,possible LHC tomorrow, dispo early this week pending clinical course and when ok with all.        Medications:  Reviewed    Infusion Medications    sodium chloride       Scheduled Medications    aspirin  81 mg Oral Daily    apixaban  5 mg Oral BID    atorvastatin  40 mg Oral Daily    metoprolol succinate  50 mg Oral Daily    sodium chloride flush  5-40 mL IntraVENous 2 times per day    azithromycin  500 mg IntraVENous Q24H    cefTRIAXone (ROCEPHIN) IV  1,000 mg IntraVENous Q24H     PRN Meds: sodium chloride flush, sodium chloride, ondansetron **OR** ondansetron, polyethylene glycol, acetaminophen **OR** acetaminophen      Intake/Output Summary (Last 24 hours) at 1/8/2023 1204  Last data filed at 1/8/2023 0841  Gross per 24 hour   Intake 240 ml   Output 800 ml   Net -560 ml         Exam:    /66   Pulse 89   Temp 98.1 °F (36.7 °C) (Oral)   Resp 17   Ht 6' 1\" (1.854 m)   Wt 264 lb 12.8 oz (120.1 kg)   SpO2 97%   BMI 34.94 kg/m²     General appearance: No apparent distress, appears stated age and cooperative. HEENT: Pupils equal, round, and reactive to light. Conjunctivae/corneas clear. Neck: Supple, with full range of motion. No jugular venous distention. Trachea midline. Respiratory:  Normal respiratory effort. Clear to auscultation, bilaterally without Rales/Wheezes/Rhonchi. Cardiovascular: Regular rate and rhythm with normal S1/S2 without murmurs, rubs or gallops. Abdomen: Soft, non-tender, non-distended with normal bowel sounds. Musculoskeletal: No clubbing, cyanosis or edema bilaterally. Full range of motion without deformity. Skin: Skin color, texture, turgor normal.  No rashes or lesions. Neurologic:  Neurovascularly intact without any focal sensory/motor deficits. Cranial nerves: II-XII intact, grossly non-focal.  Psychiatric: Alert and oriented, thought content appropriate, normal insight  Capillary Refill: Brisk,< 3 seconds   Peripheral Pulses: +2 palpable, equal bilaterally       Labs:   Recent Labs     01/06/23  0004 01/07/23  0542 01/08/23  0747   WBC 7.1 5.9 7.3   HGB 13.4* 11.9* 12.7*   HCT 40.7 36.7* 39.9*    125* 159       Recent Labs     01/06/23  0004 01/07/23  0542 01/08/23  0747    139 139   K 3.9 3.9 4.1    102 99   CO2 27 24 28   BUN 40* 43* 41*   CREATININE 1.6* 1.4* 1.4*   CALCIUM 9.6 9.0 9.3       Recent Labs     01/06/23  0004   AST 35   ALT 21   BILITOT 1.1*   ALKPHOS 127       No results for input(s): INR in the last 72 hours.   Recent Labs     01/06/23  0004   TROPONINI 0.02*         Urinalysis:      Lab Results   Component Value Date/Time    NITRU Negative 03/01/2019 12:11 PM    WBCUA 6 03/01/2019 12:11 PM    RBCUA 1 03/01/2019 12:11 PM    BLOODU Negative 03/01/2019 12:11 PM    SPECGRAV >1.030 03/01/2019 12:11 PM    GLUCOSEU Negative 03/01/2019 12:11 PM       Radiology:  XR CHEST PORTABLE   Final Result   Increasing moderate right pleural effusion and worsening right basilar   airspace disease.                  Assessment/Plan:    Active Hospital Problems    Diagnosis Date Noted    Dyspnea [R06.00] 01/06/2023     Priority: Medium    CHF (congestive heart failure), NYHA class II, acute on chronic, systolic (HCC) [U94.93]     Primary hypertension [I10]     Pneumonia of right lung due to infectious organism [J18.9] 10/07/2018          Melvin Pineda DO

## 2023-01-08 NOTE — PLAN OF CARE
Problem: Discharge Planning  Goal: Discharge to home or other facility with appropriate resources  1/8/2023 1013 by Kostas Cabrera RN  Outcome: Progressing  1/8/2023 0134 by Amanda Loaiza RN  Outcome: Progressing     Problem: Safety - Adult  Goal: Free from fall injury  1/8/2023 1013 by Kostas Cabrera RN  Outcome: Progressing  1/8/2023 0134 by Amanda Loaiza RN  Outcome: Progressing     Problem: Chronic Conditions and Co-morbidities  Goal: Patient's chronic conditions and co-morbidity symptoms are monitored and maintained or improved  1/8/2023 1013 by Kostas Cabrera RN  Outcome: Progressing  1/8/2023 0134 by Amanda Loaiza RN  Outcome: Progressing     Problem: ABCDS Injury Assessment  Goal: Absence of physical injury  1/8/2023 1013 by Kostas Cabrera RN  Outcome: Progressing  1/8/2023 0134 by Amanda Loaiza RN  Outcome: Progressing     Problem: Pain  Goal: Verbalizes/displays adequate comfort level or baseline comfort level  1/8/2023 1013 by Kostas Cabrera RN  Outcome: Progressing  1/8/2023 0134 by Amanda Loaiza RN  Outcome: Progressing

## 2023-01-09 VITALS
HEART RATE: 93 BPM | BODY MASS INDEX: 35.09 KG/M2 | RESPIRATION RATE: 16 BRPM | HEIGHT: 73 IN | OXYGEN SATURATION: 97 % | DIASTOLIC BLOOD PRESSURE: 78 MMHG | WEIGHT: 264.77 LBS | SYSTOLIC BLOOD PRESSURE: 116 MMHG | TEMPERATURE: 97.6 F

## 2023-01-09 LAB
ANION GAP SERPL CALCULATED.3IONS-SCNC: 9 MMOL/L (ref 3–16)
BASOPHILS ABSOLUTE: 0.1 K/UL (ref 0–0.2)
BASOPHILS RELATIVE PERCENT: 1 %
BUN BLDV-MCNC: 43 MG/DL (ref 7–20)
CALCIUM SERPL-MCNC: 9.5 MG/DL (ref 8.3–10.6)
CHLORIDE BLD-SCNC: 99 MMOL/L (ref 99–110)
CO2: 31 MMOL/L (ref 21–32)
CREAT SERPL-MCNC: 1.5 MG/DL (ref 0.8–1.3)
EOSINOPHILS ABSOLUTE: 0.1 K/UL (ref 0–0.6)
EOSINOPHILS RELATIVE PERCENT: 1.2 %
GFR SERPL CREATININE-BSD FRML MDRD: 48 ML/MIN/{1.73_M2}
GLUCOSE BLD-MCNC: 97 MG/DL (ref 70–99)
HCT VFR BLD CALC: 37.3 % (ref 40.5–52.5)
HEMOGLOBIN: 12.1 G/DL (ref 13.5–17.5)
LV EF: 29 %
LVEF MODALITY: NORMAL
LYMPHOCYTES ABSOLUTE: 0.8 K/UL (ref 1–5.1)
LYMPHOCYTES RELATIVE PERCENT: 11.9 %
MCH RBC QN AUTO: 32.1 PG (ref 26–34)
MCHC RBC AUTO-ENTMCNC: 32.6 G/DL (ref 31–36)
MCV RBC AUTO: 98.7 FL (ref 80–100)
MONOCYTES ABSOLUTE: 0.9 K/UL (ref 0–1.3)
MONOCYTES RELATIVE PERCENT: 13.7 %
NEUTROPHILS ABSOLUTE: 4.5 K/UL (ref 1.7–7.7)
NEUTROPHILS RELATIVE PERCENT: 72.2 %
PDW BLD-RTO: 16.9 % (ref 12.4–15.4)
PLATELET # BLD: 146 K/UL (ref 135–450)
PMV BLD AUTO: 9.1 FL (ref 5–10.5)
POTASSIUM REFLEX MAGNESIUM: 4.5 MMOL/L (ref 3.5–5.1)
RBC # BLD: 3.78 M/UL (ref 4.2–5.9)
SODIUM BLD-SCNC: 139 MMOL/L (ref 136–145)
TROPONIN: 0.02 NG/ML
WBC # BLD: 6.3 K/UL (ref 4–11)

## 2023-01-09 PROCEDURE — 6360000002 HC RX W HCPCS: Performed by: FAMILY MEDICINE

## 2023-01-09 PROCEDURE — 6370000000 HC RX 637 (ALT 250 FOR IP): Performed by: NURSE PRACTITIONER

## 2023-01-09 PROCEDURE — 2700000000 HC OXYGEN THERAPY PER DAY

## 2023-01-09 PROCEDURE — 6360000002 HC RX W HCPCS: Performed by: INTERNAL MEDICINE

## 2023-01-09 PROCEDURE — 3430000000 HC RX DIAGNOSTIC RADIOPHARMACEUTICAL: Performed by: INTERNAL MEDICINE

## 2023-01-09 PROCEDURE — 2580000003 HC RX 258: Performed by: FAMILY MEDICINE

## 2023-01-09 PROCEDURE — 78452 HT MUSCLE IMAGE SPECT MULT: CPT

## 2023-01-09 PROCEDURE — A9502 TC99M TETROFOSMIN: HCPCS

## 2023-01-09 PROCEDURE — A9502 TC99M TETROFOSMIN: HCPCS | Performed by: INTERNAL MEDICINE

## 2023-01-09 PROCEDURE — 94760 N-INVAS EAR/PLS OXIMETRY 1: CPT

## 2023-01-09 PROCEDURE — 6370000000 HC RX 637 (ALT 250 FOR IP): Performed by: INTERNAL MEDICINE

## 2023-01-09 PROCEDURE — 3430000000 HC RX DIAGNOSTIC RADIOPHARMACEUTICAL

## 2023-01-09 PROCEDURE — 80048 BASIC METABOLIC PNL TOTAL CA: CPT

## 2023-01-09 PROCEDURE — 84484 ASSAY OF TROPONIN QUANT: CPT

## 2023-01-09 PROCEDURE — 36415 COLL VENOUS BLD VENIPUNCTURE: CPT

## 2023-01-09 PROCEDURE — 93017 CV STRESS TEST TRACING ONLY: CPT

## 2023-01-09 PROCEDURE — 85025 COMPLETE CBC W/AUTO DIFF WBC: CPT

## 2023-01-09 RX ORDER — FUROSEMIDE 40 MG/1
40 TABLET ORAL DAILY
Qty: 30 TABLET | Refills: 3 | Status: SHIPPED | OUTPATIENT
Start: 2023-01-09 | End: 2023-05-09

## 2023-01-09 RX ORDER — CEFDINIR 300 MG/1
300 CAPSULE ORAL 2 TIMES DAILY
Qty: 10 CAPSULE | Refills: 0 | Status: SHIPPED | OUTPATIENT
Start: 2023-01-09 | End: 2023-01-14

## 2023-01-09 RX ORDER — AZITHROMYCIN 500 MG/1
500 TABLET, FILM COATED ORAL DAILY
Qty: 3 TABLET | Refills: 0 | Status: SHIPPED | OUTPATIENT
Start: 2023-01-09 | End: 2023-01-12

## 2023-01-09 RX ADMIN — REGADENOSON 0.4 MG: 0.08 INJECTION, SOLUTION INTRAVENOUS at 10:02

## 2023-01-09 RX ADMIN — APIXABAN 5 MG: 5 TABLET, FILM COATED ORAL at 11:28

## 2023-01-09 RX ADMIN — TETROFOSMIN 10 MILLICURIE: 1.38 INJECTION, POWDER, LYOPHILIZED, FOR SOLUTION INTRAVENOUS at 08:55

## 2023-01-09 RX ADMIN — METOPROLOL SUCCINATE 50 MG: 50 TABLET, EXTENDED RELEASE ORAL at 11:28

## 2023-01-09 RX ADMIN — TETROFOSMIN 30 MILLICURIE: 1.38 INJECTION, POWDER, LYOPHILIZED, FOR SOLUTION INTRAVENOUS at 10:09

## 2023-01-09 RX ADMIN — CEFTRIAXONE 1000 MG: 1 INJECTION, POWDER, FOR SOLUTION INTRAMUSCULAR; INTRAVENOUS at 01:34

## 2023-01-09 RX ADMIN — AZITHROMYCIN MONOHYDRATE 500 MG: 500 INJECTION, POWDER, LYOPHILIZED, FOR SOLUTION INTRAVENOUS at 02:06

## 2023-01-09 RX ADMIN — ATORVASTATIN CALCIUM 40 MG: 40 TABLET, FILM COATED ORAL at 11:28

## 2023-01-09 RX ADMIN — ASPIRIN 81 MG: 81 TABLET, CHEWABLE ORAL at 11:28

## 2023-01-09 NOTE — PLAN OF CARE
Pt in bed A&O x4. Pt with many questions about stress test. BLE edema noted, pt educated on elevating legs at rest. Pt denies shortness of breath at rest. Pt requesting O2 for comfort. Call light in reach and fall precautions in place. Pt denies further needs. Daughter is at bedside. Electronically signed by Te Richmond RN on 1/9/2023 at 10:21 AM      Problem: Safety - Adult  Goal: Free from fall injury  1/9/2023 1016 by Te Richmond RN  Outcome: Progressing  Note: Fall precautions in place. Bed in lowest position, wheels locked, call light in reach, non slip socks on, alarm refused. Pt educated on using call light for assistance. Pt agreeable. Problem: Pain  Goal: Verbalizes/displays adequate comfort level or baseline comfort level  1/9/2023 1016 by Te Richmond RN  Outcome: Progressing  Note: Pt educated on using pain scale. Pt given PRN pain medication per  orders see Maddy Dean. Pt agreeable to pain management.

## 2023-01-09 NOTE — PROGRESS NOTES
Discharge instruction went over with pt and daughter, all questions answered. IV flushed and discontinued, no complications. New medications and side effects went over with pt, stated understanding. Meds escribed to pt pharmacy. Pt waiting on wheelchair transportation.   Electronically signed by Art Bruner RN on 1/9/2023 at 2:58 PM

## 2023-01-09 NOTE — PLAN OF CARE
Problem: Discharge Planning  Goal: Discharge to home or other facility with appropriate resources  Outcome: Progressing     Problem: Safety - Adult  Goal: Free from fall injury  Outcome: Progressing     Problem: Chronic Conditions and Co-morbidities  Goal: Patient's chronic conditions and co-morbidity symptoms are monitored and maintained or improved  Outcome: Progressing     Problem: ABCDS Injury Assessment  Goal: Absence of physical injury  Outcome: Progressing     Problem: Pain  Goal: Verbalizes/displays adequate comfort level or baseline comfort level  Outcome: Progressing

## 2023-01-09 NOTE — DISCHARGE SUMMARY
Hospital Medicine Discharge Summary    Patient ID: Corinna Huber      Patient's PCP: Sabrina Jimenez    Admit Date: 1/5/2023     Discharge Date:   1/9/2023    Admitting Physician: Eve Schlatter, MD     Discharge Physician: Raina Guzman DO     Discharge Diagnoses: Active Hospital Problems    Diagnosis Date Noted    Dyspnea [R06.00] 01/06/2023     Priority: Medium    CHF (congestive heart failure), NYHA class II, acute on chronic, systolic (HCC) [E22.82]     Primary hypertension [I10]     Pneumonia of right lung due to infectious organism [J18.9] 10/07/2018       The patient was seen and examined on day of discharge and this discharge summary is in conjunction with any daily progress note from day of discharge. Hospital Course:     71-year-old male medical history significant for A. fib on Eliquis CHF CKD HLD HTN right-sided pleural effusion, CVA. Presented with shortness of breath and cough found to have pneumonia & CHF. Cardiology service was consulted and recommended C, patient declined this, however he underwent stress test which was abnormal, he declined LHC again. He prefers discharge home with outpatient follow up with Dr. Asad Griffith. I have prescribed him a course of antibiotics as well as daily lasix. Follow up pcp in 1 week follow up cardiology as indicated. Exam:     /78   Pulse 93   Temp 97.6 °F (36.4 °C) (Axillary)   Resp 16   Ht 6' 1\" (1.854 m)   Wt 264 lb 12.4 oz (120.1 kg)   SpO2 97%   BMI 34.93 kg/m²       General appearance:  No apparent distress, appears stated age and cooperative. HEENT:  Normal cephalic, atraumatic without obvious deformity. Pupils equal, round, and reactive to light. Extra ocular muscles intact. Conjunctivae/corneas clear. Neck: Supple, with full range of motion. No jugular venous distention. Trachea midline. Respiratory:  Normal respiratory effort. Clear to auscultation, bilaterally without Rales/Wheezes/Rhonchi.   Cardiovascular: Regular rate and rhythm with normal S1/S2 without murmurs, rubs or gallops. Abdomen: Soft, non-tender, non-distended with normal bowel sounds. Musculoskeletal:  No clubbing, cyanosis or edema bilaterally. Full range of motion without deformity. Skin: Skin color, texture, turgor normal.  No rashes or lesions. Neurologic:  Neurovascularly intact without any focal sensory/motor deficits. Cranial nerves: II-XII intact, grossly non-focal.  Psychiatric:  Alert and oriented, thought content appropriate, normal insight  Capillary Refill: Brisk,< 3 seconds   Peripheral Pulses: +2 palpable, equal bilaterally       Labs: For convenience and continuity at follow-up the following most recent labs are provided:      CBC:    Lab Results   Component Value Date/Time    WBC 6.3 01/09/2023 05:17 AM    HGB 12.1 01/09/2023 05:17 AM    HCT 37.3 01/09/2023 05:17 AM     01/09/2023 05:17 AM       Renal:    Lab Results   Component Value Date/Time     01/09/2023 05:17 AM    K 4.5 01/09/2023 05:17 AM    CL 99 01/09/2023 05:17 AM    CO2 31 01/09/2023 05:17 AM    BUN 43 01/09/2023 05:17 AM    CREATININE 1.5 01/09/2023 05:17 AM    CALCIUM 9.5 01/09/2023 05:17 AM    PHOS 3.7 12/29/2022 08:51 AM         Significant Diagnostic Studies    Radiology:   NM Cardiac Stress Test Nuclear Imaging   Final Result      XR CHEST PORTABLE   Final Result   Increasing moderate right pleural effusion and worsening right basilar   airspace disease. Consults:     IP CONSULT TO HOSPITALIST  IP CONSULT TO CARDIOLOGY    Disposition:  home     Condition at Discharge: Stable    Discharge Instructions/Follow-up:  pcp and cardio as indicated.      Code Status:  Full Code      Activity: activity as tolerated    Diet: cardiac diet      Discharge Medications:     Current Discharge Medication List             Details   azithromycin (ZITHROMAX) 500 MG tablet Take 1 tablet by mouth daily for 3 days  Qty: 3 tablet, Refills: 0    Associated Diagnoses: Pneumonia of right lung due to infectious organism, unspecified part of lung      cefdinir (OMNICEF) 300 MG capsule Take 1 capsule by mouth 2 times daily for 5 days  Qty: 10 capsule, Refills: 0      furosemide (LASIX) 40 MG tablet Take 1 tablet by mouth daily  Qty: 30 tablet, Refills: 3                Details   atorvastatin (LIPITOR) 40 MG tablet TAKE 1 TABLET BY MOUTH DAILY  Qty: 90 tablet, Refills: 3      apixaban (ELIQUIS) 5 MG TABS tablet Take 1 tablet by mouth 2 times daily  Qty: 180 tablet, Refills: 3      ALFALFA PO Take by mouth daily      metoprolol succinate (TOPROL XL) 50 MG extended release tablet TAKE 1 TABLET BY MOUTH DAILY  Qty: 90 tablet, Refills: 3      allopurinol (ZYLOPRIM) 100 MG tablet Take 100 mg by mouth daily      Saw Palmetto, Serenoa repens, (SAW PALMETTO BERRY PO) Take 1 tablet by mouth daily      Vitamin D (CHOLECALCIFEROL) 25 MCG (1000 UT) TABS tablet Take 1,000 Units by mouth daily      zinc sulfate (ZINCATE) 220 (50 Zn) MG capsule Take 50 mg by mouth daily      b complex vitamins capsule Take 1 capsule by mouth daily      fluocinonide (LIDEX) 0.05 % gel Apply 1 each topically daily as needed (psoriasis)  Refills: 3      Multiple Vitamins-Minerals (MULTIVITAMIN ADULT PO) Take 1 tablet by mouth daily      vitamin C (ASCORBIC ACID) 500 MG tablet Take 500 mg by mouth daily             Time Spent on discharge is more than 45 minutes in the examination, evaluation, counseling and review of medications and discharge plan. Signed: Mery Al DO   1/9/2023      Thank you Cammie Zuniga for the opportunity to be involved in this patient's care. If you have any questions or concerns please feel free to contact me at [phone].

## 2023-01-09 NOTE — PROGRESS NOTES
Physician Progress Note      PATIENT:               Dilip Allen  CSN #:                  [id number]  :                       1945  ADMIT DATE:       2023 11:43 PM  [address] [place] DATE:  [place]  PROVIDER #:        Deyvi Franz DO        QUERY TEXT:    Stage of Chronic Kidney Disease: Please provide further specificity, if known. Clinical indicators include: kd, bun, creatinine  Options provided:  -- Chronic kidney disease stage 1  -- Chronic kidney disease stage 2  -- Chronic kidney disease stage 3  -- Chronic kidney disease stage 3a  -- Chronic kidney disease stage 3b  -- Chronic kidney disease stage 4  -- Chronic kidney disease stage 5  -- Chronic kidney disease stage 5, requiring dialysis  -- End stage renal disease  -- Other - I will add my own diagnosis  -- Disagree - Not applicable / Not valid  -- Disagree - Clinically Unable to determine / Unknown        PROVIDER RESPONSE TEXT:    The patient has chronic kidney disease stage 3. Electronically signed by:   Deyvi Franz DO 2023 10:32 AM

## 2023-01-10 LAB
BLOOD CULTURE, ROUTINE: NORMAL
CULTURE, BLOOD 2: NORMAL

## 2023-01-12 ENCOUNTER — TELEPHONE (OUTPATIENT)
Dept: CARDIOLOGY CLINIC | Age: 78
End: 2023-01-12

## 2023-01-12 DIAGNOSIS — R06.02 SHORTNESS OF BREATH: ICD-10-CM

## 2023-01-12 DIAGNOSIS — I50.22 CHRONIC SYSTOLIC CHF (CONGESTIVE HEART FAILURE), NYHA CLASS 2 (HCC): Primary | ICD-10-CM
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
## 2023-01-12 NOTE — TELEPHONE ENCOUNTER
Refer to prior tele encounter. Instructed him that  Jolanta-Procedure  was provided information this afternoon to get his LHC scheduled. All q/c answered at this time.

## 2023-01-12 NOTE — TELEPHONE ENCOUNTER
Please see message below, patient will need scheduled for a left heart cath with Dr. Caleb Briggs at [facility].     The morning of your procedure you will park in the hospital parking lot and report directly to the cath lab to check in.     Pre-Procedure Instructions   You will need to fast for at least 8 hours prior to procedure. No caffeine the morning of. You will need to hold your anticoagulation, Eliquis for 2 days prior. Hold your diuretic, Lasix the morning of procedure. All other medications can be taken in the morning with sips of water. You will need to take 325 mg aspirin the morning of. If you are currently taking 81 mg please take 4 tablets that morning. Do not use any lotions, creams or perfume the morning of procedure. Pre-procedure lab work will need to be completed 5-7 days prior to procedure. Please have a responsible adult to drive you home after procedure. We advise you have someone to stay with you for 24 hours following procedure for precautionary measures. Depending on procedure you may require an overnight stay. Cath lab will provide you with all post procedure instructions. If you have any questions regarding the procedure itself or medications, please call [phone] and ask to speak with a nurse.

## 2023-01-12 NOTE — TELEPHONE ENCOUNTER
Miguel's daughter Luiz Lares is calling stating that her father was released from the hospital and Dr. Chino Arndt told them to call our office to get scheduled for Vasile Morales can be reached at [phone]

## 2023-01-13 NOTE — TELEPHONE ENCOUNTER
Spoke with the patient and got him scheduled for his procedure. He is asking for this procedure to be done in his Left arm. He asked me to call his daughter Huma Guerra and go over details and dates with her. I spoke with her and went over instructions below and she verbalized understanding. Procedure- [facility]  Date: 1/24/2023  Arrival time:  8:30 am   Procedure time: 10:00 am    The morning of your procedure you will park in the hospital parking lot and report directly to the cath lab to check in.      Pre-Procedure Instructions   You will need to fast for at least 8 hours prior to procedure. No caffeine the morning of. You will need to hold your anticoagulation, Eliquis for 2 days prior. Hold your diuretic, Lasix the morning of procedure. All other medications can be taken in the morning with sips of water. You will need to take 325 mg aspirin the morning of. If you are currently taking 81 mg please take 4 tablets that morning. Do not use any lotions, creams or perfume the morning of procedure. Pre-procedure lab work will need to be completed 5-7 days prior to procedure. Please have a responsible adult to drive you home after procedure. We advise you have someone to stay with you for 24 hours following procedure for precautionary measures. Depending on procedure you may require an overnight stay. Cath lab will provide you with all post procedure instructions. If you have any questions regarding the procedure itself or medications, please call [phone] and ask to speak with a nurse.      Teams update / emailed cath lab

## 2023-01-17 ENCOUNTER — TELEPHONE (OUTPATIENT)
Dept: CARDIOLOGY CLINIC | Age: 78
End: 2023-01-17

## 2023-01-17 NOTE — TELEPHONE ENCOUNTER
Called and spoke to David and let her know that it would be okay to proceed with the procedure as he would be on antibiotics for over a week prior to the angiogram. Encouraged her to call with any worsening symptoms. She verbalized understanding.

## 2023-01-17 NOTE — TELEPHONE ENCOUNTER
Evelyn Alarcon called in this morning wanting to know if her father can continue with his scheduled angiogram if he is on anti-biotics? She stated that he saw his PCP over the weekend for a possible UTI and was prescribed a medication to treat it. Evelyn Alarcon wants to make sure there will be no issue with him being on the medication during his procedure.       Evelyn Alarcon can be reached at: [phone]

## 2023-01-19 ENCOUNTER — APPOINTMENT (OUTPATIENT)
Dept: GENERAL RADIOLOGY | Age: 78
End: 2023-01-19
Payer: MEDICARE

## 2023-01-19 ENCOUNTER — HOSPITAL ENCOUNTER (EMERGENCY)
Age: 78
Discharge: HOME | End: 2023-01-20
Attending: EMERGENCY MEDICINE
Payer: MEDICARE

## 2023-01-19 DIAGNOSIS — R06.02 SHORTNESS OF BREATH: Primary | ICD-10-CM

## 2023-01-19 PROCEDURE — 82803 BLOOD GASES ANY COMBINATION: CPT

## 2023-01-19 PROCEDURE — 87804 INFLUENZA ASSAY W/OPTIC: CPT

## 2023-01-19 PROCEDURE — 85025 COMPLETE CBC W/AUTO DIFF WBC: CPT

## 2023-01-19 PROCEDURE — 71046 X-RAY EXAM CHEST 2 VIEWS: CPT

## 2023-01-19 PROCEDURE — 82077 ASSAY SPEC XCP UR&BREATH IA: CPT

## 2023-01-19 PROCEDURE — 93005 ELECTROCARDIOGRAM TRACING: CPT

## 2023-01-19 PROCEDURE — 99284 EMERGENCY DEPT VISIT MOD MDM: CPT

## 2023-01-19 PROCEDURE — 84484 ASSAY OF TROPONIN QUANT: CPT

## 2023-01-19 PROCEDURE — 83605 ASSAY OF LACTIC ACID: CPT

## 2023-01-19 PROCEDURE — 83880 ASSAY OF NATRIURETIC PEPTIDE: CPT

## 2023-01-19 PROCEDURE — 87635 SARS-COV-2 COVID-19 AMP PRB: CPT

## 2023-01-19 ASSESSMENT — PAIN SCALES - GENERAL: PAINLEVEL_OUTOF10: 0

## 2023-01-20 ENCOUNTER — TELEPHONE (OUTPATIENT)
Dept: OTHER | Facility: CLINIC | Age: 78
End: 2023-01-20

## 2023-01-20 ENCOUNTER — APPOINTMENT (OUTPATIENT)
Dept: CT IMAGING | Age: 78
End: 2023-01-20
Payer: MEDICARE

## 2023-01-20 VITALS
DIASTOLIC BLOOD PRESSURE: 69 MMHG | WEIGHT: 260.14 LBS | RESPIRATION RATE: 16 BRPM | HEART RATE: 92 BPM | HEIGHT: 74 IN | BODY MASS INDEX: 33.39 KG/M2 | SYSTOLIC BLOOD PRESSURE: 129 MMHG | TEMPERATURE: 97.3 F | OXYGEN SATURATION: 100 %

## 2023-01-20 LAB
ANION GAP SERPL CALCULATED.3IONS-SCNC: 13 MMOL/L (ref 3–16)
BASE EXCESS VENOUS: 4.9 MMOL/L
BASOPHILS ABSOLUTE: 0.1 K/UL (ref 0–0.2)
BASOPHILS RELATIVE PERCENT: 1.2 %
BUN BLDV-MCNC: 27 MG/DL (ref 7–20)
CALCIUM SERPL-MCNC: 9.6 MG/DL (ref 8.3–10.6)
CARBOXYHEMOGLOBIN: 1.3 %
CHLORIDE BLD-SCNC: 99 MMOL/L (ref 99–110)
CO2: 28 MMOL/L (ref 21–32)
CREAT SERPL-MCNC: 1.9 MG/DL (ref 0.8–1.3)
EKG DIAGNOSIS: NORMAL
EKG Q-T INTERVAL: 368 MS
EKG QRS DURATION: 124 MS
EKG QTC CALCULATION (BAZETT): 467 MS
EKG R AXIS: 43 DEGREES
EKG T AXIS: 221 DEGREES
EKG VENTRICULAR RATE: 97 BPM
EOSINOPHILS ABSOLUTE: 0.1 K/UL (ref 0–0.6)
EOSINOPHILS RELATIVE PERCENT: 1.9 %
ETHANOL: NORMAL MG/DL (ref 0–0.08)
GFR SERPL CREATININE-BSD FRML MDRD: 36 ML/MIN/{1.73_M2}
GLUCOSE BLD-MCNC: 103 MG/DL (ref 70–99)
HCO3 VENOUS: 32 MMOL/L (ref 23–29)
HCT VFR BLD CALC: 40.9 % (ref 40.5–52.5)
HEMOGLOBIN: 13.3 G/DL (ref 13.5–17.5)
LACTIC ACID: 1.7 MMOL/L (ref 0.4–2)
LYMPHOCYTES ABSOLUTE: 0.9 K/UL (ref 1–5.1)
LYMPHOCYTES RELATIVE PERCENT: 12.2 %
MCH RBC QN AUTO: 31.9 PG (ref 26–34)
MCHC RBC AUTO-ENTMCNC: 32.5 G/DL (ref 31–36)
MCV RBC AUTO: 98.3 FL (ref 80–100)
METHEMOGLOBIN VENOUS: 0.2 %
MONOCYTES ABSOLUTE: 1.2 K/UL (ref 0–1.3)
MONOCYTES RELATIVE PERCENT: 15.4 %
NEUTROPHILS ABSOLUTE: 5.4 K/UL (ref 1.7–7.7)
NEUTROPHILS RELATIVE PERCENT: 69.3 %
O2 SAT, VEN: 40 %
O2 THERAPY: ABNORMAL
PCO2, VEN: 55.8 MMHG (ref 40–50)
PDW BLD-RTO: 17.4 % (ref 12.4–15.4)
PH VENOUS: 7.37 (ref 7.35–7.45)
PLATELET # BLD: 174 K/UL (ref 135–450)
PMV BLD AUTO: 8.7 FL (ref 5–10.5)
PO2, VEN: <30 MMHG
POTASSIUM REFLEX MAGNESIUM: 4.6 MMOL/L (ref 3.5–5.1)
PRO-BNP: 4548 PG/ML (ref 0–449)
RAPID INFLUENZA  B AGN: NEGATIVE
RAPID INFLUENZA A AGN: NEGATIVE
RBC # BLD: 4.16 M/UL (ref 4.2–5.9)
REASON FOR REJECTION: NORMAL
REJECTED TEST: NORMAL
SARS-COV-2, NAAT: NOT DETECTED
SODIUM BLD-SCNC: 140 MMOL/L (ref 136–145)
TCO2 CALC VENOUS: 34 MMOL/L
TROPONIN: 0.02 NG/ML
WBC # BLD: 7.7 K/UL (ref 4–11)

## 2023-01-20 PROCEDURE — 6370000000 HC RX 637 (ALT 250 FOR IP): Performed by: EMERGENCY MEDICINE

## 2023-01-20 PROCEDURE — 93010 ELECTROCARDIOGRAM REPORT: CPT | Performed by: INTERNAL MEDICINE

## 2023-01-20 PROCEDURE — 80048 BASIC METABOLIC PNL TOTAL CA: CPT

## 2023-01-20 RX ORDER — HYDROXYZINE PAMOATE 25 MG/1
25 CAPSULE ORAL 3 TIMES DAILY PRN
Qty: 10 CAPSULE | Refills: 0 | Status: SHIPPED | OUTPATIENT
Start: 2023-01-20 | End: 2023-01-25

## 2023-01-20 RX ORDER — FUROSEMIDE 10 MG/ML
40 INJECTION INTRAMUSCULAR; INTRAVENOUS ONCE
Status: DISCONTINUED | OUTPATIENT
Start: 2023-01-20 | End: 2023-01-20

## 2023-01-20 RX ORDER — FUROSEMIDE 40 MG/1
40 TABLET ORAL ONCE
Status: COMPLETED | OUTPATIENT
Start: 2023-01-20 | End: 2023-01-20

## 2023-01-20 RX ADMIN — FUROSEMIDE 40 MG: 40 TABLET ORAL at 02:31

## 2023-01-20 ASSESSMENT — ENCOUNTER SYMPTOMS
CONSTIPATION: 0
ABDOMINAL PAIN: 0
EYE REDNESS: 0
COUGH: 0
RECTAL PAIN: 0
BACK PAIN: 0
DIARRHEA: 0
SHORTNESS OF BREATH: 1
EYE DISCHARGE: 0
APNEA: 0
ABDOMINAL DISTENTION: 0
STRIDOR: 0
BLOOD IN STOOL: 0
COLOR CHANGE: 0
CHEST TIGHTNESS: 0
WHEEZING: 0
NAUSEA: 0
VOMITING: 0
EYE ITCHING: 0
ANAL BLEEDING: 0
PHOTOPHOBIA: 0
CHOKING: 0
EYE PAIN: 0

## 2023-01-20 ASSESSMENT — LIFESTYLE VARIABLES
HOW OFTEN DO YOU HAVE A DRINK CONTAINING ALCOHOL: NEVER
HOW MANY STANDARD DRINKS CONTAINING ALCOHOL DO YOU HAVE ON A TYPICAL DAY: PATIENT DOES NOT DRINK

## 2023-01-20 ASSESSMENT — PAIN - FUNCTIONAL ASSESSMENT: PAIN_FUNCTIONAL_ASSESSMENT: NONE - DENIES PAIN
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
## 2023-01-23 ENCOUNTER — TELEPHONE (OUTPATIENT)
Dept: CARDIOLOGY CLINIC | Age: 78
End: 2023-01-23

## 2023-01-23 RX ORDER — SODIUM CHLORIDE 0.9 % (FLUSH) 0.9 %
5-40 SYRINGE (ML) INJECTION EVERY 12 HOURS SCHEDULED
OUTPATIENT
Start: 2023-01-24

## 2023-01-23 RX ORDER — SODIUM CHLORIDE 0.9 % (FLUSH) 0.9 %
5-40 SYRINGE (ML) INJECTION PRN
OUTPATIENT
Start: 2023-01-24

## 2023-01-23 RX ORDER — SODIUM CHLORIDE 9 MG/ML
INJECTION, SOLUTION INTRAVENOUS PRN
OUTPATIENT
Start: 2023-01-24

## 2023-01-23 NOTE — TELEPHONE ENCOUNTER
Spoke with the patient and we rescheduled for his procedure as he isn't feeling well. He asked me to call his daughter as well as she had a few questions for me. I spoke with her and gave her all the information and answered her general questions about the procedure.      Procedure - [facility]   Date 1/30/2023  Arrival time:11:30 am   Procedure time: 1:00 pm

## 2023-01-23 NOTE — TELEPHONE ENCOUNTER
Lizz Galarza called very upset, he is scheduled for an angiogram tomorrow. Does not want to cx yet til he speaks to an RN. He is throwing up but has no fever. Advised I would send message back and have someone call him today since procedure is tomorrow.     Lizz Geovanni # [phone]

## 2023-01-23 NOTE — TELEPHONE ENCOUNTER
Called and spoke with patient. He is currently not feeling well and wishes to cancel his procedure tomorrow, 1/24/23. I have been speaking with Jolanta who states that Dr. Alexx Pendleton has availability 1/30/23 at 1 p.m. and patient is agreeable to reschedule for this time. Please call patient and and reschedule angiogram at this time. Thank you.

## 2023-01-24 ENCOUNTER — HOSPITAL ENCOUNTER (OUTPATIENT)
Dept: CARDIAC CATH/INVASIVE PROCEDURES | Age: 78
Discharge: HOME | End: 2023-01-24

## 2023-01-24 ENCOUNTER — TELEPHONE (OUTPATIENT)
Dept: CARDIOLOGY CLINIC | Age: 78
End: 2023-01-24

## 2023-01-24 NOTE — TELEPHONE ENCOUNTER
Spoke with patient. He states that the Toprol XL has been making him vomit. He states that he has been taking the Toprol XL by itself with food and every time he vomits right after. This has been going on the past few weeks. He states that he does not endorse nausea/vomiting with any other medications. Discussed with Dr. Tej Scott and patient is okay to discontinue Toprol XL. Encouraged to monitor blood pressure and heart rate at home and call with any continued, abnormal readings. He v/u.

## 2023-01-27 NOTE — TELEPHONE ENCOUNTER
Spoke with Oxana Romero answered her questions about the instructions and she verbalized understanding.

## 2023-01-30 ENCOUNTER — HOSPITAL ENCOUNTER (OUTPATIENT)
Dept: CARDIAC CATH/INVASIVE PROCEDURES | Age: 78
Discharge: HOME | End: 2023-01-30
Payer: MEDICARE

## 2023-01-30 VITALS
WEIGHT: 260 LBS | TEMPERATURE: 97 F | OXYGEN SATURATION: 99 % | DIASTOLIC BLOOD PRESSURE: 85 MMHG | SYSTOLIC BLOOD PRESSURE: 124 MMHG | HEART RATE: 73 BPM | RESPIRATION RATE: 20 BRPM | BODY MASS INDEX: 34.46 KG/M2 | HEIGHT: 73 IN

## 2023-01-30 DIAGNOSIS — R94.39 ABNORMAL CARDIOVASCULAR STRESS TEST: ICD-10-CM

## 2023-01-30 LAB
ANION GAP SERPL CALCULATED.3IONS-SCNC: 8 MMOL/L (ref 3–16)
BUN BLDV-MCNC: 26 MG/DL (ref 7–20)
CALCIUM SERPL-MCNC: 9.5 MG/DL (ref 8.3–10.6)
CHLORIDE BLD-SCNC: 98 MMOL/L (ref 99–110)
CO2: 29 MMOL/L (ref 21–32)
CREAT SERPL-MCNC: 1.2 MG/DL (ref 0.8–1.3)
GFR SERPL CREATININE-BSD FRML MDRD: >60 ML/MIN/{1.73_M2}
GLUCOSE BLD-MCNC: 100 MG/DL (ref 70–99)
LEFT VENTRICULAR EJECTION FRACTION MODE: NORMAL
LV EF: 20 %
POC ACT LR: 296 SEC
POTASSIUM SERPL-SCNC: 4 MMOL/L (ref 3.5–5.1)
SODIUM BLD-SCNC: 135 MMOL/L (ref 136–145)

## 2023-01-30 PROCEDURE — 92978 ENDOLUMINL IVUS OCT C 1ST: CPT | Performed by: INTERNAL MEDICINE

## 2023-01-30 PROCEDURE — C1753 CATH, INTRAVAS ULTRASOUND: HCPCS

## 2023-01-30 PROCEDURE — 80048 BASIC METABOLIC PNL TOTAL CA: CPT

## 2023-01-30 PROCEDURE — 92978 ENDOLUMINL IVUS OCT C 1ST: CPT

## 2023-01-30 PROCEDURE — 85347 COAGULATION TIME ACTIVATED: CPT

## 2023-01-30 PROCEDURE — 93458 L HRT ARTERY/VENTRICLE ANGIO: CPT

## 2023-01-30 PROCEDURE — 99152 MOD SED SAME PHYS/QHP 5/>YRS: CPT

## 2023-01-30 PROCEDURE — 6360000002 HC RX W HCPCS

## 2023-01-30 PROCEDURE — 84132 ASSAY OF SERUM POTASSIUM: CPT

## 2023-01-30 PROCEDURE — 99152 MOD SED SAME PHYS/QHP 5/>YRS: CPT | Performed by: INTERNAL MEDICINE

## 2023-01-30 PROCEDURE — 93458 L HRT ARTERY/VENTRICLE ANGIO: CPT | Performed by: INTERNAL MEDICINE

## 2023-01-30 PROCEDURE — C1894 INTRO/SHEATH, NON-LASER: HCPCS

## 2023-01-30 PROCEDURE — 93571 IV DOP VEL&/PRESS C FLO 1ST: CPT

## 2023-01-30 PROCEDURE — 2709999900 HC NON-CHARGEABLE SUPPLY

## 2023-01-30 PROCEDURE — 99153 MOD SED SAME PHYS/QHP EA: CPT

## 2023-01-30 PROCEDURE — C1887 CATHETER, GUIDING: HCPCS

## 2023-01-30 PROCEDURE — 93571 IV DOP VEL&/PRESS C FLO 1ST: CPT | Performed by: INTERNAL MEDICINE

## 2023-01-30 PROCEDURE — 6360000004 HC RX CONTRAST MEDICATION: Performed by: INTERNAL MEDICINE

## 2023-01-30 PROCEDURE — 2500000003 HC RX 250 WO HCPCS

## 2023-01-30 PROCEDURE — C1769 GUIDE WIRE: HCPCS

## 2023-01-30 RX ORDER — SODIUM CHLORIDE 0.9 % (FLUSH) 0.9 %
5-40 SYRINGE (ML) INJECTION PRN
Status: DISCONTINUED | OUTPATIENT
Start: 2023-01-30 | End: 2023-01-31 | Stop reason: HOSPADM

## 2023-01-30 RX ORDER — SODIUM CHLORIDE 0.9 % (FLUSH) 0.9 %
5-40 SYRINGE (ML) INJECTION EVERY 12 HOURS SCHEDULED
Status: DISCONTINUED | OUTPATIENT
Start: 2023-01-30 | End: 2023-01-31 | Stop reason: HOSPADM

## 2023-01-30 RX ORDER — ACETAMINOPHEN 325 MG/1
650 TABLET ORAL EVERY 4 HOURS PRN
Status: DISCONTINUED | OUTPATIENT
Start: 2023-01-30 | End: 2023-01-31 | Stop reason: HOSPADM

## 2023-01-30 RX ORDER — SODIUM CHLORIDE 9 MG/ML
INJECTION, SOLUTION INTRAVENOUS PRN
Status: DISCONTINUED | OUTPATIENT
Start: 2023-01-30 | End: 2023-01-31 | Stop reason: HOSPADM

## 2023-01-30 RX ADMIN — IOPAMIDOL 105 ML: 755 INJECTION, SOLUTION INTRAVENOUS at 15:30
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
## 2023-01-30 NOTE — DISCHARGE INSTRUCTIONS
CARDIAC ANGIOGRAM (LEFT HEART CATH) - RADIAL ACCESS    Care of your puncture site:  Remove clear bandage 24 hours after the procedure. 2023 5:00 PM  May shower in 24 hours  Inspect the site daily and gently clean using soap and water. Dry thoroughly and apply a Band-Aid. Normal Observations:  Soreness or tenderness which may last one week. Mild oozing from the incision site. Possible bruising that could last 2 weeks. Activity:  You may resume driving 24 hours following the procedure. You may resume normal activity in 3 days or after the wound heals. Avoid lifting more than 10 pounds for 3 days with affected arm. Do not make important / legal decisions within 24 hours after procedure. Nutrition:  Regular diet or previous diet. Drink at least 8 to 10 glasses of decaffeinated, non-alcoholic fluid for the next 24 hours to flush the x-ray dye used for your angiogram out of your body. Call your doctor immediately if your condition worsens, for any other concerns, for a follow-up appointment or if you experience any of the followin418 [phone]  Significant bleeding that does not stop after 10 minutes of applying firm pressure on the puncture site. Increased swelling of the wrist.  Unusual pain, numbness, or tingling of the wrist/arm. Any signs of infection such as: redness, yellow drainage at the site, swelling or pain. IF experiencing any recurrent chest pain, arm or jaw pain, shortness of breath,sweating,nausea & vomiting, lighteheadedness or sudden weak.

## 2023-01-30 NOTE — H&P
CHIEF COMPLAINT:  shortness of breath      History Obtained From:  patient, electronic medical record     HISTORY OF PRESENT ILLNESS:       The patient is a 69 y.o.  male who is admitted to the hospital for worsening cough and shortenss of breath. past medical history significant for chronic atrial fibrillation on Eliquis,  chronic systolic CHF, hypertension, h/o CVA and coronary artery calcifications seen on chest CT. He was admitted 1/2022 with pleural effusion. Patient with recent PNA and was started on oral ABX as an outpatient. He reports he has not been able to tolerate oral ABX. He denies fever or chills. Patient reports he has been compliant with medications. He has been on Demadex as an outpatient and was started on IV lasix in the ED. He reports he is feeling better today. Less coughing. Less shortness of breath. Past Medical History/Past Surgical History/Social History/Family History/Living Situation: Reviewed per record                          Home Medications:    Home Medications           Prior to Admission medications    Medication Sig Start Date End Date Taking?  Authorizing Provider   levoFLOXacin (LEVAQUIN) 500 MG tablet Take 1 tablet by mouth daily for 10 days 1/3/23 1/13/23   Sofía Campbell MD   amoxicillin-clavulanate (AUGMENTIN) 875-125 MG per tablet Take 1 tablet by mouth 2 times daily for 10 days 12/30/22 1/9/23   Sofía Campbell MD   torsemide (DEMADEX) 20 MG tablet Take 1 tablet by mouth daily 12/13/22     JOHN Contreras - CNP   atorvastatin (LIPITOR) 40 MG tablet TAKE 1 TABLET BY MOUTH DAILY 6/3/22     Yonathan Mcneil MD   apixaban [place]) 5 MG TABS tablet Take 1 tablet by mouth 2 times daily 5/31/22     Yonathan Mcneil MD   ALFALFA PO Take by mouth daily       Historical Provider, MD   metoprolol succinate (TOPROL XL) 50 MG extended release tablet TAKE 1 TABLET BY MOUTH DAILY 4/22/22     JOHN Bocanegra - GERRY   allopurinol (ZYLOPRIM) 100 MG tablet Take 100 mg by mouth daily 11/24/21     Historical Provider, MD   Saw [place], Serenoa repens, (SAW PALMETTO BERRY PO) Take 1 tablet by mouth daily       Historical Provider, MD   Vitamin D (CHOLECALCIFEROL) 25 MCG (1000 UT) TABS tablet Take 1,000 Units by mouth daily       Historical Provider, MD   zinc sulfate (ZINCATE) 220 (50 Zn) MG capsule Take 50 mg by mouth daily       Historical Provider, MD   b complex vitamins capsule Take 1 capsule by mouth daily       Historical Provider, MD   fluocinonide (LIDEX) 0.05 % gel Apply 1 each topically daily as needed for Other For psoriasis 2/8/19     Historical Provider, MD   Multiple Vitamins-Minerals (MULTIVITAMIN ADULT PO) Take by mouth       Historical Provider, MD   vitamin C (ASCORBIC ACID) 500 MG tablet Take 500 mg by mouth daily       Historical Provider, MD            Current Medications:  Scheduled Meds:  Scheduled Medications    apixaban  5 mg Oral BID    atorvastatin  40 mg Oral Daily    metoprolol succinate  50 mg Oral Daily    sodium chloride flush  5-40 mL IntraVENous 2 times per day         Continuous Infusions:  Infusions Meds    sodium chloride           PRN Meds:. PRN Medications   perflutren lipid microspheres, sodium chloride flush, sodium chloride, ondansetron **OR** ondansetron, polyethylene glycol, acetaminophen **OR** acetaminophen        Allergies:  Spironolactone        REVIEW OF SYSTEMS:    Constitutional:No change in weight  Yes change in energy level, No change in sleep pattern, Yes change in  activity level. No fevers, chills. Eyes: No visual changes or diplopia. No scleral icterus. ENT: No Headaches, hearing loss or vertigo. No mouth sores or sore throat. Cardiovascular: No for chest pain, Yes for dyspnea on exertion, No for palpitations or No for loss of consciousness. Yes cough, No hemoptysis, No for pleuritic pain, or phlebitis. Respiratory: Yes for cough or [place]zing, Yes sputum production. Gastrointestinal: No abdominal pain, appetite loss, blood in stools. No change in bowel or bladder habits. Genitourinary: No dysuria, trouble voiding, or hematuria. Musculoskeletal:  No gait disturbance, No for weakness or joint complaints. Integumentary: No rash or pruritis. Neurological: No headache, diplopia, change in muscle strength, numbness or tingling. No change in gait, balance, coordination, mood, affect, memory, mentation, behavior. Psychiatric: No anxiety, or depression. Endocrine: No temperature intolerance. No excessive thirst, fluid intake, or urination. No tremor. Hematologic/Lymphatic: No abnormal bruising or bleeding, blood clots or swollen lymph nodes. Allergic/Immunologic: No nasal congestion or hives. PHYSICAL EXAM:      /76   Pulse (!) 102   Temp 97.7 °F (36.5 °C) (Axillary)   Resp 16   Ht 6' 1\" (1.854 m)   Wt 269 lb 6.4 oz (122.2 kg)   SpO2 96%   BMI 35.54 kg/m²    Constitutional and General Appearance: alert, no distress, and appears stated age  Eyes: PERRL, non-icteric  HENT: no cervical lymphadenopathy. No masses palpable. Normal oral mucosa, no neck stiffness  Skin: Normal turgor, no rashes, no bruising  Respiratory:              Normal excursion and expansion without use of accessory muscles              Resp Auscultation: Diminished breath sounds R > L   Cardiovascular:              Heart tones are crisp and normal. irregular S1 and S2.                       No murmurs, rubs or gallops              Jugular venous pulsation Normal                     Peripheral pulses are symmetrical and full  Gastrointestinal:              Soft, non-tender              No masses                    Bowel sounds normal active x4  Musculoskeletal:  No muscle wasting or decreased range of motion  Extremities:              No Cyanosis or Clubbing              Lower extremity edema: Yes-+1 BLE               Skin: Warm and dry  Neurological:              CN 3-12 grossly intact No gross sensory deficits              No tremors   Psychiatric:              Alert oriented to person, place and time. Normal insight and normal affect. DATA:       Telemetry: AF rate controlled  EC23  AF   Radiology/Imaging:  CXR:  FINDINGS:   No pneumothorax. Worsening right basilar airspace disease and moderate right   pleural effusion. Heart size is stable. Patchy consolidation at the left   lung base remains. Impression   Increasing moderate right pleural effusion and worsening right basilar   airspace disease. CT Chest 22  1. Chronic loculated right pleural effusion with similar volume to prior CT   2022.   2. Worsening atelectasis and airspace opacification in the right middle and   lower lobes. Chronic atelectasis or fibrotic change is suspected. Chronic   infectious or inflammatory process also suspected due to progression. 3. There is also a new small left pleural effusion with mild ground-glass   opacification on the left. Asymmetric edema or pneumonitis may be considered. 4. Worsening mediastinal lymphadenopathy. 5. Cardiomegaly with calcification of pericardium suggesting a prior history   of pericarditis. Stable appearance from prior exam.         Echo: pending      2019   Summary   Suboptimal image quality. Ejection fraction is visually estimated to be 25-30%, though likely   underestimated due to atrial fibrillation. Indeterminate diastolic function. Mitral valve leaflets appear mildly thickened. Mild to moderate mitral regurgitation is present. The left atrium is severely dilated. Trivial tricuspid regurgitation. PA systolic pressure is 30 mm Hg.   Stress Testing:   Cardiac Cath:  ICD/PPM:         In: 240 [P.O.:240]  Out: -        Wt Readings from Last 7 Encounters:   23 269 lb 6.4 oz (122.2 kg)   22 267 lb 12.8 oz (121.5 kg)   22 268 lb 9.6 oz (121.8 kg)   22 268 lb 12.8 oz (121.9 kg) 05/20/22 256 lb 3.2 oz (116.2 kg)   01/26/22 246 lb 12.8 oz (111.9 kg)   04/07/21 260 lb 6.4 oz (118.1 kg)            IMPRESSION:          RECOMMENDATIONS:  Heart failure  Acute on chronic systolic              Patient mildly fluid elevated              On lasix IVP                           Continue today hopefully transition to oral by tomorrow  On Toprol XL-continue  No ACE/ARB/aldactone/ARNI secondary to CKD   Continue fluid and Na restrictions   Repeat echo pending                 2. PNA               Likely community acquired               Defer to primary      3. Atrial Fibrillation               Paroxsymal              Remains in SR               Continue Eliquis   4. HTN              Stable              Continue home meds        I have reviewed the most recent H&P for this patient and independently examined the patient. There have been no changes. We will proceed with the planned procedure. Wai Mitchell MD  Vitals:    01/30/23 1230   BP: 124/85   Pulse: 73   Resp: 20   Temp: 97 °F (36.1 °C)   SpO2: 99%     Stress Perfusion 1/8/23:  Pharmacological Stress/MPI Results:  1. Technically a satisfactory study. 2. Large mostly dense perfusion defect involving lateral and inferolateral wall suggestive of previous MI. No reversibility /ischemia  3. Gated Study shows markedly dilated LV with lateral and inferolateral akinesis; EF 29 %.

## 2023-01-31 NOTE — PROCEDURES
[address]                            CARDIAC CATHETERIZATION    PATIENT NAME: Carley Dorman                   :        1945  MED REC NO:   [id number]                          ROOM:  ACCOUNT NO:   [de-identified]                           ADMIT DATE: 2023  PROVIDER:     Cate Tobar MD    DATE OF PROCEDURE:  2023    This is a [facility] Cardiology catheterization report. INDICATIONS FOR PROCEDURE:  Chronic systolic CHF, abnormal  cardiovascular stress test.    The risks and benefits of the procedure were explained to the patient. Informed consent was obtained. He was placed on the cardiac  catheterization table and prepped and draped in sterile fashion. Anesthesia was provided in the volar surface. The left wrist with 1%  lidocaine injected subcutaneously and deep. Using a micropuncture kit,  left radial artery was accessed and cannulated. A 6-[place] sheath  inserted using Seldinger technique. The pre-cocktail of heparin,  verapamil and nitroglycerin was given through the sheath port. Over the 0.035 J-wire, the JL-3.5 5-[place] diagnostic catheter was  advanced to the ostium of left main coronary artery. Coronary  angiography was performed to this system. The catheter was removed over  the wire. Next, the JR-5 5-[place] diagnostic catheter was advanced to  the ostium of the right coronary artery and coronary angiography was  performed to this system. The catheter was removed over the wire. Lastly, the pigtail catheter was advanced over the wire in the left  ventricle. Left ventricular end-diastolic pressure measurements were  obtained and then a power injection left ventriculogram was performed. Catheter was flushed and placed back on pressure and then pulled back  across the aortic valve to assess for gradient. Catheter was removed  over the wire.     The decision was made to perform fractional flow reserve on the left  anterior descending artery which had a hazy angiographic 60% mid lesion. Over the 0.035 J-wire, the EBU 3.5 6-[place] guide catheter was advanced  to the ostium of left main coronary artery. The patient was given IV  unfractionated heparin and ACTs were checked throughout the case to  maintain an ACT greater than 250 seconds. The LAD was wired with the [facility] Scientific fractional flow reserve  wire. IV adenosine was then started. Maximum hyperemia, this  demonstrated significance with a ratio of 0.77. IV adenosine was  stopped and this wire was used to perform intravascular ultrasound on  the LAD. This was done using the [address], IVUS catheter. This demonstrated a median luminal area of 3.57 mm squared. The vessel  size was approximately 4.2 x 4.5 and then the midportion. The IVUS  catheter was removed as was the wire. The final angiography was  performed. The EBU catheter was removed over the wire. The post cocktail of nitroglycerin was given through the left radial  sheath port. The left radial sheath was removed and a Terumo pressure  band applied for nonocclusive hemostasis. There were no complications  from the procedure. Estimated blood loss was less than 20 ml. Moderate sedation was administered during the procedure by an  independent agent at my direction and supervision with 2 mg of IV Versed  and 150 mcg of fentanyl. Total duration of sedation was 54 minutes. Vital signs were monitored throughout and remained stable. FINDINGS:  1. Right-dominant coronary arterial system with subtotal occlusion of  the distal RCA due to a 99% lesion. There is collateral flow to the PDA  and posterolateral branches from the left seen. Additionally, there is  an 80% mid lesion seen. In the left system, there is no left main  disease.   The circumflex artery is occluded at the midportion just at  the takeoff of the true circumflex. There is a very large bifurcating  obtuse marginal branch really one in two the fill in from collaterals. This is an approximate 10-15 mm segment occlusion. The left anterior  descending artery, there is a mid lesion that is calcified of  approximately 80% who had a median luminal area of 3.57 cm2 and then was  positive by fractional flow reserve at 0.77.  2.  Elevated left ventricular end-diastolic pressure 21 mmHg. 3.  Severe left ventricular systolic function with global hypokinesis  and LV ejection fraction 20%. 4.  Trivial gradient across the aortic valve on pullback suggesting no  significant aortic stenosis.         Alisha Cuellar MD    D: 01/30/2023 15:43:41       T: 01/30/2023 15:46:15     [email or web address]  Job#: [id number]     Doc#: [id number]    CC:  Alfred Espinosa

## 2023-02-02 ENCOUNTER — TELEPHONE (OUTPATIENT)
Dept: CARDIOLOGY CLINIC | Age: 78
End: 2023-02-02

## 2023-02-02 DIAGNOSIS — I50.22 CHRONIC SYSTOLIC CHF (CONGESTIVE HEART FAILURE), NYHA CLASS 2 (HCC): ICD-10-CM

## 2023-02-02 DIAGNOSIS — I25.10 CORONARY ARTERY CALCIFICATION SEEN ON CT SCAN: Primary | ICD-10-CM

## 2023-02-02 NOTE — TELEPHONE ENCOUNTER
Please call patient to facilitate scheduling of a Impella assisted multi vessel staged PCI with Dr. Yoly Domínguez. Schedule when Kylie Corbett is at [facility], possibly on his STEMI day (Wednesday). Thanks. The morning of your procedure you will park in the hospital parking lot and report directly to the cath lab to check in.     Pre-Procedure Instructions   You will need to fast for at least 8 hours prior to procedure. No caffeine the morning of. You will need to hold your anticoagulation, Eliquis for 3 days prior. Hold your diuretic, Lasix the morning of procedure. Hold all diabetic medications including, Metfomin. If you take Lantus/Levemir only take ½ your normal dose the evening before. All other medications can be taken in the morning with sips of water. You will need to take 325 mg aspirin the morning of. If you are currently taking 81 mg please take 4 tablets that morning. Do not use any lotions, creams or perfume the morning of procedure. Pre-procedure lab work will need to be completed 5-7 days prior to procedure. Please have a responsible adult to drive you home after procedure. We advise you have someone to stay with you for 24 hours following procedure for precautionary measures. Depending on procedure you may require an overnight stay. Cath lab will provide you with all post procedure instructions. If you have any questions regarding the procedure itself or medications, please call [phone] and ask to speak with a nurse.

## 2023-02-02 NOTE — TELEPHONE ENCOUNTER
Patient is calling to follow up on [address] he had done on Monday. Patient states that he is expecting a call to schedule an angioplasty. Please call patient and advise. He Is very anxious to get this scheduled as soon as possible.

## 2023-02-03 NOTE — TELEPHONE ENCOUNTER
Patients daughter Sary Loredo is calling back to talk to [facility] about procedure and can be reached at  [phone]

## 2023-02-03 NOTE — TELEPHONE ENCOUNTER
Spoke with the patient to get him scheduled. He is wanting to do this on 2/8/2023. I had to reach out to Drew/Gia about procedure times on that date. I'm waiting for them to get back with me to get him confirmed/scheduled. I told him I would call him back as soon as I had an answer for him.

## 2023-02-03 NOTE — TELEPHONE ENCOUNTER
Called patient with update that Lori López will be reaching out to him soon to schedule the procedure. He v/u. Stated his daughter will be calling me soon with other questions. Update: Patient's daughter Ja Alex called and I informed her that Lori López will be reaching out to schedule his procedure. She v/u.

## 2023-02-03 NOTE — TELEPHONE ENCOUNTER
Spoke with Ching Later and told her I'm waiting on word from the Doctors what they would like to do as far as scheduling the procedure. As soon as I have that information I will call her back to schedule. She also asked how long the procedure takes, that I will also try and find out for her.

## 2023-02-08 PROBLEM — R41.82 AMS (ALTERED MENTAL STATUS): Status: ACTIVE | Noted: 2023-01-01

## 2023-02-08 PROBLEM — J96.01 ACUTE RESPIRATORY FAILURE WITH HYPOXIA (HCC): Status: ACTIVE | Noted: 2023-01-01

## 2023-02-08 PROBLEM — R04.2 HEMOPTYSIS: Status: ACTIVE | Noted: 2023-02-08
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
## 2023-02-08 NOTE — H&P
CHIEF COMPLAINT:  shortness of breath      History Obtained From:  patient, electronic medical record     HISTORY OF PRESENT ILLNESS:       The patient is a 69 y.o.  male who is admitted to the hospital for worsening cough and shortenss of breath. past medical history significant for chronic atrial fibrillation on Eliquis,  chronic systolic CHF, hypertension, h/o CVA and coronary artery calcifications seen on chest CT. He was admitted 1/2022 with pleural effusion. Patient with recent PNA and was started on oral ABX as an outpatient. He reports he has not been able to tolerate oral ABX. He denies fever or chills. Patient reports he has been compliant with medications. He has been on Demadex as an outpatient and was started on IV lasix in the ED. He reports he is feeling better today. Less coughing. Less shortness of breath. Past Medical History/Past Surgical History/Social History/Family History/Living Situation: Reviewed per record                          Home Medications:    Home Medications                 Prior to Admission medications    Medication Sig Start Date End Date Taking?  Authorizing Provider   levoFLOXacin (LEVAQUIN) 500 MG tablet Take 1 tablet by mouth daily for 10 days 1/3/23 1/13/23   Alyssa Murphy MD   amoxicillin-clavulanate (AUGMENTIN) 875-125 MG per tablet Take 1 tablet by mouth 2 times daily for 10 days 12/30/22 1/9/23   Alyssa Murphy MD   torsemide (DEMADEX) 20 MG tablet Take 1 tablet by mouth daily 12/13/22     JOHN Cobb CNP   atorvastatin (LIPITOR) 40 MG tablet TAKE 1 TABLET BY MOUTH DAILY 6/3/22     Austin Frausto MD   apixaban [place]) 5 MG TABS tablet Take 1 tablet by mouth 2 times daily 5/31/22     Austin Frausto MD   ALFALFA PO Take by mouth daily       Historical Provider, MD   metoprolol succinate (TOPROL XL) 50 MG extended release tablet TAKE 1 TABLET BY MOUTH DAILY 4/22/22     JOHN Coombs CNP   allopurinol (ZYLOPRIM) 100 MG tablet Take 100 mg by mouth daily 11/24/21     Historical Provider, MD   S[place], Serenoa repens, (SAW PALMETTO BERRY PO) Take 1 tablet by mouth daily       Historical Provider, MD   Vitamin D (CHOLECALCIFEROL) 25 MCG (1000 UT) TABS tablet Take 1,000 Units by mouth daily       Historical Provider, MD   zinc sulfate (ZINCATE) 220 (50 Zn) MG capsule Take 50 mg by mouth daily       Historical Provider, MD   b complex vitamins capsule Take 1 capsule by mouth daily       Historical Provider, MD   fluocinonide (LIDEX) 0.05 % gel Apply 1 each topically daily as needed for Other For psoriasis 2/8/19     Historical Provider, MD   Multiple Vitamins-Minerals (MULTIVITAMIN ADULT PO) Take by mouth       Historical Provider, MD   vitamin C (ASCORBIC ACID) 500 MG tablet Take 500 mg by mouth daily       Historical Provider, MD            Current Medications:  Scheduled Meds:  Scheduled Medications    apixaban  5 mg Oral BID    atorvastatin  40 mg Oral Daily    metoprolol succinate  50 mg Oral Daily    sodium chloride flush  5-40 mL IntraVENous 2 times per day         Continuous Infusions:  Infusions Meds    sodium chloride           PRN Meds:. PRN Medications   perflutren lipid microspheres, sodium chloride flush, sodium chloride, ondansetron **OR** ondansetron, polyethylene glycol, acetaminophen **OR** acetaminophen         Allergies:  Spironolactone        REVIEW OF SYSTEMS:    Constitutional:No change in weight  Yes change in energy level, No change in sleep pattern, Yes change in  activity level. No fevers, chills. Eyes: No visual changes or diplopia. No scleral icterus. ENT: No Headaches, hearing loss or vertigo. No mouth sores or sore throat. Cardiovascular: No for chest pain, Yes for dyspnea on exertion, No for palpitations or No for loss of consciousness. Yes cough, No hemoptysis, No for pleuritic pain, or phlebitis. Respiratory: Yes for cough or [place], Yes sputum production. Gastrointestinal: No abdominal pain, appetite loss, blood in stools. No change in bowel or bladder habits. Genitourinary: No dysuria, trouble voiding, or hematuria. Musculoskeletal:  No gait disturbance, No for weakness or joint complaints. Integumentary: No rash or pruritis. Neurological: No headache, diplopia, change in muscle strength, numbness or tingling. No change in gait, balance, coordination, mood, affect, memory, mentation, behavior. Psychiatric: No anxiety, or depression. Endocrine: No temperature intolerance. No excessive thirst, fluid intake, or urination. No tremor. Hematologic/Lymphatic: No abnormal bruising or bleeding, blood clots or swollen lymph nodes. Allergic/Immunologic: No nasal congestion or hives. PHYSICAL EXAM:      /76   Pulse (!) 102   Temp 97.7 °F (36.5 °C) (Axillary)   Resp 16   Ht 6' 1\" (1.854 m)   Wt 269 lb 6.4 oz (122.2 kg)   SpO2 96%   BMI 35.54 kg/m²    Constitutional and General Appearance: alert, no distress, and appears stated age  Eyes: PERRL, non-icteric  HENT: no cervical lymphadenopathy. No masses palpable. Normal oral mucosa, no neck stiffness  Skin: Normal turgor, no rashes, no bruising  Respiratory:              Normal excursion and expansion without use of accessory muscles              Resp Auscultation: Diminished breath sounds R > L   Cardiovascular:              Heart tones are crisp and normal. irregular S1 and S2.                       No murmurs, rubs or gallops              Jugular venous pulsation Normal                     Peripheral pulses are symmetrical and full  Gastrointestinal:              Soft, non-tender              No masses                    Bowel sounds normal active x4  Musculoskeletal:  No muscle wasting or decreased range of motion  Extremities:              No Cyanosis or Clubbing              Lower extremity edema: Yes-+1 BLE               Skin: Warm and dry  Neurological:              CN 3-12 grossly intact No gross sensory deficits              No tremors   Psychiatric:              Alert oriented to person, place and time. Normal insight and normal affect. DATA:       Telemetry: AF rate controlled  EC23  AF   Radiology/Imaging:  CXR:  FINDINGS:   No pneumothorax. Worsening right basilar airspace disease and moderate right   pleural effusion. Heart size is stable. Patchy consolidation at the left   lung base remains. Impression   Increasing moderate right pleural effusion and worsening right basilar   airspace disease. CT Chest 22  1. Chronic loculated right pleural effusion with similar volume to prior CT   2022.   2. Worsening atelectasis and airspace opacification in the right middle and   lower lobes. Chronic atelectasis or fibrotic change is suspected. Chronic   infectious or inflammatory process also suspected due to progression. 3. There is also a new small left pleural effusion with mild ground-glass   opacification on the left. Asymmetric edema or pneumonitis may be considered. 4. Worsening mediastinal lymphadenopathy. 5. Cardiomegaly with calcification of pericardium suggesting a prior history   of pericarditis. Stable appearance from prior exam.         Echo: pending      2019   Summary   Suboptimal image quality. Ejection fraction is visually estimated to be 25-30%, though likely   underestimated due to atrial fibrillation. Indeterminate diastolic function. Mitral valve leaflets appear mildly thickened. Mild to moderate mitral regurgitation is present. The left atrium is severely dilated. Trivial tricuspid regurgitation. PA systolic pressure is 30 mm Hg.   Stress Testing:   Cardiac Cath:  ICD/PPM:         In: 240 [P.O.:240]  Out: -          Wt Readings from Last 7 Encounters:   23 269 lb 6.4 oz (122.2 kg)   22 267 lb 12.8 oz (121.5 kg)   22 268 lb 9.6 oz (121.8 kg)   22 268 lb 12.8 oz (121.9 kg) 05/20/22 256 lb 3.2 oz (116.2 kg)   01/26/22 246 lb 12.8 oz (111.9 kg)   04/07/21 260 lb 6.4 oz (118.1 kg)            IMPRESSION:          RECOMMENDATIONS:  Heart failure  Acute on chronic systolic              Patient mildly fluid elevated              On lasix IVP                           Continue today hopefully transition to oral by tomorrow  On Toprol XL-continue  No ACE/ARB/aldactone/ARNI secondary to CKD   Continue fluid and Na restrictions   Repeat echo pending                 2. PNA               Likely community acquired               Defer to primary      3. Atrial Fibrillation               Paroxsymal              Remains in SR               Continue Eliquis   4. HTN              Stable              Continue home meds         I have reviewed the most recent H&P for this patient and independently examined the patient. There have been no changes. We will proceed with the planned procedure. Ramirez Cerda MD    Vitals:    02/08/23 0915   BP: (!) 133/92   Pulse: 80   Resp: 24   Temp: 97.5 °F (36.4 °C)   SpO2: 100%         Stress Perfusion 1/8/23:  Pharmacological Stress/MPI Results:  1. Technically a satisfactory study. 2. Large mostly dense perfusion defect involving lateral and inferolateral wall suggestive of previous MI. No reversibility /ischemia  3. Gated Study shows markedly dilated LV with lateral and inferolateral akinesis; EF 29 %. Lori Hand is here for multivessel PC today to the RCA and LAD using Impella LV support. He declined surgical revascularization.     Ramirez Cerda MD
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
## 2023-02-08 NOTE — PROGRESS NOTES
Patient intubated by Dr. Jing Monroe. 7.5 25 lip bilateral breath sounds and + color change.   Placed on vent    Electronically signed by Lainey Prado on 2/8/2023 at 4:14 PM

## 2023-02-08 NOTE — PROGRESS NOTES
Patient in cath lab -when Natalia Blocker was removed there was a change in mentation and code stroke was called. CTA of brain was negative. Upon patients arrival to room -Cath lab RNs at bedside RNs started to notice grunting. Patient was unresponsive to any stimuli. Dr. Jessica Hernandez and anesthesia to bedside. Patient was intubated at 1314-2 versed and 100 succs given. 1316 intubated ETT 7.5 @25 lip. Bilateral breath sounds heard, color change noted.

## 2023-02-08 NOTE — PROGRESS NOTES
Arrived to place PICC for pt with poor access options. Bedside ISAIAH Wise completed timeout in the room verifying pt, procedure and consent. Double Lumen PICC placed in the Right Cephalic vein without complication. STAT Cxray order placed per protocol due to pt being in Atrial Fib. Blood return achieved and line flushed without resistance. Pt slept through the procedure. Written education left at bedside for family to review when they return to the room. Handoff completed with ISAIAH Wise. Do NOT use the PICC until Radiologist reviews the results and MD gives the orders to use the PICC. Thank you for allowing our care and services.

## 2023-02-08 NOTE — PROCEDURES
[facility] Department of Anesthesiology  Procedure Note - Intubation       Name:  Ivan Hernandez                                         Age:  69 y.o. MRN:  [id number]     Indication:  respiratory failure    Timeout:  A time-out was completed verifying correct patient, procedure, site, positioning, and special equipment if applicable. Procedure: The patient was placed in a flat position. Sedation was obtained using midazolam and additionally succinylcholine. The patient was easily ventilated using an ambu bag. The Glidescope blade was used and inserted into the oropharynx at which time there was a Grade 1 view of the vocal cords. A 7.5mm cuffed endotracheal tube was inserted and visualized going through the vocal cords. The stylette was removed. Colorimetric change was visualized on the CO2 meter. Breath sounds were heard in both lung fields equally. The endotracheal tube was placed at 24 cm, measured at the teeth. A chest x-ray was ordered to assess for pneumothorax and verify endotrachealtube placement. The patient tolerated the procedure.     Complications: No    Rachel Montgomery MD    February 8, 2023  1:45 PM
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
## 2023-02-08 NOTE — PROGRESS NOTES
4 Eyes Skin Assessment     NAME:  Mary Alice Gee  YOB: 1945  MEDICAL RECORD NUMBER:  [id number]    The patient is being assessed for  Cath Lab Post-Op    I agree that One RN has performed a thorough Head to Toe Skin Assessment on the patient. ALL assessment sites listed below have been assessed. Areas assessed by both nurses:    Head, Face, Ears, Shoulders, Back, Chest, Arms, Elbows, Hands, Sacrum. Buttock, Coccyx, Ischium, and Legs. Feet and Heels        Does the Patient have a Wound?  No noted wound(s)       Tera Prevention initiated by RN: Yes   Wound Care Orders initiated by RN: No    Pressure Injury (Stage 3,4, Unstageable, DTI, NWPT, and Complex wounds) if present, place referral order by RN under : NA    New and Established Ostomies, if present place, referral order under : NA      Nurse 1 eSignature: Electronically signed by Pat Martinez RN on 2/8/23 at 6:55 PM EST    **SHARE this note so that the co-signing nurse can place an eSignature**    Nurse 2 eSignature: Electronically signed by Pasco Koyanagi, RN on 2/8/23 at 6:57 PM EST

## 2023-02-09 NOTE — PROGRESS NOTES
Medication Reconciliation    List of medications patient is currently taking is complete. Source of information: 1.  RN Interview                                      2. EPIC records - recent discharge med list and recent telephone encounters since discharge

## 2023-02-09 NOTE — PROGRESS NOTES
Verbal orders received from Dr. Toshia Spann that it is okay to extubate pt. RT at bedside for extubation. After extubation pt is talking with RN and RT, but is having a lot of thick, dark red secretions. This RN assisted the RT with suctioning the pt. At around 1015 the pt went into v-tach for about 30 seconds. The pt was unresponsive during this time but did not lose a pulse. Code blue was called. After run of v-tach, the patient's chronic afib was uncontrolled in the 150s-160s. Verbal orders for 150 mg of amiodarone to be given twice, 300 mg total. Verbal orders also received for IV lasix and a milrinone gtt. Plan is to wean the patient off of meaghan and transition to levo per Dr. Toshia Spann.

## 2023-02-09 NOTE — PROCEDURES
[address] [address]                            CARDIAC CATHETERIZATION    PATIENT NAME: Zac Rey                   :        1945  MED REC NO:   [id number]                          ROOM:       5  ACCOUNT NO:   [de-identified]                           ADMIT DATE: 2023  PROVIDER:     Alina Urbina MD    DATE OF PROCEDURE:  2023    INDICATION FOR PROCEDURE:  Unstable angina, chronic systolic CHF. PROCEDURE:  The risks and benefits of the procedure were explained to  the patient. Informed consent was obtained. The patient was placed on  the cardiac catheterization table and prepped and draped in sterile  fashion. Anesthesia was provided in the right groin with 1% lidocaine  injected subcutaneously and deep. Using ultrasound guidance, the right  common femoral artery was accessed and cannulated and a 6-Libyan sheath  inserted using Seldinger technique. The angiography was performed  through the sheath port. The sheath was removed over the wire and a  Perclose device was placed in the artery. We next dilated up the tissue  tract and placed a 14-Libyan Impella introducer sheath. Over the 0.035 J-wire, the pigtail catheter was advanced up into the  aorta and one across the aortic valve. We left the pigtail catheter in  place and checked an LV end-diastolic pressure. This was 21 mmHg. We placed the platinum plus wire and removed the pigtail catheter. The  patient was given IV unfractionated heparin. ACTs were checked  throughout the case to maintain an ACT greater than 250 seconds. He was  given double bolus IV Integrilin and started on a drip. He was also  loaded with 600 mg of clopidogrel for antiplatelet activity. Over the platinum plus wire, we then advanced the Impella device into  the left ventricle. The wire was removed and system was on placed on  pressure.   This delivering 3.6 liters of cardiac output on automatic  settings. We then introduced a 6-Yemeni sheath into the Impella sheath through the  hemostatic valve and then over the 0.035 J-wire, we advanced a JR-4  6-Yemeni guide catheter to the ostium of the right coronary artery. We  wired the RCA with a Runthrough 0.014 coronary wire. We then predilated  the lesion was initially with a 1.5-mm balloon and then a 2.5-mm  balloon. We elected to stent in overlapping fashion with 3 mm x 33 mm  and then 33 mm x 34 mm [place] frontier drug-eluting stents. This was  deployed to 3.2 mm in diameter proximally and 3.1 mm distally. There  was 0% residual stenosis and SANTO III flow on repeat angiography after  the administration of intraarterial nitroglycerin. The wire was  removed. Final angiography was performed. Over the 0.035 J-wire, the EBU 3.5 6-Yemeni guide catheter was advanced  to the ostium of left main coronary artery. The LAD was wired with a  Runthrough wire. We then predilated the mid LAD lesion with a 2.5-mm  balloon. We removed this and then elected to stent the lesion with a  3.5 x 22 mm [place] frontier drug-eluting stent. This was placed across  lesion and dilated up to 3.6 mm in diameter. Stent balloon was removed  and repeat angiography was performed demonstrating SANTO III flow in the  vessel with 0% residual stenosis. The wire was pulled back and a brief attempt was made using the Turnpike  catheter and a Fielder XT wire to cross the chronic total occlusion of  the mid circumflex. This was not easily crossed, so further attempts  were abandoned. The catheter and wires were removed. We titrated down the Impella pump and then we were able to remove it out  through the sheath port. The right common femoral arterial sheath was  removed and the Perclose suture deployed. Manual pressure was held for  hemostasis with good control.   There were no complications from the  procedure and estimated blood loss was less than 50 mL. Moderate sedation was administered by an independent agent at my  direction and supervision with 2 mg of IV Versed and 200 mcg of  fentanyl. Total duration of sedation was 74 minutes. Vital signs were  monitored throughout and remained stable. FINDINGS:  1. Right-dominant coronary arterial circulation. Successful  intervention to 90% lesion and 99% more distal RCA lesion with a 3 mm  overlapping drug-eluting stents dilated up to 3.2 mm proximally and 3.1  mm distally, resulting in 0% residual stenosis and SANTO III flow in the  vessel. There was reversal of flow to antegrade in the dominant right  coronary artery. The PDA had been fed by collaterals. In the left  system, there is trivial left main plaque disease. The LAD had an 80%  mid lesion that was treated with a 3.5 x 22 mm Edvin frontier  drug-eluting stent dilated to 3.6 mm in diameter resulting in 0%  residual stenosis and SANTO III flow. The circumflex has a chronic total  occlusion in the midportion but the distal vessel fills in from  collaterals. 2.  Left ventricular end-diastolic pressure 21 mmHg. 3.  No gradient across the aortic valve on pullback to suggest aortic  stenosis. PROCEDURES PERFORMED:  1. Left heart catheterization. 2.  Percutaneous coronary intervention with drug-eluting stents to the  dominant right coronary artery. 3.  Percutaneous coronary intervention with drug-eluting stent to the  mid left anterior descending artery. 4.  Insertion of short-term external heart assist system into heart,  percutaneous approach. 5.  Removal of short-term external heart assist system from heart,  percutaneous approach for assistance with cardiac output using impeller  pump, continuous.         Betsy Torres MD    D: 02/08/2023 17:49:29       T: 02/08/2023 17:53:55     [email or web address]  Job#: [id number]     Doc#: [id number]    CC:

## 2023-02-09 NOTE — PROGRESS NOTES
Pt's a-fib has been uncontrolled in the 120s-160s. Dr. Power Pihllips at bedside for cardioversion. Pt put back on BIPAP for cardioversion. 1500: 2 mL propofol given  1501: Pt shocked with synchronized cardioversion at 200 J per Dr. Power Phillips  1502: Pt shocked with an additional 200 J per Dr. Power Phillips  1502: 1 mL propofol given  1503: Pt shocked with an additional 200 J per Dr. Cassandra Fonseca down to 100s after cardioversion. Verbal orders received for digoxin and to resume the amiodarone gtt.

## 2023-02-09 NOTE — PROGRESS NOTES
Responded to [address]    Arrived to find patient lethargic with wide complex rhythm with pulse and BP. Hypoxic on NRB. Dr. Harley Lechuga ordered Amiodarone bolus and infusion. Bilevel ordered. ABG In 1 hour. High risk for being intubated. May need to address goals of care. I would suspect his renal function will worsen and may end of on HD    Acute Hypoxic respiratory Failure due to pulmonary edema   Bilevel now. ABG in 1 hour. Watch for intubated   If reintubated I could perform bronchoscopy. Too high risk now  Acute Pulmonary Edema   Lasix 40 mg IV given  Wide complex arrhythmia   Per Dr. Harley Lechuga    Additional critical care time of 35 minutes for total time of 80 for the day.       D/W Dr. Dorian Sung, DO  [facility]  [phone]

## 2023-02-09 NOTE — PROGRESS NOTES
At this time Cxray results have not been released. When results become available, if there are issues with the PICC line please contact [facility] for assistance.

## 2023-02-09 NOTE — PROGRESS NOTES
Pt extubated per verbal order from MD.  Pt placed on 6L NC, SpO2 initially 96%. Pt coughing up a large amount of thick secretions. Pt placed on NRB mask, SpO2 98% but with increased WOB. MD notified. Will continue to monitor pt.    Electronically signed by Olivier Sal RCP on 2/9/2023 at 10:17 AM

## 2023-02-09 NOTE — PROGRESS NOTES
4 Eyes Skin Assessment     NAME:  Sebastian Barron  YOB: 1945  MEDICAL RECORD NUMBER:  [id number]    The patient is being assessed for  Shift Handoff    I agree that One RN has performed a thorough Head to Toe Skin Assessment on the patient. ALL assessment sites listed below have been assessed. Areas assessed by both nurses:    Head, Face, Ears, Shoulders, Back, Chest, Arms, Elbows, Hands, Sacrum. Buttock, Coccyx, Ischium, and Legs. Feet and Heels        Does the Patient have a Wound?  No noted wound(s)       Tera Prevention initiated by RN: Yes   Wound Care Orders initiated by RN: No    Pressure Injury (Stage 3,4, Unstageable, DTI, NWPT, and Complex wounds) if present, place referral order by RN under : NA    New and Established Ostomies, if present place, referral order under : NA      Nurse 1 eSignature: Electronically signed by Susanna Abbott RN on 2/9/23 at 6:26 AM EST    **SHARE this note so that the co-signing nurse can place an eSignature**    Nurse 2 eSignature: Electronically signed by Vikash Burrell RN on 2/10/23 at 11:47 AM EST

## 2023-02-09 NOTE — PROGRESS NOTES
Progress Note  Admit Date: 2/8/2023      PCP: Aaron Almanza     CC: F/U for altered mental status after procedure    Days in hospital:  1    SUBJECTIVE / Interval History:  Patient is on BiPAP at present. Awake but confused  Had an episode of V. tach  Urine output on the lower side     Allergies  Spironolactone    Medications    Scheduled Meds:   aspirin  325 mg Oral Once    LORazepam  1 mg Oral Once    succinylcholine  100 mg IntraVENous Once    enoxaparin  30 mg SubCUTAneous BID    aspirin  81 mg Oral Daily    Or    aspirin  300 mg Rectal Daily    atorvastatin  80 mg Oral Nightly    furosemide  40 mg Oral Daily    pantoprazole (PROTONIX) 40 mg injection  40 mg IntraVENous Daily    chlorhexidine  15 mL Mouth/Throat BID    lidocaine 1 % injection  5 mL IntraDERmal Once    sodium chloride flush  5-40 mL IntraVENous 2 times per day     Continuous Infusions:   midazolam Stopped (02/09/23 0705)    fentaNYL 25 mcg/hr (02/09/23 0718)    sodium chloride Stopped (02/08/23 2041)    phenylephrine (MCKENNA-SYNEPHRINE) 50 mg/250 mL infusion 110 mcg/min (02/09/23 0718)       PRN Meds:  fentaNYL **AND** fentaNYL, acetaminophen **OR** acetaminophen, ondansetron **OR** ondansetron, midazolam, sodium chloride flush, sodium chloride, naloxone    Vitals    /73   Pulse (!) 109   Temp 97.9 °F (36.6 °C) (Oral)   Resp 23   Ht 6' 1\" (1.854 m)   Wt 265 lb (120.2 kg)   SpO2 97%   BMI 34.96 kg/m²     Exam:    Gen: No distress. Eyes: PERRL. No sclera icterus. No conjunctival injection. ENT: No discharge. Pharynx clear. External appearance of ears and nose normal.  Neck: Trachea midline. No obvious mass. Resp: No accessory muscle use. No crackles. No wheezes. No rhonchi. No dullness on percussion. CV: Regular rate. Regular rhythm. No murmur or rub. 3+ edema. GI: Non-tender. Non-distended. No hernia. Skin: Warm, dry, normal texture and turgor. No nodule on exposed extremities. Lymph: No cervical LAD.  No supraclavicular LAD. M/S: No cyanosis. No clubbing. No joint deformity. Neuro: Moves all extremities but confused      Data    LABS  CBC:   Recent Labs     02/08/23  0911 02/08/23  1502 02/09/23  0346   WBC 7.2 9.4 9.0   HGB 13.5 11.6* 10.9*   HCT 42.3 36.6* 31.7*   MCV 98.0 99.0 95.4    219 205     BMP:   Recent Labs     02/08/23  1502 02/09/23  0346    139   K 5.0 4.4   CL 98* 101   CO2 26 26   BUN 32* 37*   CREATININE 1.3 1.7*   GLUCOSE 106* 79     POC GLUCOSE:  No results for input(s): POCGLU in the last 72 hours. LIVER PROFILE: No results for input(s): AST, ALT, LIPASE, AMYLASE, LABALBU, BILIDIR, BILITOT, ALKPHOS in the last 72 hours. PT/INR:   Recent Labs     02/08/23  1502 02/09/23  0346   PROTIME 20.2* 19.3*   INR 1.73* 1.62*     APTT: No results for input(s): APTT in the last 72 hours. UA:No results for input(s): NITRITE, COLORU, PHUR, LABCAST, WBCUA, RBCUA, MUCUS, TRICHOMONAS, YEAST, BACTERIA, CLARITYU, SPECGRAV, LEUKOCYTESUR, UROBILINOGEN, BILIRUBINUR, BLOODU, GLUCOSEU, KETUA, AMORPHOUS in the last 72 hours. Microbiology:  Wound Culture: No results for input(s): WNDABS, ORG in the last 72 hours. Invalid input(s):  LABGRAM  Nasal Culture: No results for input(s): ORG, MRSAPCR in the last 72 hours. Blood Culture: No results for input(s): BC, BLOODCULT2 in the last 72 hours. Fungal Culture:   No results for input(s): FUNGSM in the last 72 hours. No results for input(s): FUNCXBLD in the last 72 hours. CSF Culture:  No results for input(s): COLORCSF, APPEARCSF, CFTUBE, CLOTCSF, WBCCSF, RBCCSF, NEUTCSF, NUMCELLSCSF, LYMPHSCSF, MONOCSF, GLUCCSF, VOLCSF in the last 72 hours. Respiratory Culture:  No results for input(s): Kyaw Cruz in the last 72 hours. AFB:No results for input(s): AFBSMEAR in the last 72 hours. Urine Culture  No results for input(s): LABURIN in the last 72 hours.     RADIOLOGY:    XR CHEST PORTABLE   Final Result   Status post right PICC line placement in good position with no change in the   ET tube and gastric tube. Stable moderate cardiomegaly and moderate central pulmonary congestion which   is slightly less prominent. Hazy perihilar and bibasilar opacities which is increased and may be related   to pulmonary edema and/or worsening pneumonia. Small right pleural effusion which is more prominent         XR CHEST PORTABLE   Final Result   1. Increasing right basilar airspace consolidation. Increasing bilateral   perihilar airspace infiltrates. This could represent edema versus pneumonia. Likely small right pleural effusion as well      2. Lines and tubes appear properly placed as described above. CTA HEAD NECK W CONTRAST   Final Result   1. No large vessel occlusion identified within the brain. 2. No significant arterial stenosis identified within the neck. 3. 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 4. Worsening mediastinal lymphadenopathy consistent with a neoplastic process   or lymphoproliferative disorder. 5. Enlarged, heterogeneous appearance of the thyroid gland, new from the   previous chest CT concerning for a thyroiditis. CT HEAD WO CONTRAST   Final Result   No acute intracranial abnormality. MRI BRAIN WO CONTRAST    (Results Pending)       CONSULTS:    IP CONSULT TO NEUROLOGY  IP CONSULT TO PULMONOLOGY  IP CONSULT TO NEUROLOGY    ASSESSMENT AND PLAN:      Principal Problem:    AMS (altered mental status)  Active Problems:    Acute respiratory failure with hypoxia (HCC)    Hemoptysis    Pleural effusion on right    Abnormal CXR  Resolved Problems:    * No resolved hospital problems. *    Patient is a 45-year-old male with a past medical history of pleural effusion, coronary artery disease, CKD, A-fib CHF who was admitted on 1/9/2023 with pleural effusion and CHF exacerbation.   At that time patient had an abnormal stress test and refused inpatient cardiac catheterization. Patient was admitted on 2/8 for Impella assisted C. After Impella was removed patient's mentation changed and code stroke was called. Patient was lethargic and intubated for respiratory failure. Acute metabolic encephalopathy  -Mentation change after Impella removal.  Concern for possible CVA  -CTA head and neck shows no critical stenosis  -For possible EEG and MRI  -Neurology consult appreciated      Coronary artery disease  -S/p drug-eluting stent to the right RCA, LAD. Procedure done with Impella. Impella removed on 2/8  -On aspirin and statin  -Start Plavix    V. Tach  -Started on amiodarone    Hypotension  -Wean pressors as tolerated  -Started on milrinone drip    Acute hypoxic respiratory failure-extubated to BiPAP on 2/9  -Hypoxic during the encephalopathy episode with cyanosis. Intubated on 2/8      Acute on chronic CHF systolic exacerbation  -Chest x-ray concerning for effusion. Patient is clinically volume overloaded  -Started on Lasix  -Milrinone drip added  -Last echo showed a EF of 20%  -Monitor UCHE and daily weight    A-fib  -Resume anticoagulation when okay with cardiology      GISSELLE on CKD stage III-  -Creatinine mildly elevated 1.7. Monitor urine output. If low will consult nephrology  -Baseline creatinine 1.2  -Secondary to contrast load with hypotension. Lymphadenopathy  -Mediastinal lymphadenopathy present. Further work-up in 1 to 2 days    Thyromegaly  -TSH ordered      Left ICA pseudoaneurysm  -Will need outpatient follow-up    DVT Prophylaxis: Lovenox  Diet: Diet NPO Exceptions are: Sips of Water with Meds  Code Status: Full Code    PT/OT Eval Status:    Discharge plan -continue ICU care    The patient and / or the family were informed of the results of any tests, a time was given to answer questions, a plan was proposed and they agreed with plan.     Discussed with consulting physicians, nursing and social work     The note was completed using EMR. Every effort was made to ensure accuracy; however, inadvertent computerized transcription errors may be present.        Marianna Becerra MD

## 2023-02-09 NOTE — PROGRESS NOTES
Arrived to pts room at 1022.  150mg of amiodarone IV push at 1023. .  150mg amiodarone IV push at 1024.   Pt placed on BiPAP at 1025  106/86,  at 1026  Pt responding to commands  95/78 115 at 1027  MD to put new orders in for IV lasix  End Code

## 2023-02-09 NOTE — PROGRESS NOTES
This RN has been continuously going up on the levophed after the meaghan was stopped. This RN notified Dr. Junior Hughes, and order received for vasopressin. This RN also notified the MD that the UO as only been about 50 mLs since receiving IV lasix about 4 hours. No other new orders at this time.

## 2023-02-09 NOTE — PROGRESS NOTES
Attempted to wean sedation at 0000, as ordered. After going down 25 mcg/hr on Fentanyl, patient went from RASS -4 to RASS +1 after a few minutes, kicking legs, shaking side rails, unable to be reoriented or calmed down by staff. Attempted to just return to previous Fentanyl dose, but RASS increased to +2 after 15 minutes, and patient was becoming dyssynchronous with ventilator. Increased Versed by 1mg/hr. RASS -2 after. Will reattempt weaning of sedation once patient's RASS score improves.     Michele Uribe RN

## 2023-02-09 NOTE — PROGRESS NOTES
Narcotic Waste Documentation    Administer 500 mcg of fentanyl and wasted 500 mcg per [employer] Administer 40 mg of versed and wasted 60 mg per [employer]       Electronically signed by Radha Hughes RN on 2/9/2023 at 12:54 PM    Electronically signed by Maria G De La Rosa RN on 2/17/2023 at 7:39 AM

## 2023-02-09 NOTE — PROGRESS NOTES
Pulmonary Progress Note    CC:  Follow up respiratory failure    Subjective:  Remains on vent with FIO2 at 45% and PEEP of 5  On neosynephrine at 90 mcg       Intake/Output Summary (Last 24 hours) at 2/9/2023 0802  Last data filed at 2/9/2023 5783  Gross per 24 hour   Intake 715.25 ml   Output 440 ml   Net 275.25 ml         PHYSICAL EXAM:  Blood pressure 103/73, pulse (!) 109, temperature 97.9 °F (36.6 °C), temperature source Oral, resp. rate 23, height 6' 1\" (1.854 m), weight 265 lb (120.2 kg), SpO2 97 %.'  Gen: Chronically ill appearing, elderly   Eyes: PERRL. No sclera icterus. No conjunctival injection. ENT: No discharge. Pharynx with ETT and oG. Less blood in ETT  Neck: Trachea midline. No obvious mass. Resp: Crackles scattered   CV: Irregular. No murmur or rub. GI: Non-tender. Non-distended. No hernia. Skin: Pale  Lymph: No cervical LAD. No supraclavicular LAD. M/S: No cyanosis. No clubbing. No joint deformity. Neuro: Moves all four extremities. CN 2-12 tested, no defect noted.   Ext:   + edema    Medications:    Scheduled Meds:   aspirin  325 mg Oral Once    LORazepam  1 mg Oral Once    succinylcholine  100 mg IntraVENous Once    enoxaparin  30 mg SubCUTAneous BID    aspirin  81 mg Oral Daily    Or    aspirin  300 mg Rectal Daily    atorvastatin  80 mg Oral Nightly    furosemide  40 mg Oral Daily    pantoprazole (PROTONIX) 40 mg injection  40 mg IntraVENous Daily    chlorhexidine  15 mL Mouth/Throat BID    lidocaine 1 % injection  5 mL IntraDERmal Once    sodium chloride flush  5-40 mL IntraVENous 2 times per day       Continuous Infusions:   midazolam Stopped (02/09/23 0705)    fentaNYL 25 mcg/hr (02/09/23 0718)    sodium chloride Stopped (02/08/23 2041)    phenylephrine (MCKENNA-SYNEPHRINE) 50 mg/250 mL infusion 110 mcg/min (02/09/23 0718)       PRN Meds:  fentaNYL **AND** fentaNYL, acetaminophen **OR** acetaminophen, ondansetron **OR** ondansetron, midazolam, sodium chloride flush, sodium chloride, naloxone    Labs:  CBC:   Recent Labs     23  0911 23  1502 23  0346   WBC 7.2 9.4 9.0   HGB 13.5 11.6* 10.9*   HCT 42.3 36.6* 31.7*   MCV 98.0 99.0 95.4    219 205     BMP:   Recent Labs     23  1502 23  0346    139   K 5.0 4.4   CL 98* 101   CO2 26 26   BUN 32* 37*   CREATININE 1.3 1.7*     LIVER PROFILE: No results for input(s): AST, ALT, LIPASE, BILIDIR, BILITOT, ALKPHOS in the last 72 hours. Invalid input(s): AMYLASE,  ALB  PT/INR:   Recent Labs     23  1502 23  0346   PROTIME 20.2* 19.3*   INR 1.73* 1.62*     APTT: No results for input(s): APTT in the last 72 hours. UA:No results for input(s): NITRITE, COLORU, PHUR, LABCAST, WBCUA, RBCUA, MUCUS, TRICHOMONAS, YEAST, BACTERIA, CLARITYU, SPECGRAV, LEUKOCYTESUR, UROBILINOGEN, BILIRUBINUR, BLOODU, GLUCOSEU, AMORPHOUS in the last 72 hours. Invalid input(s): Shobha Madura  No results for input(s): PH, PCO2, PO2 in the last 72 hours. Films:  Chest imaging reports were reviewed and imaging was reviewed by me and showed no new images     [id number]    Cultures:  None    I reviewed the labs and images listed above    Assessment/Plan:   Acute Hypoxic Respiratory Failure with saturations less than 90% on room air and cyanotic appearance  SBT process, ABG at 9 am   Hemoptysis, slightly better   Suspect related to anticoagulation loading in the cath lab  No plans for bronchoscopy yet   Hypotension vs Cardiogenic Shock, Suspect hypotension due to meds   Neosynephrine for MAP of 65  Chronic Right pleural effusion s/p chest tube in January  Refused surgical intervention last month  Suspect trapped lung  Bedside US with moderate sized effusion, thoracentesis will not be of benefit as lung is trapped   Abnormal CXR with worsening airspace disease. Suspect possible worsening edema and/or aspiration event   Continue with lasix   Cardiomyopathy with EF of 20%  Need diuresis.   I suspect when he had his event yesterday he had more edema develop due to poor EF at baseline. Possible seizure and/or CVA  CT Brain without acute insult  May not be able to have MRI  Neurology following   CAD s/p cardiac intervention   Plavix to continue. Received plavix load on 2/8  Afib with RVR  Defer to cardiology       GI prophylaxis  Protonix  DVT prophylaxis  Lovenox. Could resume eliquis    Updated daughter at bedside     D/W Dr. Jasper Lopez care time of 45 minutes. This does not include procedural time. Please see procedural notes for details.     Susan Velázquez DO  [facility]

## 2023-02-09 NOTE — PROGRESS NOTES
[address]   Daily Progress Note      Admit Date:  2/8/2023      Subjective:   Mr. Talia Del Toro is a 77yo male with chronic systolic CHF LVEF 14-99%, CAD and chronic atrial fibrillation who came for Impella assisted PCI to the RCA and LAD 2/8/23. The procedure went well and the Impella was explanted. Unfortunately, he developed mental status chnages after the procedure concerning for a potential CVA. Head CT/CTA was unremarkable though. He ultimately was intubated for airway protection. Interval History:  Monika Irvin was extubated and had a 30 second run of VT when being suctioned. He is on 70mcg/min of phenylephrine and starting milrinone at 0.25mcg/kg/min. Objective:     /88   Pulse (!) 149   Temp 97.8 °F (36.6 °C) (Oral)   Resp (!) 46   Ht 6' 1\" (1.854 m)   Wt 265 lb (120.2 kg)   SpO2 93%   BMI 34.96 kg/m²      Intake/Output Summary (Last 24 hours) at 2/9/2023 1031  Last data filed at 2/9/2023 0800  Gross per 24 hour   Intake 715.25 ml   Output 470 ml   Net 245.25 ml       Physical Exam:  General:  Lethargic, NAD  Skin:  Warm and dry  Neck:  JVD<8, no carotid bruits  Chest:  On Bipap. Diminished bilaterally.     Cardiovascular:  RRR, normal S1/S2, no M/R/G  Abdomen:  Soft, nontender, +bowel sounds  Extremities:  2+ bilateral LE edema  Pulses: 2+ bilat carotid    2+ bilat radial    2+ bilat femoral        Medications:    clopidogrel  75 mg Oral Daily    furosemide  40 mg IntraVENous Once    aspirin  325 mg Oral Once    LORazepam  1 mg Oral Once    succinylcholine  100 mg IntraVENous Once    enoxaparin  30 mg SubCUTAneous BID    aspirin  81 mg Oral Daily    Or    aspirin  300 mg Rectal Daily    atorvastatin  80 mg Oral Nightly    furosemide  40 mg Oral Daily    pantoprazole (PROTONIX) 40 mg injection  40 mg IntraVENous Daily    chlorhexidine  15 mL Mouth/Throat BID    lidocaine 1 % injection  5 mL IntraDERmal Once    sodium chloride flush  5-40 mL IntraVENous 2 times per day amiodarone 450mg/250ml D5W infusion      milrinone      midazolam Stopped (02/09/23 0705)    fentaNYL 25 mcg/hr (02/09/23 0718)    sodium chloride Stopped (02/08/23 2041)    phenylephrine (MCKENNA-SYNEPHRINE) 50 mg/250 mL infusion 110 mcg/min (02/09/23 0718)       Lab Data:  CBC:   Recent Labs     02/08/23  0911 02/08/23  1502 02/09/23  0346   WBC 7.2 9.4 9.0   HGB 13.5 11.6* 10.9*    219 205     BMP:    Recent Labs     02/08/23  1502 02/09/23  0346    139   K 5.0 4.4   CO2 26 26   BUN 32* 37*   CREATININE 1.3 1.7*     LIVR: No results for input(s): AST, ALT in the last 72 hours. PT/INR:   Recent Labs     02/08/23  1502 02/09/23  0346   INR 1.73* 1.62*     Recent Labs     02/08/23  1502   TROPONINI 0.03*     FASTING LIPID PANEL:  Lab Results   Component Value Date/Time    CHOL 84 02/09/2023 03:46 AM    HDL 34 02/09/2023 03:46 AM    TRIG 61 02/09/2023 03:46 AM     LHC/PCI 2/8/23:  1. Right-dominant coronary arterial circulation. Successful  intervention to 90% lesion and 99% more distal RCA lesion with a 3 mm  overlapping drug-eluting stents dilated up to 3.2 mm proximally and 3.1  mm distally, resulting in 0% residual stenosis and SANTO III flow in the  vessel. There was reversal of flow to antegrade in the dominant right  coronary artery. The PDA had been fed by collaterals. In the left  system, there is trivial left main plaque disease. The LAD had an 80%  mid lesion that was treated with a 3.5 x 22 mm [place] frontier  drug-eluting stent dilated to 3.6 mm in diameter resulting in 0%  residual stenosis and SANTO III flow. The circumflex has a chronic total  occlusion in the midportion but the distal vessel fills in from  collaterals. 2.  Left ventricular end-diastolic pressure 21 mmHg. 3.  No gradient across the aortic valve on pullback to suggest aortic  stenosis. PROCEDURES PERFORMED:  1. Left heart catheterization.   2.  Percutaneous coronary intervention with drug-eluting stents to the  dominant right coronary artery. 3.  Percutaneous coronary intervention with drug-eluting stent to the  mid left anterior descending artery. 4.  Insertion of short-term external heart assist system into heart,  percutaneous approach. 5.  Removal of short-term external heart assist system from heart,  percutaneous approach for assistance with cardiac output using impeller  pump, continuous. Stress Perfusion 1/29/23:  Pharmacological Stress/MPI Results:  1. Technically a satisfactory study. 2. Large mostly dense perfusion defect involving lateral and inferolateral wall suggestive of previous MI. No reversibility /ischemia  3. Gated Study shows markedly dilated LV with lateral and inferolateral akinesis; EF 29 %. Echo 1/5/23:  Poor image quality. Definity contrast administered. Patient appears to be in atrial fibrillation. Overall left ventricular systolic function appears severely reduced. Ejection fraction is visually estimated to be 20-25% with diffuse  hypokinesis and regional abnormalities including inferior akinesis. Normal left ventricular wall thickness and cavity size. The right ventricle appears normal in size with severely reduced systolic function. The left and right atria are moderately dilated. Possibly severe mitral regurgitation. The aortic valve leaflets are not well visualized. Mild aortic stenosis based on a peak velocity of 2.6 m/s and a mean pressure gradient  of 20 mmHg. Gradients may be underestimated with reduced LV function. Trivial aortic regurgitation. Trivial pericardial effusion. IVC size is dilated (>2.1 cm) and collapses < 50% with respiration consistent with elevated RA pressure (15 mmHg). Assessment / Plan: 1. Ventricular Tachycardia  >30 seconds of VT. Starting amiodarone drip at 1mg/hr for now. LVEF 20-30%. 2.Acute on chronic systolic CHF, class 4  Starting inotropes with milrinone at 0.25mcg/kg/min. Diurese with IV lasix.   LVEDP 21mmHg at the time of the [facility]. 3.Unstable Angina  S/p PCI to the LAD and the RCA on 2/8/23. Continue DAPT with aspirin and clopidogrel. Hold beta blockade. Continue statin therapy. 4.Mental Status Change  Neurology following. Will obtain brain MRI when patient is more stable. Improved mentation prior to VT episode. Following commands. Total critical care time 45 minutes.       Signed:  Mercedes Flores MD

## 2023-02-09 NOTE — PROGRESS NOTES
Discussed case with Dr. Fay Rodríguez. Levophed up to 70 mcg. Added vasopressin and hydrocortisone. We talked about weaning down or weaning off milrinone due to persistent hypotension and tachycardia. Also discussed coverage with antibiotics. Cefepime to be started and one time dose of Vanco to be given. Renal function likely to worsen before it improves therefore I do not expect a lot of urine output today    Additional CC time of 15 minutes, for total time for 95 minutes.       Asa Favor, DO  [facility], Sleep [facility]  [phone]

## 2023-02-09 NOTE — PLAN OF CARE
Problem: Chronic Conditions and Co-morbidities  Goal: Patient's chronic conditions and co-morbidity symptoms are monitored and maintained or improved  Outcome: Progressing  Flowsheets (Taken 2/8/2023 2000)  Care Plan - Patient's Chronic Conditions and Co-Morbidity Symptoms are Monitored and Maintained or Improved:   Monitor and assess patient's chronic conditions and comorbid symptoms for stability, deterioration, or improvement   Collaborate with multidisciplinary team to address chronic and comorbid conditions and prevent exacerbation or deterioration   Update acute care plan with appropriate goals if chronic or comorbid symptoms are exacerbated and prevent overall improvement and discharge     Problem: Discharge Planning  Goal: Discharge to home or other facility with appropriate resources  Outcome: Progressing  Flowsheets (Taken 2/8/2023 2000)  Discharge to home or other facility with appropriate resources:   Identify barriers to discharge with patient and caregiver   Arrange for needed discharge resources and transportation as appropriate   Identify discharge learning needs (meds, wound care, etc)   Arrange for interpreters to assist at discharge as needed   Refer to discharge planning if patient needs post-hospital services based on physician order or complex needs related to functional status, cognitive ability or social support system     Problem: Safety - Medical Restraint  Goal: Remains free of injury from restraints (Restraint for Interference with Medical Device)  Description: INTERVENTIONS:  1. Determine that other, less restrictive measures have been tried or would not be effective before applying the restraint  2. Evaluate the patient's condition at the time of restraint application  3. Inform patient/family regarding the reason for restraint  4.  Q2H: Monitor safety, psychosocial status, comfort, nutrition and hydration  Outcome: Progressing  Flowsheets (Taken 2/8/2023 2000)  Remains free of injury from restraints (restraint for interference with medical device):   Determine that other, less restrictive measures have been tried or would not be effective before applying the restraint   Evaluate the patient's condition at the time of restraint application   Inform patient/family regarding the reason for restraint   Every 2 hours: Monitor safety, psychosocial status, comfort, nutrition and hydration     Problem: Pain  Goal: Verbalizes/displays adequate comfort level or baseline comfort level  Outcome: Progressing  Flowsheets (Taken 2/8/2023 2000)  Verbalizes/displays adequate comfort level or baseline comfort level:   Encourage patient to monitor pain and request assistance   Assess pain using appropriate pain scale   Administer analgesics based on type and severity of pain and evaluate response   Implement non-pharmacological measures as appropriate and evaluate response   Consider cultural and social influences on pain and pain management   Notify Licensed Independent Practitioner if interventions unsuccessful or patient reports new pain

## 2023-02-09 NOTE — PROGRESS NOTES
Informed he was extubated and already on 100% FIO2.   Give Lasix 40 mg IV once and start bilevel    Yanet Anne DO  [facility] Pulmonary, Sleep and Critical Care  [phone]

## 2023-02-10 PROBLEM — J96.22 ACUTE AND CHRONIC RESPIRATORY FAILURE WITH HYPERCAPNIA (HCC): Status: ACTIVE | Noted: 2023-01-01

## 2023-02-10 PROBLEM — I47.20 VENTRICULAR TACHYCARDIA: Status: ACTIVE | Noted: 2023-01-01

## 2023-02-10 PROBLEM — I20.0 UNSTABLE ANGINA (HCC): Status: ACTIVE | Noted: 2023-01-01

## 2023-02-10 PROBLEM — R57.0 CARDIOGENIC SHOCK (HCC): Status: ACTIVE | Noted: 2023-01-01

## 2023-02-10 NOTE — PROGRESS NOTES
At [address] [place] [address] Rd, patient suddenly hypertensive and tachycardic. Upon entering patient's room, RN observed patient now having noticeable increased work of breathing. Sats on NRBM remain 100%. Patient does endorse shortness of breath, and has a weak non-productive cough. Respiratory Therapy called to bedside, and BiPAP applied. Patient indicates BiPAP is helping with work of breathing. Will continue to monitor.     Handy Silva RN

## 2023-02-10 NOTE — PROGRESS NOTES
Progress Note  Admit Date: 2/8/2023      PCP: Ruthy Christopher     CC: F/U for altered mental status after procedure    Days in hospital:  2    SUBJECTIVE / Interval History:  Patient is on BiPAP at present. Overnight had respiratory distress and was on nonrebreather awake and oriented   Pressures on the lower side  Urine output on the lower side 140 ml     Family at bedside    Allergies  Spironolactone    Medications    Scheduled Meds:   cefepime  1,000 mg IntraVENous Q12H    heparin (porcine)  5,000 Units SubCUTAneous 3 times per day    clopidogrel  75 mg Oral Daily    hydrocortisone sodium succinate PF  50 mg IntraVENous Q6H    aspirin  325 mg Oral Once    LORazepam  1 mg Oral Once    succinylcholine  100 mg IntraVENous Once    aspirin  81 mg Oral Daily    Or    aspirin  300 mg Rectal Daily    atorvastatin  80 mg Oral Nightly    furosemide  40 mg Oral Daily    pantoprazole (PROTONIX) 40 mg injection  40 mg IntraVENous Daily    chlorhexidine  15 mL Mouth/Throat BID    lidocaine 1 % injection  5 mL IntraDERmal Once    sodium chloride flush  5-40 mL IntraVENous 2 times per day     Continuous Infusions:   amiodarone 450mg/250ml D5W infusion 0.5 mg/min (02/10/23 0600)    milrinone 0.2 mcg/kg/min (02/10/23 0600)    norepinephrine 40 mcg/min (02/10/23 0600)    vasopressin (Septic Shock) infusion 0.04 Units/min (02/10/23 0600)    midazolam Stopped (02/09/23 0705)    fentaNYL Stopped (02/09/23 8743)    sodium chloride Stopped (02/08/23 2041)    phenylephrine (MCKENNA-SYNEPHRINE) 50 mg/250 mL infusion Stopped (02/09/23 1354)       PRN Meds:  fentaNYL **AND** fentaNYL, acetaminophen **OR** acetaminophen, ondansetron **OR** ondansetron, midazolam, sodium chloride flush, sodium chloride, naloxone    Vitals    /67   Pulse (!) 108   Temp 97.4 °F (36.3 °C) (Axillary)   Resp 15   Ht 6' 1\" (1.854 m)   Wt 275 lb 2.2 oz (124.8 kg)   SpO2 100%   BMI 36.30 kg/m²     Exam:    Gen: No distress. Eyes: PERRL.  No sclera icterus. No conjunctival injection. ENT: No discharge. Pharynx clear. External appearance of ears and nose normal.  Neck: Trachea midline. No obvious mass. Resp: No accessory muscle use. Few crackles. No wheezes. No rhonchi. No dullness on percussion. CV: Regular rate. Regular rhythm. No murmur or rub. 3+ edema. GI: Non-tender. Non-distended. No hernia. Skin: Warm, dry, normal texture and turgor. No nodule on exposed extremities. M/S: No cyanosis. No clubbing. No joint deformity. Neuro: Alert, oriented, moves all extremities      Data    LABS  CBC:   Recent Labs     02/08/23  1502 02/09/23  0346 02/09/23  1448 02/10/23  0300   WBC 9.4 9.0  --  14.9*   HGB 11.6* 10.9* 13.7 10.8*   HCT 36.6* 31.7*  --  35.4*   MCV 99.0 95.4  --  99.0    205  --  247     BMP:   Recent Labs     02/09/23  0346 02/09/23  1800 02/10/23  0300    139 136   K 4.4 4.7 5.2*    102 98*   CO2 26 23 21   BUN 37* 40* 48*   CREATININE 1.7* 2.3* 2.7*   GLUCOSE 79 126* 170*     POC GLUCOSE:  No results for input(s): POCGLU in the last 72 hours. LIVER PROFILE:   Recent Labs     02/09/23  1800 02/10/23  0300   AST 40* 42*   ALT 14 15   LABALBU 3.2* 3.3*   BILIDIR 0.9*  --    BILITOT 1.7* 1.4*   ALKPHOS 114 111     PT/INR:   Recent Labs     02/08/23  1502 02/09/23  0346   PROTIME 20.2* 19.3*   INR 1.73* 1.62*     APTT: No results for input(s): APTT in the last 72 hours. UA:  Recent Labs     02/09/23  1555   COLORU DARK YELLOW*   PHUR 5.0   WBCUA 39*   RBCUA 684*   MUCUS 1+*   YEAST Present*   BACTERIA None Seen   CLARITYU CLOUDY*   SPECGRAV 1.074   LEUKOCYTESUR MODERATE*   UROBILINOGEN 1.0   BILIRUBINUR SMALL*   BLOODU LARGE*   GLUCOSEU Negative   KETUA TRACE*     Microbiology:  Wound Culture: No results for input(s): WNDABS, ORG in the last 72 hours. Invalid input(s):  LABGRAM  Nasal Culture:   Recent Labs     02/09/23  1555   MRSAPCR Negative  MRSA DNA not detected.   Normal Range: Not detected       Blood Culture: No results for input(s): BC, Barnetta Isabel in the last 72 hours. Fungal Culture:   No results for input(s): FUNGSM in the last 72 hours. No results for input(s): FUNCXBLD in the last 72 hours. CSF Culture:  No results for input(s): COLORCSF, APPEARCSF, CFTUBE, CLOTCSF, WBCCSF, RBCCSF, NEUTCSF, NUMCELLSCSF, LYMPHSCSF, MONOCSF, GLUCCSF, VOLCSF in the last 72 hours. Respiratory Culture:  No results for input(s): [place] in the last 72 hours. AFB:No results for input(s): AFBSMEAR in the last 72 hours. Urine Culture  No results for input(s): LABURIN in the last 72 hours. RADIOLOGY:    XR CHEST PORTABLE   Final Result   Status post right PICC line placement in good position with no change in the   ET tube and gastric tube. Stable moderate cardiomegaly and moderate central pulmonary congestion which   is slightly less prominent. Hazy perihilar and bibasilar opacities which is increased and may be related   to pulmonary edema and/or worsening pneumonia. Small right pleural effusion which is more prominent         XR CHEST PORTABLE   Final Result   1. Increasing right basilar airspace consolidation. Increasing bilateral   perihilar airspace infiltrates. This could represent edema versus pneumonia. Likely small right pleural effusion as well      2. Lines and tubes appear properly placed as described above. CTA HEAD NECK W CONTRAST   Final Result   1. No large vessel occlusion identified within the brain. 2. No significant arterial stenosis identified within the neck. 3. 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 4. Worsening mediastinal lymphadenopathy consistent with a neoplastic process   or lymphoproliferative disorder. 5. Enlarged, heterogeneous appearance of the thyroid gland, new from the   previous chest CT concerning for a thyroiditis.          CT HEAD WO CONTRAST   Final Result No acute intracranial abnormality. MRI BRAIN WO CONTRAST    (Results Pending)       CONSULTS:    IP CONSULT TO NEUROLOGY  IP CONSULT TO PULMONOLOGY  IP CONSULT TO NEUROLOGY  IP CONSULT TO NEPHROLOGY  IP CONSULT TO NEPHROLOGY    ASSESSMENT AND PLAN:      Principal Problem:    AMS (altered mental status)  Active Problems:    Acute respiratory failure with hypoxia (HCC)    Hemoptysis    Pleural effusion on right    Abnormal CXR  Resolved Problems:    * No resolved hospital problems. *    Patient is a 67-year-old male with a past medical history of pleural effusion, coronary artery disease, CKD, A-fib CHF who was admitted on 1/9/2023 with pleural effusion and CHF exacerbation. At that time patient had an abnormal stress test and refused inpatient cardiac catheterization. Patient was admitted on 2/8 for Impella assisted LHC. After Impella was removed patient's mentation changed and code stroke was called. Patient was lethargic and intubated for respiratory failure. Patient was extubated on 2/8. Hospital course was complicated by V. tach and patient was started on amiodarone. Patient is now in shock and is on pressors with GISSELLE and oliguria. GISSELLE on CKD stage III-worse with oliguria-will need CRRT  - Monitor urine output.   -consult nephrology  -Baseline creatinine 1.2  -Secondary to contrast load with hypotension    Acute metabolic encephalopathy-improved  -Mentation change after Impella removal.  Concern for possible CVA  -CTA head and neck shows no critical stenosis  -For possible EEG and MRI once respiratory status improves  -Neurology consult appreciated      Coronary artery disease  -S/p drug-eluting stent to the right RCA, LAD. Procedure done with Impella. Impella removed on 2/8  -On aspirin and statin  -Continue Plavix    V.  Tach  -Started on amiodarone    Hypotension-unclear if its cardiogenic shock or septic shock  -Wean pressors as tolerated  -At present on Levophed and vasopressin  -Stress dose steroids started    Acute hypoxic respiratory failure-extubated to BiPAP on 2/9  -Hypoxic during the encephalopathy episode with cyanosis. Intubated on 2/8      Acute on chronic CHF systolic exacerbation  -Chest x-ray concerning for effusion. Patient is clinically volume overloaded  -Last echo showed a EF of 20%  -Monitor UCHE and daily weight  -Hold off diuresis. Patient needs CRRT    Possible pneumonia  -Treat as gram-negative. White count elevated  -Started on cefepime    A-fib  -Resume anticoagulation when okay with cardiology      Lymphadenopathy  -Mediastinal lymphadenopathy present. Further work-up in 1 to 2 days, when stable    Thyromegaly  -TSH mildly elevated, possibly sick euthyroid syndrome      Left ICA pseudoaneurysm  -Will need outpatient follow-up    DVT Prophylaxis: heparin  Diet: Diet NPO Exceptions are: Sips of Water with Meds  Code Status: Full Code    PT/OT Eval Status:    Discharge plan -continue ICU care    The patient and / or the family were informed of the results of any tests, a time was given to answer questions, a plan was proposed and they agreed with plan. Discussed with consulting physicians, nursing and social work     The note was completed using EMR. Every effort was made to ensure accuracy; however, inadvertent computerized transcription errors may be present.        Catia Childers MD

## 2023-02-10 NOTE — CONSULTS
Consult received. Seen and examined at bedside. Discussed with patient's daughter and ICU team, cardiology. Will start CRRT to address volume overload since he is oliguric with rising solutes.      Fallon Mayes MD

## 2023-02-10 NOTE — PROCEDURES
Central Ven[facility] Catheter [facility]    Consent: Daughter provided consent   Informed consent was obtained for the procedure, including sedation. Risks of  hemorrhage, arrhythmia, infection and adverse drug reaction were discussed    Indication: GISSELLE   A Time Out was performed to identify the correct patient and the correct procedure. Procedure:    Ultrasound used and rightinternal jugular vein was noted to be patent. Patient positioned then prepared with sterile technique. Including placement of sterile drape, wearing of sterile gloves/gown and adhering to universal precautions. Lidocaine was administered via 25 gauge needle. 18 gauge needle inserted with access of rightinternal jugular vein under active ultrasound guidance. Wire inserted to 20 cm (image saved). Small incision made in skin, dilator inserted over wire then removed. Triple lumen inserted to 20 cm, secured and sterile dressing applied including placement of biopatch. Complications:  None  There were no changes to vital signs. Patient did tolerate procedure well. CXR pending     Estimated blood loss:  5 ml    No immediate complications      I have presented reasonable alternatives to the patient's proposed care, treatment, and services.  The discussion I have done encompassed risks, benefits, and side effects related to the alternatives and the risks related to not receiving the proposed care, treatment, and services
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
## 2023-02-10 NOTE — PROGRESS NOTES
Pulmonary Progress Note    CC:  Follow up respiratory failure    Subjective:  Extubated yesterday then quickly decline with wide complex tachycardia and hypoxia/hypercapnia.  He was put on bilevel and pressors were continuously increased during the day.  On NRB currently  Levophed and vasopressin   Says his breathing is better and he is thirsty   Has need bilevel now and again      Intake/Output Summary (Last 24 hours) at 2/10/2023 0803  Last data filed at 2/10/2023 0600  Gross per 24 hour   Intake 1760.07 ml   Output 110 ml   Net 1650.07 ml           PHYSICAL EXAM:  Blood pressure 104/67, pulse (!) 108, temperature 97.4 °F (36.3 °C), temperature source Axillary, resp. rate 15, height 6' 1\" (1.854 m), weight 275 lb 2.2 oz (124.8 kg), SpO2 100 %.'  Gen: Chronically ill appearing, elderly, says he is thirsty   Eyes: PERRL. No sclera icterus. No conjunctival injection.   ENT: No discharge. Pharynx with NRB mask on  Neck: Trachea midline. No obvious mass.    Resp: Crackles scattered with some wheezing  CV: Irregular. No murmur or rub.    GI: Non-tender. Non-distended. No hernia.   Skin: Pale  Lymph: No cervical LAD. No supraclavicular LAD.   M/S: No cyanosis. No clubbing. No joint deformity.    Neuro: Moves all four extremities. CN 2-12 tested, no defect noted.  Ext:   ++ edema    Medications:    Scheduled Meds:   cefepime  1,000 mg IntraVENous Q12H    heparin (porcine)  5,000 Units SubCUTAneous 3 times per day    clopidogrel  75 mg Oral Daily    hydrocortisone sodium succinate PF  50 mg IntraVENous Q6H    aspirin  325 mg Oral Once    LORazepam  1 mg Oral Once    succinylcholine  100 mg IntraVENous Once    aspirin  81 mg Oral Daily    Or    aspirin  300 mg Rectal Daily    atorvastatin  80 mg Oral Nightly    furosemide  40 mg Oral Daily    pantoprazole (PROTONIX) 40 mg injection  40 mg IntraVENous Daily    chlorhexidine  15 mL Mouth/Throat BID    lidocaine 1 % injection  5 mL IntraDERmal Once    sodium chloride flush   5-40 mL IntraVENous 2 times per day       Continuous Infusions:   amiodarone 450mg/250ml D5W infusion 0.5 mg/min (02/10/23 0600)    milrinone 0.2 mcg/kg/min (02/10/23 0600)    norepinephrine 40 mcg/min (02/10/23 0600)    vasopressin (Septic Shock) infusion 0.04 Units/min (02/10/23 0600)    midazolam Stopped (23 07)    fentaNYL Stopped (23)    sodium chloride Stopped (23)    phenylephrine (MCKENNA-SYNEPHRINE) 50 mg/250 mL infusion Stopped (23 135)       PRN Meds:  fentaNYL **AND** fentaNYL, acetaminophen **OR** acetaminophen, ondansetron **OR** ondansetron, midazolam, sodium chloride flush, sodium chloride, naloxone    Labs:  CBC:   Recent Labs     23  1502 23  0346 23  1448 02/10/23  0300   WBC 9.4 9.0  --  14.9*   HGB 11.6* 10.9* 13.7 10.8*   HCT 36.6* 31.7*  --  35.4*   MCV 99.0 95.4  --  99.0    205  --  247       BMP:   Recent Labs     23  0346 23  1800 02/10/23  0300    139 136   K 4.4 4.7 5.2*    102 98*   CO2 26 23 21   BUN 37* 40* 48*   CREATININE 1.7* 2.3* 2.7*       LIVER PROFILE:   Recent Labs     23  1800 02/10/23  0300   AST 40* 42*   ALT 14 15   BILIDIR 0.9*  --    BILITOT 1.7* 1.4*   ALKPHOS 114 111     PT/INR:   Recent Labs     23  1502 23  0346   PROTIME 20.2* 19.3*   INR 1.73* 1.62*       APTT: No results for input(s): APTT in the last 72 hours. UA:  Recent Labs     23  1555   COLORU DARK YELLOW*   PHUR 5.0   WBCUA 39*   RBCUA 684*   MUCUS 1+*   YEAST Present*   BACTERIA None Seen   CLARITYU CLOUDY*   SPECGRAV 1.074   LEUKOCYTESUR MODERATE*   UROBILINOGEN 1.0   BILIRUBINUR SMALL*   BLOODU LARGE*   GLUCOSEU Negative     No results for input(s): PH, PCO2, PO2 in the last 72 hours.         Films:  Chest imaging reports were reviewed and imaging was reviewed by me and showed no new images     AB.//343    Cultures:  Pneumonia panel:  pending   MRSA probe:  negative     I reviewed the labs and images listed above    Assessment/Plan:   Acute Hypoxic Respiratory Failure with saturations less than 90% on room air and cyanotic appearance  Continue with NRB and/or Bilevel based on CO2 levels  Seems to be ok with NRB but may need more ventilator support now and again  CXR today   Acute on Chronic Hypercapnic Respiratory Failure  Bilevel when needed  Cardiogenic Shock and/or possible septic   Levophed for MAP of 65  Continuous Vasopressin 0.04  Hydrocortisone   Recommend stopping milrinone   Cefepime to continue. Check procal and pneumonia panel today  One dose of vanco received   GISSELLE, worse  Vascath to be placed today'  RRT to start today   Hemoptysis, slightly better. Still suspect related to anticoagulation loading  Chronic Right pleural effusion s/p chest tube in January. Has trapped lung   No intervention required   Cardiomyopathy with EF of 30%  Need RRT to remove some volume   Possible seizure and/or CVA  CAD s/p cardiac intervention   Plavix to continue. Will need NG to get plavix  Afib with RVR  Amiodarone infusion     DVT prophylaxis Heparin     D/W DR. Aiyana Schwartz and Dr. Gentry Priest care time of 50 minutes. This does not include procedural time. Please see procedural notes for details.     Brett Gee, DO [facility]
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
## 2023-02-10 NOTE — CONSULTS
[address] [place] [address] Nephrology   [facility]  [phone]  Nephrology Consult Note          Patient ID: Erna Monroe  Referring/ Physician: Korin Everett MD      HPI/Summary:   Erna Monroe is being seen by nephrology for GISSELLE. This is a 56-year-old male with past medical history significant for atrial fibrillation, heart failure, hypertension who presented to the hospital with shortness of breath. Patient was admitted to the ICU on 2/8/2023. Prior to this on Wednesday he had a heart cath procedure with required Impella support. There was concern for possible stroke afterwards. Later he had not episode of unresponsiveness, cyanotic with ventricular tachycardia and required intubation for airway protection. At time of consult patient was seen in the ICU, extubated but on nonrebreather. He denied any shortness of breath chest pain but he does have significant edema. He is still requiring a lot of oxygen support. His renal function has declined rapidly over the past 2 to 3 days and he is requiring high doses of pressor support to maintain his MAP. Blood pressure 115/71  On amiodarone 30, Levophed 40, milrinone which is now discontinued  Vaso  Satting 100% on nonrebreather  Last pH 7.29 PCO2 47 bicarb 23  Urine output only 35 cc today and +2.3 L for admission so far    Labs reviewed  Sodium 136 potassium 5.2 bicarb 21 BUN 48 creatinine 2.7    Plan:   Victoria Qureshi likely has hemodynamic mediated GISSELLE resulting in ATN. He has required very high doses of pressors, had hypoxia and required intubation. He is still on a nonrebreather and has evidence of volume overload on exam.  At this point he is too hypotensive to be responsive to diuretics and oliguric despite positive fluid balance. His renal function is steadily declining. At this point CRRT will be the most effective way to manage his volume status and electrolyte abnormalities. I discussed the risks and benefits with the family at bedside. I discussed with the primary team and pulmonary team at bedside. CRRT orders have been placed, electrolyte replacement and labs have been ordered. 38 minutes were spent in providing critical care to this patient including reviewing records, ordering tests and labs discussing with family and other consultants at bedside. #GISSELLE, oliguric  Likely due to hemodynamic mediated GISSELLE resulting in ATN. He was severely hypotensive requiring high-dose of pressors, was intubated with hypoxic respiratory failure and subsequently developed renal failure. He had a few doses of IV contrast 1 on 1/30/2023 and 2/8/2023. Etiology of his hypotension is unclear at this point  Urinalysis after catheter being placed showed dark yellow urine specific gravity 1.074,  WBC 39 present used 100 mg/dL of albumin  Hold off on further testing of GISSELLE at this time. #Hyperkalemia  Due to renal failure, oliguric    #Metabolic acidosis  Due to renal failure  Addressed with dialysis    #Shock  Etiology unclear, cultures are negative so does not peer to have any source of sepsis. Procalcitonin was only mildly elevated 0.61  Discussed with cardiology about possible [place] to see how his cardiac index is. His EF 35%    [facility] would like to thank you for the opportunity to serve this patient. Please call with any questions or concerns. Mateo Forde MD  [facility]  Mary Professor [address], [address]  Fax: [phone]  Office: [phone]         CC/Reason for consult:   Reason for consult: GISSELLE  No chief complaint on file. Review of Systems:   [address] All other remaining systems are negative. Constitutional:  fever, chills, weakness, weight change, fatigue,      Skin:  rash, pruritus, hair loss, bruising, dry skin, petechiae. Head, Face, Neck   headaches, swelling,  cervical adenopathy.      Respiratory: shortness of breath, cough, or wheezing  Cardiovascular: chest pain, palpitations, dizzy, edema  Gastrointestinal: nausea, vomiting, diarrhea, constipation,belly pain    Yellow skin, blood in stool  Musculoskeletal:  back pain, muscle weakness, gait problems,       joint pain or swelling. Genitourinary:  dysuria, poor urine flow, flank pain, blood in urine  Neurologic:  vertigo, TIA'S, syncope, seizures, focal weakness  Psychosocial:  insomnia, anxiety, or depression. Additional positive findings: -     PMH/SH/FH:    Medical Hx: reviewed and updated as appropriate  Past Medical History:   Diagnosis Date    Arrhythmia     Atrial fib, first noted 2018    Cellulitis 06/2021    left hand; treated with medrol dose pack and oral antibiotics    Cerebral artery occlusion with cerebral infarction ([facility] 03/2019    L sided numbness/weakness. tPA    CHF (congestive heart failure) (HCC)     Chronic kidney disease     baseline Cr 1.3-1.5    Gout 2019    left knee    HLD (hyperlipidemia)     Hypertension     Obesity     Pleural effusion 2018    right    Sciatica          Surgical Hx: reviewed and updated as appropriate   has a past surgical history that includes fracture surgery; eye surgery; and IR CHEST TUBE INSERTION (1/21/2022). Social Hx: reviewed and updated as appropriate  Social History     Tobacco Use    Smoking status: Never    Smokeless tobacco: Never   Substance Use Topics    Alcohol use: Not Currently        Family hx: reviewed and updated as appropriate  family history includes Heart Disease in his mother; Hypertension in his father and mother; Kidney Disease in his brother; Stroke in his mother.     Medications:   cefepime, 1,000 mg, Q12H  heparin (porcine), 5,000 Units, 3 times per day  clopidogrel, 75 mg, Daily  hydrocortisone sodium succinate PF, 50 mg, Q6H  LORazepam, 1 mg, Once  succinylcholine, 100 mg, Once  aspirin, 81 mg, Daily   Or  aspirin, 300 mg, Daily  atorvastatin, 80 mg, Nightly  furosemide, 40 mg, Daily  pantoprazole (PROTONIX) 40 mg injection, 40 mg, Daily  chlorhexidine, 15 mL, BID  lidocaine 1 % injection, 5 mL, Once  sodium chloride flush, 5-40 mL, 2 times per day       Spironolactone    Allergies: Allergies   Allergen Reactions    Spironolactone          Physical Exam/Objective:   Vitals:    02/10/23 0645   BP:    Pulse: (!) 108   Resp: 15   Temp:    SpO2: 100%       Intake/Output Summary (Last 24 hours) at 2/10/2023 0806  Last data filed at 2/10/2023 0600  Gross per 24 hour   Intake 1760.07 ml   Output 110 ml   Net 1650.07 ml         General appearance:  in no acute distress, comfortable, communicative, awake and alert. HEENT: no icterus, EOM intact, trachea midline. Neck : no masses, appears symmetrical and no JVD appreciated. Respiratory: Respiratory effort normal, bilateral equal chest rise. No wheeze, +crackles   Cardiovascular: Ausculation shows RRR and  ++ edema   Abdomen: abdomen is soft, non distended, no masses, no pain with palpation. Musculoskeletal:  no joint swelling, no deformity, strength grossly normal.   Skin: no rashes, no induration, no tightening, no jaundice   Neuro:   Follows commands, moves all extremities spontaneously       Data:   CBC:   Recent Labs     02/08/23  1502 02/09/23  0346 02/09/23  1448 02/10/23  0300   WBC 9.4 9.0  --  14.9*   HGB 11.6* 10.9* 13.7 10.8*   HCT 36.6* 31.7*  --  35.4*    205  --  247     BMP:    Recent Labs     02/09/23  0346 02/09/23  1800 02/10/23  0300    139 136   K 4.4 4.7 5.2*    102 98*   CO2 26 23 21   BUN 37* 40* 48*   CREATININE 1.7* 2.3* 2.7*   GLUCOSE 79 126* 170*   MG 2.00  --  2.00

## 2023-02-10 NOTE — PROGRESS NOTES
[facility]   Daily Progress Note      Admit Date:  2/8/2023      Subjective:   Mr. Malik Singleton is a 75yo male with chronic systolic CHF LVEF 35-00%, CAD and chronic atrial fibrillation who came for Impella assisted PCI to the RCA and LAD 2/8/23. The procedure went well and the Impella was explanted. Unfortunately, he developed mental status chnages after the procedure concerning for a potential CVA. Head CT/CTA was unremarkable though. He ultimately was intubated for airway protection. Interval History:  Cheryl Roe is on a nonrebreather. Getting CRRT with removal of 90cc/hr to keep him just even. He is on 40mcg/min of norepinephrine. Milrinone off. His daughter is at the bedside. He is following commands and asking questions. No further VT. Amiodarone and Vasopressin running. Objective:     /71   Pulse (!) 103   Temp 97.5 °F (36.4 °C) (Axillary)   Resp 12   Ht 6' 1\" (1.854 m)   Wt 275 lb 2.2 oz (124.8 kg)   SpO2 100%   BMI 36.30 kg/m²      Intake/Output Summary (Last 24 hours) at 2/10/2023 1604  Last data filed at 2/10/2023 1559  Gross per 24 hour   Intake 2210.14 ml   Output 238 ml   Net 1972.14 ml       Physical Exam:  General:  Lethargic, NAD  Skin:  Warm and dry  Neck:  JVD<8, no carotid bruits  Chest:  On Bipap. Diminished bilaterally.     Cardiovascular:  Irreg irreg and mildly tachy, normal S1/S2, no M/R/G  Abdomen:  Soft, nontender, +bowel sounds  Extremities:  2+ bilateral LE edema  Pulses: 2+ bilat carotid    2+ bilat radial    2+ bilat femoral        Medications:    heparin (porcine)  5,000 Units SubCUTAneous 3 times per day    cefepime  2,000 mg IntraVENous Q12H    clopidogrel  75 mg Oral Daily    hydrocortisone sodium succinate PF  50 mg IntraVENous Q6H    LORazepam  1 mg Oral Once    succinylcholine  100 mg IntraVENous Once    aspirin  81 mg Oral Daily    Or    aspirin  300 mg Rectal Daily    atorvastatin  80 mg Oral Nightly    pantoprazole (PROTONIX) 40 mg injection  40 mg IntraVENous Daily    chlorhexidine  15 mL Mouth/Throat BID    lidocaine 1 % injection  5 mL IntraDERmal Once    sodium chloride flush  5-40 mL IntraVENous 2 times per day      prismaSATE BGK 4/2.5      prismaSATE BGK 4/2.5      prismaSATE BGK 4/2.5      amiodarone 450mg/250ml D5W infusion 0.5 mg/min (02/10/23 1559)    norepinephrine 38 mcg/min (02/10/23 1559)    vasopressin (Septic Shock) infusion 0.04 Units/min (02/10/23 1559)    sodium chloride Stopped (02/10/23 1543)       Lab Data:  CBC:   Recent Labs     02/08/23  1502 02/09/23  0346 02/09/23  1448 02/10/23  0300   WBC 9.4 9.0  --  14.9*   HGB 11.6* 10.9* 13.7 10.8*    205  --  247     BMP:    Recent Labs     02/09/23  0346 02/09/23  1800 02/10/23  0300    139 136   K 4.4 4.7 5.2*   CO2 26 23 21   BUN 37* 40* 48*   CREATININE 1.7* 2.3* 2.7*     LIVR:   Recent Labs     02/09/23  1800 02/10/23  0300   AST 40* 42*   ALT 14 15     PT/INR:   Recent Labs     02/08/23  1502 02/09/23  0346 02/09/23  1800 02/10/23  0300   PROT  --   --  6.6 6.9   INR 1.73* 1.62*  --   --      Recent Labs     02/08/23  1502   TROPONINI 0.03*     FASTING LIPID PANEL:  Lab Results   Component Value Date/Time    CHOL 84 02/09/2023 03:46 AM    HDL 34 02/09/2023 03:46 AM    TRIG 61 02/09/2023 03:46 AM     LHC/PCI 2/8/23:  1. Right-dominant coronary arterial circulation. Successful  intervention to 90% lesion and 99% more distal RCA lesion with a 3 mm  overlapping drug-eluting stents dilated up to 3.2 mm proximally and 3.1  mm distally, resulting in 0% residual stenosis and SANTO III flow in the  vessel. There was reversal of flow to antegrade in the dominant right  coronary artery. The PDA had been fed by collaterals. In the left  system, there is trivial left main plaque disease.   The LAD had an 80%  mid lesion that was treated with a 3.5 x 22 mm Plover frontier  drug-eluting stent dilated to 3.6 mm in diameter resulting in 0%  residual stenosis and SANTO III flow.  The circumflex has a chronic total  occlusion in the midportion but the distal vessel fills in from  collaterals. 2.  Left ventricular end-diastolic pressure 21 mmHg. 3.  No gradient across the aortic valve on pullback to suggest aortic  stenosis. PROCEDURES PERFORMED:  1. Left heart catheterization. 2.  Percutaneous coronary intervention with drug-eluting stents to the  dominant right coronary artery. 3.  Percutaneous coronary intervention with drug-eluting stent to the  mid left anterior descending artery. 4.  Insertion of short-term external heart assist system into heart,  percutaneous approach. 5.  Removal of short-term external heart assist system from heart,  percutaneous approach for assistance with cardiac output using impeller  pump, continuous. Stress Perfusion 1/29/23:  Pharmacological Stress/MPI Results:  1. Technically a satisfactory study. 2. Large mostly dense perfusion defect involving lateral and inferolateral wall suggestive of previous MI. No reversibility /ischemia  3. Gated Study shows markedly dilated LV with lateral and inferolateral akinesis; EF 29 %. Echo 1/5/23:  Poor image quality. Definity contrast administered. Patient appears to be in atrial fibrillation. Overall left ventricular systolic function appears severely reduced. Ejection fraction is visually estimated to be 20-25% with diffuse  hypokinesis and regional abnormalities including inferior akinesis. Normal left ventricular wall thickness and cavity size. The right ventricle appears normal in size with severely reduced systolic function. The left and right atria are moderately dilated. Possibly severe mitral regurgitation. The aortic valve leaflets are not well visualized. Mild aortic stenosis based on a peak velocity of 2.6 m/s and a mean pressure gradient  of 20 mmHg. Gradients may be underestimated with reduced LV function. Trivial aortic regurgitation. Trivial pericardial effusion.   IVC size is dilated (>2.1 cm) and collapses < 50% with respiration consistent with elevated RA pressure (15 mmHg). Assessment / Plan: 1. Ventricular Tachycardia  >30 seconds of VT. He has been on the amiodarone drip. Will hold now given hypotension. Start oral amiodarone 200mg bid tomorrow if tolerating oral.   LVEF 20-30%. 2.Acute on chronic systolic CHF, class 4  Starting inotropes with milrinone at 0.25mcg/kg/min. Some voluem removal with CRRT. LVEDP 21mmHg at the time of the [facility]. LVEF ~35 by repeat echocardiogram.    3.Unstable Angina  S/p PCI to the LAD and the RCA on 2/8/23. Continue DAPT with aspirin and clopidogrel. Will need to crush in apple sauce until tolerating more oral intake. Hold beta blockade. Continue statin therapy. 4.Mental Status Change  Improved. CVA seems less likely as the patient is neuro intact to his baseline. Punctate infarcts still a possibility. Neurology following. Will obtain brain MRI when patient is more stable. If not able to do a swallow evaluation tomorrow (due to nonrebreather status) will consider placing an NG tube. NG tube may be problematic if he is still requiring Bipap overnight. Covered empirically for aspiration pneumonia with antibiotics though sepsis less likely with low procalcitonin and lactic acid. Total critical care time 45 minutes.       Signed:  Tasha Gamble MD

## 2023-02-10 NOTE — PROGRESS NOTES
4 Eyes Skin Assessment     NAME:  Felicia Pang  YOB: 1945  MEDICAL RECORD NUMBER:  [id number]    The patient is being assessed for  Shift Handoff    I agree that One RN has performed a thorough Head to Toe Skin Assessment on the patient. ALL assessment sites listed below have been assessed. Areas assessed by both nurses:    Head, Face, Ears, Shoulders, Back, Chest, Arms, Elbows, Hands, Sacrum. Buttock, Coccyx, Ischium, and Legs. Feet and Heels        Does the Patient have a Wound?  No noted wound(s)       Tera Prevention initiated by RN: Yes   Wound Care Orders initiated by RN: NA    Pressure Injury (Stage 3,4, Unstageable, DTI, NWPT, and Complex wounds) if present, place referral order by RN under : NA    New and Established Ostomies, if present place, referral order under : NA      Nurse 1 eSignature: Electronically signed by Jazmine Oro RN on 2/10/23 at 6:04 AM EST    **SHARE this note so that the co-signing nurse can place an eSignature**    Nurse 2 eSignature: Electronically signed by Cherelle Schaefer RN on 2/10/23 at 11:47 AM EST

## 2023-02-10 NOTE — PLAN OF CARE
Problem: Chronic Conditions and Co-morbidities  Goal: Patient's chronic conditions and co-morbidity symptoms are monitored and maintained or improved  Outcome: Progressing  Flowsheets  Taken 2/9/2023 2000 by Abelardo Flores - Patient's Chronic Conditions and Co-Morbidity Symptoms are Monitored and Maintained or Improved:   Monitor and assess patient's chronic conditions and comorbid symptoms for stability, deterioration, or improvement   Collaborate with multidisciplinary team to address chronic and comorbid conditions and prevent exacerbation or deterioration   Update acute care plan with appropriate goals if chronic or comorbid symptoms are exacerbated and prevent overall improvement and discharge  Taken 2/9/2023 0800 by Abelardo Ansari - Patient's Chronic Conditions and Co-Morbidity Symptoms are Monitored and Maintained or Improved: Monitor and assess patient's chronic conditions and comorbid symptoms for stability, deterioration, or improvement     Problem: Discharge Planning  Goal: Discharge to home or other facility with appropriate resources  Outcome: Progressing  Flowsheets  Taken 2/9/2023 2000 by Clifton Armstrong RN  Discharge to home or other facility with appropriate resources:   Identify barriers to discharge with patient and caregiver   Arrange for needed discharge resources and transportation as appropriate   Identify discharge learning needs (meds, wound care, etc)   Arrange for interpreters to assist at discharge as needed   Refer to discharge planning if patient needs post-hospital services based on physician order or complex needs related to functional status, cognitive ability or social support system  Taken 2/9/2023 0800 by Raza Zamora RN  Discharge to home or other facility with appropriate resources: Identify barriers to discharge with patient and caregiver     Problem: Safety - Medical Restraint  Goal: Remains free of injury from restraints (Restraint for Interference with Medical Device)  Description: INTERVENTIONS:  1. Determine that other, less restrictive measures have been tried or would not be effective before applying the restraint  2. Evaluate the patient's condition at the time of restraint application  3. Inform patient/family regarding the reason for restraint  4.  Q2H: Monitor safety, psychosocial status, comfort, nutrition and hydration  Outcome: Progressing  Flowsheets (Taken 2/9/2023 0800 by Chase Babb RN)  Remains free of injury from restraints (restraint for interference with medical device): Determine that other, less restrictive measures have been tried or would not be effective before applying the restraint     Problem: Pain  Goal: Verbalizes/displays adequate comfort level or baseline comfort level  Outcome: Progressing  Flowsheets  Taken 2/9/2023 2000 by Argelia Vergara RN  Verbalizes/displays adequate comfort level or baseline comfort level:   Encourage patient to monitor pain and request assistance   Assess pain using appropriate pain scale   Administer analgesics based on type and severity of pain and evaluate response   Implement non-pharmacological measures as appropriate and evaluate response   Consider cultural and social influences on pain and pain management   Notify Licensed Independent Practitioner if interventions unsuccessful or patient reports new pain  Taken 2/9/2023 0800 by Chase Babb RN  Verbalizes/displays adequate comfort level or baseline comfort level: Encourage patient to monitor pain and request assistance     Problem: Respiratory - Adult  Goal: Achieves optimal ventilation and oxygenation  Outcome: Progressing  Flowsheets (Taken 2/9/2023 2000)  Achieves optimal ventilation and oxygenation:   Assess for changes in respiratory status   Assess for changes in mentation and behavior   Position to facilitate oxygenation and minimize respiratory effort   Oxygen supplementation based on oxygen saturation or arterial blood gases   Encourage broncho-pulmonary hygiene including cough, deep breathe, incentive spirometry   Assess the need for suctioning and aspirate as needed   Assess and instruct to report shortness of breath or any respiratory difficulty   Respiratory therapy support as indicated     Problem: Cardiovascular - Adult  Goal: Maintains optimal cardiac output and hemodynamic stability  Outcome: Progressing  Flowsheets (Taken 2/9/2023 2000)  Maintains optimal cardiac output and hemodynamic stability:   Monitor blood pressure and heart rate   Monitor urine output and notify Licensed Independent Practitioner for values outside of normal range   Assess for signs of decreased cardiac output   Administer fluid and/or volume expanders as ordered   Administer vasoactive medications as ordered  Goal: Absence of cardiac dysrhythmias or at baseline  Outcome: Progressing  Flowsheets (Taken 2/9/2023 2000)  Absence of cardiac dysrhythmias or at baseline:   Monitor cardiac rate and rhythm   Assess for signs of decreased cardiac output   Administer antiarrhythmia medication and electrolyte replacement as ordered     Problem: Metabolic/Fluid and Electrolytes - Adult  Goal: Electrolytes maintained within normal limits  Outcome: Progressing  Flowsheets  Taken 2/9/2023 2000 by Josafat Samaniego RN  Electrolytes maintained within normal limits:   Monitor labs and assess patient for signs and symptoms of electrolyte imbalances   Administer electrolyte replacement as ordered   Monitor response to electrolyte replacements, including repeat lab results as appropriate   Instruct patient on fluid and nutrition restrictions as appropriate  Taken 2/9/2023 0800 by Anupam Duran RN  Electrolytes maintained within normal limits:   Monitor labs and assess patient for signs and symptoms of electrolyte imbalances   Administer electrolyte replacement as ordered  Goal: Hemodynamic stability and optimal renal function maintained  Outcome: Progressing  Flowsheets  Taken 2/9/2023 2000 by Vannessa William RN  Hemodynamic stability and optimal renal function maintained:   Monitor labs and assess for signs and symptoms of volume excess or deficit   Monitor intake, output and patient weight   Monitor response to interventions for patient's volume status, including labs, urine output, blood pressure (other measures as available)   Encourage oral intake as appropriate   Instruct patient on fluid and nutrition restrictions as appropriate  Taken 2/9/2023 0800 by Honey Rutherford RN  Hemodynamic stability and optimal renal function maintained: Monitor labs and assess for signs and symptoms of volume excess or deficit     Problem: Hematologic - Adult  Goal: Maintains hematologic stability  Outcome: Progressing  Flowsheets  Taken 2/9/2023 2000 by Vanenssa William RN  Maintains hematologic stability:   Assess for signs and symptoms of bleeding or hemorrhage   Monitor labs for bleeding or clotting disorders  Taken 2/9/2023 0800 by Honey Rutherford RN  Maintains hematologic stability: Assess for signs and symptoms of bleeding or hemorrhage     Problem: Neurosensory - Adult  Goal: Achieves stable or improved neurological status  Outcome: Progressing  Flowsheets (Taken 2/9/2023 2000)  Achieves stable or improved neurological status:   Assess for and report changes in neurological status   Initiate measures to prevent increased intracranial pressure  Goal: Absence of seizures  Outcome: Progressing  Flowsheets (Taken 2/9/2023 2000)  Absence of seizures: Monitor for seizure activity.   If seizure occurs, document type and location of movements and any associated apnea  Goal: Achieves maximal functionality and self care  Outcome: Progressing  Flowsheets (Taken 2/9/2023 2000)  Achieves maximal functionality and self care:   Monitor swallowing and airway patency with patient fatigue and changes in neurological status   Encourage and assist patient to increase activity and self care with guidance from physical therapy/occupational therapy     Problem: Skin/Tissue Integrity  Goal: Absence of new skin breakdown  Description: 1. Monitor for areas of redness and/or skin breakdown  2. Assess vascular access sites hourly  3. Every 4-6 hours minimum:  Change oxygen saturation probe site  4. Every 4-6 hours:  If on nasal continuous positive airway pressure, respiratory therapy assess nares and determine need for appliance change or resting period.   Outcome: Progressing     Problem: Safety - Adult  Goal: Free from fall injury  Outcome: Progressing     Problem: ABCDS Injury Assessment  Goal: Absence of physical injury  Outcome: Progressing  Flowsheets (Taken 2/9/2023 1953)  Absence of Physical Injury: Implement safety measures based on patient assessment

## 2023-02-10 NOTE — PROGRESS NOTES
Clinical Pharmacy Note  Subcutaneous Anticoagulant Adjustment     Enoxaparin has been adjusted to Heparin SQ 5000 units TID based on [facility] policy. Recent Labs     02/09/23  1800 02/10/23  0300   CREATININE 2.3* 2.7*     Recent Labs     02/09/23  0346 02/09/23  1448 02/10/23  0300   HGB 10.9*   < > 10.8*   HCT 31.7*  --  35.4*     --  247   INR 1.62*  --   --     < > = values in this interval not displayed. Estimated Creatinine Clearance: 32 mL/min (A) (based on SCr of 2.7 mg/dL (H)). Pharmacist Review of Appropriate Use and Automatic Dose Adjustment of Subcutaneous Anticoagulants (Adult)    The guidance below is to provide initial recommendations for dosing. If recommended dose does not align well with patient's current clinical picture, communications with the care team will occur to determine most appropriate medication and dose. TABLE 1. ENOXAPARIN ROUTINE PROPHYLAXIS DOSING (Medically ill, routine surgery)   Patient Weight (kg)     50.9 and below 51 - 100.9 101 - 150.9 151 - 174.9 175 or greater         Estimated CrCl  (ml/min) 30 or greater   30 mg SUBQ daily   40 mg SUBQ daily 30 mg SUBQ BID  40 mg SUBQ BID 60mg SUBQ BID      15-29 UFH 5000 units SUBQ BID   30 mg SUBQ daily 30 mg SUBQ daily 40 mg SUBQ daily   60 mg SUBQ daily      Less than 15 or Dialysis UFH 5000 units SUBQ BID   UFH 5000 units SUBQ TID UFH 7500 units SUBQ TID       TABLE 2. ENOXAPARIN TREATMENT DOSING   (Based on 1mg/kg BID for DVT/PE/AFib)   Patient Weight (kg)     50.9 and below .9 151-189.9 190 or greater         Estimated CrCl  (ml/min) 30 or greater Recommend [facility] standardized UFH infusion, apixaban or rivaroxaban 1mg/kg SUBQ BID 1mg/kg SUBQ BID if anti-Xa levels are feasible per institution. Alternatively,  recommend switch to [facility] standardized UFH infusion     Recommend switch to [facility] standardized UFH infusion.       15-29 Recommend [facility] standardized UFH infusion or apixaban 1mg/kg SUBQ daily Recommend [facility] to [facility] standardized UFH infusion     Less than 15 or Dialysis Recommend switch to [facility] standardized UFH infusion.      Jason Alvares, [address] [facility] 2/10/2023 6:59 AM

## 2023-02-10 NOTE — PROGRESS NOTES
Speech Language Pathology    Attempted to see patient for clinical swallow evaluation. RN reports patient off BiPAP and on NRB. Will hold evaluation due to high oxygen demands and re-attempt later this afternoon as schedule permits.      Jayne Garcia, [address] [place] [address], [address] [place] [address]  Speech-Language Pathologist  On 02/10/23 at 7:50 AM

## 2023-02-10 NOTE — PROGRESS NOTES
Facility/Department: [address] CVICU  Initial Assessment  DYSPHAGIA BEDSIDE SWALLOW EVALUATION     Patient: Mary Alice Gee   : 1945   MRN: [id number]      Evaluation Date: 2/10/2023   Admitting Diagnosis: AMS (altered mental status) [R41.82]  Pain: Did not state                                                       Chart Review:   H&P:   Patient is a 78-year-old male with a past medical history of pleural effusion, coronary artery disease, CKD, A-fib CHF who was admitted on 2023 with pleural effusion and CHF exacerbation. At that time patient had an abnormal stress test and refused inpatient cardiac catheterization. Patient was admitted on  for Impella assisted LHC. After Impella was removed patient's mentation changed and code stroke was called. Patient was lethargic and intubated for respiratory failure. Patient was extubated on . Hospital course was complicated by V. tach and patient was started on amiodarone. Patient is now in shock and is on pressors with GISSELLE and oliguria. 2023 - intubated  2023 - extubated    Current Diet Level (prior to evaluation): NPO  NPO   Respiratory Status:   []Room Air   []O2 via nasal cannula   [x]Other: pt on non-re-breather mask    Imaging:  Chest X-ray: 2/10/2023  Impression   Stable exam with small right pleural effusion and perihilar/bibasilar   airspace opacities which may be related to pulmonary edema or pneumonia. Chest X-ray: 2023  Impression   Status post right PICC line placement in good position with no change in the   ET tube and gastric tube. Stable moderate cardiomegaly and moderate central pulmonary congestion which   is slightly less prominent. Hazy perihilar and bibasilar opacities which is increased and may be related   to pulmonary edema and/or worsening pneumonia. Small right pleural effusion which is more prominent         Head CT: 2023  Impression   No acute intracranial abnormality. Brain MRI ordered 2/8, not yet completed    Modified Barium Swallow Study: n/a per EMR review    Reason for referral: SLP evaluation orders received due to recent intubation     History/Prior Level of Function:   Living Status: pt lives alone, independent for all IADLs prior to admission  Baseline diet: regular/thin  Prior Dysphagia History: n/a per EMR review    Dysphagia Impressions/Diagnosis: Oropharyngeal Dysphagia   OROPHARYNGEAL DYSPHAGIA DIAGNOSIS:   Pt awake, alert, oriented to self, partially to place and time. Pt on non-re-breather, however c/o significant dry oral cavity and asking for water. RN and daughter at bedside. Pt on CCRT at this time. Oral motor exam revealed pt to have own dentition with missing teeth. Lingual/labial strength, ROM and coordination were all mild-moderately reduced. Pt unable to elicit volitional swallow, able to elicit volitional cough/throat clear. Moderate zeferino-pharyngeal dysphagia characterized by reduced labial rounding, reduced lingual control, suspected posterior loss of bolus, delayed initiation, reduced laryngeal elevation, immediate wet, weak cough. Pt presented with single spoon sips of thin water only on this date. Given respiratory needs and pt generalized weakness, further PO trials were not given. Recommend continue NPO at this time with re-assess for readiness for PO t+1. Recommendations reviewed with pt, daughter and RN. Pt with reduced insight, suspect reduced comprehension. Recommended Diet and Intervention 2/10/2023:  Diet Solids Recommendation:  NPO  Liquid Consistency Recommendation:  NPO Recommended form of Meds: Meds via alternative means      Compensatory Swallowing Strategies: To be determined     SHORT TERM DYSPHAGIA GOALS/PLAN OF CARE: Speech therapy for dysphagia tx 3-5 times per week during acute care stay.     Goals  Pt will functionally tolerate ongoing assessment of swallow function with diet to be determined as indicated   Pt will demonstrate understanding of aspiration risk and precautions via education/demonstration with occasional prompting  Pt will advance to least restrictive diet as indicated        Dysphagia Therapeutic Intervention:  Pharyngeal Exercise, Diet Tolerance Monitoring , Oral Motor Exercises , Patient/Family Education , Therapeutic Trials with SLP     Dysphagia Prognosis: [] good [x]fair  [x]guarded []poor  Barriers to progress: Limited insight into deficits and Decreased endurance    Discharge Recommendations: Discharge recommendations to be determined pending ongoing follow-up during acute care stay      TEST DATA  Vision: not fully assessed, appeared adequate  Hearing: pt required increased volume    Consistencies Presented: Thin liquid; single spoon sips of thin water only; reduced labial rounding; reduced bolus control; suspect posterior loss to pharynx, delayed initiation, reduced laryngeal elevation; immediate wet, weak cough    Cognitive/behavioral:   [x]orientation [x] to self [x] Place (partially) [x] Date (partially) [] reason for admit [] purpose of evaluation  [x]distractible  [x]verbally responsive  [x]follows one step commands  []agitated  [x]impulsive  [] other:     Patient Positioning: Upright in bed approx 70 degrees, unable to go up further d/t CRRT.     Dentition:  [x]Adequate  []Dentures   [x]Missing Many Teeth  []Edentulous  []Other:    Baseline Vocal Quality:  []Normal  []Dysphonic   []Aphonic   []Hoarse  []Wet  [x]Weak  []Other:    Volitional Cough:  []Strong  [x]Weak  []Wet  []Absent  []Congested  []Other:    Volitional Swallow:   [x]Absent   []Delayed     []Adequate     [x]Required use of drink     Oral Mechanism Exam:  []WFL []Mild   [x] Moderate  []Severe  []To be assessed  Impaired:   []Left side      []Right side    [x]Labial ROM/Coordination/strength   []Labial Symmetry   [x]Lingual ROM/Coordination/strength  []Lingual Symmetry  []Gag  []Other:     Oral Phase: []WFL []Mild   [x] Moderate []Severe  []To be assessed   []Impaired/Prolonged Mastication:   []Spillage Left:   []Spillage Right:  []Pocketing Left:   []Pocketing Right:   []Decreased Anterior to Posterior Transit:   [x]Suspected Premature Bolus Loss: thin water   []Lingual/Palatal Residue:   [x]Other: reduced labial closure    Pharyngeal Phase: []WFL []Mild   [x] Moderate  []Severe  []To be assessed   [x]Delayed Swallow:   [x]Suspected Pharyngeal Pooling:   [x]Decreased Laryngeal Elevation:   []Absent Swallow:  []Wet Vocal Quality:   []Throat Clearing-Immediate:   []Throat Clearing-Delayed:   [x]Cough-Immediate:   []Cough-Delayed:  []Change in Vital Signs:  [x]Suspected Delayed Pharyngeal Clearing: multiple swallows (3-4) per bolus  []Other:       Eating Assistance:  []Independent  []Setup or clean-up assistance   [] Supervision or touching assistance   [] Partial or moderate assistance   [] Substantial or maximal assistance  [x] Dependent       EDUCATION:   Provided education regarding role of SLP, results of assessment, recommendations and general speech pathology plan of care. [] Pt verbalized understanding and agreement   [x] Pt requires ongoing learning   [] No evidence of comprehension   Education also completed with pt daughter at bedside who expressed understanding and agreement    If patient discharges prior to next visit, this note will serve as discharge.      Timed Code Minutes: 0  Total Treatment Minutes: 17    Time in: 1500  Time out: Kir[place] 89    Electronically signed by:    Estela Campa MA CCC-SLP #[id number]  Speech-Language Pathologist  02/10/23  3:17pm

## 2023-02-11 NOTE — PROGRESS NOTES
POCT ABG   Latest Reference Range & Units 2/11/23 17:11   pH, Arterial 7.350 - 7.450  7.343 (L)   pCO2, Arterial 35.0 - 45.0 mm Hg 46.3 (H)   pO2, Arterial 75.0 - 108.0 mm Hg 182.1 (H)   HCO3, Arterial 21.0 - 29.0 mmol/L 25.1   TCO2 (calc), Art Not Established mmol/L 27   Base Excess, Arterial -3 - 3  -1   O2 Sat, Arterial 93 - 100 % 100       BiPaP removed, placed on 8L/min high flow cannula. RT aware.

## 2023-02-11 NOTE — PROGRESS NOTES
Hospitalist Progress Note  2/11/2023 8:37 AM  Subjective:   Admit Date: 2/8/2023  PCP: Hortencia Loja  Status: Inpatient   Interval History: Hospital Day: 4, admitted for Impella assisted LHC. After Impella was removed patient's mentation changed and code stroke was called. Patient was lethargic and intubated for respiratory failure. Patient was extubated on 2/8. Hospital course was complicated by V. tach and patient was started on amiodarone. Patient is now in shock and is on pressors with GISSELLE and oliguria. History of present illness:  Chronic systolic CHF LVEF 27-04%, CAD and chronic atrial fibrillation who came for Impella assisted PCI to the RCA and LAD 2/8/23. The procedure went well and the Impella was explanted. Unfortunately, he developed mental status chnages after the procedure concerning for a potential CVA. Head CT/CTA was unremarkable though. He ultimately was intubated for airway protection and extubated to BiPAP and requiring nonrebreather. CRRT required for euvolemia, managed by nephrology. Amiodarone initiated for ventricular tachycardia.      Diet: NPO  Right cephalic double lumen PICC (2/8, day #3)  Rectal tube (2/10, day #2)  Urinary catheter (2/8, day #3)  Right arterial line (2/9, day #2)  Right neck Vascath (2/10, day #2)  Medications:     prismaSATE BGK 4/2.5 1,000 mL/hr    dexmedetomidine (Precedex) 0.2 mcg/kg/hr    norepinephrine 12 mcg/min    vasopressin  0.04 Units/min      heparin   5,000 Units SubCUTAneous TID   clopidogrel  75 mg Oral Daily   hydrocortisone   50 mg IntraVENous Q6H   lorazepam  1 mg Oral Once   aspirin  81 mg Oral Daily   atorvastatin  80 mg Oral Nightly   pantoprazole   40 mg IntraVENous Daily     Recent Labs     02/09/23  0346 02/09/23  1448 02/10/23  0300 02/11/23  0419   WBC 9.0  --  14.9* 16.3*   HGB 10.9* 13.7 10.8* 10.3*     --  247 183   MCV 95.4  --  99.0 95.7     Recent Labs     02/10/23  1634 02/11/23  0008 02/11/23  0419    143 137   K 5.1 5.2* 4.8   CL 99 103 101   CO2 22 18* 22   BUN 46* 45* 40*   CREATININE 2.8* 2.8* 2.4*   GLUCOSE 154* 139* 131*     Recent Labs     02/09/23  1800 02/10/23  0300   AST 40* 42*   ALT 14 15   BILITOT 1.7* 1.4*   ALKPHOS 114 111     Troponin T (2/8) 0.03 ng/mL  INR (2/8) 1.73, (2/9) 1.62  Magnesium (2/11) 2.30 mg/dL  Ionized calcium (2/11) 1.13  Venous pH (2/11) 7.29    Portable CXR (2/10) Stable exam with small right pleural effusion and perihilar/bibasilar airspace opacities which may be related to pulmonary edema or pneumonia. Left heart cath with PCI (2/8) Right-dominant coronary arterial circulation. Successful intervention to 90% lesion and 99% more distal RCA lesion with a 3 mm overlapping drug-eluting stents dilated up to 3.2 mm proximally and 3.1 mm distally, resulting in 0% residual stenosis and SANTO III flow in the vessel. There was reversal of flow to  antegrade in the dominant right coronary artery. The PDA had been fed by collaterals. In the left system, there is trivial left main plaque disease. The LAD had an 80% mid lesion that was treated with a 3.5 x 22 mm Edvin frontier drug-eluting stent dilated to 3.6 mm in diameter resulting in 0% residual stenosis and SANTO III flow. The circumflex  has a chronic total occlusion in the midportion but the distal vessel fills in from collaterals. Left ventricular end-diastolic pressure 21 mmHg. No gradient across the aortic valve on pullback to suggest aortic stenosis. Insertion of short-term external heart assist system into heart, percutaneous approach. Removal of short-term external heart assist system from heart, percutaneous approach for assistance with cardiac output using impeller  pump, continuous. Echo (2/9)  Limited echo to assess left ventriclar ejection fraction. Suboptimal echo due to lung interference. Difficult to assess Left ventricular function but appears to be moderately depressed at about 35%.   Cannot exclude regional wall motion abnormalities secondary to poor endocardial visualization. Objective:   Vitals:  /76   Pulse 79   Temp (!) 95 °F (35 °C) (Axillary) Comment: blood warmer turned up  Resp 10   Ht 6' 1\" (1.854 m)   Wt 271 lb 2.7 oz (123 kg)   SpO2 100%   BMI 35.78 kg/m²   General appearance: alert and cooperative with exam  Lungs: clear to auscultation bilaterally  Heart: regular rate and rhythm, S1, S2 normal, no murmur, click, rub or gallop  Abdomen: soft, non-tender; bowel sounds normal; no masses,  no organomegaly  Extremities: extremities normal, atraumatic, no cyanosis or edema  Neurologic: No obvious focal neurologic deficits. Assessment and Plan:   GISSELLE on CKD stage 3, requiring CRRT 1.5 L UF (2/10). Adequate solute clearance per nephrology. Acute on chronic systolic heart failure, class 4 with Cardiomyopathy (EF 30%) Cardiogenic shock with hypotension on norepinephrine 12 mcg/min and vasopressin 0.04 Units/min. MAP of 65. CAD with unstable angina s/p PCI to the LAD and the RCA (2/8) on aspirin and clopidogrel dual anti-platelet therapy. Metabolic encephalopathy with agitation on non-invasive ventilation, initiated on dexmedetomidine (Precedex) by pulmonary. May not be best choice due to low EF per pulmonary. Avoiding benzodiazepines. MRI brain when clinically stable. Swallow evaluation or possible NG tube. Acute respiratory failure requiring intubation and mechanical ventilation (2/8-2/9) and extubated to BiPAP. Leukocytosis:  Initially treated with cefepime. Pneumonia panel negative. Antibiotic discontinued. Low procalcitonin and lactate. Chronic right pleural effusion s/p chest tube in January. Trapped lung per pulmonary with no intervention required as lung will not re-expand. Hemoptysis:  Suspected related to anticoagulation loading. Atrial fibrillation with rapid ventricular response:  Completed amiodarone infusion. Rate improve off milrinone. Thyromegaly with TSH mildly elevated c/w euthyroid sick syndrome. Left ICA pseudoaneurysm:  Outpatient follow up post discharge.      Advance Directive: Full Code  DVT prophylaxis with heparin 5,000 units sub-Q TID  Discharge planning: unknown at this time, ICU level of care      Frannie Ashby MD  Rounding Hospitalist

## 2023-02-11 NOTE — PROGRESS NOTES
Pulmonary Progress Note    CC:  Follow up respiratory failure    Subjective: On and off bilevel  Started on CRRT yesterday   Remains on pressors  Pneumonia panel negative   Precedex started for agitation       Intake/Output Summary (Last 24 hours) at 2/11/2023 0634  Last data filed at 2/11/2023 0600  Gross per 24 hour   Intake 1356.65 ml   Output 1510 ml   Net -153.35 ml         PHYSICAL EXAM:  Blood pressure 128/76, pulse 94, temperature 98 °F (36.7 °C), temperature source Axillary, resp. rate 23, height 6' 1\" (1.854 m), weight 271 lb 2.7 oz (123 kg), SpO2 100 %.'  Gen: Chronically ill appearing, slightly confused but conversant   Eyes: PERRL. No sclera icterus. No conjunctival injection. ENT: No discharge. Pharynx with NRB mask on  Neck: Trachea midline. No obvious mass. Resp: Diminished, poor effort   CV: Irregular. No murmur or rub. GI: Non-tender. Non-distended. No hernia. Skin: Pale  Lymph: No cervical LAD. No supraclavicular LAD. M/S: No cyanosis. No clubbing. No joint deformity.     Neuro: Confused, repetitive speech and saying the same thing over and over again  Ext:   ++ edema    Medications:    Scheduled Meds:   heparin (porcine)  5,000 Units SubCUTAneous 3 times per day    clopidogrel  75 mg Oral Daily    hydrocortisone sodium succinate PF  50 mg IntraVENous Q6H    LORazepam  1 mg Oral Once    succinylcholine  100 mg IntraVENous Once    aspirin  81 mg Oral Daily    Or    aspirin  300 mg Rectal Daily    atorvastatin  80 mg Oral Nightly    pantoprazole (PROTONIX) 40 mg injection  40 mg IntraVENous Daily    chlorhexidine  15 mL Mouth/Throat BID    lidocaine 1 % injection  5 mL IntraDERmal Once    sodium chloride flush  5-40 mL IntraVENous 2 times per day       Continuous Infusions:   prismaSATE BGK 4/2.5 1,000 mL/hr at 02/11/23 0342    prismaSATE BGK 4/2.5 500 mL/hr at 02/10/23 2234    prismaSATE BGK 4/2.5 500 mL/hr at 02/10/23 2234    dexmedetomidine 0.6 mcg/kg/hr (02/11/23 0500) norepinephrine 16 mcg/min (23 0500)    vasopressin (Septic Shock) infusion 0.04 Units/min (23 0500)    sodium chloride Stopped (02/10/23 1543)       PRN Meds:  potassium chloride, magnesium sulfate, sodium phosphate IVPB **OR** sodium phosphate IVPB **OR** sodium phosphate IVPB **OR** sodium phosphate IVPB, acetaminophen **OR** acetaminophen, ondansetron **OR** ondansetron, midazolam, sodium chloride flush, sodium chloride, naloxone    Labs:  CBC:   Recent Labs     23  0346 23  1448 02/10/23  0300 23  0419   WBC 9.0  --  14.9* 16.3*   HGB 10.9* 13.7 10.8* 10.3*   HCT 31.7*  --  35.4* 31.3*   MCV 95.4  --  99.0 95.7     --  247 183     BMP:   Recent Labs     02/10/23  1634 23  0008 23  0419    143 137   K 5.1 5.2* 4.8   CL 99 103 101   CO2 22 18* 22   PHOS 6.7* 5.4*  --    BUN 46* 45* 40*   CREATININE 2.8* 2.8* 2.4*     LIVER PROFILE:   Recent Labs     23  1800 02/10/23  0300   AST 40* 42*   ALT 14 15   BILIDIR 0.9*  --    BILITOT 1.7* 1.4*   ALKPHOS 114 111     PT/INR:   Recent Labs     23  1502 23  0346   PROTIME 20.2* 19.3*   INR 1.73* 1.62*     APTT: No results for input(s): APTT in the last 72 hours. UA:  Recent Labs     23  1555   COLORU DARK YELLOW*   PHUR 5.0   WBCUA 39*   RBCUA 684*   MUCUS 1+*   YEAST Present*   BACTERIA None Seen   CLARITYU CLOUDY*   SPECGRAV 1.074   LEUKOCYTESUR MODERATE*   UROBILINOGEN 1.0   BILIRUBINUR SMALL*   BLOODU LARGE*   GLUCOSEU Negative     No results for input(s): PH, PCO2, PO2 in the last 72 hours.         Films:  Chest imaging reports were reviewed and imaging was reviewed by me and showed bilateral airspace disease, effusion on right with vascath and PICC in place     [id number]    Cultures:  Pneumonia panel:  Negative   MRSA probe:  negative     I reviewed the labs and images listed above    Assessment/Plan:   Acute Hypoxic Respiratory Failure with saturations less than 90% on room air and cyanotic appearance  Titrate oxygen for saturations greater than or equal to 90%. Currently on 13 liters of oxygen   Bilevel as needed   Acute on Chronic Hypercapnic Respiratory Failure  Bilevel when needed  Cardiogenic Shock suspected   Levophed for MAP of 65  Continuous Vasopressin 0.04  Hydrocortisone  50 mg q 6 hours   Discontinue antibiotics as the pneumonia panel is negative   GISSELLE  RRT per Nephrology   Hemoptysis, slightly better. Still suspect related to anticoagulation loading  Metabolic Encephalopathy  Precedex may not be the best option due to low EF  Try to avoid ativan if possible. Seroquel or Haldol but QT has been prolonged   Cefepime to be discontinued which does cause encephalopathy  Chronic Right pleural effusion s/p chest tube in January. Has trapped lung   No intervention required as lung will not re-expand   Cardiomyopathy with EF of 30%  RRT per nephrology   Possible seizure and/or CVA  CAD s/p cardiac intervention   Per Cardiology   Afib with RVR  Off amiodarone infusion  Rate has improved due to being of milrinone     DVT prophylaxis Heparin     Critical care time of 32 minutes. This does not include procedural time. Please see procedural notes for details.     Naz Fernandez DO  [place] [place] [facility] [place] [facility]

## 2023-02-11 NOTE — PROGRESS NOTES
4 Eyes Skin Assessment     NAME:  Geovanny Lopez  YOB: 1945  MEDICAL RECORD NUMBER:  [id number]    The patient is being assessed for  Shift Handoff    I agree that One RN has performed a thorough Head to Toe Skin Assessment on the patient. ALL assessment sites listed below have been assessed. Areas assessed by both nurses:    Head, Face, Ears, Shoulders, Back, Chest, Arms, Elbows, Hands, Sacrum. Buttock, Coccyx, Ischium, and Legs. Feet and Heels        Does the Patient have a Wound?  No noted wound(s)       Tera Prevention initiated by RN: NA   Wound Care Orders initiated by RN: NA    Pressure Injury (Stage 3,4, Unstageable, DTI, NWPT, and Complex wounds) if present, place referral order by RN under : NA    New and Established Ostomies, if present place, referral order under : NA      Nurse 1 eSignature: Electronically signed by Gio Healy RN on 2/11/23 at 6:33 AM EST    **SHARE this note so that the co-signing nurse can place an eSignature**    Nurse 2 eSignature: Electronically signed by Lencho Villanueva RN on 2/11/23 at 6:34 AM EST

## 2023-02-11 NOTE — PROGRESS NOTES
[address]   Daily Progress Note      Admit Date:  2/8/2023      Subjective:   Mr. Bryson Altman is a 77yo male with chronic systolic CHF LVEF 96-20%, CAD and chronic atrial fibrillation who came for Impella assisted PCI to the RCA and LAD 2/8/23. The procedure went well and the Impella was explanted. Unfortunately, he developed mental status chnages after the procedure concerning for a potential CVA. Head CT/CTA was unremarkable though. He ultimately was intubated for airway protection. Interval History:  He is currently on a nonrebreather  Hemodynamics continue to improve as his vasopressor requirement has become less  He is able to tolerate 100 cc of fluid removed per hour      Objective:     /76   Pulse 68   Temp (!) 95 °F (35 °C) (Axillary) Comment: blood warmer turned up  Resp 14   Ht 6' 1\" (1.854 m)   Wt 271 lb 2.7 oz (123 kg)   SpO2 100%   BMI 35.78 kg/m²      Intake/Output Summary (Last 24 hours) at 2/11/2023 1431  Last data filed at 2/11/2023 1400  Gross per 24 hour   Intake 1054.31 ml   Output 2179 ml   Net -1124.69 ml         Physical Exam:  General:  Lethargic, NAD  Skin:  Warm and dry  Neck:  JVD<8, no carotid bruits  Chest:  On Bipap. Diminished bilaterally.     Cardiovascular:  Irreg irreg and mildly tachy, normal S1/S2, no M/R/G  Abdomen:  Soft, nontender, +bowel sounds  Extremities:  2+ bilateral LE edema  Pulses: 2+ bilat carotid    2+ bilat radial    2+ bilat femoral        Medications:    heparin (porcine)  5,000 Units SubCUTAneous 3 times per day    clopidogrel  75 mg Oral Daily    hydrocortisone sodium succinate PF  50 mg IntraVENous Q6H    LORazepam  1 mg Oral Once    succinylcholine  100 mg IntraVENous Once    aspirin  81 mg Oral Daily    Or    aspirin  300 mg Rectal Daily    atorvastatin  80 mg Oral Nightly    pantoprazole (PROTONIX) 40 mg injection  40 mg IntraVENous Daily    chlorhexidine  15 mL Mouth/Throat BID    lidocaine 1 % injection  5 mL IntraDERmal Once    sodium chloride flush  5-40 mL IntraVENous 2 times per day      prismaSATE BGK 4/2.5 1,000 mL/hr at 02/11/23 [address] 4/2.5 500 mL/hr at 02/11/23 0841    prismaSATE BGK 4/2.5 500 mL/hr at 02/11/23 0921    dexmedetomidine 0.1 mcg/kg/hr (02/11/23 1400)    norepinephrine 10 mcg/min (02/11/23 1400)    vasopressin (Septic Shock) infusion Stopped (02/11/23 1338)    sodium chloride Stopped (02/10/23 1543)       Lab Data:  CBC:   Recent Labs     02/09/23  0346 02/09/23  1448 02/10/23  0300 02/11/23  0419   WBC 9.0  --  14.9* 16.3*   HGB 10.9* 13.7 10.8* 10.3*     --  247 183       BMP:    Recent Labs     02/11/23  0008 02/11/23  0419 02/11/23  0919    137 136   K 5.2* 4.8 4.9   CO2 18* 22 21   BUN 45* 40* 36*   CREATININE 2.8* 2.4* 2.3*       LIVR:   Recent Labs     02/09/23  1800 02/10/23  0300   AST 40* 42*   ALT 14 15       PT/INR:   Recent Labs     02/08/23  1502 02/09/23  0346 02/09/23  1800 02/10/23  0300   PROT  --   --  6.6 6.9   INR 1.73* 1.62*  --   --        Recent Labs     02/08/23  1502   TROPONINI 0.03*       FASTING LIPID PANEL:  Lab Results   Component Value Date/Time    CHOL 84 02/09/2023 03:46 AM    HDL 34 02/09/2023 03:46 AM    TRIG 61 02/09/2023 03:46 AM     LHC/PCI 2/8/23:  1. Right-dominant coronary arterial circulation. Successful  intervention to 90% lesion and 99% more distal RCA lesion with a 3 mm  overlapping drug-eluting stents dilated up to 3.2 mm proximally and 3.1  mm distally, resulting in 0% residual stenosis and SANTO III flow in the  vessel. There was reversal of flow to antegrade in the dominant right  coronary artery. The PDA had been fed by collaterals. In the left  system, there is trivial left main plaque disease. The LAD had an 80%  mid lesion that was treated with a 3.5 x 22 mm Edvin frontier  drug-eluting stent dilated to 3.6 mm in diameter resulting in 0%  residual stenosis and SANTO III flow.   The circumflex has a chronic total  occlusion in the midportion but the distal vessel fills in from  collaterals. 2.  Left ventricular end-diastolic pressure 21 mmHg. 3.  No gradient across the aortic valve on pullback to suggest aortic  stenosis. PROCEDURES PERFORMED:  1. Left heart catheterization. 2.  Percutaneous coronary intervention with drug-eluting stents to the  dominant right coronary artery. 3.  Percutaneous coronary intervention with drug-eluting stent to the  mid left anterior descending artery. 4.  Insertion of short-term external heart assist system into heart,  percutaneous approach. 5.  Removal of short-term external heart assist system from heart,  percutaneous approach for assistance with cardiac output using impeller  pump, continuous. Stress Perfusion 1/29/23:  Pharmacological Stress/MPI Results:  1. Technically a satisfactory study. 2. Large mostly dense perfusion defect involving lateral and inferolateral wall suggestive of previous MI. No reversibility /ischemia  3. Gated Study shows markedly dilated LV with lateral and inferolateral akinesis; EF 29 %. Echo 1/5/23:  Poor image quality. Definity contrast administered. Patient appears to be in atrial fibrillation. Overall left ventricular systolic function appears severely reduced. Ejection fraction is visually estimated to be 20-25% with diffuse  hypokinesis and regional abnormalities including inferior akinesis. Normal left ventricular wall thickness and cavity size. The right ventricle appears normal in size with severely reduced systolic function. The left and right atria are moderately dilated. Possibly severe mitral regurgitation. The aortic valve leaflets are not well visualized. Mild aortic stenosis based on a peak velocity of 2.6 m/s and a mean pressure gradient  of 20 mmHg. Gradients may be underestimated with reduced LV function. Trivial aortic regurgitation. Trivial pericardial effusion.   IVC size is dilated (>2.1 cm) and collapses < 50% with respiration consistent with elevated RA pressure (15 mmHg).    Assessment / Plan:   1.Ventricular Tachycardia  Plan is to place an NG tube today we will start p.o. amiodarone    2.Acute on chronic systolic CHF, class 4  Volume removal via CRRT  Once his hemodynamics tolerate can restart close guideline directed medical therapy    3.Unstable Angina  S/p PCI to the LAD and the RCA on 2/8/23.  NG tube placement today to restart dual antiplatelet therapy we will reload with Plavix 70 mg x 1    4.Mental Status Change  Improved. CVA seems less likely as the patient is neuro intact to his baseline. Punctate infarcts still a possibility.   Neurology following.  Will obtain brain MRI when patient is more stable.    Pepe Renteria MD [facility]  General, Interventional Cardiology, and Peripheral Vascular Disease   [facility]   (O): [phone]  (F): [phone]

## 2023-02-11 NOTE — PROGRESS NOTES
Patient has become increasingly agitated. Wanting to go home. Playing with arterial line. Is only alert and oriented to self. Talked with Dr. May Buerger. Order for precedex infusion. Patients has also had 7 liquid stools today. FMS placed.

## 2023-02-11 NOTE — PROGRESS NOTES
[facility]                                               Progress note    Summary:   Oumar Avendano is being seen by nephrology for GISSELLE. Admitted with shortness of breath. Patient was admitted to the ICU on 2/8/2023. Prior to this on Wednesday he had a heart cath procedure with required Impella support. There was concern for possible stroke afterwards. Later he had not episode of unresponsiveness, cyanotic with ventricular tachycardia and required intubation for airway protection. .    Interval History  Seen and examined at bedside  Very somnolent,'s just fell asleep at 5:30 AM  Increased UF with CRRT to 50 cc/h last night, has been tolerating this and his pressor requirements coming down    Urine output 70 cc  CRRT 1.5 L UF  Blood pressure 109/63  On Levophed 12 mics, vasozero 0.04    Labs reviewed  Sodium 137 potassium 4.8 bicarb 22 BUN 40 creatinine 2.4 calcium 8.7 hemoglobin 10.3      Plan:   -Continue CRRT, 4K bath 2.5 calcium. UF increased to 100 cc/h as long as pressor requirement continues to trend down.  -Labs and electrolyte protocol in place.  -Adequate solute clearance, leave effluent dose the same       Rayray Sheikh MD  [facility]  Office: [phone]    Assessment:   #GISSELLE, oliguric  Likely due to hemodynamic mediated GISSELLE resulting in ATN. He was severely hypotensive requiring high-dose of pressors, was intubated with hypoxic respiratory failure and subsequently developed renal failure. He had a few doses of IV contrast 1 on 1/30/2023 and 2/8/2023. Etiology of his hypotension is unclear at this point  Urinalysis after catheter being placed showed dark yellow urine specific gravity 1.074,  WBC 39 present used 100 mg/dL of albumin  Hold off on further testing of GISSELLE at this time.     #Hyperkalemia  Due to renal failure, oliguric    #Metabolic acidosis  Due to renal failure  Addressed with dialysis    #Shock  Etiology unclear, cultures are negative so does not peer to have any source of sepsis. Procalcitonin was only mildly elevated 0.61  Discussed with cardiology about possible [place] to see how his cardiac index is. His EF 35%      ROS:     Positives Listed Bold. All other remaining systems are negative. Constitutional:  fever, chills, weakness, weight change, fatigue,      Skin:  rash, pruritus, hair loss, bruising, dry skin, petechiae. Head, Face, Neck   headaches, swelling,  cervical adenopathy. Respiratory: shortness of breath, cough, or wheezing  Cardiovascular: chest pain, palpitations, dizzy, edema  Gastrointestinal: nausea, vomiting, diarrhea, constipation,belly pain    Yellow skin, blood in stool  Musculoskeletal:  back pain, muscle weakness, gait problems,       joint pain or swelling. Genitourinary:  dysuria, poor urine flow, flank pain, blood in urine  Neurologic:  vertigo, TIA'S, syncope, seizures, focal weakness  Psychosocial:  insomnia, anxiety, or depression. Additional positive findings: -     PMH:   Past medical history, surgical history, social history, family history are reviewed and updated as appropriate. Reviewed current medication list.   Allergies reviewed and updated as needed. PE:   Vitals:    02/11/23 0700   BP:    Pulse: 87   Resp: 24   Temp:    SpO2: 99%       General appearance:  in NAD, somnolent. Comfortable. HEENT: EOM intact, no icterus. Trachea is midline. Neck : No masses, appears symmetrical, no JVD  Respiratory: Respiratory effort appears normal, bilateral equal chest rise, no wheeze,+ crackles   Cardiovascular: Ausculation shows RRR ++ edema  Abdomen: No visible mass or tenderness, non distended. Musculoskeletal:  Joints with no swelling or deformity. Skin:no rashes, ulcers, induration, no jaundice. Neuro: face symmetric, no focal deficits. Appropriate responses.        Lab Results   Component Value Date    CREATININE 2.4 (H) 02/11/2023    BUN 40 (H) 02/11/2023     02/11/2023    K 4.8 02/11/2023     02/11/2023    CO2 22 02/11/2023      Lab Results   Component Value Date    WBC 16.3 (H) 02/11/2023    HGB 10.3 (L) 02/11/2023    HCT 31.3 (L) 02/11/2023    MCV 95.7 02/11/2023     02/11/2023     Lab Results   Component Value Date    CALCIUM 8.7 02/11/2023    CAION 1.13 02/11/2023    PHOS 5.4 (H) 02/11/2023

## 2023-02-11 NOTE — PROGRESS NOTES
Pt now oriented to place. Slow with verbal replies at times. Pt tolerating CRRT goal net -100/hr. Able to wean O2 and levophed, see flowsheets and eMAR.

## 2023-02-11 NOTE — PROGRESS NOTES
Speech Language Pathology    Dysphagia Treatment Attempt:    Chart reviewed, discussed with ISAIAH Hurd, and attempted bedside follow up with daughter present. Pt with decreasing O2 needs this morning, CRRT in progress, but he is quite confused with reduced attention, inconsistently able to follow commands, and unable to elicit a productive cough, despite reflexive attempts to cough. Pt sounds quite congested but RN is unsuccessful in suction attempts. Discussed with RN and daughter that pt is unsafe for PO trials at this time, and they are both in agreement. SLP will reattempt on Monday 2/13; daughter and RN are aware.     Jamie Meza MS, CCC-SLP #[id number]  Speech Language Pathologist

## 2023-02-11 NOTE — PROGRESS NOTES
Assessment completed. Pt was sleeping, tolerating BiPAP, awaken easily. Pt fully awake at this time writing notes to daughter Carlton Torrez. Labs drawn and sent to lab.

## 2023-02-11 NOTE — PLAN OF CARE
Problem: Chronic Conditions and Co-morbidities  Goal: Patient's chronic conditions and co-morbidity symptoms are monitored and maintained or improved  Outcome: Progressing     Problem: Discharge Planning  Goal: Discharge to home or other facility with appropriate resources  Outcome: Progressing     Problem: Safety - Medical Restraint  Goal: Remains free of injury from restraints (Restraint for Interference with Medical Device)  Description: INTERVENTIONS:  1. Determine that other, less restrictive measures have been tried or would not be effective before applying the restraint  2. Evaluate the patient's condition at the time of restraint application  3. Inform patient/family regarding the reason for restraint  4. Q2H: Monitor safety, psychosocial status, comfort, nutrition and hydration  Outcome: Progressing     Problem: Pain  Goal: Verbalizes/displays adequate comfort level or baseline comfort level  Outcome: Progressing     Problem: Respiratory - Adult  Goal: Achieves optimal ventilation and oxygenation  Outcome: Progressing-wean O2      Problem: Cardiovascular - Adult  Goal: Maintains optimal cardiac output and hemodynamic stability  Outcome: Progressing-coming down on vasopressors  Goal: Absence of cardiac dysrhythmias or at baseline  Outcome: Progressing     Problem: Metabolic/Fluid and Electrolytes - Adult  Goal: Electrolytes maintained within normal limits  Outcome: Progressing  Goal: Hemodynamic stability and optimal renal function maintained  Outcome: Progressing     Problem: Hematologic - Adult  Goal: Maintains hematologic stability  Outcome: Progressing     Problem: Neurosensory - Adult  Goal: Achieves stable or improved neurological status  Outcome: Progressing  Goal: Absence of seizures  Outcome: Progressing  Goal: Achieves maximal functionality and self care  Outcome: Progressing     Problem: Skin/Tissue Integrity  Goal: Absence of new skin breakdown  Description: 1.   Monitor for areas of redness and/or skin breakdown  2. Assess vascular access sites hourly  3. Every 4-6 hours minimum:  Change oxygen saturation probe site  4. Every 4-6 hours:  If on nasal continuous positive airway pressure, respiratory therapy assess nares and determine need for appliance change or resting period.   Outcome: Progressing     Problem: Safety - Adult  Goal: Free from fall injury  Outcome: Progressing     Problem: ABCDS Injury Assessment  Goal: Absence of physical injury  Outcome: Progressing

## 2023-02-12 NOTE — PROGRESS NOTES
Bedside shift handoff with Cathy Burris RN @ 2606. Pt resting quietly wearing BiPAP at this time. Assessment completed. Daughter Carlton Torrez arrived to bedside. Pt transitioned to nasal cannula. Scheduled medications administered. While administering NG medications, pt repetitively saying \"I didn't order any medications. \" CRRT labs drawn via A line without difficulty, pt repetitively saying \"Don't take my blood. \"    Dr Orlando Henriquez to bedside for nephrology rounds. Status reviewed. Daughter bedside and able to ask questions.  Continue fluid removal goal -100/hr; can reach for -150ml/hr if ABP tolerates without increasing Levophed gtt >10mcg/min

## 2023-02-12 NOTE — PROGRESS NOTES
Hospitalist Progress Note  2/12/2023 9:55 AM  Subjective:   Admit Date: 2/8/2023  PCP: Julianne Jones Status: Inpatient   Interval History: Hospital Day: 5, vasopressin discontinued, continues on norepinephrine decreased from 12 to 6 mcg/min. History of present illness:  Admitted for Impella assisted LHC. After Impella was removed patient's mentation changed and code stroke was called. Patient was lethargic and intubated for respiratory failure. Patient was extubated on 2/8. Hospital course was complicated by V. tach and patient was started on amiodarone. Patient is now in shock and is on pressors with GISSELLE and oliguria. Chronic systolic CHF LVEF 21-13%, CAD and chronic atrial fibrillation who came for Impella assisted PCI to the RCA and LAD 2/8/23. The procedure went well and the Impella was explanted. Unfortunately, he developed mental status chnages after the procedure concerning for a potential CVA. Head CT/CTA was unremarkable though. He ultimately was intubated for airway protection and extubated to BiPAP and requiring nonrebreather. CRRT required for euvolemia, managed by nephrology. Amiodarone initiated for ventricular tachycardia.       Diet: NPO (sips of water with meds)  Right cephalic double lumen PICC (2/8, day #4)  Rectal tube (2/10, day #3)  Urinary catheter (2/8, day #4)  Right arterial line (2/9, day #3)  Right neck Vascath (2/10, day #3)  Medications:     prismaSATE BGK 4/2.5 1,000 mL/hr   dexmedetomidine 0.2 mcg/kg/hr    norepinephrine 6 mcg/min      amiodarone  200 mg Oral Daily   heparin  5,000 Units SubCUTAneous TID   clopidogrel  75 mg Oral Daily   hydrocortisone   50 mg IntraVENous Q6H   aspirin  81 mg Oral Daily   atorvastatin  80 mg Oral Nightly   pantoprazole  40 mg IntraVENous Daily     Recent Labs     02/10/23  0300 02/11/23  0419 02/12/23  0530   WBC 14.9* 16.3* 13.2*   HGB 10.8* 10.3* 10.3*    183 161   MCV 99.0 95.7 97.8     Recent Labs     02/12/23  0100 02/12/23  0530 02/12/23  0828    137 137   K 4.8 4.8 5.0    102 102   CO2 24 23 24   BUN 33* 33* 33*   CREATININE 2.1* 2.1* 2.1*   GLUCOSE 112* 110* 110*     Recent Labs     02/09/23  1800 02/10/23  0300   AST 40* 42*   ALT 14 15   BILITOT 1.7* 1.4*   ALKPHOS 114 111     Troponin T (2/8) 0.03 ng/mL  INR (2/8) 1.73, (2/9) 1.62  Magnesium (2/11) 2.30, (2/12) 2.50 mg/dL  Ionized calcium (2/11) 1.13, (2/12) 1.17 mmol/L  Venous pH (2/11) 7.29     Portable CXR (2/10) Stable exam with small right pleural effusion and perihilar/bibasilar airspace opacities which may be related to pulmonary edema or pneumonia. Left heart cath with PCI (2/8) Right-dominant coronary arterial circulation. Successful intervention to 90% lesion and 99% more distal RCA lesion with a 3 mm overlapping drug-eluting stents dilated up to 3.2 mm proximally and 3.1 mm distally, resulting in 0% residual stenosis and SANTO III flow in the vessel. There was reversal of flow to  antegrade in the dominant right coronary artery. The PDA had been fed by collaterals. In the left system, there is trivial left main plaque disease. The LAD had an 80% mid lesion that was treated with a 3.5 x 22 mm Edvin frontier drug-eluting stent dilated to 3.6 mm in diameter resulting in 0% residual stenosis and SANTO III flow. The circumflex  has a chronic total occlusion in the midportion but the distal vessel fills in from collaterals. Left ventricular end-diastolic pressure 21 mmHg. No gradient across the aortic valve on pullback to suggest aortic stenosis. Insertion of short-term external heart assist system into heart, percutaneous approach. Removal of short-term external heart assist system from heart, percutaneous approach for assistance with cardiac output using impeller pump, continuous. Echo (2/9)  Limited echo to assess left ventriclar ejection fraction. Suboptimal echo due to lung interference.  Difficult to assess Left ventricular function but appears to be moderately depressed at about 35%. Cannot exclude regional wall motion abnormalities secondary to poor endocardial visualization. Objective:   Vitals:  /55   Pulse 67   Temp 97.2 °F (36.2 °C) (Axillary)   Resp 11   Ht 6' 1\" (1.854 m)   Wt 271 lb 2.7 oz (123 kg)   SpO2 97% on 4 LPM  BMI 35.78 kg/m²   General appearance: alert and cooperative with exam  Lungs: clear to auscultation bilaterally  Heart: regular rate and rhythm, S1, S2 normal, no murmur, click, rub or gallop  Abdomen: soft, non-tender; bowel sounds normal; no masses,  no organomegaly  Extremities: extremities normal, atraumatic, no cyanosis or edema  Neurologic: No obvious focal neurologic deficits. Assessment and Plan:   GISSELLE on CKD stage 3, requiring CRRT 1.5 L UF (2/10). Adequate solute clearance per nephrology. Acute on chronic systolic heart failure, class 4 with Cardiomyopathy (EF 30%) Cardiogenic shock with hypotension on norepinephrine 12 mcg/min and vasopressin 0.04 Units/min. MAP of 65. CAD with unstable angina s/p PCI to the LAD and the RCA (2/8) on aspirin and clopidogrel dual anti-platelet therapy. Metabolic encephalopathy with agitation on non-invasive ventilation, initiated on dexmedetomidine (Precedex) by pulmonary. May not be best choice due to low EF per pulmonary. Avoiding benzodiazepines. MRI brain when clinically stable. Swallow evaluation or possible NG tube. Acute respiratory failure requiring intubation and mechanical ventilation (2/8-2/9) and extubated to BiPAP. Leukocytosis:  Initially treated with cefepime. Pneumonia panel negative. Antibiotic discontinued. Low procalcitonin and lactate. Chronic right pleural effusion s/p chest tube in January. Trapped lung per pulmonary with no intervention required as lung will not re-expand. Hemoptysis:  Suspected related to anticoagulation loading.    Atrial fibrillation with rapid ventricular response:  Completed amiodarone infusion. Rate improve off milrinone. Thyromegaly with TSH mildly elevated c/w euthyroid sick syndrome. Left ICA pseudoaneurysm:  Outpatient follow up post discharge.       Advance Directive: Full Code  DVT prophylaxis with heparin 5,000 units sub-Q TID  Discharge planning: unknown at this time, ICU level of care      Cal Paz MD  [facility] Hospitalist

## 2023-02-12 NOTE — PLAN OF CARE
Problem: Chronic Conditions and Co-morbidities  Goal: Patient's chronic conditions and co-morbidity symptoms are monitored and maintained or improved  Outcome: Progressing  Flowsheets (Taken 2/11/2023 2000 by Apolinar Sheriff RN)  Care Plan - Patient's Chronic Conditions and Co-Morbidity Symptoms are Monitored and Maintained or Improved:   Monitor and assess patient's chronic conditions and comorbid symptoms for stability, deterioration, or improvement   Collaborate with multidisciplinary team to address chronic and comorbid conditions and prevent exacerbation or deterioration   Update acute care plan with appropriate goals if chronic or comorbid symptoms are exacerbated and prevent overall improvement and discharge     Problem: Discharge Planning  Goal: Discharge to home or other facility with appropriate resources  Outcome: Progressing  Flowsheets (Taken 2/12/2023 1119)  Discharge to home or other facility with appropriate resources:   Identify barriers to discharge with patient and caregiver   Arrange for needed discharge resources and transportation as appropriate   Identify discharge learning needs (meds, wound care, etc)   Refer to discharge planning if patient needs post-hospital services based on physician order or complex needs related to functional status, cognitive ability or social support system     Problem: Pain  Goal: Verbalizes/displays adequate comfort level or baseline comfort level  Outcome: Progressing  Flowsheets (Taken 2/9/2023 2000 by Argelia Vergara RN)  Verbalizes/displays adequate comfort level or baseline comfort level:   Encourage patient to monitor pain and request assistance   Assess pain using appropriate pain scale   Administer analgesics based on type and severity of pain and evaluate response   Implement non-pharmacological measures as appropriate and evaluate response   Consider cultural and social influences on pain and pain management   Notify Licensed Independent Practitioner if interventions unsuccessful or patient reports new pain     Problem: Respiratory - Adult  Goal: Achieves optimal ventilation and oxygenation  Outcome: Progressing  Flowsheets (Taken 2/12/2023 1119)  Achieves optimal ventilation and oxygenation:   Assess for changes in respiratory status   Assess for changes in mentation and behavior   Position to facilitate oxygenation and minimize respiratory effort   Oxygen supplementation based on oxygen saturation or arterial blood gases   Encourage broncho-pulmonary hygiene including cough, deep breathe, incentive spirometry   Assess the need for suctioning and aspirate as needed   Assess and instruct to report shortness of breath or any respiratory difficulty   Respiratory therapy support as indicated     Problem: Cardiovascular - Adult  Goal: Maintains optimal cardiac output and hemodynamic stability  Outcome: Progressing  Flowsheets (Taken 2/12/2023 1119)  Maintains optimal cardiac output and hemodynamic stability:   Monitor blood pressure and heart rate   Monitor urine output and notify Licensed Independent Practitioner for values outside of normal range   Assess for signs of decreased cardiac output   Administer fluid and/or volume expanders as ordered   Administer vasoactive medications as ordered  Goal: Absence of cardiac dysrhythmias or at baseline  Outcome: Progressing  Flowsheets (Taken 2/12/2023 1119)  Absence of cardiac dysrhythmias or at baseline:   Monitor cardiac rate and rhythm   Assess for signs of decreased cardiac output   Administer antiarrhythmia medication and electrolyte replacement as ordered     Problem: Metabolic/Fluid and Electrolytes - Adult  Goal: Electrolytes maintained within normal limits  Outcome: Progressing  Flowsheets (Taken 2/12/2023 1119)  Electrolytes maintained within normal limits:   Monitor labs and assess patient for signs and symptoms of electrolyte imbalances   Administer electrolyte replacement as ordered   Monitor response to electrolyte replacements, including repeat lab results as appropriate   Fluid restriction as ordered   Instruct patient on fluid and nutrition restrictions as appropriate  Goal: Hemodynamic stability and optimal renal function maintained  Outcome: Progressing  Flowsheets (Taken 2/12/2023 1119)  Hemodynamic stability and optimal renal function maintained:   Monitor labs and assess for signs and symptoms of volume excess or deficit   Monitor intake, output and patient weight   Monitor urine specific gravity, serum osmolarity and serum sodium as indicated or ordered   Monitor response to interventions for patient's volume status, including labs, urine output, blood pressure (other measures as available)   Instruct patient on fluid and nutrition restrictions as appropriate     Problem: Hematologic - Adult  Goal: Maintains hematologic stability  Outcome: Progressing  Flowsheets (Taken 2/12/2023 1119)  Maintains hematologic stability:   Assess for signs and symptoms of bleeding or hemorrhage   Monitor labs for bleeding or clotting disorders     Problem: Neurosensory - Adult  Goal: Achieves stable or improved neurological status  Outcome: Progressing  Flowsheets (Taken 2/12/2023 1119)  Achieves stable or improved neurological status:   Assess for and report changes in neurological status   Initiate measures to prevent increased intracranial pressure   Maintain blood pressure and fluid volume within ordered parameters to optimize cerebral perfusion and minimize risk of hemorrhage   Monitor temperature, glucose, and sodium. Initiate appropriate interventions as ordered  Goal: Absence of seizures  Outcome: Progressing  Flowsheets (Taken 2/12/2023 1119)  Absence of seizures:   Monitor for seizure activity.   If seizure occurs, document type and location of movements and any associated apnea   If seizure occurs, turn head to side and suction secretions as needed   Administer anticonvulsants as ordered   Support airway/breathing, administer oxygen as needed  Goal: Achieves maximal functionality and self care  Outcome: Progressing  Flowsheets (Taken 2/12/2023 1119)  Achieves maximal functionality and self care:   Monitor swallowing and airway patency with patient fatigue and changes in neurological status   Encourage and assist patient to increase activity and self care with guidance from physical therapy/occupational therapy     Problem: Skin/Tissue Integrity  Goal: Absence of new skin breakdown  Description: 1. Monitor for areas of redness and/or skin breakdown  2. Assess vascular access sites hourly  3. Every 4-6 hours minimum:  Change oxygen saturation probe site  4. Every 4-6 hours:  If on nasal continuous positive airway pressure, respiratory therapy assess nares and determine need for appliance change or resting period.   Outcome: Progressing  Note: A: Low air loss/alternating pressure specialty mattress, assist with turn/repositioning, incontinence care, Preventative Sacral Mepilex border, off loading pressure areas, Nutritional consult, daily weight, I/O       Problem: Safety - Adult  Goal: Free from fall injury  Outcome: Progressing  Flowsheets (Taken 2/12/2023 1119)  Free From Fall Injury: Instruct family/caregiver on patient safety     Problem: ABCDS Injury Assessment  Goal: Absence of physical injury  Outcome: Progressing  Flowsheets (Taken 2/11/2023 2023 by Eugenio Schuster, RN)  Absence of Physical Injury: Implement safety measures based on patient assessment     Problem: Skin/Tissue Integrity - Adult  Goal: Skin integrity remains intact  Outcome: Progressing  Flowsheets (Taken 2/12/2023 1119)  Skin Integrity Remains Intact:   Monitor for areas of redness and/or skin breakdown   Assess vascular access sites hourly   Every 4-6 hours minimum: Change oxygen saturation probe site   Every 4-6 hours: If on nasal continuous positive airway pressure, respiratory therapy assesses nares and determine need for appliance change or resting period     Problem: Musculoskeletal - Adult  Goal: Return mobility to safest level of function  Outcome: Progressing  Flowsheets (Taken 2/12/2023 1119)  Return Mobility to Safest Level of Function:   Assess patient stability and activity tolerance for standing, transferring and ambulating with or without assistive devices   Assist with transfers and ambulation using safe patient handling equipment as needed   Ensure adequate protection for wounds/incisions during mobilization   Obtain physical therapy/occupational therapy consults as needed   Instruct patient/family in ordered activity level  Goal: Return ADL status to a safe level of function  Outcome: Progressing  Flowsheets (Taken 2/12/2023 1119)  Return ADL Status to a Safe Level of Function:   Administer medication as ordered   Assess activities of daily living deficits and provide assistive devices as needed   Obtain physical therapy/occupational therapy consults as needed   Assist and instruct patient to increase activity and self care as tolerated     Problem: Gastrointestinal - Adult  Goal: Minimal or absence of nausea and vomiting  Outcome: Progressing  Flowsheets (Taken 2/12/2023 1119)  Minimal or absence of nausea and vomiting:   Maintain NPO status until nausea and vomiting are resolved   Administer IV fluids as ordered to ensure adequate hydration   Administer ordered antiemetic medications as needed   Provide nonpharmacologic comfort measures as appropriate   Nutrition consult to assist patient with adequate nutrition and appropriate food choices  Goal: Maintains or returns to baseline bowel function  Outcome: Progressing  Flowsheets (Taken 2/12/2023 1119)  Maintains or returns to baseline bowel function:   Assess bowel function   Nutrition consult to assist patient with appropriate food choices     Problem: Genitourinary - Adult  Goal: Urinary catheter remains patent  Outcome: Progressing  Flowsheets (Taken 2/12/2023 1119)  Urinary catheter remains patent:   Assess patency of urinary catheter   Irrigate catheter per Licensed Independent Practitioner order if indicated and notify Licensed Independent Practitioner if unable to irrigate     Problem: Infection - Adult  Goal: Absence of infection at discharge  Outcome: Progressing  Flowsheets (Taken 2/12/2023 1119)  Absence of infection at discharge:   Assess and monitor for signs and symptoms of infection   Monitor lab/diagnostic results   Monitor all insertion sites i.e., indwelling lines, tubes and drains   Administer medications as ordered   Instruct and encourage patient and family to use good hand hygiene technique   [place] appropriate cooling/warming therapies per order  Goal: Absence of infection during hospitalization  Outcome: Progressing  Flowsheets (Taken 2/12/2023 1119)  Absence of infection during hospitalization:   Assess and monitor for signs and symptoms of infection   Monitor lab/diagnostic results   Monitor all insertion sites i.e., indwelling lines, tubes and drains   [place] appropriate cooling/warming therapies per order   Administer medications as ordered   Instruct and encourage patient and family to use good hand hygiene technique     Problem: Anxiety  Goal: Will report anxiety at manageable levels  Description: INTERVENTIONS:  1. Administer medication as ordered  2. Teach and rehearse alternative coping skills  3. Provide emotional support with 1:1 interaction with staff  Outcome: Progressing  Flowsheets (Taken 2/12/2023 1119)  Will report anxiety at manageable levels:   Administer medication as ordered   Provide emotional support with 1:1 interaction with staff   Teach and rehearse alternative coping skills     Problem: Coping  Goal: Pt/Family able to verbalize concerns and demonstrate effective coping strategies  Description: INTERVENTIONS:  1.  Assist patient/family to identify coping skills, available support systems and cultural and spiritual values  2. Provide emotional support, including active listening and acknowledgement of concerns of patient and caregivers  3. Reduce environmental stimuli, as able  4. Instruct patient/family in relaxation techniques, as appropriate  5. Assess for spiritual pain/suffering and initiate Spiritual Care, Psychosocial Clinical Specialist consults as needed  Outcome: Progressing  Flowsheets (Taken 2/12/2023 1119)  Patient/family able to verbalize anxieties, fears, and concerns, and demonstrate effective coping:   Assist patient/family to identify coping skills, available support systems and cultural and spiritual values   Provide emotional support, including active listening and acknowledgement of concerns of patient and caregivers   Reduce environmental stimuli, as able   Instruct patient/family in relaxation techniques, as appropriate   Assess for spiritual pain/suffering and initiate Spiritual Care, Psychosocial Clinical Specialist consults as needed     Problem: Confusion  Goal: Confusion, delirium, dementia, or psychosis is improved or at baseline  Description: INTERVENTIONS:  1. Assess for possible contributors to thought disturbance, including medications, impaired vision or hearing, underlying metabolic abnormalities, dehydration, psychiatric diagnoses, and notify attending LIP  2. [place] high risk fall precautions, as indicated  3. Provide frequent short contacts to provide reality reorientation, refocusing and direction  4. Decrease environmental stimuli, including noise as appropriate  5. Monitor and intervene to maintain adequate nutrition, hydration, elimination, sleep and activity  6. If unable to ensure safety without constant attention obtain sitter and review sitter guidelines with assigned personnel  7.  Initiate Psychosocial CNS and Spiritual Care consult, as indicated  Outcome: Progressing  Flowsheets (Taken 2/12/2023 1119)  Effect of thought disturbance (confusion, delirium, dementia, or psychosis) are managed with adequate functional status:   Assess for contributors to thought disturbance, including medications, impaired vision or hearing, underlying metabolic abnormalities, dehydration, psychiatric diagnoses, notify [facility] high risk fall precautions, as indicated   Provide frequent short contacts to provide reality reorientation, refocusing and direction   Decrease environmental stimuli, including noise as appropriate   Monitor and intervene to maintain adequate nutrition, hydration, elimination, sleep and activity     Problem: Gastrointestinal - Adult  Goal: Maintains adequate nutritional intake  Outcome: Not Progressing  Flowsheets (Taken 2/12/2023 1119)  Maintains adequate nutritional intake:   Obtain nutritional consult as needed   Monitor intake and output, weight and lab values   Identify factors contributing to decreased intake, treat as appropriate     Problem: Safety - Medical Restraint  Goal: Remains free of injury from restraints (Restraint for Interference with Medical Device)  Description: INTERVENTIONS:  1. Determine that other, less restrictive measures have been tried or would not be effective before applying the restraint  2. Evaluate the patient's condition at the time of restraint application  3. Inform patient/family regarding the reason for restraint  4.  Q2H: Monitor safety, psychosocial status, comfort, nutrition and hydration  Outcome: Completed     Problem: Gastrointestinal - Adult  Goal: Maintains adequate nutritional intake  Outcome: Not Progressing  Flowsheets (Taken 2/12/2023 1119)  Maintains adequate nutritional intake:   Obtain nutritional consult as needed   Monitor intake and output, weight and lab values   Identify factors contributing to decreased intake, treat as appropriate

## 2023-02-12 NOTE — PLAN OF CARE
Problem: Chronic Conditions and Co-morbidities  Goal: Patient's chronic conditions and co-morbidity symptoms are monitored and maintained or improved  Recent Flowsheet Documentation  Taken 2/11/2023 2000 by Paxton Schaefer RN  Care Plan - Patient's Chronic Conditions and Co-Morbidity Symptoms are Monitored and Maintained or Improved:   Monitor and assess patient's chronic conditions and comorbid symptoms for stability, deterioration, or improvement   Collaborate with multidisciplinary team to address chronic and comorbid conditions and prevent exacerbation or deterioration   Update acute care plan with appropriate goals if chronic or comorbid symptoms are exacerbated and prevent overall improvement and discharge     Problem: Discharge Planning  Goal: Discharge to home or other facility with appropriate resources  Recent Flowsheet Documentation  Taken 2/11/2023 2000 by Paxton Schaefer RN  Discharge to home or other facility with appropriate resources:   Identify barriers to discharge with patient and caregiver   Refer to discharge planning if patient needs post-hospital services based on physician order or complex needs related to functional status, cognitive ability or social support system     Problem: Respiratory - Adult  Goal: Achieves optimal ventilation and oxygenation  Recent Flowsheet Documentation  Taken 2/11/2023 2000 by Paxton Schaefer RN  Achieves optimal ventilation and oxygenation:   Assess for changes in respiratory status   Assess for changes in mentation and behavior   Position to facilitate oxygenation and minimize respiratory effort   Oxygen supplementation based on oxygen saturation or arterial blood gases   Assess the need for suctioning and aspirate as needed   Assess and instruct to report shortness of breath or any respiratory difficulty   Respiratory therapy support as indicated     Problem: Cardiovascular - Adult  Goal: Maintains optimal cardiac output and hemodynamic stability  Recent Flowsheet Documentation  Taken 2/11/2023 2000 by Delia Garcia RN  Maintains optimal cardiac output and hemodynamic stability:   Monitor blood pressure and heart rate   Assess for signs of decreased cardiac output   Administer vasoactive medications as ordered     Problem: Cardiovascular - Adult  Goal: Absence of cardiac dysrhythmias or at baseline  Recent Flowsheet Documentation  Taken 2/11/2023 2000 by Delia Garcia RN  Absence of cardiac dysrhythmias or at baseline:   Monitor cardiac rate and rhythm   Assess for signs of decreased cardiac output   Administer antiarrhythmia medication and electrolyte replacement as ordered     Problem: Metabolic/Fluid and Electrolytes - Adult  Goal: Electrolytes maintained within normal limits  Recent Flowsheet Documentation  Taken 2/11/2023 2000 by Delia Garcia RN  Electrolytes maintained within normal limits:   Monitor labs and assess patient for signs and symptoms of electrolyte imbalances   Administer electrolyte replacement as ordered   Monitor response to electrolyte replacements, including repeat lab results as appropriate   Fluid restriction as ordered   Instruct patient on fluid and nutrition restrictions as appropriate     Problem: Metabolic/Fluid and Electrolytes - Adult  Goal: Hemodynamic stability and optimal renal function maintained  Recent Flowsheet Documentation  Taken 2/11/2023 2000 by Delia Garcia RN  Hemodynamic stability and optimal renal function maintained:   Monitor labs and assess for signs and symptoms of volume excess or deficit   Monitor intake, output and patient weight   Monitor response to interventions for patient's volume status, including labs, urine output, blood pressure (other measures as available)     Problem: Hematologic - Adult  Goal: Maintains hematologic stability  Recent Flowsheet Documentation  Taken 2/11/2023 2000 by Delia Garcia RN  Maintains hematologic stability:   Assess for signs and symptoms of bleeding or hemorrhage   Monitor labs for bleeding or clotting disorders     Problem: Neurosensory - Adult  Goal: Achieves stable or improved neurological status  Recent Flowsheet Documentation  Taken 2/11/2023 2000 by Carissa Ribeiro RN  Achieves stable or improved neurological status:   Assess for and report changes in neurological status   Maintain blood pressure and fluid volume within ordered parameters to optimize cerebral perfusion and minimize risk of hemorrhage     Problem: Neurosensory - Adult  Goal: Achieves maximal functionality and self care  Recent Flowsheet Documentation  Taken 2/11/2023 2000 by Carissa Ribeiro RN  Achieves maximal functionality and self care: Monitor swallowing and airway patency with patient fatigue and changes in neurological status     Problem: ABCDS Injury Assessment  Goal: Absence of physical injury  Recent Flowsheet Documentation  Taken 2/11/2023 2023 by Carissa Ribeiro RN  Absence of Physical Injury: Implement safety measures based on patient assessment

## 2023-02-12 NOTE — FLOWSHEET NOTE
02/12/23 1705   Treatment Team Notification   Reason for Communication Review case  (update on fluid removal)   Team Member Name Dr Yuriy Chaudhari Provider   Method of Communication Secure Message   Response No new orders   Notification Time 21      Dr Kwame Weiner order to increase Pre Blood Pump rate this from 500 to 1000 this AM @ [id number]    Pt able to tolerate CRRT with average fluid removal -130ml/hr with ABP remaining in goal titrating Levophed between 4-6mcg/min. O2 weaned to 3L/min. Protocol labs drawn and sent to lab. No electrolyte replacements required.   Perfect serve message sent to Dr Kwame Weiner to update on status and current CRRT fluid removal.

## 2023-02-12 NOTE — PROGRESS NOTES
Weaning levophed gtt and precedex as tolerated. Observed HR tachycardia and ABP map 80-90 when awake, HR 60s to bradycardia  and MAP 62-70 when asleep.   Pt remains Sinus rhythm with freq PVCs

## 2023-02-12 NOTE — PROGRESS NOTES
[facility], [place]   Daily Progress Note      Admit Date:  2/8/2023      Subjective:   Mr. Demond Simon is a 75yo male with chronic systolic CHF LVEF 87-39%, CAD and chronic atrial fibrillation who came for Impella assisted PCI to the RCA and LAD 2/8/23. The procedure went well and the Impella was explanted. Unfortunately, he developed mental status chnages after the procedure concerning for a potential CVA. Head CT/CTA was unremarkable though. He ultimately was intubated for airway protection. Interval History:  He is currently on a nonrebreather  Hemodynamics continue to improve as his vasopressor requirement has become less. Levophed might be d/c today   Increasing volume removal to 150 cc /hr today       Objective:     BP (!) 101/55   Pulse 65   Temp 97.2 °F (36.2 °C) (Axillary)   Resp 12   Ht 6' 1\" (1.854 m)   Wt 271 lb 2.7 oz (123 kg)   SpO2 98%   BMI 35.78 kg/m²      Intake/Output Summary (Last 24 hours) at 2/12/2023 1010  Last data filed at 2/12/2023 1000  Gross per 24 hour   Intake 640.04 ml   Output 3394 ml   Net -2753.96 ml         Physical Exam:  General:  Lethargic, NAD  Skin:  Warm and dry  Neck:  JVD<8, no carotid bruits  Chest:  On Bipap. Diminished bilaterally.     Cardiovascular:  Irreg irreg and mildly tachy, normal S1/S2, no M/R/G  Abdomen:  Soft, nontender, +bowel sounds  Extremities:  2+ bilateral LE edema  Pulses: 2+ bilat carotid    2+ bilat radial    2+ bilat femoral        Medications:    amiodarone  200 mg Oral Daily    heparin (porcine)  5,000 Units SubCUTAneous 3 times per day    clopidogrel  75 mg Oral Daily    hydrocortisone sodium succinate PF  50 mg IntraVENous Q6H    aspirin  81 mg Oral Daily    Or    aspirin  300 mg Rectal Daily    atorvastatin  80 mg Oral Nightly    pantoprazole (PROTONIX) 40 mg injection  40 mg IntraVENous Daily    chlorhexidine  15 mL Mouth/Throat BID    sodium chloride flush  5-40 mL IntraVENous 2 times per day      prismaSATE BGK 4/2.5 1,000 mL/hr at 02/12/23 0959    prismaSATE BGK 4/2.5 1,000 mL/hr at 02/12/23 1000    prismaSATE BGK 4/2.5 500 mL/hr at 02/12/23 0455    dexmedetomidine 0.2 mcg/kg/hr (02/12/23 0946)    norepinephrine 6 mcg/min (02/12/23 0946)    vasopressin (Septic Shock) infusion Stopped (02/11/23 1338)    sodium chloride Stopped (02/10/23 1543)       Lab Data:  CBC:   Recent Labs     02/10/23  0300 02/11/23  0419 02/12/23  0530   WBC 14.9* 16.3* 13.2*   HGB 10.8* 10.3* 10.3*    183 161       BMP:    Recent Labs     02/12/23  0100 02/12/23  0530 02/12/23  0828    137 137   K 4.8 4.8 5.0   CO2 24 23 24   BUN 33* 33* 33*   CREATININE 2.1* 2.1* 2.1*       LIVR:   Recent Labs     02/09/23  1800 02/10/23  0300   AST 40* 42*   ALT 14 15       PT/INR:   Recent Labs     02/09/23  1800 02/10/23  0300   PROT 6.6 6.9       No results for input(s): CKMB, CKMBINDEX, TROPONINI in the last 72 hours. Invalid input(s): CKTOTAL;3    FASTING LIPID PANEL:  Lab Results   Component Value Date/Time    CHOL 84 02/09/2023 03:46 AM    HDL 34 02/09/2023 03:46 AM    TRIG 61 02/09/2023 03:46 AM     LHC/PCI 2/8/23:  1. Right-dominant coronary arterial circulation. Successful  intervention to 90% lesion and 99% more distal RCA lesion with a 3 mm  overlapping drug-eluting stents dilated up to 3.2 mm proximally and 3.1  mm distally, resulting in 0% residual stenosis and SANTO III flow in the  vessel. There was reversal of flow to antegrade in the dominant right  coronary artery. The PDA had been fed by collaterals. In the left  system, there is trivial left main plaque disease. The LAD had an 80%  mid lesion that was treated with a 3.5 x 22 mm [place] frontier  drug-eluting stent dilated to 3.6 mm in diameter resulting in 0%  residual stenosis and SANTO III flow. The circumflex has a chronic total  occlusion in the midportion but the distal vessel fills in from  collaterals. 2.  Left ventricular end-diastolic pressure 21 mmHg.   3.  No gradient across the aortic valve on pullback to suggest aortic  stenosis. PROCEDURES PERFORMED:  1. Left heart catheterization. 2.  Percutaneous coronary intervention with drug-eluting stents to the  dominant right coronary artery. 3.  Percutaneous coronary intervention with drug-eluting stent to the  mid left anterior descending artery. 4.  Insertion of short-term external heart assist system into heart,  percutaneous approach. 5.  Removal of short-term external heart assist system from heart,  percutaneous approach for assistance with cardiac output using impeller  pump, continuous. Stress Perfusion 1/29/23:  Pharmacological Stress/MPI Results:  1. Technically a satisfactory study. 2. Large mostly dense perfusion defect involving lateral and inferolateral wall suggestive of previous MI. No reversibility /ischemia  3. Gated Study shows markedly dilated LV with lateral and inferolateral akinesis; EF 29 %. Echo 1/5/23:  Poor image quality. Definity contrast administered. Patient appears to be in atrial fibrillation. Overall left ventricular systolic function appears severely reduced. Ejection fraction is visually estimated to be 20-25% with diffuse  hypokinesis and regional abnormalities including inferior akinesis. Normal left ventricular wall thickness and cavity size. The right ventricle appears normal in size with severely reduced systolic function. The left and right atria are moderately dilated. Possibly severe mitral regurgitation. The aortic valve leaflets are not well visualized. Mild aortic stenosis based on a peak velocity of 2.6 m/s and a mean pressure gradient  of 20 mmHg. Gradients may be underestimated with reduced LV function. Trivial aortic regurgitation. Trivial pericardial effusion. IVC size is dilated (>2.1 cm) and collapses < 50% with respiration consistent with elevated RA pressure (15 mmHg). Assessment / Plan: 1. Ventricular Tachycardia  PO amio   200qd     2. Acute on chronic systolic CHF, class 4  Volume removal via CRRT  Once his hemodynamics tolerate can restart close guideline directed medical therapy    3. Unstable Angina  S/p PCI to the LAD and the RCA on 2/8/23. Asa/plavix vis NG    4. Mental Status Change  Improved. CVA seems less likely as the patient is neuro intact to his baseline. Punctate infarcts still a possibility. Neurology following. Will obtain brain MRI when patient is more stable.     Florencio Ritchie MD [id number] [facility], Interventional Cardiology, and Peripheral Vascular Disease   [facility]   (O): [phone]  (F): [phone]

## 2023-02-12 NOTE — PROGRESS NOTES
Pulmonary Progress Note    CC:  Follow up respiratory failure    Subjective:  Bilevel at hs and currently on it now. RR increases when off but slows down with bilevel. 4-8 liters during the day  Remains on CRRT  Remains also on precedex  Levophed at 8 mcg       Intake/Output Summary (Last 24 hours) at 2/12/2023 2051  Last data filed at 2/12/2023 0600  Gross per 24 hour   Intake 684.13 ml   Output 3081 ml   Net -2396.87 ml         PHYSICAL EXAM:  Blood pressure (!) 101/55, pulse 68, temperature 97.2 °F (36.2 °C), temperature source Axillary, resp. rate 19, height 6' 1\" (1.854 m), weight 271 lb 2.7 oz (123 kg), SpO2 100 %.'  Gen: Chronically ill appearing, lethargic on bilevel   Eyes: PERRL. No sclera icterus. No conjunctival injection. ENT: No discharge. Pharynx with bilevel   Neck: Trachea midline. No obvious mass. Resp: Better aeration in upper lobes   CV: Irregular. No murmur or rub. GI: Non-tender. Non-distended. No hernia. Skin: Pale  Lymph: No cervical LAD. No supraclavicular LAD. M/S: No cyanosis. No clubbing. No joint deformity.     Neuro: Lethargic on bilevel   Ext:   ++ edema    Medications:    Scheduled Meds:   amiodarone  200 mg Oral Daily    heparin (porcine)  5,000 Units SubCUTAneous 3 times per day    clopidogrel  75 mg Oral Daily    hydrocortisone sodium succinate PF  50 mg IntraVENous Q6H    aspirin  81 mg Oral Daily    Or    aspirin  300 mg Rectal Daily    atorvastatin  80 mg Oral Nightly    pantoprazole (PROTONIX) 40 mg injection  40 mg IntraVENous Daily    chlorhexidine  15 mL Mouth/Throat BID    sodium chloride flush  5-40 mL IntraVENous 2 times per day       Continuous Infusions:   prismaSATE BGK 4/2.5 1,000 mL/hr at 02/12/23 0455    prismaSATE BGK 4/2.5 500 mL/hr at 02/12/23 0455    prismaSATE BGK 4/2.5 500 mL/hr at 02/12/23 0455    dexmedetomidine 0.3 mcg/kg/hr (02/12/23 0600)    norepinephrine 6 mcg/min (02/12/23 0615)    vasopressin (Septic Shock) infusion Stopped (02/11/23 1338)    sodium chloride Stopped (02/10/23 1543)       PRN Meds:  ipratropium-albuterol, potassium chloride, magnesium sulfate, sodium phosphate IVPB **OR** sodium phosphate IVPB **OR** sodium phosphate IVPB **OR** sodium phosphate IVPB, acetaminophen **OR** acetaminophen, ondansetron **OR** ondansetron, midazolam, sodium chloride flush, sodium chloride, naloxone    Labs:  CBC:   Recent Labs     02/10/23  0300 23  0419 23  0530   WBC 14.9* 16.3* 13.2*   HGB 10.8* 10.3* 10.3*   HCT 35.4* 31.3* 32.2*   MCV 99.0 95.7 97.8    183 161     BMP:   Recent Labs     23  0919 23  1628 23  0100    136 136   K 4.9 4.8 4.8    99 101   CO2 21 24 24   PHOS 4.8 4.4 3.9   BUN 36* 29* 33*   CREATININE 2.3* 2.1* 2.1*     LIVER PROFILE:   Recent Labs     23  1800 02/10/23  0300   AST 40* 42*   ALT 14 15   BILIDIR 0.9*  --    BILITOT 1.7* 1.4*   ALKPHOS 114 111     PT/INR:   No results for input(s): PROTIME, INR in the last 72 hours. APTT: No results for input(s): APTT in the last 72 hours. UA:  Recent Labs     23  1555   COLORU DARK YELLOW*   PHUR 5.0   WBCUA 39*   RBCUA 684*   MUCUS 1+*   YEAST Present*   BACTERIA None Seen   CLARITYU CLOUDY*   SPECGRAV 1.074   LEUKOCYTESUR MODERATE*   UROBILINOGEN 1.0   BILIRUBINUR SMALL*   BLOODU LARGE*   GLUCOSEU Negative     No results for input(s): PH, PCO2, PO2 in the last 72 hours. Films:  Chest imaging reports were reviewed and imaging was reviewed by me and showed bilateral airspace disease, effusion on right with vascath and PICC in place     AB.32/49/91    Cultures:  Pneumonia panel:  Negative   MRSA probe:  negative     I reviewed the labs and images listed above    Assessment/Plan:   Acute Hypoxic Respiratory Failure with saturations less than 90% on room air and cyanotic appearance  Titrate oxygen for saturations greater than or equal to 90%. Bilevel when needed.   FIO2 weaned to 40%  Acute on Chronic Hypercapnic Respiratory Failure  Bilevel when needed for retention of CO2  Cardiogenic Shock suspected   Levophed for MAP of 65  Hydrocortisone  50 mg q 6 hours. Continue until off levophed    Antibiotics were discontinued as pneumonia panel was negative   GISSELLE  RRT per Nephrology. Fluid removal ongoing  Hemoptysis, slightly better. Still suspect related to anticoagulation loading  Metabolic Encephalopathy  Will have to try to wean off precedex   Chronic Right pleural effusion s/p chest tube in January. Has trapped lung   No intervention required as lung will not re-expand   Cardiomyopathy with EF of 30%  RRT per nephrology   Possible seizure and/or CVA  CAD s/p cardiac intervention   Per Cardiology   Afib with RVR  Amiodarone    DVT prophylaxis Heparin     Try to limit bilevel if able too considering he has NG. Would hold off on starting enteral feeding while on bilevel to reduce risk of aerophagia and subsequent vomiting    Critical care time of 32 minutes. This does not include procedural time. Please see procedural notes for details.     Guzman Boyd DO  [facility] Pulmonary

## 2023-02-12 NOTE — PROGRESS NOTES
[facility]                                               Progress note    Summary:   Myranda Siegel is being seen by nephrology for GISSELLE. Admitted with shortness of breath. Patient was admitted to the ICU on 2/8/2023. Prior to this on Wednesday he had a heart cath procedure with required Impella support. There was concern for possible stroke afterwards. Later he had not episode of unresponsiveness, cyanotic with ventricular tachycardia and required intubation for airway protection. .    Interval History  Seen and examined at bedside  Somnolent and confused. Spoke with his daughter at bedside in detail about dialysis and his overall condition. /55  On 4 mcg levo  Urine output 54cc  CRRT 3 L negative 2.5 L past day     Labs reviewed  Sodium 137 potassium 5 bicarb 24 BUN 33 creatinine 2.1 calcium 8.6 hemoglobin 10.3      Plan:   -Continue CRRT, 4K bath 2.5 calcium. UF increased to 150 cc/h as long as pressor requirement continues to trend down.  -Labs and electrolyte protocol in place.  -solute clearance suboptimal, K 5 BUN 33 so increase effluent dose     Gonzalo Haider MD  [facility] Nephrology  Office: [phone]    Assessment:   #GISSELLE, oliguric  Likely due to hemodynamic mediated GISSELLE resulting in ATN. He was severely hypotensive requiring high-dose of pressors, was intubated with hypoxic respiratory failure and subsequently developed renal failure. He had a few doses of IV contrast 1 on 1/30/2023 and 2/8/2023. Etiology of his hypotension is unclear at this point  Urinalysis after catheter being placed showed dark yellow urine specific gravity 1.074,  WBC 39 present used 100 mg/dL of albumin  Hold off on further testing of GISSELLE at this time. #Hyperkalemia  Due to renal failure, oliguric    #Metabolic acidosis  Due to renal failure  Addressed with dialysis    #Shock  Etiology unclear, cultures are negative so does not peer to have any source of sepsis.   Procalcitonin was only mildly elevated 0.61  Discussed with cardiology about possible [place] to see how his cardiac index is. His EF 35%      ROS:     Positives Listed Bold. All other remaining systems are negative. Constitutional:  fever, chills, weakness, weight change, fatigue,      Skin:  rash, pruritus, hair loss, bruising, dry skin, petechiae. Head, Face, Neck   headaches, swelling,  cervical adenopathy. Respiratory: shortness of breath, cough, or wheezing  Cardiovascular: chest pain, palpitations, dizzy, edema  Gastrointestinal: nausea, vomiting, diarrhea, constipation,belly pain    Yellow skin, blood in stool  Musculoskeletal:  back pain, muscle weakness, gait problems,       joint pain or swelling. Genitourinary:  dysuria, poor urine flow, flank pain, blood in urine  Neurologic:  vertigo, TIA'S, syncope, seizures, focal weakness  Psychosocial:  insomnia, anxiety, or depression. Additional positive findings: -     PMH:   Past medical history, surgical history, social history, family history are reviewed and updated as appropriate. Reviewed current medication list.   Allergies reviewed and updated as needed. PE:   Vitals:    02/12/23 0930   BP:    Pulse: 82   Resp: 21   Temp:    SpO2: 96%       General appearance:  in NAD, somnolent. Comfortable. HEENT: EOM intact, no icterus. Trachea is midline. Neck : No masses, appears symmetrical, no JVD  Respiratory: Respiratory effort appears normal, bilateral equal chest rise, no wheeze,+ crackles   Cardiovascular: Ausculation shows RRR ++ edema  Abdomen: No visible mass or tenderness, non distended. Musculoskeletal:  Joints with no swelling or deformity. Skin:no rashes, ulcers, induration, no jaundice. Neuro: face symmetric, no focal deficits. Appropriate responses.        Lab Results   Component Value Date    CREATININE 2.1 (H) 02/12/2023    BUN 33 (H) 02/12/2023     02/12/2023    K 5.0 02/12/2023     02/12/2023    CO2 24 02/12/2023      Lab Results Component Value Date    WBC 13.2 (H) 02/12/2023    HGB 10.3 (L) 02/12/2023    HCT 32.2 (L) 02/12/2023    MCV 97.8 02/12/2023     02/12/2023     Lab Results   Component Value Date    CALCIUM 8.6 02/12/2023    CAION 1.17 02/12/2023    PHOS 3.4 02/12/2023

## 2023-02-13 NOTE — PROGRESS NOTES
Physical Therapy  Facility/Department: [address] CVICU  Physical Therapy Initial Assessment    Name: Ivan Hernandez  : 1945  MRN: [id number]  Date of Service: 2023    Discharge Recommendations:  Continue to assess pending progress, Subacute/Skilled Nursing Facility   PT Equipment Recommendations  Other: Defer to next level of care at this time. Ivan Hernandez scored a 6/24 on the AM-PAC short mobility form. Current research shows that an AM-PAC score of 17 or less is typically not associated with a discharge to the patient's home setting. Based on the patient's AM-PAC score and their current functional mobility deficits, it is recommended that the patient have 3-5 sessions per week of Physical Therapy at d/c to increase the patient's independence. Please see assessment section for further patient specific details. If patient discharges prior to next session this note will serve as a discharge summary. Please see below for the latest assessment towards goals. Assessment   Body Structures, Functions, Activity Limitations Requiring Skilled Therapeutic Intervention: Decreased functional mobility ; Decreased strength;Decreased safe awareness;Decreased cognition;Decreased endurance  Assessment: 68 y/o male admit 2023 for Cardiac Cath : Stents  with Impella support. Decrease medical status result Intubation -2023. CT Head : negative. PMH as noted including CVA, CHF, A-Fib, Cardiomyopathy (EF 20-30%), CKD, CAD, Sciatica, Fx/Surg (Jaw, 1070's). PTA pt living alone in home with 1+1 step to enter and 1st floor bed/bath; independent daily care and functional mobility. Currently, Pt alert to self only; attempts to follow simple commands although delayed/inconsistent. Sign weak/debility. Bed Mob dependent; oob via Maxi-Davie. At this time, would recommend cont PT (3-5) upon d/c. Will cont to monitor pt's progress.   Therapy Prognosis: Fair  Decision Making: Medium Complexity  History: 70 y/o male admit 2/8/2023 for Cardiac Cath : Stent with Impella support. Decrease medical status result Intubation 2/8-2/9/2023. CT Head : negative. PMH as noted including CVA, CHF, A-Fib, Cardiomyopathy (EF 20-30%), CKD, CAD, Sciatica, Fx/Surg (Jaw, 1070's). Exam: See above. Clinical Presentation: See above. Barriers to Learning: Cognitive. Requires PT Follow-Up: Yes  Activity Tolerance  Activity Tolerance: Treatment limited secondary to decreased cognition;Patient limited by endurance  Activity Tolerance Comments: Pt alert to self only; attempts to follow simple commands although delayed/inconsistent. Sign weak/debility. Bed Mob dependent; oob via Maxi-Davie. Plan   Physcial Therapy Plan  General Plan: 3-5 times per week  Current Treatment Recommendations: Strengthening, Functional mobility training, Transfer training, Gait training, Balance training, Safety education & training, Patient/Caregiver education & training  Safety Devices  Type of Devices: Call light within reach, Chair alarm in place, Left in chair, Nurse notified (Family at bedside.)     Restrictions  Restrictions/Precautions  Restrictions/Precautions: Fall Risk  Position Activity Restriction  Other position/activity restrictions: Arterial Line, O2 3L via NC.  NPO, NG Tube Feed. Flexiseal.     Subjective   General  Chart Reviewed: Yes  Patient assessed for rehabilitation services?: Yes  Additional Pertinent Hx: 70 y/o male admit 2/8/2023 for Cardiac Cath : Stent with Impella support. Decrease medical status result Intubation 2/8-2/9/2023. CT Head : negative. PMH as noted including CVA, CHF, A-Fib, Cardiomyopathy (EF 20-30%), CKD, CAD, Sciatica, Fx/Surg (Jaw, 1070's). Family / Caregiver Present: Yes (Dtr Moreno Josselyn). )  Referring Practitioner: Dr. Mauricio Emmanuel. Other (Comment): Pt attempts to follow simple commands although delayed/inconsistent. Subjective  Subjective: Pt/family agreeable to PT Eval/Rx. Social/Functional History  Social/Functional History  Lives With: Alone  Type of Home: House  Home Layout: Two level, Able to Live on Main level with bedroom/bathroom, Laundry in basement (1 story with basement.)  Home Access: Stairs to enter without rails (1+1 step to enter : garage to main level of home.)  Bathroom Shower/Tub: Tub/Shower unit  Bathroom Equipment: Grab bars in shower  Bathroom Accessibility: Accessible  ADL Assistance: Independent  Homemaking Assistance:  (Dtr assists as needed.)  Ambulation Assistance: Independent (Without assist device pta.)  Transfer Assistance: Independent  Active : Yes  Occupation: Retired  Type of Occupation: Retired : Various jobs. Vision/Hearing  Vision  Vision: Within Functional Limits  Hearing  Hearing: Within functional limits    Cognition   Orientation  Orientation Level: Oriented to person;Disoriented to place; Disoriented to time;Disoriented to situation  Cognition  Overall Cognitive Status: Exceptions  Arousal/Alertness: Delayed responses to stimuli  Following Commands: Follows one step commands with increased time; Follows one step commands with repetition (Inconsistent.)  Attention Span: Difficulty dividing attention  Memory: Decreased recall of recent events  Safety Judgement: Decreased awareness of need for safety;Decreased awareness of need for assistance  Insights: Decreased awareness of deficits  Initiation: Requires cues for all  Sequencing: Requires cues for all     Objective        Observation/Palpation  Observation: Poor head control; Flex/R SB. Attempt gentle passive mvt/stretch to midline; pillows placed to attempt more midline. Gross Assessment  AROM: Generally decreased, functional  Strength:  (Grossly 2 2+/5.)                    Bed mobility  Rolling to Left: Dependent/Total  Rolling to Right: Dependent/Total  Scooting: Dependent/Total  Bed Mobility Comments: Bed Mob dependent for positioning of lift pad prior oob.   Transfers  Bed to Chair: Dependent/Total (OOB via Maxi-Davie.)           A/AROM Exercises: AAROM B UEs/LEs. Gentle Passive Stretch C-Spine Soft Tissue. AM-PAC Score  AM-PAC Inpatient Mobility Raw Score : 6 (02/13/23 1441)  AM-PAC Inpatient T-Scale Score : 23.55 (02/13/23 1441)  Mobility Inpatient CMS 0-100% Score: 100 (02/13/23 1441)  Mobility Inpatient CMS G-Code Modifier : CN (02/13/23 1441)            Goals  Short Term Goals  Time Frame for Short Term Goals: Upon d/c acute care setting. Short Term Goal 1: Bed Mob Mod assist.  Short Term Goal 2: EOB ~ 3-5 min CGA in prep oob activities. Short Term Goal 3: Transfers via Stedy Min/Mod assist x 2. Short Term Goal 4: Attempt Transfers/Amb with assist device as approp. Short Term Goal 5: Pt participating in approp Strength Exs. Patient Goals   Patient Goals : None stated. Education  Patient Education  Education Given To: Patient; Family  Education Provided Comments: Role of PT, POC, Need to call for assist, OOB via Maxi-Davie.   Education Method: Verbal;Demonstration  Barriers to Learning: Cognition  Education Outcome: Continued education needed      Therapy Time   Individual Concurrent Group Co-treatment   Time In [address]         Time Out [address] [facility] [address] [facility], PT

## 2023-02-13 NOTE — PROGRESS NOTES
Pulmonary Progress Note    CC:  Follow up respiratory failure    Subjective:  Oxygen 3 liters  Pressors weaned off  BP much better  Remains on CRRT   Still on precedex   Confused and unable to provide any useful information  Daughter at bedside    ROS  Unable       Intake/Output Summary (Last 24 hours) at 2/13/2023 0808  Last data filed at 2/13/2023 0700  Gross per 24 hour   Intake 447.5 ml   Output 3612 ml   Net -3164.5 ml         PHYSICAL EXAM:  Blood pressure 131/63, pulse 85, temperature 97 °F (36.1 °C), temperature source Axillary, resp. rate 26, height 6' 1\" (1.854 m), weight 267 lb 6.7 oz (121.3 kg), SpO2 98 %.'  Gen: Chronically ill appearing, confused, repetitive behaviors   Eyes: PERRL. No sclera icterus. No conjunctival injection. ENT: No discharge. Pharynx with bilevel   Neck: Trachea midline. No obvious mass. Resp: Diminished   CV: Irregular. No murmur or rub. GI: Non-tender. Non-distended. No hernia. Skin: Pale  Lymph: No cervical LAD. No supraclavicular LAD. M/S: No cyanosis. No clubbing. No joint deformity. Neuro: Awake, confused, repetitive speech and not making sense.   Does not appear focal   Ext:   ++ edema    Medications:    Scheduled Meds:   hydrocortisone sodium succinate PF  25 mg IntraVENous Q6H    amiodarone  200 mg Oral Daily    heparin (porcine)  5,000 Units SubCUTAneous 3 times per day    clopidogrel  75 mg Oral Daily    aspirin  81 mg Oral Daily    Or    aspirin  300 mg Rectal Daily    atorvastatin  80 mg Oral Nightly    pantoprazole (PROTONIX) 40 mg injection  40 mg IntraVENous Daily    chlorhexidine  15 mL Mouth/Throat BID    sodium chloride flush  5-40 mL IntraVENous 2 times per day       Continuous Infusions:   prismaSATE BGK 4/2.5 1,000 mL/hr at 02/13/23 0230    prismaSATE BGK 4/2.5 1,000 mL/hr at 02/12/23 2243    prismaSATE BGK 4/2.5 500 mL/hr at 02/13/23 0002    dexmedetomidine 0.2 mcg/kg/hr (02/13/23 0700)    norepinephrine Stopped (02/13/23 0530)    sodium chloride 5 mL/hr at 23 0700       PRN Meds:  ipratropium-albuterol, potassium chloride, magnesium sulfate, sodium phosphate IVPB **OR** sodium phosphate IVPB **OR** sodium phosphate IVPB **OR** sodium phosphate IVPB, acetaminophen **OR** acetaminophen, ondansetron **OR** ondansetron, midazolam, sodium chloride flush, sodium chloride, naloxone    Labs:  CBC:   Recent Labs     23  0419 23  0530 23  0530   WBC 16.3* 13.2* 10.0   HGB 10.3* 10.3* 10.4*   HCT 31.3* 32.2* 32.1*   MCV 95.7 97.8 96.7    161 128*     BMP:   Recent Labs     23  0828 23  1609 23  0149    134* 134*   K 5.0 4.8 4.7    99 99   CO2    PHOS 3.4 2.9 2.6   BUN 33* 26* 24*   CREATININE 2.1* 1.8* 1.6*     LIVER PROFILE:   No results for input(s): AST, ALT, LIPASE, BILIDIR, BILITOT, ALKPHOS in the last 72 hours. Invalid input(s): AMYLASE,  ALB    PT/INR:   No results for input(s): PROTIME, INR in the last 72 hours. APTT: No results for input(s): APTT in the last 72 hours. UA:  No results for input(s): NITRITE, COLORU, PHUR, LABCAST, WBCUA, RBCUA, MUCUS, TRICHOMONAS, YEAST, BACTERIA, CLARITYU, SPECGRAV, LEUKOCYTESUR, UROBILINOGEN, BILIRUBINUR, BLOODU, GLUCOSEU, AMORPHOUS in the last 72 hours. Invalid input(s): My Wen    No results for input(s): PH, PCO2, PO2 in the last 72 hours. Films:  Chest imaging reports were reviewed and imaging was reviewed by me and showed bilateral airspace disease, effusion on right with vascath and PICC in place     AB.44    Cultures:  Pneumonia panel:  Negative   MRSA probe:  negative     I reviewed the labs and images listed above    Assessment/Plan:   Acute Hypoxic Respiratory Failure with saturations less than 90% on room air and cyanotic appearance  Titrate oxygen for saturations greater than or equal to 90%. Bilevel when needed.     Acute on Chronic Hypercapnic Respiratory Failure  Bilevel when needed for retention of CO2 caution while with NG  Cardiogenic Shock suspected, improved  Decrease hydrocortisone to 25 mg q 6 hours   All cultures negative to date. GISSELLE  RRT per Nephrology. Fluid removal ongoing  Hemoptysis, resolved   Metabolic Encephalopathy with some delirium likely   Will have to try to wean off precedex if possible   Hydrocortisone to stop today   Chronic Right pleural effusion s/p chest tube in January. Has trapped lung   No intervention required as lung will not re-expand   Cardiomyopathy with EF of 30%  RRT per nephrology   Possible seizure and/or CVA (seems unlikely due to current mentation)  CAD s/p cardiac intervention   Per Cardiology   Afib with RVR  Amiodarone    DVT prophylaxis Heparin     Start enteral feeding  Speech eval    D/W Dr. Ish Pérez care time of 31 minutes. This does not include procedural time. Please see procedural notes for details.     Cesar Potts DO  [facility] Pulmonary

## 2023-02-13 NOTE — PROGRESS NOTES
Effluent bag alarming emptied. Stopped CRRT per order to eval for further need and can transition to HD or for later restart of CRRT. Returned 230ml back to the patient. Will monitor.

## 2023-02-13 NOTE — ACP (ADVANCE CARE PLANNING)
Advance Care Planning     Advance Care Planning Activator (Inpatient)  Conversation Note      Date of ACP Conversation: 2/13/2023     Conversation Conducted with: Ubaldo Infante. Decision Maker: Next of Kin by law (only applies in absence of above) (name) Ja Alex, Daughter    ACP Activator: [id number] Decision Maker:     Kurt Mg, Daughter, [phone]    Current Designated Health Care Decision Maker:     Click here to complete Healthcare Decision Makers including section of the Healthcare Decision Maker Relationship (ie \"Primary\")      Care Preferences    Ventilation: \"If you were in your present state of health and suddenly became very ill and were unable to breathe on your own, what would your preference be about the use of a ventilator (breathing machine) if it were available to you? \"      Would the patient desire the use of ventilator (breathing machine)?: yes    \"If your health worsens and it becomes clear that your chance of recovery is unlikely, what would your preference be about the use of a ventilator (breathing machine) if it were available to you? \"     Would the patient desire the use of ventilator (breathing machine)?:       Resuscitation  \"CPR works best to restart the heart when there is a sudden event, like a heart attack, in someone who is otherwise healthy. Unfortunately, CPR does not typically restart the heart for people who have serious health conditions or who are very sick. \"    \"In the event your heart stopped as a result of an underlying serious health condition, would you want attempts to be made to restart your heart (answer \"yes\" for attempt to resuscitate) or would you prefer a natural death (answer \"no\" for do not attempt to resuscitate)? \" yes       [] Yes   [] No   Educated Patient / Kayla Isaak regarding differences between Advance Directives and portable DNR orders.     Length of ACP Conversation in minutes:      Conversation Outcomes:  [] ACP discussion completed  [] Existing advance directive reviewed with patient; no changes to patient's previously recorded wishes  [] New Advance Directive completed  [] Portable Do Not Rescitate prepared for Provider review and signature  [] POLST/POST/MOLST/MOST prepared for Provider review and signature      Follow-up plan:    [x] Schedule follow-up conversation to continue planning  [] Referred individual to Provider for additional questions/concerns   [] Advised patient/agent/surrogate to review completed ACP document and update if needed with changes in condition, patient preferences or care setting    [] This note routed to one or more involved healthcare providers  Referral sent to Spiritual Care Team for creation of ADV Directives when  more alert.       [place], [place]     Case Management   [phone]    2/13/2023  11:23 AM

## 2023-02-13 NOTE — PROGRESS NOTES
Dr. Jc Mcleod rounding with nephrology. Plan will be to stop CRRT at 12pm (when filter is set to be changed) have PT/OT eval and sit in the chair. Will touch based around 5pm to possibly restart CRRT or transition to HD tomorrow. Did increased fluid removal rate to 200ml/hr on CRRT to meet goal. Did have to lower removal rate as pt became hypotensive and lethargic with the increased rate.

## 2023-02-13 NOTE — PROGRESS NOTES
[facility]                                               Progress note    Summary:   Hayden Rivas is being seen by nephrology for GISSELLE. Admitted with shortness of breath. Patient was admitted to the ICU on 2/8/2023. Prior to this on Wednesday he had a heart cath procedure with required Impella support. There was concern for possible stroke afterwards. Later he had not episode of unresponsiveness, cyanotic with ventricular tachycardia and required intubation for airway protection. .    Interval History  Seen and examined at bedside  He is confused and saying we are trying to kill him, he is not oriented to situation or place. His daughter is at bedside. Came off pressors at 530 am this AM   No issues reported with CRRT , not requiring electrolyte replacement. /68  Off pressors. UO only 43 cc past day   Negative 3.3 L past day   Negative 4.6 L for admission. Labs reviewed  Na 137 K 4.8 bicarb 23 BUN 25 Cr 1.6 mag 2.7 ca 8.9 phos 32.6  Hgb 10.4  Platelet 574   Ph 7.4      Plan:   - filter is to be changed at 12 pm, can stop CRRT and monitor   - he will likely need dialysis tomorrow, can attempt HD and if he does not tolerate consider CRRT again.   -  cc/hr until filter goes down for stable BP. He still has a lot of edema and on oxygen so will need more fluid off    Jacquie Dunne MD  [facility]  Office: [phone]    Assessment:   #GISSELLE, oliguric  Likely due to hemodynamic mediated GISSELLE resulting in ATN. He was severely hypotensive requiring high-dose of pressors, was intubated with hypoxic respiratory failure and subsequently developed renal failure. He had a few doses of IV contrast 1 on 1/30/2023 and 2/8/2023.   Etiology of his hypotension is unclear at this point  Urinalysis after catheter being placed showed dark yellow urine specific gravity 1.074,  WBC 39 present used 100 mg/dL of albumin  Hold off on further testing of GISSELLE at this time.    #Hyperkalemia  Due to renal failure, oliguric    #Metabolic acidosis  Due to renal failure  Addressed with dialysis    #Shock  Etiology unclear, cultures are negative so does not peer to have any source of sepsis. Procalcitonin was only mildly elevated 0.61  Discussed with cardiology about possible [place] to see how his cardiac index is. His EF 35%      ROS:     Positives Listed Bold. All other remaining systems are negative. Constitutional:  fever, chills, weakness, weight change, fatigue,      Skin:  rash, pruritus, hair loss, bruising, dry skin, petechiae. Head, Face, Neck   headaches, swelling,  cervical adenopathy. Respiratory: shortness of breath, cough, or wheezing  Cardiovascular: chest pain, palpitations, dizzy, edema  Gastrointestinal: nausea, vomiting, diarrhea, constipation,belly pain    Yellow skin, blood in stool  Musculoskeletal:  back pain, muscle weakness, gait problems,       joint pain or swelling. Genitourinary:  dysuria, poor urine flow, flank pain, blood in urine  Neurologic:  vertigo, TIA'S, syncope, seizures, focal weakness  Psychosocial:  insomnia, anxiety, or depression. Additional positive findings: -     PMH:   Past medical history, surgical history, social history, family history are reviewed and updated as appropriate. Reviewed current medication list.   Allergies reviewed and updated as needed. PE:   Vitals:    02/13/23 1200   BP: (!) 170/88   Pulse: 89   Resp: (!) 32   Temp: 97.9 °F (36.6 °C)   SpO2:        General appearance:  in NAD, somnolent. Comfortable. HEENT: EOM intact, no icterus. Trachea is midline. Neck : No masses, appears symmetrical, no JVD  Respiratory: Respiratory effort appears normal, bilateral equal chest rise, no wheeze,+ crackles   Cardiovascular: Ausculation shows RRR ++ edema  Abdomen: No visible mass or tenderness, non distended. Musculoskeletal:  Joints with no swelling or deformity.    Skin:no rashes, ulcers, induration, no jaundice. Neuro: face symmetric, no focal deficits. Appropriate responses.        Lab Results   Component Value Date    CREATININE 1.6 (H) 02/13/2023    BUN 25 (H) 02/13/2023     02/13/2023    K 4.8 02/13/2023     02/13/2023    CO2 23 02/13/2023      Lab Results   Component Value Date    WBC 10.0 02/13/2023    HGB 10.4 (L) 02/13/2023    HCT 32.1 (L) 02/13/2023    MCV 96.7 02/13/2023     (L) 02/13/2023     Lab Results   Component Value Date    CALCIUM 8.9 02/13/2023    CAION 1.21 02/13/2023    PHOS 2.6 02/13/2023

## 2023-02-13 NOTE — CARE COORDINATION
Chart Reviewed. Spoke with Daughter, Candie Dutton, about [facility] paperwork and gave her fax number to have her employer fax to Dr Arleth Saini office at [phone]. Assessment completed. ACP completed; sent referral to Spiritual Care Team for creation when he is more alert. INITIAL CASE MANAGEMENT ASSESSMENT    Reviewed chart, met with patient to assess possible discharge needs. Explained Case Management role/services. Living Situation: Pt lives alone, ranch house with one LARGE step entry. He stays on first floor except for laundry in the basement. Dgtr reports he was totally independent prior to admit the first time and was released but went home and needed assistance for cooking, laundry and meal prep until he returned for stent placing. He uses and owns no DME. ADLs: Up til 2 mos ago, he was totally indpeendent     DME: own no DME    PT/OT Recs: TBD     Active Services: none     Transportation: daughter     Medications: [facility] on [address]    PCP: Eliseo Seaman      HD/PD: started on CRRT; possible need for new HD    PLAN/COMMENTS: Daughter reports he may need to go to a facility at discharge. Extension Natasha Bowden for pt/ot elier. SW/CM provided contact information for family to call with any questions. SW/CM will follow and assist as needed.       [place], [place]     Case Management   [phone]    2/13/2023  11:28 AM

## 2023-02-13 NOTE — CONSULTS
Comprehensive Nutrition Assessment    Type and Reason for Visit:  Consult (TF Order and MGMT)    RECOMMENDATIONS  PO Diet: NPO  Nutrition Support:   Recommend Nepro (Renal formula) at 10 mL/hr and as tolerated, increase by 10 mL/hr q 4 hours until goal of 30 mL/hr + 4 boluses Proteinex daily. Flush per provider. Monitor closely and correct lytes (K, Phos, Mg) before progressing TF to goal d/t risk of refeeding syndrome. Malnutrition Assessment:  Malnutrition Status:  Insufficient data (02/13/23 1039)    Context:  Acute Illness       Nutrition Assessment:    RD consult for \"TF Ordering and Managment\". Pt with pmh of afib, CHF, HTN, CAD, CVA, CKD3 admitted to ICU for intubation after fiorella almazan was called when pt found unresponsive post cardiac cath. Since been extubated on 3 L NC. Pressors off. Continues on precedex. On CRRT possibly transitions to HD tomorrow. Pt NPO with NG placed, will provide TF recs. Nutrition Related Findings:    +3 BLE, +1 BUE edema. -4.5 L since admit. Labs reviewed. LBM 2/12 via rectal tube. Wound Type: None       Current Nutrition Intake & Therapies:    Average Meal Intake: NPO  Average Supplements Intake: NPO  Diet NPO Exceptions are: Sips of Water with Meds  Current Tube Feeding (TF) Orders:  Feeding Route: Nasogastric  Formula: Renal Formula  Schedule: Continuous  Additives/Modulars: Protein (4 Proteinex daily)  Water Flushes: Per provider  Goal TF & Flush Orders Provides: Recommend Nepro with goal rate of 30 mL/hr (calculated over 20 hours for routine nsg care) + 4 bolus of Proteinex daily. This provides 600 mL TV, 1496 kcal (1080 TF + 416 Proteinex), 153 gm protein (49 gm TF + 104 gm Proteinex), and 436 mL free fl. Anthropometric Measures:  Height: 6' 1\" (185.4 cm)  Ideal Body Weight (IBW): 184 lbs (84 kg)       Current Body Weight: 267 lb (121.1 kg),   IBW.  Weight Source: Bed Scale  Current BMI (kg/m2): 35.2                          BMI Categories: Obese Class 2 (BMI 35.0 -39.9)    Estimated Daily Nutrient Needs:  Energy Requirements Based On: Kcal/kg  Weight Used for Energy Requirements: Current  Energy (kcal/day): 1613-4444 kcal (11-14 kcal/kg)  Weight Used for Protein Requirements: Ideal  Protein (g/day): 166 gm (2 g/kg IBW)     Fluid (ml/day): per provider    Nutrition Diagnosis:   Inadequate oral intake related to cognitive or neurological impairment as evidenced by NPO or clear liquid status due to medical condition, nutrition support - enteral nutrition    Nutrition Interventions:   Food and/or Nutrient Delivery: Continue NPO, Start Tube Feeding  Nutrition Education/Counseling: Education not indicated  Coordination of Nutrition Care: Continue to monitor while inpatient       Goals:     Goals: Initiate nutrition support, within 2 days       Nutrition Monitoring and Evaluation:   Behavioral-Environmental Outcomes: None Identified  Food/Nutrient Intake Outcomes: Diet Advancement/Tolerance, Enteral Nutrition Intake/Tolerance  Physical Signs/Symptoms Outcomes: Biochemical Data, Nutrition Focused Physical Findings, Skin, Weight, Fluid Status or Edema, Hemodynamic Status    Discharge Planning:     Too soon to determine     Pedro Amador, [address] [place] [address],   Contact: [phone]

## 2023-02-13 NOTE — PROGRESS NOTES
[facility]   Speech Therapy  Daily Dysphagia Treatment Note        Lola Gregory  AGE: 69 y.o. GENDER: male  : 1945  [id number]  EPISODE DATE:  2023  Patient Active Problem List   Diagnosis    Pneumonia of right lung due to infectious organism    Acute combined systolic and diastolic HF (heart failure) (HCC)    Persistent atrial fibrillation (HCC)    GISSELLE (acute kidney injury) ([facility]    Primary hypertension    Acute on chronic systolic CHF (congestive heart failure), NYHA class 4 ([facility])    Paroxysmal atrial fibrillation ([facility]    Aortic atherosclerosis ([facility]    Morbid obesity with BMI of 40.0-44.9, adult ([facility]    Cerebrovascular accident (CVA) ([facility]    Pleural effusion, right    Lung nodules    Pleural effusion on right    Chronic combined systolic and diastolic CHF (congestive heart failure) (HCC)    Transaminitis    Abnormal CXR    Shortness of breath    Cardiomyopathy ([facility]    Acute on chronic congestive heart failure (HCC)    Stage 3b chronic kidney disease (HCC)    Dyspnea    Abnormal cardiovascular stress test    AMS (altered mental status)    Acute respiratory failure with hypoxia (HCC)    Hemoptysis    Acute and chronic respiratory failure with hypercapnia (HCC)    Cardiogenic shock (HCC)    Unstable angina ([facility])    Ventricular tachycardia     Allergies   Allergen Reactions    Spironolactone      Treatment Diagnosis: Dysphagia     Chart review:   H&P:   Patient is a 81-year-old male with a past medical history of pleural effusion, coronary artery disease, CKD, A-fib CHF who was admitted on 2023 with pleural effusion and CHF exacerbation. At that time patient had an abnormal stress test and refused inpatient cardiac catheterization. Patient was admitted on  for Impella assisted C. After Impella was removed patient's mentation changed and code stroke was called. Patient was lethargic and intubated for respiratory failure. Patient was extubated on .   Hospital course was complicated by V. tach and patient was started on amiodarone. Patient is now in shock and is on pressors with GISSELLE and oliguria. 2/8/2023 - intubated  2/9/2023 - extubated     Respiratory Status:   []Room Air              [x]O2 via nasal cannula - 3L              []Other:      Imaging:  Chest X-ray: 2/11/2023  Impression   Worsening CHF/pulmonary edema. Chest X-ray: 2/10/2023  Impression   Stable exam with small right pleural effusion and perihilar/bibasilar   airspace opacities which may be related to pulmonary edema or pneumonia. Chest X-ray: 2/8/2023  Impression   Status post right PICC line placement in good position with no change in the   ET tube and gastric tube. Stable moderate cardiomegaly and moderate central pulmonary congestion which   is slightly less prominent. Hazy perihilar and bibasilar opacities which is increased and may be related   to pulmonary edema and/or worsening pneumonia. Small right pleural effusion which is more prominent          Head CT: 2/8/2023  Impression   No acute intracranial abnormality. Brain MRI ordered 2/8, not yet completed    Subjective:     Current diet: NPO  Comments regarding tolerating Current Diet: n/a      Objective:     Pain: none reported  Cognitive/Behavior: confused, partially oriented to self/place, inconsistent command following, CRRT running  Positioning: upright in bed     PO Trials:   Thin Liquids; thin water via spoon sips; reduced labial rounding/labial closure; anterior spillage; suspect posterior loss of bolus to pharynx, delayed initiation, reduced laryngeal elevation; wet, weak cough post swallow x2 sips  Nectar thick liquids; pt refused  Honey Thick liquids; pt refused  Puree; pt refused   Soft food; DNT  Regular food; DNT    Dysphagia Tx:   Direct Dysphagia tx: PO trials tolerated as described above  Dysphagia ex: pt unable to complete  Training in compensatory strategies: reviewed rationale for PO trials and current NPO status  Pt response to ex/training: no evidence of comprehension. Education completed with daughter, Tasia Claude, at bedside. Goals:   SHORT TERM DYSPHAGIA GOALS/PLAN OF CARE: Speech therapy for dysphagia tx 3-5 times per week during acute care stay. Goals  Pt will functionally tolerate ongoing assessment of swallow function with diet to be determined as indicated continue  Pt will demonstrate understanding of aspiration risk and precautions via education/demonstration with occasional prompting continue  Pt will advance to least restrictive diet as indicated continue NPO at this time    Assessment:   Impressions:   OROPHARYNGEAL DYSPHAGIA DIAGNOSIS:   Pt awake, verbal, confused, oriented to self, partially to place and time. 3L O2 via nasal canula, NG tube in place, daughter at bedside. Pt on CCRT at this time. Oral motor exam revealed pt to have own dentition with missing teeth. Lingual/labial strength, ROM and coordination were all mild-moderately reduced. Pt unable to elicit volitional swallow, able to elicit volitional cough/throat clear. Moderate zeferino-pharyngeal dysphagia characterized by reduced labial rounding, anterior spillage, reduced lingual control, suspected posterior loss of bolus, delayed initiation, reduced laryngeal elevation, immediate wet, weak cough. Pt presented with single spoon sips of thin water only on this date. He refused all attempts at thickened liquids and pureed solids. Recommend continue NPO at this time with re-assess for readiness for PO t+1. Recommendations reviewed with pt, daughter and RN. Pt with reduced insight, suspect reduced comprehension.      Diet Recommendations: NPO    Prognosis: guarded  Barriers to progress: Limited insight into deficits, Decreased endurance, medical status    Plan:     Continue Dysphagia Therapy: yes  Interventions: ongoing re-assess, PO trials, dysphagia ex as tolerated, ongoing education with pt/family  Duration/Frequency of therapy while on unit: 3-5x  Discharge Instructions:   Anticipate Yes__x__No__ for further skilled Speech Therapy for Dysphagia at discharge    This note serves as a D/C Summary in the event that this patient is discharged prior to the next therapy session.     Coded treatment time: 0  Total treatment time: 18    Time in: 1118  Time out: 1136    Electronically signed by   Ladan Armando  [facility]  Speech-Language Pathologist  02/13/23  11:36am

## 2023-02-13 NOTE — PROGRESS NOTES
[address]   Daily Progress Note      Admit Date:  2/8/2023      Subjective:   Mr. Mark Joyce is a 75yo male with chronic systolic CHF LVEF 99-03%, CAD and chronic atrial fibrillation who came for Impella assisted PCI to the RCA and LAD 2/8/23. The procedure went well and the Impella was explanted. Unfortunately, he developed mental status chnages after the procedure concerning for a potential CVA. Head CT/CTA was unremarkable though. He ultimately was intubated for airway protection. Interval History:  Koko Mojica is doing better at present. He is down to 3 L of oxygen. CRRT still running with some volume removal.  He is now off vasopressors. He had some agitation over the weekend requiring initiation of Precedex for sedation. He has also developed some delirium. Objective:     BP (!) 170/88   Pulse 89   Temp 97.9 °F (36.6 °C) (Axillary)   Resp (!) 32   Ht 6' 1\" (1.854 m)   Wt 267 lb 6.7 oz (121.3 kg)   SpO2 100%   BMI 35.28 kg/m²      Intake/Output Summary (Last 24 hours) at 2/13/2023 1301  Last data filed at 2/13/2023 1223  Gross per 24 hour   Intake 419.03 ml   Output 3542 ml   Net -3122.97 ml       Physical Exam:  General:  Lethargic, NAD  Skin:  Warm and dry  Neck:  JVD<8, no carotid bruits  Chest: Diminished bilaterally in bases.   No rhonchi or rales  Cardiovascular:  Irreg irreg and mildly tachy, normal S1/S2, no M/R/G  Abdomen:  Soft, nontender, +bowel sounds  Extremities:  2+ bilateral LE edema  Pulses: 2+ bilat carotid    2+ bilat radial    2+ bilat femoral        Medications:    hydrocortisone sodium succinate PF  25 mg IntraVENous Q6H    amiodarone  200 mg Oral Daily    heparin (porcine)  5,000 Units SubCUTAneous 3 times per day    clopidogrel  75 mg Oral Daily    aspirin  81 mg Oral Daily    Or    aspirin  300 mg Rectal Daily    atorvastatin  80 mg Oral Nightly    pantoprazole (PROTONIX) 40 mg injection  40 mg IntraVENous Daily    chlorhexidine  15 mL Mouth/Throat BID sodium chloride flush  5-40 mL IntraVENous 2 times per day      prismaSATE BGK 4/2.5 1,000 mL/hr at 02/13/23 0230    prismaSATE BGK 4/2.5 1,000 mL/hr at 02/13/23 0850    prismaSATE BGK 4/2.5 500 mL/hr at 02/13/23 0002    dexmedetomidine 0.2 mcg/kg/hr (02/13/23 1223)    norepinephrine Stopped (02/13/23 0530)    sodium chloride 5 mL/hr at 02/13/23 1223       Lab Data:  CBC:   Recent Labs     02/11/23  0419 02/12/23  0530 02/13/23  0530   WBC 16.3* 13.2* 10.0   HGB 10.3* 10.3* 10.4*    161 128*     BMP:    Recent Labs     02/12/23  1609 02/13/23  0149 02/13/23  0810   * 134* 137   K 4.8 4.7 4.8   CO2 23 23 23   BUN 26* 24* 25*   CREATININE 1.8* 1.6* 1.6*       FASTING LIPID PANEL:  Lab Results   Component Value Date/Time    CHOL 84 02/09/2023 03:46 AM    HDL 34 02/09/2023 03:46 AM    TRIG 61 02/09/2023 03:46 AM     LHC/PCI 2/8/23:  1. Right-dominant coronary arterial circulation. Successful  intervention to 90% lesion and 99% more distal RCA lesion with a 3 mm  overlapping drug-eluting stents dilated up to 3.2 mm proximally and 3.1  mm distally, resulting in 0% residual stenosis and SANTO III flow in the  vessel. There was reversal of flow to antegrade in the dominant right  coronary artery. The PDA had been fed by collaterals. In the left  system, there is trivial left main plaque disease. The LAD had an 80%  mid lesion that was treated with a 3.5 x 22 mm Edvin frontier  drug-eluting stent dilated to 3.6 mm in diameter resulting in 0%  residual stenosis and SANTO III flow. The circumflex has a chronic total  occlusion in the midportion but the distal vessel fills in from  collaterals. 2.  Left ventricular end-diastolic pressure 21 mmHg. 3.  No gradient across the aortic valve on pullback to suggest aortic  stenosis. PROCEDURES PERFORMED:  1. Left heart catheterization. 2.  Percutaneous coronary intervention with drug-eluting stents to the  dominant right coronary artery.   3.  Percutaneous coronary intervention with drug-eluting stent to the  mid left anterior descending artery. 4.  Insertion of short-term external heart assist system into heart,  percutaneous approach. 5.  Removal of short-term external heart assist system from heart,  percutaneous approach for assistance with cardiac output using impeller  pump, continuous. Stress Perfusion 1/29/23:  Pharmacological Stress/MPI Results:  1. Technically a satisfactory study. 2. Large mostly dense perfusion defect involving lateral and inferolateral wall suggestive of previous MI. No reversibility /ischemia  3. Gated Study shows markedly dilated LV with lateral and inferolateral akinesis; EF 29 %. Echo 1/5/23:  Poor image quality. Definity contrast administered. Patient appears to be in atrial fibrillation. Overall left ventricular systolic function appears severely reduced. Ejection fraction is visually estimated to be 20-25% with diffuse  hypokinesis and regional abnormalities including inferior akinesis. Normal left ventricular wall thickness and cavity size. The right ventricle appears normal in size with severely reduced systolic function. The left and right atria are moderately dilated. Possibly severe mitral regurgitation. The aortic valve leaflets are not well visualized. Mild aortic stenosis based on a peak velocity of 2.6 m/s and a mean pressure gradient  of 20 mmHg. Gradients may be underestimated with reduced LV function. Trivial aortic regurgitation. Trivial pericardial effusion. IVC size is dilated (>2.1 cm) and collapses < 50% with respiration consistent with elevated RA pressure (15 mmHg). Assessment / Plan: 1. Ventricular Tachycardia  No further VT. Continue oral amiodarone 200mg bid. LVEF 35%. 2.Acute on chronic systolic CHF, class 4  LVEF ~35 by repeat echocardiogram.  He appears more euvolemic at present  Hold beta-blockade given hypotension.   Hold nephrotoxic agents such as Aldactone ACE inhibitors or ARB's while patient has acute renal failure and is on CRRT. 3.Unstable Angina  S/p PCI to the LAD and the RCA on 2/8/23. Continue DAPT with aspirin and clopidogrel. Will need to crush in apple sauce until tolerating more oral intake. Hold beta blockade. Continue statin therapy. 4.Mental Status Change  Improved but now somewhat diminished as he is on Precedex for sedation. Neurology following. Will obtain brain MRI when patient is more stable.         Signed:  Don Esposito MD

## 2023-02-14 PROBLEM — T17.998A MUCUS PLUG IN RESPIRATORY TRACT: Status: ACTIVE | Noted: 2023-01-01

## 2023-02-14 PROBLEM — J96.02 ACUTE RESPIRATORY FAILURE WITH HYPOXIA AND HYPERCAPNIA (HCC): Status: ACTIVE | Noted: 2023-01-01

## 2023-02-14 NOTE — PROCEDURES
ICU PROCEDURE - ENDOTRACHEAL INTUBATION    Dennis Alfaro     MRN#: [id number]  23      [email or web address] [email or web address]     : 1945      INDICATION: as above    TIME OUT: taken    Permission obtained, risks/benefits reviewed:    ANESTHESIA:   []Ketamine  []Ativan  [] Morphine  [x]Propofol  []Other medications:      ESTIMATED BLOOD LOSS:  None. COMPLICATIONS:  [x]N/A  [] Other:    LARYNGOSCOPIC AIRWAY GRADE (CORMACK-LEHANE):[x]1  []2a  []2b []3  []4        INTUBATION EQUIPMENT USED:  [x] Direct laryngoscope only    OUTCOME: Successful placement of #  1  Taperguard Evac endotracheal via   [x]Oral route    INSERTION DEPTH:  25    cm from   [x]lip           CONFIRMATION OF TUBE POSITION:   [x]Capnography - Strong & repeatable exhaled CO2 detection   [x]Multiple point auscultation   [x]SpO2 response   []STAT X-ray   [x]Bronchoscopic assessment    UNUSUAL FINDINGS:    PROCEDURE:     Using direct laryngoscopy, the vocal cords were visualized and the endotracheal tube was placed through the cords under direct vision. Good breath sounds were auscultated bilaterally without sounds over abdomen. Appropriate strong & repeatable exhaled CO2 detection was confirmed.        Electronically signed by Kandy Mason MD on 2023 at 12:40 PM

## 2023-02-14 NOTE — FLOWSHEET NOTE
02/14/23 0755 02/14/23 1149   Vital Signs   /62 (!) 123/57   Temp 97.7 °F (36.5 °C) 97.8 °F (36.6 °C)   Heart Rate 87 86   Resp 30 28     Treatment time: 3.5 hours    Net UF: 3 L    Pre weight: 117.6 kg (bed scale)  Post weight: 114.6 kg (bed scale)  EDW: TBD/GISSELLE    Access used: NT RIJ HD cath  Access function: No problems    Medications or blood products given: Albumin 25 Gm IVPB. Regular outpatient schedule: TBD/GISSELLE    Summary of response to treatment: 1st HD tx. SBP 90's, MAP <65, 1.5 hours after start of tx. Midodrine 10 mg per NG given, Albumin 25 Gm IVPB given, Levo gtt started and titrated up to 10 mcg/min to keep MAP>65. SBP improved to >100 for remainder of tx and Levo gtt was titrated to off. Tachypneic throughout tx, 30's. Copy of dialysis treatment record placed in chart, to be scanned into EMR.      Report given to Major Miller RN

## 2023-02-14 NOTE — PLAN OF CARE
Problem: Chronic Conditions and Co-morbidities  Goal: Patient's chronic conditions and co-morbidity symptoms are monitored and maintained or improved  Outcome: Progressing  Flowsheets (Taken 2/13/2023 2000)  Care Plan - Patient's Chronic Conditions and Co-Morbidity Symptoms are Monitored and Maintained or Improved:   Monitor and assess patient's chronic conditions and comorbid symptoms for stability, deterioration, or improvement   Update acute care plan with appropriate goals if chronic or comorbid symptoms are exacerbated and prevent overall improvement and discharge     Problem: Discharge Planning  Goal: Discharge to home or other facility with appropriate resources  Outcome: Progressing  Flowsheets (Taken 2/13/2023 2000)  Discharge to home or other facility with appropriate resources:   Identify barriers to discharge with patient and caregiver   Refer to discharge planning if patient needs post-hospital services based on physician order or complex needs related to functional status, cognitive ability or social support system     Problem: Pain  Goal: Verbalizes/displays adequate comfort level or baseline comfort level  Outcome: Progressing  Flowsheets (Taken 2/13/2023 2000)  Verbalizes/displays adequate comfort level or baseline comfort level:   Encourage patient to monitor pain and request assistance   Assess pain using appropriate pain scale   Administer analgesics based on type and severity of pain and evaluate response   Implement non-pharmacological measures as appropriate and evaluate response     Problem: Respiratory - Adult  Goal: Achieves optimal ventilation and oxygenation  Outcome: Progressing  Flowsheets (Taken 2/13/2023 2000)  Achieves optimal ventilation and oxygenation:   Assess for changes in respiratory status   Assess for changes in mentation and behavior   Position to facilitate oxygenation and minimize respiratory effort   Oxygen supplementation based on oxygen saturation or arterial blood gases   Encourage broncho-pulmonary hygiene including cough, deep breathe, incentive spirometry   Assess the need for suctioning and aspirate as needed   Assess and instruct to report shortness of breath or any respiratory difficulty   Respiratory therapy support as indicated     Problem: Cardiovascular - Adult  Goal: Maintains optimal cardiac output and hemodynamic stability  Outcome: Progressing  Flowsheets (Taken 2/13/2023 2000)  Maintains optimal cardiac output and hemodynamic stability:   Monitor blood pressure and heart rate   Monitor urine output and notify Licensed Independent Practitioner for values outside of normal range   Assess for signs of decreased cardiac output  Goal: Absence of cardiac dysrhythmias or at baseline  Outcome: Progressing  Flowsheets (Taken 2/13/2023 2000)  Absence of cardiac dysrhythmias or at baseline:   Monitor cardiac rate and rhythm   Assess for signs of decreased cardiac output   Administer antiarrhythmia medication and electrolyte replacement as ordered     Problem: Metabolic/Fluid and Electrolytes - Adult  Goal: Electrolytes maintained within normal limits  Outcome: Progressing  Flowsheets (Taken 2/13/2023 2000)  Electrolytes maintained within normal limits:   Monitor labs and assess patient for signs and symptoms of electrolyte imbalances   Administer electrolyte replacement as ordered   Monitor response to electrolyte replacements, including repeat lab results as appropriate  Goal: Hemodynamic stability and optimal renal function maintained  Outcome: Progressing  Flowsheets (Taken 2/13/2023 2000)  Hemodynamic stability and optimal renal function maintained:   Monitor labs and assess for signs and symptoms of volume excess or deficit   Monitor intake, output and patient weight   Monitor response to interventions for patient's volume status, including labs, urine output, blood pressure (other measures as available)     Problem: Hematologic - Adult  Goal: Maintains hematologic stability  Outcome: Progressing  Flowsheets (Taken 2/13/2023 2000)  Maintains hematologic stability:   Assess for signs and symptoms of bleeding or hemorrhage   Monitor labs for bleeding or clotting disorders     Problem: Neurosensory - Adult  Goal: Achieves stable or improved neurological status  Outcome: Progressing  Flowsheets (Taken 2/13/2023 2000)  Achieves stable or improved neurological status:   Assess for and report changes in neurological status   Initiate measures to prevent increased intracranial pressure   Maintain blood pressure and fluid volume within ordered parameters to optimize cerebral perfusion and minimize risk of hemorrhage  Goal: Absence of seizures  Outcome: Progressing  Flowsheets (Taken 2/13/2023 2000)  Absence of seizures:   Monitor for seizure activity. If seizure occurs, document type and location of movements and any associated apnea   If seizure occurs, turn head to side and suction secretions as needed   Support airway/breathing, administer oxygen as needed  Goal: Achieves maximal functionality and self care  Outcome: Progressing  Flowsheets (Taken 2/13/2023 2000)  Achieves maximal functionality and self care: Monitor swallowing and airway patency with patient fatigue and changes in neurological status     Problem: Skin/Tissue Integrity  Goal: Absence of new skin breakdown  Description: 1. Monitor for areas of redness and/or skin breakdown  2. Assess vascular access sites hourly  3. Every 4-6 hours minimum:  Change oxygen saturation probe site  4. Every 4-6 hours:  If on nasal continuous positive airway pressure, respiratory therapy assess nares and determine need for appliance change or resting period.   Outcome: Progressing     Problem: Safety - Adult  Goal: Free from fall injury  Outcome: Progressing  Flowsheets (Taken 2/14/2023 0148)  Free From Fall Injury: Instruct family/caregiver on patient safety     Problem: ABCDS Injury Assessment  Goal: Absence of physical injury  Outcome: Progressing  Flowsheets (Taken 2/14/2023 0148)  Absence of Physical Injury: Implement safety measures based on patient assessment     Problem: Skin/Tissue Integrity - Adult  Goal: Skin integrity remains intact  Outcome: Progressing  Flowsheets (Taken 2/13/2023 2000)  Skin Integrity Remains Intact:   Monitor for areas of redness and/or skin breakdown   Assess vascular access sites hourly   Every 4-6 hours minimum: Change oxygen saturation probe site     Problem: Musculoskeletal - Adult  Goal: Return mobility to safest level of function  Outcome: Progressing  Flowsheets (Taken 2/13/2023 2000)  Return Mobility to Safest Level of Function:   Assess patient stability and activity tolerance for standing, transferring and ambulating with or without assistive devices   Assist with transfers and ambulation using safe patient handling equipment as needed   Obtain physical therapy/occupational therapy consults as needed  Goal: Return ADL status to a safe level of function  Outcome: Progressing  Flowsheets (Taken 2/13/2023 2000)  Return ADL Status to a Safe Level of Function:   Administer medication as ordered   Obtain physical therapy/occupational therapy consults as needed     Problem: Gastrointestinal - Adult  Goal: Minimal or absence of nausea and vomiting  Outcome: Progressing  Flowsheets (Taken 2/13/2023 2000)  Minimal or absence of nausea and vomiting:   Maintain NPO status until nausea and vomiting are resolved   Administer ordered antiemetic medications as needed   Nutrition consult to assist patient with adequate nutrition and appropriate food choices  Goal: Maintains or returns to baseline bowel function  Outcome: Progressing  Flowsheets (Taken 2/13/2023 2000)  Maintains or returns to baseline bowel function: Assess bowel function  Goal: Maintains adequate nutritional intake  Outcome: Progressing  Flowsheets (Taken 2/13/2023 2000)  Maintains adequate nutritional intake:   Identify factors contributing to decreased intake, treat as appropriate   Monitor intake and output, weight and lab values   Obtain nutritional consult as needed     Problem: Genitourinary - Adult  Goal: Urinary catheter remains patent  Outcome: Progressing  Flowsheets (Taken 2/13/2023 2000)  Urinary catheter remains patent: Assess patency of urinary catheter     Problem: Infection - Adult  Goal: Absence of infection at discharge  Outcome: Progressing  Flowsheets (Taken 2/13/2023 2000)  Absence of infection at discharge:   Assess and monitor for signs and symptoms of infection   Monitor lab/diagnostic results   Monitor all insertion sites i.e., indwelling lines, tubes and drains   Administer medications as ordered   Instruct and encourage patient and family to use good hand hygiene technique  Goal: Absence of infection during hospitalization  Outcome: Progressing  Flowsheets (Taken 2/13/2023 2000)  Absence of infection during hospitalization:   Assess and monitor for signs and symptoms of infection   Monitor lab/diagnostic results   Monitor all insertion sites i.e., indwelling lines, tubes and drains   Administer medications as ordered     Problem: Anxiety  Goal: Will report anxiety at manageable levels  Description: INTERVENTIONS:  1. Administer medication as ordered  2. Teach and rehearse alternative coping skills  3. Provide emotional support with 1:1 interaction with staff  Outcome: Progressing  Flowsheets (Taken 2/13/2023 2000)  Will report anxiety at manageable levels: Administer medication as ordered     Problem: Coping  Goal: Pt/Family able to verbalize concerns and demonstrate effective coping strategies  Description: INTERVENTIONS:  1. Assist patient/family to identify coping skills, available support systems and cultural and spiritual values  2. Provide emotional support, including active listening and acknowledgement of concerns of patient and caregivers  3. Reduce environmental stimuli, as able  4.  Instruct patient/family in relaxation techniques, as appropriate  5. Assess for spiritual pain/suffering and initiate Spiritual Care, Psychosocial Clinical Specialist consults as needed  Outcome: Progressing  Flowsheets (Taken 2/13/2023 2000)  Patient/family able to verbalize anxieties, fears, and concerns, and demonstrate effective coping:   Assist patient/family to identify coping skills, available support systems and cultural and spiritual values   Provide emotional support, including active listening and acknowledgement of concerns of patient and caregivers     Problem: Confusion  Goal: Confusion, delirium, dementia, or psychosis is improved or at baseline  Description: INTERVENTIONS:  1. Assess for possible contributors to thought disturbance, including medications, impaired vision or hearing, underlying metabolic abnormalities, dehydration, psychiatric diagnoses, and notify attending LIP  2. [place] high risk fall precautions, as indicated  3. Provide frequent short contacts to provide reality reorientation, refocusing and direction  4. Decrease environmental stimuli, including noise as appropriate  5. Monitor and intervene to maintain adequate nutrition, hydration, elimination, sleep and activity  6. If unable to ensure safety without constant attention obtain sitter and review sitter guidelines with assigned personnel  7.  Initiate Psychosocial CNS and Spiritual Care consult, as indicated  Outcome: Progressing  Flowsheets (Taken 2/13/2023 2000)  Effect of thought disturbance (confusion, delirium, dementia, or psychosis) are managed with adequate functional status: Assess for contributors to thought disturbance, including medications, impaired vision or hearing, underlying metabolic abnormalities, dehydration, psychiatric diagnoses, notify LIP     Problem: Nutrition Deficit:  Goal: Optimize nutritional status  Outcome: Progressing  Flowsheets (Taken 2/14/2023 0204)  Nutrient intake appropriate for improving, restoring, or maintaining nutritional needs:   Monitor oral intake, labs, and treatment plans   Recommend, monitor, and adjust tube feedings and TPN/PPN based on assessed needs

## 2023-02-14 NOTE — PROGRESS NOTES
Latest Reference Range & Units 2/14/23 04:57   pH, Arterial 7.350 - 7.450  7.285 (L)   pCO2, Arterial 35.0 - 45.0 mm Hg 54.8 (H)   pO2, Arterial 75.0 - 108.0 mm Hg 278.1 (H)   HCO3, Arterial 21.0 - 29.0 mmol/L 26.1   TCO2 (calc), Art Not Established mmol/L 28   Base Excess, Arterial -3 - 3  -1   O2 Sat, Arterial 93 - 100 % 100     Decreased FiO2 to 60%. Pt more alert and responding to stimulus. Not answering questions at this time though.

## 2023-02-14 NOTE — PROGRESS NOTES
Occupational Therapy    OT order received and chart reviewed. Pt in HD at time of attempt. Will follow up as schedule and pt condition permit.     Electronically signed by Rush Grady OT on 2/14/2023 at 9:00 AM Deep Sutures: 4-0 Vicryl

## 2023-02-14 NOTE — PROGRESS NOTES
Hospitalist Progress Note  2/14/2023 3:16 PM  Subjective:   Admit Date: 2/8/2023  PCP: Rupesh Green Status: Inpatient   Interval History: Hospital Day: 7, patient's status post bronchoscopy  Remains intubated  Low-dose pressors  Received hemodialysis today    History of present illness:  Admitted for Impella assisted LHC. After Impella was removed patient's mentation changed and code stroke was called. Patient was lethargic and intubated for respiratory failure. Patient was extubated on 2/8. Hospital course was complicated by V. tach and patient was started on amiodarone. Patient is now in shock and is on pressors with GISSELLE and oliguria. Chronic systolic CHF LVEF 41-51%, CAD and chronic atrial fibrillation who came for Impella assisted PCI to the RCA and LAD 2/8/23. The procedure went well and the Impella was explanted. Unfortunately, he developed mental status chnages after the procedure concerning for a potential CVA. Head CT/CTA was unremarkable though. He ultimately was intubated for airway protection and extubated to BiPAP and requiring nonrebreather. CRRT required for euvolemia, managed by nephrology. Amiodarone initiated for ventricular tachycardia.       Diet: NPO (sips of water with meds)  Right cephalic double lumen PICC (2/8, day #4)  Rectal tube (2/10, day #3)  Urinary catheter (2/8, day #4)  Right arterial line (2/9, day #3)  Right neck Vascath (2/10, day #3)  Medications:     prismaSATE BGK 4/2.5 1,000 mL/hr   dexmedetomidine 0.2 mcg/kg/hr    norepinephrine 6 mcg/min      amiodarone  200 mg Oral Daily   heparin  5,000 Units SubCUTAneous TID   clopidogrel  75 mg Oral Daily   hydrocortisone   50 mg IntraVENous Q6H   aspirin  81 mg Oral Daily   atorvastatin  80 mg Oral Nightly   pantoprazole  40 mg IntraVENous Daily     Recent Labs     02/12/23  0530 02/13/23  0530 02/14/23  0342 02/14/23  0345   WBC 13.2* 10.0 13.5*  --    HGB 10.3* 10.4* 11.3* 14.0    128* 155  --    MCV 97.8 96.7 97.7  --        Recent Labs     02/13/23  0810 02/13/23  1600 02/14/23  0342    136 137   K 4.8 4.8 5.3*    100 101   CO2 23 24 27   BUN 25* 31* 50*   CREATININE 1.6* 1.9* 2.4*   GLUCOSE 104* 119* 156*       No results for input(s): CA, AST, ALT, ALB, BILITOT, ALKPHOS in the last 72 hours. Troponin T (2/8) 0.03 ng/mL  INR (2/8) 1.73, (2/9) 1.62  Magnesium (2/11) 2.30, (2/12) 2.50 mg/dL  Ionized calcium (2/11) 1.13, (2/12) 1.17 mmol/L  Venous pH (2/11) 7.29     Portable CXR (2/10) Stable exam with small right pleural effusion and perihilar/bibasilar airspace opacities which may be related to pulmonary edema or pneumonia. Left heart cath with PCI (2/8) Right-dominant coronary arterial circulation. Successful intervention to 90% lesion and 99% more distal RCA lesion with a 3 mm overlapping drug-eluting stents dilated up to 3.2 mm proximally and 3.1 mm distally, resulting in 0% residual stenosis and SANTO III flow in the vessel. There was reversal of flow to  antegrade in the dominant right coronary artery. The PDA had been fed by collaterals. In the left system, there is trivial left main plaque disease. The LAD had an 80% mid lesion that was treated with a 3.5 x 22 mm [place] frontier drug-eluting stent dilated to 3.6 mm in diameter resulting in 0% residual stenosis and SANTO III flow. The circumflex  has a chronic total occlusion in the midportion but the distal vessel fills in from collaterals. Left ventricular end-diastolic pressure 21 mmHg. No gradient across the aortic valve on pullback to suggest aortic stenosis. Insertion of short-term external heart assist system into heart, percutaneous approach. Removal of short-term external heart assist system from heart, percutaneous approach for assistance with cardiac output using impeller pump, continuous. Echo (2/9)  Limited echo to assess left ventriclar ejection fraction. Suboptimal echo due to lung interference.  Difficult to assess Left ventricular function but appears to be moderately depressed at about 35%. Cannot exclude regional wall motion abnormalities secondary to poor endocardial visualization. Objective:   Vitals:  /55   Pulse 67   Temp 97.2 °F (36.2 °C) (Axillary)   Resp 11   Ht 6' 1\" (1.854 m)   Wt 271 lb 2.7 oz (123 kg)   SpO2 97% on 4 LPM  BMI 35.78 kg/m²   General appearance: alert and cooperative with exam  Lungs: clear to auscultation bilaterally  Heart: regular rate and rhythm, S1, S2 normal, no murmur, click, rub or gallop  Abdomen: soft, non-tender; bowel sounds normal; no masses,  no organomegaly  Extremities: extremities normal, atraumatic, no cyanosis or edema  Neurologic: No obvious focal neurologic deficits. Assessment and Plan:   GISSELLE on CKD stage 3, requiring CRRT . HD done today  Acute on chronic systolic heart failure, class 4 with Cardiomyopathy (EF 30%) Cardiogenic shock with hypotension on norepinephrine 12 mcg/min and vasopressin 0.04 Units/min. MAP of 65. CAD with unstable angina s/p PCI to the LAD and the RCA (2/8) on aspirin and clopidogrel dual anti-platelet therapy. Metabolic encephalopathy with agitation on non-invasive ventilation,  Avoiding benzodiazepines. MRI brain planned for today  Acute respiratory failure requiring intubation and mechanical ventilation (2/8-2/9) and extubated to BiPAP. Leukocytosis:  Initially treated with cefepime. Pneumonia panel negative. Antibiotic discontinued. Low procalcitonin and lactate. Chronic right pleural effusion s/p chest tube in January. Trapped lung per pulmonary with no intervention required as lung will not re-expand. Hemoptysis:  Suspected related to anticoagulation loading. Atrial fibrillation with rapid ventricular response:  Completed amiodarone infusion. Rate improve off milrinone. Thyromegaly with TSH mildly elevated c/w euthyroid sick syndrome.    Left ICA pseudoaneurysm:  Outpatient follow up post discharge.      Advance Directive: Full Code  DVT prophylaxis with heparin 5,000 units sub-Q TID  Discharge planning: unknown at this time, ICU level of care  Spoke with daughter    Carter Guan MD  Rounding Hospitalist

## 2023-02-14 NOTE — PROGRESS NOTES
Hospitalist Progress Note  2/13/2023 7:59 PM  Subjective:   Admit Date: 2/8/2023  PCP: Hortencia Loja Status: Inpatient   Interval History: Hospital Day: 6, vasopressin discontinued, continues on norepinephrine decreased from 12 to 6 mcg/min. History of present illness:  Admitted for Impella assisted LHC. After Impella was removed patient's mentation changed and code stroke was called. Patient was lethargic and intubated for respiratory failure. Patient was extubated on 2/8. Hospital course was complicated by V. tach and patient was started on amiodarone. Patient is now in shock and is on pressors with GISSELLE and oliguria. Chronic systolic CHF LVEF 96-63%, CAD and chronic atrial fibrillation who came for Impella assisted PCI to the RCA and LAD 2/8/23. The procedure went well and the Impella was explanted. Unfortunately, he developed mental status chnages after the procedure concerning for a potential CVA. Head CT/CTA was unremarkable though. He ultimately was intubated for airway protection and extubated to BiPAP and requiring nonrebreather. CRRT required for euvolemia, managed by nephrology. Amiodarone initiated for ventricular tachycardia.       Diet: NPO (sips of water with meds)  Right cephalic double lumen PICC (2/8, day #4)  Rectal tube (2/10, day #3)  Urinary catheter (2/8, day #4)  Right arterial line (2/9, day #3)  Right neck Vascath (2/10, day #3)  Medications:     prismaSATE BGK 4/2.5 1,000 mL/hr   dexmedetomidine 0.2 mcg/kg/hr    norepinephrine 6 mcg/min      amiodarone  200 mg Oral Daily   heparin  5,000 Units SubCUTAneous TID   clopidogrel  75 mg Oral Daily   hydrocortisone   50 mg IntraVENous Q6H   aspirin  81 mg Oral Daily   atorvastatin  80 mg Oral Nightly   pantoprazole  40 mg IntraVENous Daily     Recent Labs     02/11/23  0419 02/12/23  0530 02/13/23  0530   WBC 16.3* 13.2* 10.0   HGB 10.3* 10.3* 10.4*    161 128*   MCV 95.7 97.8 96.7       Recent Labs     02/13/23  0149 02/13/23  0810 02/13/23  1600   * 137 136   K 4.7 4.8 4.8   CL 99 101 100   CO2 23 23 24   BUN 24* 25* 31*   CREATININE 1.6* 1.6* 1.9*   GLUCOSE 104* 104* 119*       No results for input(s): CA, AST, ALT, ALB, BILITOT, ALKPHOS in the last 72 hours. Troponin T (2/8) 0.03 ng/mL  INR (2/8) 1.73, (2/9) 1.62  Magnesium (2/11) 2.30, (2/12) 2.50 mg/dL  Ionized calcium (2/11) 1.13, (2/12) 1.17 mmol/L  Venous pH (2/11) 7.29     Portable CXR (2/10) Stable exam with small right pleural effusion and perihilar/bibasilar airspace opacities which may be related to pulmonary edema or pneumonia. Left heart cath with PCI (2/8) Right-dominant coronary arterial circulation. Successful intervention to 90% lesion and 99% more distal RCA lesion with a 3 mm overlapping drug-eluting stents dilated up to 3.2 mm proximally and 3.1 mm distally, resulting in 0% residual stenosis and SANTO III flow in the vessel. There was reversal of flow to  antegrade in the dominant right coronary artery. The PDA had been fed by collaterals. In the left system, there is trivial left main plaque disease. The LAD had an 80% mid lesion that was treated with a 3.5 x 22 mm [place] frontier drug-eluting stent dilated to 3.6 mm in diameter resulting in 0% residual stenosis and SANTO III flow. The circumflex  has a chronic total occlusion in the midportion but the distal vessel fills in from collaterals. Left ventricular end-diastolic pressure 21 mmHg. No gradient across the aortic valve on pullback to suggest aortic stenosis. Insertion of short-term external heart assist system into heart, percutaneous approach. Removal of short-term external heart assist system from heart, percutaneous approach for assistance with cardiac output using impeller pump, continuous. Echo (2/9)  Limited echo to assess left ventriclar ejection fraction. Suboptimal echo due to lung interference.  Difficult to assess Left ventricular function but appears to be moderately depressed at about 35%. Cannot exclude regional wall motion abnormalities secondary to poor endocardial visualization. Objective:   Vitals:  /55   Pulse 67   Temp 97.2 °F (36.2 °C) (Axillary)   Resp 11   Ht 6' 1\" (1.854 m)   Wt 271 lb 2.7 oz (123 kg)   SpO2 97% on 4 LPM  BMI 35.78 kg/m²   General appearance: alert and cooperative with exam  Lungs: clear to auscultation bilaterally  Heart: regular rate and rhythm, S1, S2 normal, no murmur, click, rub or gallop  Abdomen: soft, non-tender; bowel sounds normal; no masses,  no organomegaly  Extremities: extremities normal, atraumatic, no cyanosis or edema  Neurologic: No obvious focal neurologic deficits. Assessment and Plan:   GISSELLE on CKD stage 3, requiring CRRT . HD planned   Acute on chronic systolic heart failure, class 4 with Cardiomyopathy (EF 30%) Cardiogenic shock with hypotension on norepinephrine 12 mcg/min and vasopressin 0.04 Units/min. MAP of 65. CAD with unstable angina s/p PCI to the LAD and the RCA (2/8) on aspirin and clopidogrel dual anti-platelet therapy. Metabolic encephalopathy with agitation on non-invasive ventilation,  Avoiding benzodiazepines. MRI brain when clinically stable. Swallow evaluation or possible NG tube. Acute respiratory failure requiring intubation and mechanical ventilation (2/8-2/9) and extubated to BiPAP. Leukocytosis:  Initially treated with cefepime. Pneumonia panel negative. Antibiotic discontinued. Low procalcitonin and lactate. Chronic right pleural effusion s/p chest tube in January. Trapped lung per pulmonary with no intervention required as lung will not re-expand. Hemoptysis:  Suspected related to anticoagulation loading. Atrial fibrillation with rapid ventricular response:  Completed amiodarone infusion. Rate improve off milrinone. Thyromegaly with TSH mildly elevated c/w euthyroid sick syndrome. Left ICA pseudoaneurysm:  Outpatient follow up post discharge. Advance Directive: Full Code  DVT prophylaxis with heparin 5,000 units sub-Q TID  Discharge planning: unknown at this time, ICU level of care      Olivia Lackey MD  Rounding Hospitalist

## 2023-02-14 NOTE — PROGRESS NOTES
Dr. Meghann Vázquez notified of MRI being complete with results. Will let Neurology know.  Service called

## 2023-02-14 NOTE — PROGRESS NOTES
Daughter Rik Blair at bedside. Dr. Aries Peterson from pulmonary at bedside to explain bedside bronch and plan to leave patient intubated until tomorrow. Consent signed.      1210: Propofol started per Dr. Aries Peterson.    6297: pt intubated 25 at the lip with positive color change    1220: bedside bronch- culture ordered

## 2023-02-14 NOTE — PROCEDURES
Bronchoscopy Inpatient Procedure Note    Date of Procedure: 2/14/2023      Anesthesia: Conscious Sedation. Total Dose: Propofol     Procedure: Flexible therapeutic fiberoptic bronchoscopy with suctioning of mucous plugs    Estimated Blood Loss: Minimal    Complications: None    Mallampati 2  ASA 4  Conscious sedation time 30 minutes      Consent to Procedure  The risks, benefits, complications, treatment options and expected outcomes were discussed with the patient and/or POA  The possibilities of reaction to medication, pulmonary aspiration, perforation of a viscus, bleeding, failure to diagnose a condition and creating a complication requiring transfusion or operation were discussed with the patient who freely signed the consent. Description of Procedure   [employer] was monitored ICU endoscopy monitoring devices. Caruso Estrin and the procedure were verified as Flexible therapeutic fiberoptic Bronchoscopy. A Time Out was held and the above information confirmed. The patient was placed in the appropriate position. The patient was sedated. The bronchoscope was then passed through endotracheal tube. After careful inspection of the tracheal, the bronchoscope was sequentially passed into all segments of the left and right endobronchial trees to the second and/or third divisions. Endobronchial findings    Vocal Cords Normal  Trachea: No tracheal stenosis. Severely inflamed airways especially on the right with complete obstruction of the right main bronchus with hemorrhagic mucous plugs that was suctioned, complete obstruction of right upper lobe with multiple mucous plugs that was suctioned, severely inflamed airway bilaterally more on the right. No endobronchial lesions. Bronchial washing was obtained and sent for cultures.

## 2023-02-14 NOTE — PROGRESS NOTES
02/14/23 1607   Encounter Summary   Encounter Overview/Reason  Spiritual/Emotional Needs; Advance Care Planning   Service Provided For: Family  (daughter Tejas Section)   Last Encounter  02/14/23  (Support to dtr,pt intubated; AD education to dtr SM)   Complexity of Encounter Moderate   Begin Time 1430   End Time  1450   Total Time Calculated 20 min   Advance Care Planning   Type   (Adv. Directive forms given to daughter)   Assessment/Intervention/Outcome   Assessment Calm;Coping   Intervention Active listening;Discussed illness injury and its impact   Outcome Expressed feelings, needs, and concerns

## 2023-02-14 NOTE — PROGRESS NOTES
0066: Pt began to xavier down and having multifocal PVC's. O2 Sat began dropping. Pt not responding to any stimuli. Gasping sounds noted coming from patient. TF stopped and pt placed on bipap FiO2 @100%. RT called. @0660: Labs obtained and ABG ran on EPOC. pH 7.108, CO2 95. K 5.7, blood sugar 142. @9894: O2 99%. Weaned FiO2 to 80%. Will repeat ABG in 1 hour.
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
## 2023-02-14 NOTE — PROGRESS NOTES
Pt with very congested cough. Unable to clear secretions. Placed on 3L NC. Suctioned moderate amount of blood tinged secretions out of mouth.

## 2023-02-14 NOTE — PROGRESS NOTES
Dr. Marielle James rounding with pulmonary. ABG collected. Results reviewed. Informed that O2 was increased to 4L. Would prefer 6L O2 and keep O2 sat >92%    Levo started per dialysis RN and SBP low 90's.

## 2023-02-14 NOTE — PROGRESS NOTES
02/14/23 0344   NIV Type   $NIV $Daily Charge   Skin Assessment Clean, dry, & intact   Skin Protection for O2 Device Yes   Orientation Middle   Location Nose   Intervention(s) Skin Barrier   NIV Started/Stopped On   Equipment Type v60   Mode Bilevel   Mask Type Full face mask   Mask Size Large   Settings/Measurements   IPAP 22 cmH20   CPAP/EPAP 10 cmH2O   Vt (Measured) 412 mL   Rate Ordered 16   Resp (!) 32   Insp Rise Time (%) 3 %   FiO2  100 %   I Time/ I Time % 1 s   Minute Volume (L/min) 9.4 Liters   Mask Leak (lpm) 8 lpm   Comfort Level Good   Using Accessory Muscles No   SpO2 97   Alarm Settings   Alarms On Y   Low Pressure (cmH2O) 4 cmH2O   High Pressure (cmH2O) 30 cmH2O   Delay Alarm 20 sec(s)   RR Low (bpm) 10   RR High (bpm) 40 br/min     Pt placed on BiPAP due to low SpO2. Increased FiO2 to 100%. Will continue to monitor at this time.

## 2023-02-14 NOTE — PROGRESS NOTES
Speech Language Pathology    Dysphagia tx/re-assess attempted. Patient intubated. SLP will d/c at this time. Please re-consult once pt is respiratory stable and appropriate for PO trials. Thank you.   Jasmin Fernandez MA CCC-SLP #[id number]  Speech-Language Pathologist  02/14/23  1:00pm

## 2023-02-14 NOTE — PROGRESS NOTES
[facility]                                               Progress note    Summary:   Shady Chahal is being seen by nephrology for GISSELLE. Admitted with shortness of breath. Patient was admitted to the ICU on 2/8/2023. Prior to this on Wednesday he had a heart cath procedure with required Impella support. There was concern for possible stroke afterwards. Later he had not episode of unresponsiveness, cyanotic with ventricular tachycardia and required intubation for airway protection. .    Interval History  Patient was seen and examined at bedside  He is very confused and somnolent  Yesterday we stopped CRRT. He has had issues with labile blood pressure overnight  Is off pressors. He is going to be getting IHD today. His blood pressure at the start of treatment is 822 systolic. The goal is for 3 L UF    Labs reviewed  Na 137 K 4.8 bicarb 23 BUN 25 Cr 1.6 mag 2.7 ca 8.9 phos 32.6  Hgb 10.4  Platelet 475   Ph 7.4  Sodium 137 potassium 5.3 bicarb 27 BUN 50 creatinine 2.4 ionized calcium 1.37 lactate 1 glucose 156 phosphorus 3.4 hemoglobin 11.3    Plan:   Patient is hemodynamic mediated ATN in the setting of shock which has improved. Remains oliguric with rising solutes in between dialysis so will plan for hemodialysis as needed, first HD treatment today. UF goal 3 L as tolerated for systolic blood pressure greater than 100, okay to use low-dose pressors, albumin and midodrine to achieve the goal.  He remains very volume overloaded on exam    Later, I reviewed his treatment, he tolerated it well without any issues, was given albumin 25 g, got 3 L UF     I will assess his needs for dialysis again tomorrow. Lyn Benito MD  [facility]  Office: [phone]    Assessment:   #GISSELLE, oliguric  Likely due to hemodynamic mediated GISSELLE resulting in ATN.   He was severely hypotensive requiring high-dose of pressors, was intubated with hypoxic respiratory failure and subsequently developed renal failure. He had a few doses of IV contrast 1 on 1/30/2023 and 2/8/2023. Etiology of his hypotension is unclear at this point  Urinalysis after catheter being placed showed dark yellow urine specific gravity 1.074,  WBC 39 present used 100 mg/dL of albumin  Hold off on further testing of GISSELLE at this time. #Hyperkalemia  Due to renal failure, oliguric    #Metabolic acidosis  Due to renal failure  Addressed with dialysis    #Shock  Etiology unclear, cultures are negative so does not peer to have any source of sepsis. Procalcitonin was only mildly elevated 0.61  Discussed with cardiology about possible [place] to see how his cardiac index is. His EF 35%      ROS:   Unable to obtain, he is confused. PMH:   Past medical history, surgical history, social history, family history are reviewed and updated as appropriate. Reviewed current medication list.   Allergies reviewed and updated as needed. PE:   Vitals:    02/14/23 1300   BP:    Pulse: 86   Resp: 19   Temp:    SpO2: 100%       General appearance:  in NAD, somnolent. Comfortable. HEENT: EOM intact, no icterus. Trachea is midline. Neck : No masses, appears symmetrical, no JVD  Respiratory: Respiratory effort appears normal, bilateral equal chest rise, no wheeze,+ crackles   Cardiovascular: Ausculation shows RRR ++ edema  Abdomen: No visible mass or tenderness, non distended. Musculoskeletal:  Joints with no swelling or deformity. Skin:no rashes, ulcers, induration, no jaundice. Neuro: face symmetric, no focal deficits.        Lab Results   Component Value Date    CREATININE 2.4 (H) 02/14/2023    BUN 50 (H) 02/14/2023     02/14/2023    K 5.3 (H) 02/14/2023     02/14/2023    CO2 27 02/14/2023      Lab Results   Component Value Date    WBC 13.5 (H) 02/14/2023    HGB 14.0 02/14/2023    HCT 35.4 (L) 02/14/2023    MCV 97.7 02/14/2023     02/14/2023     Lab Results   Component Value Date    CALCIUM 9.3 02/14/2023    CAION 1.37 (H) 02/14/2023    PHOS 3.4 02/13/2023

## 2023-02-14 NOTE — PROGRESS NOTES
[facility]   Daily Progress Note      Admit Date:  2/8/2023      Subjective:   Mr. Felicity Nicolas is a 75yo male with chronic systolic CHF LVEF 84-16%, CAD and chronic atrial fibrillation who came for Impella assisted PCI to the RCA and LAD 2/8/23. The procedure went well and the Impella was explanted. Unfortunately, he developed mental status chnages after the procedure concerning for a potential CVA. Head CT/CTA was unremarkable though. He ultimately was intubated for airway protection. Interval History:  Andrew Ramirez is now receiving hemodialysis with a goal removal of 3 liters today. He is down to 3 L of oxygen. He is off vasopressors. He had some agitation over the weekend requiring initiation of Precedex for sedation. He has also developed some delirium. Objective:     /62   Pulse 87   Temp 97.7 °F (36.5 °C)   Resp 30   Ht 6' 1\" (1.854 m)   Wt 259 lb 4.2 oz (117.6 kg)   SpO2 97%   BMI 34.21 kg/m²      Intake/Output Summary (Last 24 hours) at 2/14/2023 2888  Last data filed at 2/14/2023 0600  Gross per 24 hour   Intake 598.94 ml   Output 530 ml   Net 68.94 ml       Physical Exam:  General:  Lethargic, NAD  Skin:  Warm and dry  Neck:  JVD<8, no carotid bruits  Chest: Diminished bilaterally in bases.   No rhonchi or rales  Cardiovascular:  Irreg irreg and mildly tachy, normal S1/S2, no M/R/G  Abdomen:  Soft, nontender, +bowel sounds  Extremities:  2+ bilateral LE edema  Pulses: 2+ bilat carotid    2+ bilat radial    2+ bilat femoral        Medications:    amiodarone  200 mg Oral Daily    heparin (porcine)  5,000 Units SubCUTAneous 3 times per day    clopidogrel  75 mg Oral Daily    aspirin  81 mg Oral Daily    Or    aspirin  300 mg Rectal Daily    atorvastatin  80 mg Oral Nightly    pantoprazole (PROTONIX) 40 mg injection  40 mg IntraVENous Daily    chlorhexidine  15 mL Mouth/Throat BID    sodium chloride flush  5-40 mL IntraVENous 2 times per day      dexmedetomidine 0.2 mcg/kg/hr (02/14/23 8993)    norepinephrine Stopped (02/14/23 0158)    sodium chloride 5 mL/hr at 02/13/23 2056       Lab Data:  CBC:   Recent Labs     02/12/23  0530 02/13/23  0530 02/14/23  0342 02/14/23  0345   WBC 13.2* 10.0 13.5*  --    HGB 10.3* 10.4* 11.3* 14.0    128* 155  --      BMP:    Recent Labs     02/13/23  0810 02/13/23  1600 02/14/23  0342    136 137   K 4.8 4.8 5.3*   CO2 23 24 27   BUN 25* 31* 50*   CREATININE 1.6* 1.9* 2.4*       FASTING LIPID PANEL:  Lab Results   Component Value Date/Time    CHOL 84 02/09/2023 03:46 AM    HDL 34 02/09/2023 03:46 AM    TRIG 61 02/09/2023 03:46 AM     LHC/PCI 2/8/23:  1. Right-dominant coronary arterial circulation. Successful  intervention to 90% lesion and 99% more distal RCA lesion with a 3 mm  overlapping drug-eluting stents dilated up to 3.2 mm proximally and 3.1  mm distally, resulting in 0% residual stenosis and SANTO III flow in the  vessel. There was reversal of flow to antegrade in the dominant right  coronary artery. The PDA had been fed by collaterals. In the left  system, there is trivial left main plaque disease. The LAD had an 80%  mid lesion that was treated with a 3.5 x 22 mm [place]  drug-eluting stent dilated to 3.6 mm in diameter resulting in 0%  residual stenosis and SANTO III flow. The circumflex has a chronic total  occlusion in the midportion but the distal vessel fills in from  collaterals. 2.  Left ventricular end-diastolic pressure 21 mmHg. 3.  No gradient across the aortic valve on pullback to suggest aortic  stenosis. PROCEDURES PERFORMED:  1. Left heart catheterization. 2.  Percutaneous coronary intervention with drug-eluting stents to the  dominant right coronary artery. 3.  Percutaneous coronary intervention with drug-eluting stent to the  mid left anterior descending artery. 4.  Insertion of short-term external heart assist system into heart,  percutaneous approach.   5.  Removal of short-term external heart assist system from heart,  percutaneous approach for assistance with cardiac output using impeller  pump, continuous. Stress Perfusion 1/29/23:  Pharmacological Stress/MPI Results:  1. Technically a satisfactory study. 2. Large mostly dense perfusion defect involving lateral and inferolateral wall suggestive of previous MI. No reversibility /ischemia  3. Gated Study shows markedly dilated LV with lateral and inferolateral akinesis; EF 29 %. Echo 1/5/23:  Poor image quality. Definity contrast administered. Patient appears to be in atrial fibrillation. Overall left ventricular systolic function appears severely reduced. Ejection fraction is visually estimated to be 20-25% with diffuse  hypokinesis and regional abnormalities including inferior akinesis. Normal left ventricular wall thickness and cavity size. The right ventricle appears normal in size with severely reduced systolic function. The left and right atria are moderately dilated. Possibly severe mitral regurgitation. The aortic valve leaflets are not well visualized. Mild aortic stenosis based on a peak velocity of 2.6 m/s and a mean pressure gradient  of 20 mmHg. Gradients may be underestimated with reduced LV function. Trivial aortic regurgitation. Trivial pericardial effusion. IVC size is dilated (>2.1 cm) and collapses < 50% with respiration consistent with elevated RA pressure (15 mmHg). Brain MRI 2/14/23:  Small acute right anterior and posterior external watershed infarcts. No acute hemorrhage or significant mass effect. Assessment / Plan: 1. Ventricular Tachycardia  No further VT. Continue oral amiodarone 200mg bid. LVEF 35%. 2.Acute on chronic systolic CHF, class 4  LVEF ~35 by repeat echocardiogram.  He appears more euvolemic at present  Hold beta-blockade given hypotension. Hold nephrotoxic agents such as Aldactone ACE inhibitors or ARB's while patient has acute renal failure and is on CRRT.     3.Unstable Angina  S/p PCI to the LAD and the RCA on 2/8/23. Continue DAPT with aspirin and clopidogrel. Will need to crush in apple sauce until tolerating more oral intake. Hold beta blockade. Continue statin therapy. 4.Small acute CVA  Neurology following. This may explain some of his mental status changes although his hypoxia and hypercarbia are also contributing.       Signed:  Adriana Foster MD

## 2023-02-14 NOTE — PROGRESS NOTES
4 Eyes Skin Assessment     NAME:  Sebastian Barron  YOB: 1945  MEDICAL RECORD NUMBER:  [id number]    The patient is being assessed for  Shift Handoff    I agree that One RN has performed a thorough Head to Toe Skin Assessment on the patient. ALL assessment sites listed below have been assessed. Areas assessed by both nurses:    Head, Face, Ears, Shoulders, Back, Chest, Arms, Elbows, Hands, Sacrum. Buttock, Coccyx, Ischium, and Legs. Feet and Heels        Does the Patient have a Wound?  No noted wound(s)       Tera Prevention initiated by RN: Yes   Wound Care Orders initiated by RN: Yes    Pressure Injury (Stage 3,4, Unstageable, DTI, NWPT, and Complex wounds) if present, place referral order by RN under : No    New and Established Ostomies, if present place, referral order under : NA      Nurse 1 eSignature: Electronically signed by Anna Salazar RN on 2/13/23 at 7:28 PM EST    **SHARE this note so that the co-signing nurse can place an eSignature**    Nurse 2 eSignature: Electronically signed by Marleny Crawford RN on 2/13/23 at 9:08 PM EST

## 2023-02-15 NOTE — CARE COORDINATION
Chart reviewed. Pt back on vent. Spoke with daughter who states he had another stroke. Palliative Care involved. Following for DC needs.   [place], [place]     Case Management   [phone]    2/15/2023  11:03 AM

## 2023-02-15 NOTE — PROGRESS NOTES
4 Eyes Skin Assessment     NAME:  Dennis Alfaro  YOB: 1945  MEDICAL RECORD NUMBER:  [id number]    The patient is being assessed for  Shift Handoff    I agree that One RN has performed a thorough Head to Toe Skin Assessment on the patient. ALL assessment sites listed below have been assessed. Areas assessed by both nurses:    Head, Face, Ears, Shoulders, Back, Chest, Arms, Elbows, Hands, Sacrum. Buttock, Coccyx, Ischium, and Legs. Feet and Heels        Does the Patient have a Wound?  No noted wound(s)       Tera Prevention initiated by RN: Yes   Wound Care Orders initiated by RN: NA    Pressure Injury (Stage 3,4, Unstageable, DTI, NWPT, and Complex wounds) if present, place referral order by RN under : NA    New and Established Ostomies, if present place, referral order under : NA      Nurse 1 eSignature: Electronically signed by Gayle Hoang RN on 2/15/23 at 4:52 AM EST    **SHARE this note so that the co-signing nurse can place an eSignature**    Nurse 2 eSignature: Electronically signed by Aliya Berman RN on 2/15/23 at 7:14 AM EST

## 2023-02-15 NOTE — PLAN OF CARE
Planned SBT today, pt alert, following commands, restless, VSS, family at bedside, . Electronically signed by Efra Padilla RN on 2/15/2023 at 10:00 AM

## 2023-02-15 NOTE — PROGRESS NOTES
[facility]                                               Progress note    Summary:   Felicia Pang is being seen by nephrology for GISSELLE. Admitted with shortness of breath. Patient was admitted to the ICU on 2/8/2023. Prior to this on Wednesday he had a heart cath procedure with required Impella support. There was concern for possible stroke afterwards. Later he had not episode of unresponsiveness, cyanotic with ventricular tachycardia and required intubation for airway protection. .    Interval History  Patient was seen and examined at bedside  Had dialysis yesterday   Had bronchoscopy yesterday, had mucus plugging per report with some inflamed airways. \"complete obstruction of the right main bronchus with hemorrhagic mucous plugs that was suctioned, complete obstruction of right upper lobe with multiple mucous plugs \"  He was intubated for the bronchoscopy     He is intubated when seen   Has edema   /39  On 10 levo which is now off  On 40 % fio2      cc   Labs reviewed.d   BUN 60 Cr 2.7   Na 134 K 4.2         Plan:   No acute dialysis needs today   Will plan for HD tomorrow   No signs of renal recovery as of now. UO low and solute rising     Zhanna Patel MD  [facility]  Office: [phone]    Assessment:   #GISSELLE, oliguric  Likely due to hemodynamic mediated GISSELLE resulting in ATN. He was severely hypotensive requiring high-dose of pressors, was intubated with hypoxic respiratory failure and subsequently developed renal failure. He had a few doses of IV contrast 1 on 1/30/2023 and 2/8/2023. Etiology of his hypotension is unclear at this point  Urinalysis after catheter being placed showed dark yellow urine specific gravity 1.074,  WBC 39 present used 100 mg/dL of albumin  Hold off on further testing of GISSELLE at this time.     #Hyperkalemia  Due to renal failure, oliguric    #Metabolic acidosis  Due to renal failure  Addressed with dialysis    #Shock  Etiology unclear, cultures are negative so does not peer to have any source of sepsis. Procalcitonin was only mildly elevated 0.61  Discussed with cardiology about possible [place] to see how his cardiac index is. His EF 35%      ROS:   Unable to obtain, he is confused. PMH:   Past medical history, surgical history, social history, family history are reviewed and updated as appropriate. Reviewed current medication list.   Allergies reviewed and updated as needed. PE:   Vitals:    02/15/23 0700   BP:    Pulse: 82   Resp: 18   Temp:    SpO2: 99%       General appearance:  in NAD, somnolent. Comfortable. HEENT: EOM intact, no icterus. Trachea is midline. Neck : No masses, appears symmetrical, no JVD  Respiratory: Respiratory effort appears normal, bilateral equal chest rise, no wheeze  Cardiovascular: Ausculation shows RRR ++ edema  Abdomen: No visible mass or tenderness, non distended. Musculoskeletal:  Joints with no swelling or deformity. Skin:no rashes, ulcers, induration, no jaundice. Neuro: face symmetric, no focal deficits.        Lab Results   Component Value Date    CREATININE 2.7 (H) 02/15/2023    BUN 60 (H) 02/15/2023     02/14/2023    K 5.3 (H) 02/14/2023     02/14/2023    CO2 22 02/15/2023      Lab Results   Component Value Date    WBC 13.3 (H) 02/15/2023    HGB 10.7 (L) 02/15/2023    HCT 32.9 (L) 02/15/2023    MCV 96.4 02/15/2023     (L) 02/15/2023     Lab Results   Component Value Date    CALCIUM 9.1 02/15/2023    CAION 1.37 (H) 02/14/2023    PHOS 3.4 02/13/2023

## 2023-02-15 NOTE — PROGRESS NOTES
Secure message sent to Dr. Ronald Ospina via [employer] with the most recent ABG results:   Latest Reference Range & Units Most Recent   pH, Arterial 7.350 - 7.450  7.469 (H)  2/15/23 09:58   pCO2, Arterial 35.0 - 45.0 mm Hg 32.2 (L)  2/15/23 09:58   pO2, Arterial 75.0 - 108.0 mm Hg 104.8  2/15/23 09:58   HCO3, Arterial 21.0 - 29.0 mmol/L 23.3  2/15/23 09:58   TCO2 (calc), Art Not Established mmol/L 24  2/15/23 09:58   Base Excess, Arterial -3 - 3  0  2/15/23 09:58   O2 Sat, Arterial 93 - 100 % 98  2/15/23 09:58     Levophed and Propofol remain off. Patient is alert, making eye contact but not following commands. Moving all 4 extremities but not purposeful movement. Waiting for response.

## 2023-02-15 NOTE — PROGRESS NOTES
Secure message returned from Dr. Fanta Bush. Informed this RN that he does not want to extubate patient and to leave patient on Spontaneous and switch to full support at night. See nursing communication order.     Electronically signed by Stacey Em RN on 2/15/2023 at 10:37 AM

## 2023-02-15 NOTE — PLAN OF CARE
Problem: Chronic Conditions and Co-morbidities  Goal: Patient's chronic conditions and co-morbidity symptoms are monitored and maintained or improved  2/15/2023 0852 by Igor Muller RN  Outcome: Progressing  2/15/2023 0220 by Juana Gomez RN  Outcome: Progressing  Flowsheets (Taken 2/14/2023 2000)  Care Plan - Patient's Chronic Conditions and Co-Morbidity Symptoms are Monitored and Maintained or Improved: Monitor and assess patient's chronic conditions and comorbid symptoms for stability, deterioration, or improvement     Problem: Discharge Planning  Goal: Discharge to home or other facility with appropriate resources  2/15/2023 0852 by Igor Muller RN  Outcome: Progressing  2/15/2023 0220 by Juana Gomez RN  Outcome: Progressing  Flowsheets (Taken 2/14/2023 2000)  Discharge to home or other facility with appropriate resources: Identify barriers to discharge with patient and caregiver     Problem: Pain  Goal: Verbalizes/displays adequate comfort level or baseline comfort level  2/15/2023 0852 by Igor Muller RN  Outcome: Progressing  2/15/2023 0220 by Juana Gomez RN  Outcome: Progressing  Flowsheets (Taken 2/14/2023 2000)  Verbalizes/displays adequate comfort level or baseline comfort level:   Assess pain using appropriate pain scale   Administer analgesics based on type and severity of pain and evaluate response     Problem: Respiratory - Adult  Goal: Achieves optimal ventilation and oxygenation  2/15/2023 0852 by Igor Muller RN  Outcome: Progressing  2/15/2023 0220 by Juana Gomez RN  Outcome: Progressing  Flowsheets (Taken 2/14/2023 2000)  Achieves optimal ventilation and oxygenation:   Assess for changes in respiratory status   Position to facilitate oxygenation and minimize respiratory effort   Oxygen supplementation based on oxygen saturation or arterial blood gases   Encourage broncho-pulmonary hygiene including cough, deep breathe, incentive spirometry   Assess the need for suctioning and aspirate as needed   Respiratory therapy support as indicated     Problem: Cardiovascular - Adult  Goal: Maintains optimal cardiac output and hemodynamic stability  2/15/2023 0852 by Macrina Salazar RN  Outcome: Progressing  2/15/2023 0220 by Elba Bee RN  Outcome: Progressing  Flowsheets (Taken 2/14/2023 2000)  Maintains optimal cardiac output and hemodynamic stability:   Monitor blood pressure and heart rate   Monitor urine output and notify Licensed Independent Practitioner for values outside of normal range  Goal: Absence of cardiac dysrhythmias or at baseline  2/15/2023 0852 by Macrina Salazar RN  Outcome: Progressing  2/15/2023 0220 by Elba Bee RN  Outcome: Progressing  Flowsheets (Taken 2/14/2023 2000)  Absence of cardiac dysrhythmias or at baseline:   Monitor cardiac rate and rhythm   Assess for signs of decreased cardiac output   Administer antiarrhythmia medication and electrolyte replacement as ordered     Problem: Metabolic/Fluid and Electrolytes - Adult  Goal: Electrolytes maintained within normal limits  2/15/2023 0852 by Macrina Salazar RN  Outcome: Progressing  2/15/2023 0220 by Elba Bee RN  Outcome: Progressing  Flowsheets (Taken 2/14/2023 2000)  Electrolytes maintained within normal limits:   Monitor labs and assess patient for signs and symptoms of electrolyte imbalances   Administer electrolyte replacement as ordered  Goal: Hemodynamic stability and optimal renal function maintained  2/15/2023 0852 by Macrina Salazar RN  Outcome: Progressing  2/15/2023 0220 by Elba Bee RN  Outcome: Progressing  Flowsheets (Taken 2/14/2023 2000)  Hemodynamic stability and optimal renal function maintained:   Monitor labs and assess for signs and symptoms of volume excess or deficit   Monitor intake, output and patient weight     Problem: Hematologic - Adult  Goal: Maintains hematologic stability  2/15/2023 9365 by Lindsey Ojeda RN  Outcome: Progressing  2/15/2023 0220 by Brody Knight RN  Outcome: Progressing  Flowsheets (Taken 2/14/2023 2000)  Maintains hematologic stability: Assess for signs and symptoms of bleeding or hemorrhage     Problem: Neurosensory - Adult  Goal: Achieves stable or improved neurological status  2/15/2023 0852 by Lindsey Ojeda RN  Outcome: Progressing  2/15/2023 0220 by Brody Knight RN  Outcome: Progressing  Flowsheets (Taken 2/14/2023 2000)  Achieves stable or improved neurological status: Assess for and report changes in neurological status  Goal: Absence of seizures  2/15/2023 0852 by Lindsey Ojeda RN  Outcome: Progressing  2/15/2023 0220 by Brody Knight RN  Outcome: Progressing  Flowsheets (Taken 2/14/2023 2000)  Absence of seizures: Monitor for seizure activity. If seizure occurs, document type and location of movements and any associated apnea  Goal: Achieves maximal functionality and self care  2/15/2023 0852 by Lindsey Ojeda RN  Outcome: Progressing  2/15/2023 0220 by Brody Knight RN  Outcome: Progressing  Flowsheets (Taken 2/14/2023 2000)  Achieves maximal functionality and self care:   Monitor swallowing and airway patency with patient fatigue and changes in neurological status   Encourage and assist patient to increase activity and self care with guidance from physical therapy/occupational therapy     Problem: Skin/Tissue Integrity  Goal: Absence of new skin breakdown  Description: 1. Monitor for areas of redness and/or skin breakdown  2. Assess vascular access sites hourly  3. Every 4-6 hours minimum:  Change oxygen saturation probe site  4. Every 4-6 hours:  If on nasal continuous positive airway pressure, respiratory therapy assess nares and determine need for appliance change or resting period.   2/15/2023 0852 by Lindsey Ojeda RN  Outcome: Progressing  2/15/2023 0220 by Brody Knight RN  Outcome: Progressing Problem: Safety - Adult  Goal: Free from fall injury  2/15/2023 0852 by Melita Claudio RN  Outcome: Progressing  2/15/2023 0220 by Brenda Noriega RN  Outcome: Progressing  Flowsheets (Taken 2/15/2023 0218)  Free From Fall Injury: Instruct family/caregiver on patient safety     Problem: ABCDS Injury Assessment  Goal: Absence of physical injury  2/15/2023 0852 by Melita Claudio RN  Outcome: Progressing  2/15/2023 0220 by Brenda Noriega RN  Outcome: Progressing  Flowsheets (Taken 2/15/2023 0218)  Absence of Physical Injury: Implement safety measures based on patient assessment     Problem: Skin/Tissue Integrity - Adult  Goal: Skin integrity remains intact  2/15/2023 0852 by Melita Claudio RN  Outcome: Progressing  2/15/2023 0220 by Brenda Noriega RN  Outcome: Progressing  Flowsheets (Taken 2/14/2023 2000)  Skin Integrity Remains Intact:   Assess vascular access sites hourly   Monitor for areas of redness and/or skin breakdown   Every 4-6 hours minimum: Change oxygen saturation probe site     Problem: Musculoskeletal - Adult  Goal: Return mobility to safest level of function  2/15/2023 0852 by Melita Claudio RN  Outcome: Progressing  2/15/2023 0220 by Brenda Noriega RN  Outcome: Progressing  Flowsheets (Taken 2/14/2023 2000)  Return Mobility to Safest Level of Function: Assess patient stability and activity tolerance for standing, transferring and ambulating with or without assistive devices  Goal: Return ADL status to a safe level of function  2/15/2023 0852 by Melita Claudio RN  Outcome: Progressing  2/15/2023 0220 by Brenda Noriega RN  Outcome: Progressing  Flowsheets (Taken 2/13/2023 2000)  Return ADL Status to a Safe Level of Function:   Administer medication as ordered   Obtain physical therapy/occupational therapy consults as needed     Problem: Gastrointestinal - Adult  Goal: Minimal or absence of nausea and vomiting  2/15/2023 0852 by Melita Claudio RN  Outcome: Progressing  2/15/2023 0220 by Brody Knight RN  Outcome: Progressing  Flowsheets (Taken 2/14/2023 2000)  Minimal or absence of nausea and vomiting:   Maintain NPO status until nausea and vomiting are resolved   Nasogastric tube to low intermittent suction as ordered  Goal: Maintains or returns to baseline bowel function  2/15/2023 0852 by Lindsey Ojeda RN  Outcome: Progressing  2/15/2023 0220 by Brody Knight RN  Outcome: Progressing  Flowsheets (Taken 2/14/2023 2000)  Maintains or returns to baseline bowel function: Assess bowel function  Goal: Maintains adequate nutritional intake  2/15/2023 0852 by Lindsey Ojeda RN  Outcome: Progressing  2/15/2023 0220 by Brody Knight RN  Outcome: Progressing  Flowsheets (Taken 2/14/2023 2000)  Maintains adequate nutritional intake:   Monitor percentage of each meal consumed   Monitor intake and output, weight and lab values   Obtain nutritional consult as needed     Problem: Genitourinary - Adult  Goal: Absence of urinary retention  Outcome: Progressing  Goal: Urinary catheter remains patent  2/15/2023 0852 by Lindsey Ojeda RN  Outcome: Progressing  2/15/2023 0220 by Brody Knight RN  Outcome: Progressing  Flowsheets (Taken 2/14/2023 2000)  Urinary catheter remains patent: Assess patency of urinary catheter     Problem: Infection - Adult  Goal: Absence of infection at discharge  2/15/2023 0852 by Lindsey Ojeda RN  Outcome: Progressing  2/15/2023 0220 by Brody Knight RN  Outcome: Progressing  Flowsheets (Taken 2/14/2023 2000)  Absence of infection at discharge:   Monitor lab/diagnostic results   Assess and monitor for signs and symptoms of infection  Goal: Absence of infection during hospitalization  2/15/2023 0852 by Lindsey Ojeda RN  Outcome: Progressing  2/15/2023 0220 by Brody Knight RN  Outcome: Progressing  Flowsheets (Taken 2/14/2023 2000)  Absence of infection during hospitalization:   Assess and monitor for signs and symptoms of infection   Monitor all insertion sites i.e., indwelling lines, tubes and drains   Monitor endotracheal (as able) and nasal secretions for changes in amount and color     Problem: Anxiety  Goal: Will report anxiety at manageable levels  Description: INTERVENTIONS:  1. Administer medication as ordered  2. Teach and rehearse alternative coping skills  3. Provide emotional support with 1:1 interaction with staff  2/15/2023 0852 by Ashwini Helms RN  Outcome: Progressing  2/15/2023 0220 by Malcom Severin, RN  Outcome: Progressing  Flowsheets (Taken 2/14/2023 2000)  Will report anxiety at manageable levels: Provide emotional support with 1:1 interaction with staff     Problem: Coping  Goal: Pt/Family able to verbalize concerns and demonstrate effective coping strategies  Description: INTERVENTIONS:  1. Assist patient/family to identify coping skills, available support systems and cultural and spiritual values  2. Provide emotional support, including active listening and acknowledgement of concerns of patient and caregivers  3. Reduce environmental stimuli, as able  4. Instruct patient/family in relaxation techniques, as appropriate  5.  Assess for spiritual pain/suffering and initiate Spiritual Care, Psychosocial Clinical Specialist consults as needed  2/15/2023 4823 by Ashwini Helms RN  Outcome: Progressing  2/15/2023 0220 by Malcom Severin, RN  Outcome: Progressing  Flowsheets (Taken 2/14/2023 2000)  Patient/family able to verbalize anxieties, fears, and concerns, and demonstrate effective coping:   Assist patient/family to identify coping skills, available support systems and cultural and spiritual values   Provide emotional support, including active listening and acknowledgement of concerns of patient and caregivers   Reduce environmental stimuli, as able     Problem: Confusion  Goal: Confusion, delirium, dementia, or psychosis is improved or at baseline  Description: INTERVENTIONS:  1. Assess for possible contributors to thought disturbance, including medications, impaired vision or hearing, underlying metabolic abnormalities, dehydration, psychiatric diagnoses, and notify attending LIP  2. [place] high risk fall precautions, as indicated  3. Provide frequent short contacts to provide reality reorientation, refocusing and direction  4. Decrease environmental stimuli, including noise as appropriate  5. Monitor and intervene to maintain adequate nutrition, hydration, elimination, sleep and activity  6. If unable to ensure safety without constant attention obtain sitter and review sitter guidelines with assigned personnel  7.  Initiate Psychosocial CNS and Spiritual Care consult, as indicated  2/15/2023 2148 by Darlin Grady RN  Outcome: Progressing  2/15/2023 0220 by Roseanne Luna RN  Outcome: Progressing  Flowsheets (Taken 2/14/2023 2000)  Effect of thought disturbance (confusion, delirium, dementia, or psychosis) are managed with adequate functional status:   Assess for contributors to thought disturbance, including medications, impaired vision or hearing, underlying metabolic abnormalities, dehydration, psychiatric diagnoses, notify [facility] high risk fall precautions, as indicated   Provide frequent short contacts to provide reality reorientation, refocusing and direction   Decrease environmental stimuli, including noise as appropriate     Problem: Nutrition Deficit:  Goal: Optimize nutritional status  2/15/2023 0852 by Darlin Grady RN  Outcome: Progressing  2/15/2023 0220 by Roseanne Luna RN  Outcome: Progressing  Flowsheets (Taken 2/14/2023 0204)  Nutrient intake appropriate for improving, restoring, or maintaining nutritional needs:   Monitor oral intake, labs, and treatment plans   Recommend, monitor, and adjust tube feedings and TPN/PPN based on assessed needs

## 2023-02-15 NOTE — PROGRESS NOTES
Occupational Therapy      Pt Re-Intubated following Procedure. Will sign off at this time; please reconsult OT when appropriate.     Electronically signed by Damian Ram OT on 2/15/2023 at 10:39 AM

## 2023-02-15 NOTE — PROGRESS NOTES
Physical Therapy  Pt Re-Intubated following Procedure. Will sign off at this time; please reconsult PT when approp.   Leonila Valera, PT

## 2023-02-15 NOTE — PROGRESS NOTES
Hospitalist Progress Note  2/15/2023 4:05 PM  Subjective:   Admit Date: 2/8/2023  PCP: Olga Rangel Status: Inpatient   Interval History: Hospital Day: 8,  Patient intubated and sedated. MRI showed acute strokes  On spontaneous breathing trial    History of present illness:  Admitted for Impella assisted LHC. After Impella was removed patient's mentation changed and code stroke was called. Patient was lethargic and intubated for respiratory failure. Patient was extubated on 2/8. Hospital course was complicated by V. tach and patient was started on amiodarone. Patient is now in shock and is on pressors with GISSELLE and oliguria. Chronic systolic CHF LVEF 81-44%, CAD and chronic atrial fibrillation who came for Impella assisted PCI to the RCA and LAD 2/8/23. The procedure went well and the Impella was explanted. Unfortunately, he developed mental status chnages after the procedure concerning for a potential CVA. Head CT/CTA was unremarkable though. He ultimately was intubated for airway protection and extubated to BiPAP and requiring nonrebreather. CRRT required for euvolemia, managed by nephrology. Amiodarone initiated for ventricular tachycardia.       Diet: NPO (sips of water with meds)  Right cephalic double lumen PICC (2/8, day #4)  Rectal tube (2/10, day #3)  Urinary catheter (2/8, day #4)  Right arterial line (2/9, day #3)  Right neck Vascath (2/10, day #3)  Medications:     prismaSATE BGK 4/2.5 1,000 mL/hr   dexmedetomidine 0.2 mcg/kg/hr    norepinephrine 6 mcg/min      amiodarone  200 mg Oral Daily   heparin  5,000 Units SubCUTAneous TID   clopidogrel  75 mg Oral Daily   hydrocortisone   50 mg IntraVENous Q6H   aspirin  81 mg Oral Daily   atorvastatin  80 mg Oral Nightly   pantoprazole  40 mg IntraVENous Daily     Recent Labs     02/13/23  0530 02/14/23  0342 02/14/23  0345 02/15/23  0340   WBC 10.0 13.5*  --  13.3*   HGB 10.4* 11.3* 14.0 10.7*   * 155  --  104*   MCV 96.7 97.7  --  96.4 Recent Labs     02/13/23  1600 02/14/23  0342 02/15/23  0340    137 134*   K 4.8 5.3* 4.2    101 98*   CO2 24 27 22   BUN 31* 50* 60*   CREATININE 1.9* 2.4* 2.7*   GLUCOSE 119* 156* 161*       No results for input(s): CA, AST, ALT, ALB, BILITOT, ALKPHOS in the last 72 hours. Troponin T (2/8) 0.03 ng/mL  INR (2/8) 1.73, (2/9) 1.62  Magnesium (2/11) 2.30, (2/12) 2.50 mg/dL  Ionized calcium (2/11) 1.13, (2/12) 1.17 mmol/L  Venous pH (2/11) 7.29     Portable CXR (2/10) Stable exam with small right pleural effusion and perihilar/bibasilar airspace opacities which may be related to pulmonary edema or pneumonia. Left heart cath with PCI (2/8) Right-dominant coronary arterial circulation. Successful intervention to 90% lesion and 99% more distal RCA lesion with a 3 mm overlapping drug-eluting stents dilated up to 3.2 mm proximally and 3.1 mm distally, resulting in 0% residual stenosis and SANTO III flow in the vessel. There was reversal of flow to  antegrade in the dominant right coronary artery. The PDA had been fed by collaterals. In the left system, there is trivial left main plaque disease. The LAD had an 80% mid lesion that was treated with a 3.5 x 22 mm [place] frontier drug-eluting stent dilated to 3.6 mm in diameter resulting in 0% residual stenosis and SANTO III flow. The circumflex  has a chronic total occlusion in the midportion but the distal vessel fills in from collaterals. Left ventricular end-diastolic pressure 21 mmHg. No gradient across the aortic valve on pullback to suggest aortic stenosis. Insertion of short-term external heart assist system into heart, percutaneous approach. Removal of short-term external heart assist system from heart, percutaneous approach for assistance with cardiac output using impeller pump, continuous. Echo (2/9)  Limited echo to assess left ventriclar ejection fraction. Suboptimal echo due to lung interference.  Difficult to assess Left ventricular function but appears to be moderately depressed at about 35%. Cannot exclude regional wall motion abnormalities secondary to poor endocardial visualization. Objective:   Vitals:  /55   Pulse 67   Temp 97.2 °F (36.2 °C) (Axillary)   Resp 11   Ht 6' 1\" (1.854 m)   Wt 271 lb 2.7 oz (123 kg)   SpO2 97% on 4 LPM  BMI 35.78 kg/m²   General appearance: Intubated  Lungs: clear to auscultation bilaterally  Heart: regular rate and rhythm, S1, S2 normal, no murmur, click, rub or gallop  Abdomen: soft, non-tender; bowel sounds normal; no masses,  no organomegaly  Extremities: extremities normal, atraumatic, no cyanosis or edema  Neurologic: No obvious focal neurologic deficits. Assessment and Plan:   GISSELLE on CKD stage 3, requiring CRRT . HD done today  Acute on chronic systolic heart failure, class 4 with Cardiomyopathy (EF 30%) Cardiogenic shock with hypotension on norepinephrine 12 mcg/min and vasopressin 0.04 Units/min. MAP of 65. CAD with unstable angina s/p PCI to the LAD and the RCA (2/8) on aspirin and clopidogrel dual anti-platelet therapy. Acute strokes-noted on MRI imaging yesterday. Unclear of significance with patient's overall mental status. Neurology consult appreciated. On dual antiplatelet therapy. We will better be able to assess neurological deficits once he is extubated and off sedation  Acute respiratory failure requiring intubation and mechanical ventilation-weaning trials per pulmonary  Leukocytosis:  Initially treated with cefepime. Pneumonia panel negative. Antibiotic discontinued. Low procalcitonin and lactate. Chronic right pleural effusion s/p chest tube in January. Trapped lung per pulmonary with no intervention required as lung will not re-expand. Hemoptysis:  Suspected related to anticoagulation loading. Atrial fibrillation with rapid ventricular response:  Completed amiodarone infusion. Rate improve off milrinone.    Thyromegaly with TSH mildly elevated c/w euthyroid sick syndrome. Left ICA pseudoaneurysm:  Outpatient follow up post discharge.      Advance Directive: Full Code  DVT prophylaxis with heparin 5,000 units sub-Q TID  Discharge planning: unknown at this time, ICU level of care  Spoke with daughter    Amy Lanza MD  Rounding Hospitalist

## 2023-02-15 NOTE — PLAN OF CARE
Problem: Safety - Medical Restraint  Goal: Remains free of injury from restraints (Restraint for Interference with Medical Device)  Description: INTERVENTIONS:  1. Determine that other, less restrictive measures have been tried or would not be effective before applying the restraint  2. Evaluate the patient's condition at the time of restraint application  3. Inform patient/family regarding the reason for restraint  4.  Q2H: Monitor safety, psychosocial status, comfort, nutrition and hydration  Recent Flowsheet Documentation  Taken 2/14/2023 2000 by Joni Reno RN  Remains free of injury from restraints (restraint for interference with medical device):   Determine that other, less restrictive measures have been tried or would not be effective before applying the restraint   Evaluate the patient's condition at the time of restraint application   Inform patient/family regarding the reason for restraint   Every 2 hours: Monitor safety, psychosocial status, comfort, nutrition and hydration     Problem: Chronic Conditions and Co-morbidities  Goal: Patient's chronic conditions and co-morbidity symptoms are monitored and maintained or improved  Outcome: Progressing  Flowsheets (Taken 2/14/2023 2000)  Care Plan - Patient's Chronic Conditions and Co-Morbidity Symptoms are Monitored and Maintained or Improved: Monitor and assess patient's chronic conditions and comorbid symptoms for stability, deterioration, or improvement     Problem: Discharge Planning  Goal: Discharge to home or other facility with appropriate resources  Outcome: Progressing  Flowsheets (Taken 2/14/2023 2000)  Discharge to home or other facility with appropriate resources: Identify barriers to discharge with patient and caregiver     Problem: Pain  Goal: Verbalizes/displays adequate comfort level or baseline comfort level  Outcome: Progressing  Flowsheets (Taken 2/14/2023 2000)  Verbalizes/displays adequate comfort level or baseline comfort level: Assess pain using appropriate pain scale   Administer analgesics based on type and severity of pain and evaluate response     Problem: Respiratory - Adult  Goal: Achieves optimal ventilation and oxygenation  Outcome: Progressing  Flowsheets (Taken 2/14/2023 2000)  Achieves optimal ventilation and oxygenation:   Assess for changes in respiratory status   Position to facilitate oxygenation and minimize respiratory effort   Oxygen supplementation based on oxygen saturation or arterial blood gases   Encourage broncho-pulmonary hygiene including cough, deep breathe, incentive spirometry   Assess the need for suctioning and aspirate as needed   Respiratory therapy support as indicated     Problem: Cardiovascular - Adult  Goal: Maintains optimal cardiac output and hemodynamic stability  Outcome: Progressing  Flowsheets (Taken 2/14/2023 2000)  Maintains optimal cardiac output and hemodynamic stability:   Monitor blood pressure and heart rate   Monitor urine output and notify Licensed Independent Practitioner for values outside of normal range  Goal: Absence of cardiac dysrhythmias or at baseline  Outcome: Progressing  Flowsheets (Taken 2/14/2023 2000)  Absence of cardiac dysrhythmias or at baseline:   Monitor cardiac rate and rhythm   Assess for signs of decreased cardiac output   Administer antiarrhythmia medication and electrolyte replacement as ordered     Problem: Metabolic/Fluid and Electrolytes - Adult  Goal: Electrolytes maintained within normal limits  Outcome: Progressing  Flowsheets (Taken 2/14/2023 2000)  Electrolytes maintained within normal limits:   Monitor labs and assess patient for signs and symptoms of electrolyte imbalances   Administer electrolyte replacement as ordered  Goal: Hemodynamic stability and optimal renal function maintained  Outcome: Progressing  Flowsheets (Taken 2/14/2023 2000)  Hemodynamic stability and optimal renal function maintained:   Monitor labs and assess for signs and symptoms of volume excess or deficit   Monitor intake, output and patient weight     Problem: Hematologic - Adult  Goal: Maintains hematologic stability  Outcome: Progressing  Flowsheets (Taken 2/14/2023 2000)  Maintains hematologic stability: Assess for signs and symptoms of bleeding or hemorrhage     Problem: Neurosensory - Adult  Goal: Achieves stable or improved neurological status  Outcome: Progressing  Flowsheets (Taken 2/14/2023 2000)  Achieves stable or improved neurological status: Assess for and report changes in neurological status  Goal: Absence of seizures  Outcome: Progressing  Flowsheets (Taken 2/14/2023 2000)  Absence of seizures: Monitor for seizure activity. If seizure occurs, document type and location of movements and any associated apnea  Goal: Achieves maximal functionality and self care  Outcome: Progressing  Flowsheets (Taken 2/14/2023 2000)  Achieves maximal functionality and self care:   Monitor swallowing and airway patency with patient fatigue and changes in neurological status   Encourage and assist patient to increase activity and self care with guidance from physical therapy/occupational therapy     Problem: Skin/Tissue Integrity  Goal: Absence of new skin breakdown  Description: 1. Monitor for areas of redness and/or skin breakdown  2. Assess vascular access sites hourly  3. Every 4-6 hours minimum:  Change oxygen saturation probe site  4. Every 4-6 hours:  If on nasal continuous positive airway pressure, respiratory therapy assess nares and determine need for appliance change or resting period.   Outcome: Progressing     Problem: Safety - Adult  Goal: Free from fall injury  Outcome: Progressing  Flowsheets (Taken 2/15/2023 0218)  Free From Fall Injury: Instruct family/caregiver on patient safety     Problem: ABCDS Injury Assessment  Goal: Absence of physical injury  Outcome: Progressing  Flowsheets (Taken 2/15/2023 0218)  Absence of Physical Injury: Implement safety measures based on patient assessment     Problem: Skin/Tissue Integrity - Adult  Goal: Skin integrity remains intact  Outcome: Progressing  Flowsheets (Taken 2/14/2023 2000)  Skin Integrity Remains Intact:   Assess vascular access sites hourly   Monitor for areas of redness and/or skin breakdown   Every 4-6 hours minimum: Change oxygen saturation probe site     Problem: Musculoskeletal - Adult  Goal: Return mobility to safest level of function  Outcome: Progressing  Flowsheets (Taken 2/14/2023 2000)  Return Mobility to Safest Level of Function: Assess patient stability and activity tolerance for standing, transferring and ambulating with or without assistive devices  Goal: Return ADL status to a safe level of function  Outcome: Progressing  Flowsheets (Taken 2/13/2023 2000)  Return ADL Status to a Safe Level of Function:   Administer medication as ordered   Obtain physical therapy/occupational therapy consults as needed     Problem: Gastrointestinal - Adult  Goal: Minimal or absence of nausea and vomiting  Outcome: Progressing  Flowsheets (Taken 2/14/2023 2000)  Minimal or absence of nausea and vomiting:   Maintain NPO status until nausea and vomiting are resolved   Nasogastric tube to low intermittent suction as ordered  Goal: Maintains or returns to baseline bowel function  Outcome: Progressing  Flowsheets (Taken 2/14/2023 2000)  Maintains or returns to baseline bowel function: Assess bowel function  Goal: Maintains adequate nutritional intake  Outcome: Progressing  Flowsheets (Taken 2/14/2023 2000)  Maintains adequate nutritional intake:   Monitor percentage of each meal consumed   Monitor intake and output, weight and lab values   Obtain nutritional consult as needed     Problem: Genitourinary - Adult  Goal: Urinary catheter remains patent  Outcome: Progressing  Flowsheets (Taken 2/14/2023 2000)  Urinary catheter remains patent: Assess patency of urinary catheter     Problem: Infection - Adult  Goal: Absence of infection at discharge  Outcome: Progressing  Flowsheets (Taken 2/14/2023 2000)  Absence of infection at discharge:   Monitor lab/diagnostic results   Assess and monitor for signs and symptoms of infection  Goal: Absence of infection during hospitalization  Outcome: Progressing  Flowsheets (Taken 2/14/2023 2000)  Absence of infection during hospitalization:   Assess and monitor for signs and symptoms of infection   Monitor all insertion sites i.e., indwelling lines, tubes and drains   Monitor endotracheal (as able) and nasal secretions for changes in amount and color     Problem: Anxiety  Goal: Will report anxiety at manageable levels  Description: INTERVENTIONS:  1. Administer medication as ordered  2. Teach and rehearse alternative coping skills  3. Provide emotional support with 1:1 interaction with staff  Outcome: Progressing  Flowsheets (Taken 2/14/2023 2000)  Will report anxiety at manageable levels: Provide emotional support with 1:1 interaction with staff     Problem: Coping  Goal: Pt/Family able to verbalize concerns and demonstrate effective coping strategies  Description: INTERVENTIONS:  1. Assist patient/family to identify coping skills, available support systems and cultural and spiritual values  2. Provide emotional support, including active listening and acknowledgement of concerns of patient and caregivers  3. Reduce environmental stimuli, as able  4. Instruct patient/family in relaxation techniques, as appropriate  5.  Assess for spiritual pain/suffering and initiate Spiritual Care, Psychosocial Clinical Specialist consults as needed  Outcome: Progressing  Flowsheets (Taken 2/14/2023 2000)  Patient/family able to verbalize anxieties, fears, and concerns, and demonstrate effective coping:   Assist patient/family to identify coping skills, available support systems and cultural and spiritual values   Provide emotional support, including active listening and acknowledgement of concerns of patient and caregivers Reduce environmental stimuli, as able     Problem: Confusion  Goal: Confusion, delirium, dementia, or psychosis is improved or at baseline  Description: INTERVENTIONS:  1. Assess for possible contributors to thought disturbance, including medications, impaired vision or hearing, underlying metabolic abnormalities, dehydration, psychiatric diagnoses, and notify attending LIP  2. [place] high risk fall precautions, as indicated  3. Provide frequent short contacts to provide reality reorientation, refocusing and direction  4. Decrease environmental stimuli, including noise as appropriate  5. Monitor and intervene to maintain adequate nutrition, hydration, elimination, sleep and activity  6. If unable to ensure safety without constant attention obtain sitter and review sitter guidelines with assigned personnel  7.  Initiate Psychosocial CNS and Spiritual Care consult, as indicated  Outcome: Progressing  Flowsheets (Taken 2/14/2023 2000)  Effect of thought disturbance (confusion, delirium, dementia, or psychosis) are managed with adequate functional status:   Assess for contributors to thought disturbance, including medications, impaired vision or hearing, underlying metabolic abnormalities, dehydration, psychiatric diagnoses, notify [facility] hospital high risk fall precautions, as indicated   Provide frequent short contacts to provide reality reorientation, refocusing and direction   Decrease environmental stimuli, including noise as appropriate     Problem: Nutrition Deficit:  Goal: Optimize nutritional status  Outcome: Progressing  Flowsheets (Taken 2/14/2023 0204)  Nutrient intake appropriate for improving, restoring, or maintaining nutritional needs:   Monitor oral intake, labs, and treatment plans   Recommend, monitor, and adjust tube feedings and TPN/PPN based on assessed needs

## 2023-02-15 NOTE — PROGRESS NOTES
[facility]   Daily Progress Note      Admit Date:  2/8/2023      Subjective:   Mr. Peg Ramirez is a 75yo male with chronic systolic CHF LVEF 07-17%, CAD and chronic atrial fibrillation who came for Impella assisted PCI to the RCA and LAD 2/8/23. The procedure went well and the Impella was explanted. Unfortunately, he developed mental status chnages after the procedure concerning for a potential CVA. Head CT/CTA was unremarkable though. He ultimately was intubated for airway protection. Interval History:  John Pérez is down to 40% FiO2 today with a PEEP of 8cmH2o. He is tolerating spontaneous breathing trial. Sinus rhythm on telemetry. He is able to follow commands but is lethargic. Objective:     BP (!) 111/39   Pulse 79   Temp 97.9 °F (36.6 °C) (Axillary)   Resp 11   Ht 6' 1\" (1.854 m)   Wt 248 lb 3.8 oz (112.6 kg)   SpO2 98%   BMI 32.75 kg/m²      Intake/Output Summary (Last 24 hours) at 2/15/2023 1315  Last data filed at 2/15/2023 1004  Gross per 24 hour   Intake 1731.14 ml   Output 295 ml   Net 1436.14 ml       Physical Exam:  General:  Lethargic, NAD  Skin:  Warm and dry  Neck:  JVD<8, no carotid bruits  Chest: Intubated. Diminished bilaterally in bases.   No rhonchi or rales  Cardiovascular:  Irreg irreg and mildly tachy, normal S1/S2, no M/R/G  Abdomen:  Soft, nontender, +bowel sounds  Extremities:  2+ bilateral LE edema  Pulses: 2+ bilat carotid    2+ bilat radial    2+ bilat femoral        Medications:    piperacillin-tazobactam  3,375 mg IntraVENous Q12H    ipratropium-albuterol  1 ampule Inhalation TID    methylPREDNISolone  40 mg IntraVENous Daily    amiodarone  200 mg Oral Daily    heparin (porcine)  5,000 Units SubCUTAneous 3 times per day    clopidogrel  75 mg Oral Daily    aspirin  81 mg Oral Daily    Or    aspirin  300 mg Rectal Daily    atorvastatin  80 mg Oral Nightly    chlorhexidine  15 mL Mouth/Throat BID    sodium chloride flush  5-40 mL IntraVENous 2 times per day propofol 10 mcg/kg/min (02/15/23 1057)    norepinephrine Stopped (02/15/23 0838)    sodium chloride 5 mL/hr at 02/15/23 1004       Lab Data:  CBC:   Recent Labs     02/13/23  0530 02/14/23  0342 02/14/23  0345 02/15/23  0340   WBC 10.0 13.5*  --  13.3*   HGB 10.4* 11.3* 14.0 10.7*   * 155  --  104*     BMP:    Recent Labs     02/13/23  1600 02/14/23  0342 02/15/23  0340    137 134*   K 4.8 5.3* 4.2   CO2 24 27 22   BUN 31* 50* 60*   CREATININE 1.9* 2.4* 2.7*       FASTING LIPID PANEL:  Lab Results   Component Value Date/Time    CHOL 84 02/09/2023 03:46 AM    HDL 34 02/09/2023 03:46 AM    TRIG 61 02/09/2023 03:46 AM     LHC/PCI 2/8/23:  1. Right-dominant coronary arterial circulation. Successful  intervention to 90% lesion and 99% more distal RCA lesion with a 3 mm  overlapping drug-eluting stents dilated up to 3.2 mm proximally and 3.1  mm distally, resulting in 0% residual stenosis and SANTO III flow in the  vessel. There was reversal of flow to antegrade in the dominant right  coronary artery. The PDA had been fed by collaterals. In the left  system, there is trivial left main plaque disease. The LAD had an 80%  mid lesion that was treated with a 3.5 x 22 mm [place]  drug-eluting stent dilated to 3.6 mm in diameter resulting in 0%  residual stenosis and SANTO III flow. The circumflex has a chronic total  occlusion in the midportion but the distal vessel fills in from  collaterals. 2.  Left ventricular end-diastolic pressure 21 mmHg. 3.  No gradient across the aortic valve on pullback to suggest aortic  stenosis. PROCEDURES PERFORMED:  1. Left heart catheterization. 2.  Percutaneous coronary intervention with drug-eluting stents to the  dominant right coronary artery. 3.  Percutaneous coronary intervention with drug-eluting stent to the  mid left anterior descending artery. 4.  Insertion of short-term external heart assist system into heart,  percutaneous approach.   5. Removal of short-term external heart assist system from heart,  percutaneous approach for assistance with cardiac output using impeller  pump, continuous. Stress Perfusion 1/29/23:  Pharmacological Stress/MPI Results:  1. Technically a satisfactory study. 2. Large mostly dense perfusion defect involving lateral and inferolateral wall suggestive of previous MI. No reversibility /ischemia  3. Gated Study shows markedly dilated LV with lateral and inferolateral akinesis; EF 29 %. Echo 1/5/23:  Poor image quality. Definity contrast administered. Patient appears to be in atrial fibrillation. Overall left ventricular systolic function appears severely reduced. Ejection fraction is visually estimated to be 20-25% with diffuse  hypokinesis and regional abnormalities including inferior akinesis. Normal left ventricular wall thickness and cavity size. The right ventricle appears normal in size with severely reduced systolic function. The left and right atria are moderately dilated. Possibly severe mitral regurgitation. The aortic valve leaflets are not well visualized. Mild aortic stenosis based on a peak velocity of 2.6 m/s and a mean pressure gradient  of 20 mmHg. Gradients may be underestimated with reduced LV function. Trivial aortic regurgitation. Trivial pericardial effusion. IVC size is dilated (>2.1 cm) and collapses < 50% with respiration consistent with elevated RA pressure (15 mmHg). Brain MRI 2/14/23:  Small acute right anterior and posterior external watershed infarcts. No acute hemorrhage or significant mass effect. Assessment / Plan: 1. Ventricular Tachycardia  No further VT. Continue oral amiodarone 200mg bid. LVEF 35%. 2.Acute on chronic systolic CHF, class 4  LVEF ~35 by repeat echocardiogram.  He appears more euvolemic at present  Hold beta-blockade given hypotension.   Hold nephrotoxic agents such as Aldactone ACE inhibitors or ARB's while patient has acute renal failure and is on CRRT. 3.Unstable Angina  S/p PCI to the LAD and the RCA on 2/8/23. Continue DAPT with aspirin and clopidogrel. Will need to crush in apple sauce until tolerating more oral intake. Hold beta blockade. Continue statin therapy. 4.Small acute CVA  Neurology following. This may explain some of his mental status changes although his hypoxia and hypercarbia are also contributing. 5.Aspiration Pneumonia  S/p Bronchoscopy 2/14/23. Improved oxygenation and aeration on chest X-ray.        Signed:  María Soares MD
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
## 2023-02-15 NOTE — PROGRESS NOTES
Pt noted to be continuously oozing from subQ heparin sites on abdomen, LFA where leaking PIV was removed, and small scabbed sites on scrotum. Abdomen and previous PIV site covered with quickl clot and tegaderm. John Spoon applied to scrotum and abd pad.

## 2023-02-15 NOTE — PROGRESS NOTES
Pulmonary Critical Care Progress Note     Patient's name:  Miguel Weldon  Medical Record Number: [id number]  Patient's account/billing number: [id number]  Patient's YOB: 1945  Age: 77 y.o.  Date of Admission: 2/8/2023 12:23 PM  Date of Consult: 2/15/2023      Primary Care Physician: KIANA QUINTNAA      Code Status: Full Code    Chief complaint: Acute hypoxic and hypercapnic respiratory failure.    Assessment and Plan     Acute hypoxic and hypercapnic respiratory failure status postextubation requiring BiPAP.  Aspiration pneumonia  Mucous plugs s/p bronch   Acute kidney injury requiring hemodialysis  Ischemic CVA  Chronic right pleural effusion with trapped lung  Cardiomyopathy with EF of 30%  Coronary disease status post stenting  A-fib with RVR  Hemoptysis resolved.        Plan:  Continue ventilator support, placed on spontaneous planning to continue that for today and potential extubation tomorrow  Antibiotics started on Zosyn, follow-up culture results  Steroid x3 days  Aspiration precautions.  Continue tube feeding  Hemodialysis per nephrology.          Overnight:  MRI with small acute right anterior and posterior external watershed infarcts.  Cultures negative to date    REVIEW OF SYSTEMS:  Review of Systems -   Unable to obtain sedated intubated on mechanical ventilation        Physical Exam:    Vitals: BP (!) 130/55   Pulse 98   Temp 98.4 °F (36.9 °C) (Axillary)   Resp 19   Ht 6' 1\" (1.854 m)   Wt 248 lb 3.8 oz (112.6 kg)   SpO2 96%   BMI 32.75 kg/m²     Last Body weight:   Wt Readings from Last 3 Encounters:   02/15/23 248 lb 3.8 oz (112.6 kg)   01/30/23 260 lb (117.9 kg)   01/20/23 260 lb 2.3 oz (118 kg)       Body Mass Index : Body mass index is 32.75 kg/m².      Intake and Output summary:   Intake/Output Summary (Last 24 hours) at 2/15/2023 1035  Last data filed at 2/15/2023 1004  Gross per 24 hour   Intake 1731.14 ml   Output 295 ml   Net 1436.14 ml       Physical  Examination:     Gen: On the vent no acute distress  Eyes: PERRL. Anicteric sclera. No conjunctival injection. ENT: No discharge. Posterior oropharynx clear. External appearance of ears and nose normal.  Neck: Trachea midline. No mass   Resp: There is increased work of breathing with diminished breath sounds more on the right  CV: Regular rate. Regular rhythm. systolic murmur or rub. + edema. GI: Soft, Non-tender. Non-distended. +BS  Skin: Warm, dry, w/o erythema. Lymph: No cervical or supraclavicular LAD. M/S: No cyanosis. No clubbing. Neuro: Sedated on the vent      Laboratory findings:-    CBC:   Recent Labs     02/15/23  0340   WBC 13.3*   HGB 10.7*   *     BMP:    Recent Labs     02/13/23  1600 02/14/23  0342 02/15/23  0340    137 134*   K 4.8 5.3* 4.2    101 98*   CO2 24 27 22   BUN 31* 50* 60*   CREATININE 1.9* 2.4* 2.7*   GLUCOSE 119* 156* 161*     S. Calcium:  Recent Labs     02/15/23  0340   CALCIUM 9.1     S. Magnesium:  Recent Labs     02/13/23  1600   MG 2.70*     S. Phosphorus:  Recent Labs     02/13/23  1600   PHOS 3.4     S. Glucose:  Recent Labs     02/14/23  0342 02/15/23  0351   POCGLU 142* 152*           Radiology Review:  Pertinent images / reports were reviewed as a part of this visit.     CXR reviewed  Critical Care time 35 minutes       Priti Eddy MD, MVILLA.            2/15/2023, 10:35 AM

## 2023-02-16 NOTE — PLAN OF CARE
Problem: Neurosensory - Adult  Goal: Achieves maximal functionality and self care  Outcome: Not Progressing  Flowsheets (Taken 2/14/2023 2000 by NELLY Johnston  Problem: Chronic Conditions and Co-morbidities  Goal: Patient's chronic conditions and co-morbidity symptoms are monitored and maintained or improved  Outcome: Progressing  Flowsheets (Taken 2/14/2023 2000 by Brody Knight RN)  Care Plan - Patient's Chronic Conditions and Co-Morbidity Symptoms are Monitored and Maintained or Improved: Monitor and assess patient's chronic conditions and comorbid symptoms for stability, deterioration, or improvement     Problem: Discharge Planning  Goal: Discharge to home or other facility with appropriate resources  Outcome: Progressing  Flowsheets (Taken 2/14/2023 2000 by Brody Knight RN)  Discharge to home or other facility with appropriate resources: Identify barriers to discharge with patient and caregiver     Problem: Pain  Goal: Verbalizes/displays adequate comfort level or baseline comfort level  Outcome: Progressing  Flowsheets (Taken 2/14/2023 2000 by Brody Knight RN)  Verbalizes/displays adequate comfort level or baseline comfort level:   Assess pain using appropriate pain scale   Administer analgesics based on type and severity of pain and evaluate response     Problem: Respiratory - Adult  Goal: Achieves optimal ventilation and oxygenation  Outcome: Progressing  Flowsheets (Taken 2/15/2023 2000 by Suzy Schwab, RN)  Achieves optimal ventilation and oxygenation:   Assess for changes in respiratory status   Assess for changes in mentation and behavior   Position to facilitate oxygenation and minimize respiratory effort   Assess the need for suctioning and aspirate as needed   Respiratory therapy support as indicated     Problem: Cardiovascular - Adult  Goal: Maintains optimal cardiac output and hemodynamic stability  Outcome: Progressing  Flowsheets (Taken 2/15/2023 2000 by Suzy Schwab, RN)  Maintains optimal cardiac output and hemodynamic stability:   Monitor blood pressure and heart rate   Monitor urine output and notify Licensed Independent Practitioner for values outside of normal range   Assess for signs of decreased cardiac output  Goal: Absence of cardiac dysrhythmias or at baseline  Outcome: Progressing  Flowsheets (Taken 2/14/2023 2000 by Daryn Morrow RN)  Absence of cardiac dysrhythmias or at baseline:   Monitor cardiac rate and rhythm   Assess for signs of decreased cardiac output   Administer antiarrhythmia medication and electrolyte replacement as ordered     Problem: Metabolic/Fluid and Electrolytes - Adult  Goal: Electrolytes maintained within normal limits  Outcome: Progressing  Flowsheets (Taken 2/14/2023 2000 by Daryn Morrow RN)  Electrolytes maintained within normal limits:   Monitor labs and assess patient for signs and symptoms of electrolyte imbalances   Administer electrolyte replacement as ordered  Goal: Hemodynamic stability and optimal renal function maintained  Outcome: Progressing  Flowsheets (Taken 2/15/2023 2000 by Ashley Duran RN)  Hemodynamic stability and optimal renal function maintained:   Monitor labs and assess for signs and symptoms of volume excess or deficit   Monitor intake, output and patient weight   Monitor response to interventions for patient's volume status, including labs, urine output, blood pressure (other measures as available)     Problem: Hematologic - Adult  Goal: Maintains hematologic stability  Outcome: Progressing  Flowsheets (Taken 2/15/2023 2000 by Ashley Duran RN)  Maintains hematologic stability:   Assess for signs and symptoms of bleeding or hemorrhage   Monitor labs for bleeding or clotting disorders     Problem: Neurosensory - Adult  Goal: Achieves stable or improved neurological status  Outcome: Progressing  Flowsheets (Taken 2/14/2023 2000 by Daryn Morrow RN)  Achieves stable or improved neurological status: Assess for and report changes in neurological status     Problem: Skin/Tissue Integrity  Goal: Absence of new skin breakdown  Description: 1. Monitor for areas of redness and/or skin breakdown  2. Assess vascular access sites hourly  3. Every 4-6 hours minimum:  Change oxygen saturation probe site  4. Every 4-6 hours:  If on nasal continuous positive airway pressure, respiratory therapy assess nares and determine need for appliance change or resting period.   Outcome: Progressing     Problem: Safety - Adult  Goal: Free from fall injury  Outcome: Progressing  Flowsheets (Taken 2/15/2023 0853 by Cortez Olivas RN)  Free From Fall Injury: Instruct family/caregiver on patient safety     Problem: ABCDS Injury Assessment  Goal: Absence of physical injury  Outcome: Progressing  Flowsheets (Taken 2/15/2023 2000 by Jessica Venegas RN)  Absence of Physical Injury: Implement safety measures based on patient assessment     Problem: Skin/Tissue Integrity - Adult  Goal: Skin integrity remains intact  Outcome: Progressing  Flowsheets (Taken 2/15/2023 2000 by Jessica Venegas RN)  Skin Integrity Remains Intact: Monitor for areas of redness and/or skin breakdown     Problem: Gastrointestinal - Adult  Goal: Minimal or absence of nausea and vomiting  Outcome: Progressing  Flowsheets (Taken 2/16/2023 1401)  Minimal or absence of nausea and vomiting:   Administer ordered antiemetic medications as needed   Advance diet as tolerated, if ordered   Nutrition consult to assist patient with adequate nutrition and appropriate food choices   Provide nonpharmacologic comfort measures as appropriate   Nasogastric tube to low intermittent suction as ordered   Administer IV fluids as ordered to ensure adequate hydration   Maintain NPO status until nausea and vomiting are resolved  Goal: Maintains or returns to baseline bowel function  Outcome: Progressing  Flowsheets (Taken 2/14/2023 2000 by Yaquelin Lowe RN)  Maintains or returns to baseline bowel function: Assess bowel function  Goal: Maintains adequate nutritional intake  Outcome: Progressing  Flowsheets (Taken 2/16/2023 1401)  Maintains adequate nutritional intake:   Obtain nutritional consult as needed   Monitor intake and output, weight and lab values   Identify factors contributing to decreased intake, treat as appropriate     Problem: Genitourinary - Adult  Goal: Urinary catheter remains patent  Outcome: Progressing  Flowsheets (Taken 2/16/2023 1401)  Urinary catheter remains patent:   Assess patency of urinary catheter   Irrigate catheter per Licensed Independent Practitioner order if indicated and notify Licensed Independent Practitioner if unable to irrigate     Problem: Infection - Adult  Goal: Absence of infection at discharge  Outcome: Progressing  4 [address] Juan M Greenwood (Taken 2/15/2023 0853 by Vinh Palacio RN)  Absence of infection at discharge:   Assess and monitor for signs and symptoms of infection   Monitor lab/diagnostic results   Monitor all insertion sites i.e., indwelling lines, tubes and drains   Monitor endotracheal (as able) and nasal secretions for changes in amount and color  Goal: Absence of infection during hospitalization  Outcome: Progressing  Flowsheets (Taken 2/15/2023 0853 by Vinh Palacio RN)  Absence of infection during hospitalization:   Assess and monitor for signs and symptoms of infection   Monitor lab/diagnostic results   Monitor all insertion sites i.e., indwelling lines, tubes and drains   Monitor endotracheal (as able) and nasal secretions for changes in amount and color     Problem: Anxiety  Goal: Will report anxiety at manageable levels  Description: INTERVENTIONS:  1. Administer medication as ordered  2. Teach and rehearse alternative coping skills  3.  Provide emotional support with 1:1 interaction with staff  Outcome: Sujey Chanel (Taken 2/14/2023 2000 by Edwin Dumont RN)  Will report anxiety at manageable levels: Provide emotional support with 1:1 interaction with staff     Problem: Coping  Goal: Pt/Family able to verbalize concerns and demonstrate effective coping strategies  Description: INTERVENTIONS:  1. Assist patient/family to identify coping skills, available support systems and cultural and spiritual values  2. Provide emotional support, including active listening and acknowledgement of concerns of patient and caregivers  3. Reduce environmental stimuli, as able  4. Instruct patient/family in relaxation techniques, as appropriate  5. Assess for spiritual pain/suffering and initiate Spiritual Care, Psychosocial Clinical Specialist consults as needed  Outcome: Chelsey Marc (Taken 2/14/2023 2000 by Lexi Chen RN)  Patient/family able to verbalize anxieties, fears, and concerns, and demonstrate effective coping:   Assist patient/family to identify coping skills, available support systems and cultural and spiritual values   Provide emotional support, including active listening and acknowledgement of concerns of patient and caregivers   Reduce environmental stimuli, as able     Problem: Confusion  Goal: Confusion, delirium, dementia, or psychosis is improved or at baseline  Description: INTERVENTIONS:  1. Assess for possible contributors to thought disturbance, including medications, impaired vision or hearing, underlying metabolic abnormalities, dehydration, psychiatric diagnoses, and notify attending LIP  2. [place] high risk fall precautions, as indicated  3. Provide frequent short contacts to provide reality reorientation, refocusing and direction  4. Decrease environmental stimuli, including noise as appropriate  5. Monitor and intervene to maintain adequate nutrition, hydration, elimination, sleep and activity  6. If unable to ensure safety without constant attention obtain sitter and review sitter guidelines with assigned personnel  7.  Initiate Psychosocial CNS and Spiritual Care consult, as indicated  Outcome: Progressing  Flowsheets (Taken 2/14/2023 2000 by Meghan Haro RN)  Effect of thought disturbance (confusion, delirium, dementia, or psychosis) are managed with adequate functional status:   Assess for contributors to thought disturbance, including medications, impaired vision or hearing, underlying metabolic abnormalities, dehydration, psychiatric diagnoses, notify [facility] high risk fall precautions, as indicated   Provide frequent short contacts to provide reality reorientation, refocusing and direction   Decrease environmental stimuli, including noise as appropriate     Problem: Nutrition Deficit:  Goal: Optimize nutritional status  Outcome: Progressing  Flowsheets (Taken 2/16/2023 1401)  Nutrient intake appropriate for improving, restoring, or maintaining nutritional needs:   Assess nutritional status and recommend course of action   Monitor oral intake, labs, and treatment plans   Recommend appropriate diets, oral nutritional supplements, and vitamin/mineral supplements   Order, calculate, and assess calorie counts as needed   Recommend, monitor, and adjust tube feedings and TPN/PPN based on assessed needs   Provide specific nutrition education to patient or family as appropriate  Note: Per dietician recommendations     Problem: Neurosensory - Adult  Goal: Achieves maximal functionality and self care  Outcome: Not Progressing  Flowsheets (Taken 2/14/2023 2000 by Meghan Haro RN)  Achieves maximal functionality and self care:   Monitor swallowing and airway patency with patient fatigue and changes in neurological status   Encourage and assist patient to increase activity and self care with guidance from physical therapy/occupational therapy     Problem: Musculoskeletal - Adult  Goal: Return mobility to safest level of function  Outcome: Not Progressing  Flowsheets (Taken 2/14/2023 2000 by Meghan Haro RN)  Return Mobility to Safest Level of Function: Assess patient stability and activity tolerance for standing, transferring and ambulating with or without assistive devices  Goal: Return ADL status to a safe level of function  Outcome: Not Progressing  Flowsheets (Taken 2/13/2023 2000 by Emile Malik RN)  Return ADL Status to a Safe Level of Function:   Administer medication as ordered   Obtain physical therapy/occupational therapy consults as needed   N)  Achieves maximal functionality and self care:   Monitor swallowing and airway patency with patient fatigue and changes in neurological status   Encourage and assist patient to increase activity and self care with guidance from physical therapy/occupational therapy     Problem: Musculoskeletal - Adult  Goal: Return mobility to safest level of function  Outcome: Not Progressing  Flowsheets (Taken 2/14/2023 2000 by Emile Malik RN)  Return Mobility to Safest Level of Function: Assess patient stability and activity tolerance for standing, transferring and ambulating with or without assistive devices  Goal: Return ADL status to a safe level of function  Outcome: Not Progressing  Flowsheets (Taken 2/13/2023 2000 by Emile Malik RN)  Return ADL Status to a Safe Level of Function:   Administer medication as ordered   Obtain physical therapy/occupational therapy consults as needed

## 2023-02-16 NOTE — PROGRESS NOTES
4 Eyes Skin Assessment     NAME:  Levi Brewer  YOB: 1945  MEDICAL RECORD NUMBER:  [id number]    The patient is being assessed for  Shift Handoff    I agree that One RN has performed a thorough Head to Toe Skin Assessment on the patient. ALL assessment sites listed below have been assessed. Areas assessed by both nurses:    Head, Face, Ears, Shoulders, Back, Chest, Arms, Elbows, Hands, Sacrum. Buttock, Coccyx, Ischium, and Legs. Feet and Heels        Does the Patient have a Wound?  No noted wound(s)       Tera Prevention initiated by RN: Yes   Wound Care Orders initiated by RN: NA    Pressure Injury (Stage 3,4, Unstageable, DTI, NWPT, and Complex wounds) if present, place referral order by RN under : NA    New and Established Ostomies, if present place, referral order under : NA      Nurse 1 eSignature: Electronically signed by Harley Kramer RN on 2/16/23 at 7:49 AM EST    **SHARE this note so that the co-signing nurse can place an eSignature**    Nurse 2 eSignature: Electronically signed by Felecia Tong RN on 2/16/23 at 8:18 AM EST
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
## 2023-02-16 NOTE — PROGRESS NOTES
[facility]                                               Progress note    Summary:   Felicia Pang is being seen by nephrology for GISSELLE. Admitted with shortness of breath. Patient was admitted to the ICU on 2/8/2023. Prior to this on Wednesday he had a heart cath procedure with required Impella support. There was concern for possible stroke afterwards. Later he had not episode of unresponsiveness, cyanotic with ventricular tachycardia and required intubation for airway protection. Later was reintubated for bronchoscopy. Interval History  Seen and examined at bedside. He is intubated still, neurology at bedside performing a mental status exam.   His daughter is at bedside as well. /55  99% on 40 % Fio2  Off pressors support.  cc past day, negative 1.8 L past day        Cr 3.5  Na 132 K 4.1   Ca 9.2      Plan:   Hemodialysis today for solute clearance and UF. UF 2-3 L as tolerated or SBP > 100  Will assess needs for dialysis daily . Zhanna Patel MD  [facility]  Office: [phone]    Assessment:   #GISSELLE, oliguric  Likely due to hemodynamic mediated GISSELLE resulting in ATN. He was severely hypotensive requiring high-dose of pressors, was intubated with hypoxic respiratory failure and subsequently developed renal failure. He had a few doses of IV contrast 1 on 1/30/2023 and 2/8/2023. Etiology of his hypotension is unclear at this point  Urinalysis after catheter being placed showed dark yellow urine specific gravity 1.074,  WBC 39 present used 100 mg/dL of albumin  Hold off on further testing of GISSELLE at this time. Required CRRT initially. First HD was 2/14/23    #Hyperkalemia  Due to renal failure, oliguric    #Metabolic acidosis  Due to renal failure  Addressed with dialysis    #Shock  Etiology unclear, cultures are negative so does not peer to have any source of sepsis.   Procalcitonin was only mildly elevated 0.61  Discussed with cardiology about possible [place] to see how his cardiac index is. His EF 35%      ROS:   Unable to obtain, he is confused. PMH:   Past medical history, surgical history, social history, family history are reviewed and updated as appropriate. Reviewed current medication list.   Allergies reviewed and updated as needed. PE:   Vitals:    02/16/23 0844   BP: (!) 129/55   Pulse: 84   Resp: 17   Temp: 98.1 °F (36.7 °C)   SpO2: 99%       General appearance:  in NAD, somnolent. Comfortable. HEENT: EOM intact, no icterus. Trachea is midline. Neck : No masses, appears symmetrical, no JVD  Respiratory: Respiratory effort appears normal, bilateral equal chest rise, no wheeze  Cardiovascular: Ausculation shows RRR ++ edema  Abdomen: No visible mass or tenderness, non distended. Musculoskeletal:  Joints with no swelling or deformity. Skin:no rashes, ulcers, induration, no jaundice. Neuro: face symmetric, no focal deficits.        Lab Results   Component Value Date    CREATININE 3.5 (H) 02/16/2023     (HH) 02/16/2023     (L) 02/16/2023    K 4.1 02/16/2023    CL 97 (L) 02/16/2023    CO2 23 02/16/2023      Lab Results   Component Value Date    WBC 13.3 (H) 02/15/2023    HGB 10.7 (L) 02/15/2023    HCT 32.9 (L) 02/15/2023    MCV 96.4 02/15/2023     (L) 02/15/2023     Lab Results   Component Value Date    CALCIUM 9.2 02/16/2023    CAION 1.37 (H) 02/14/2023    PHOS 3.4 02/13/2023

## 2023-02-16 NOTE — FLOWSHEET NOTE
02/16/23 0800   Treatment Team Notification   Reason for Communication Critical results   Type of Critical Result Laboratory   Critical Lab Result Type Other (comment)  (BUN 99.9)   Team Member Name Dr Kale Crowe Provider   Method of Communication Secure Message   Response Waiting for response   Notification Time 0800     Dr Ibeth Herman responded to perfect serve message, pt on HD schedule today

## 2023-02-16 NOTE — FLOWSHEET NOTE
02/16/23 0850 02/16/23 0900 02/16/23 0945   Treatment Team Notification   Reason for Communication Review case  (bedside rounds) Review case  (bedside rounds) Review case  (bedside rounds)   Team Member Name Dr Swetha Peña Provider Consulting Provider Advanced Practice Nurse  (w/ neurology)   Method of Communication Face to face Face to face Face to face   Response At bedside At bedside At bedside   Notification Time [phone]      02/16/23 0949   Treatment Team Notification   Reason for Communication Review case  (bedside rounds)   Team Member Name Dr Vy Castillo Provider   Method of Communication Face to face   Response At bedside   Notification Time 0945       Providers rounded bedside. Status reviewed, questions answers, plan of care discussed.

## 2023-02-16 NOTE — PROGRESS NOTES
Late entry due to patient care. Bedside shift hand-off done w/ off-going RN Dalila WELSH Pt. is intubated, not in any distress, on bilateral soft wrist restraints, SR w/ occasional PVCs on the monitor, w/ NG tube w/ Nepro tube feeding @ goal rate of 30 ml/hr, w/ intact miller catheter and fecal management system, w/ RIJ Vascath, w/ R upper arm PICC line, & w/ R radial A-line. He's on Propofol @ 10 mcg/kg/min.     Electronically signed by Jesse Castillo RN on 2/16/2023 at 12:05 AM

## 2023-02-16 NOTE — PROGRESS NOTES
Treatment time: 3.5 hours  Net UF: 3027 ml    Pre weight: 113.8 kg   Post weight: 111 kg  EDW: tbd kg    Access used: RIJ temp line  Access function: fair with  ml/min. Positional.  No blood return from arterial port. Lines reversed    Medications or blood products given: heparin dwells    Regular outpatient schedule: N/A GISSELLE    Summary of response to treatment: Tolerated fair. Had frequent PVC throughout procedure. Did not require use of midodrine or albumin    Cirt Line: Initial Hct: 32.7;   End Profile : A; Refill ( Hct.1 -36.1  subtract Hct 2- 35.8): =0.3 no Refill); BV: -9.2 %      Copy of dialysis treatment record placed in chart, to be scanned into EMR.

## 2023-02-16 NOTE — PROGRESS NOTES
Bedside shift hand-off done w/ oncoming ISAIAH Davis., to assume pt. care. This RN handed-off the pt. in a stable condition.     Electronically signed by Bebo Dewitt RN on 2/16/2023 at 7:51 AM

## 2023-02-16 NOTE — PROGRESS NOTES
Late note d/t care:  Assessment completed. Pt RASS 0 with Propofol @ 10mcg/kg/min. Pt tolerating vent on SBT. Pt moves all extremities, does not follow commands. Fidel @ RN and tracks in room. Daughter Evelyne Lopez arrived to bedside to visit. Updates provided. Received call from dialysis nurse, pt on scheduled for HD at 1200. Daughter updated on schedule. Perfect serve message sent to critical care re: HD schedule for plans to reassess possible extubation after HD.

## 2023-02-16 NOTE — PROGRESS NOTES
Late entry due to patient care. Shift assessment completed. He's awake, tracks this RN w/ his eyes, doesn't follow commands but pushes away this RN w/ his hand when this RN was explaining that his ETT will be suctioned. He also keeps on shaking his head whenever this RN explains to him what nursing interventions will be done. He moves all of his extremities spontaneously. This RN was able to suction scant amount of thick and tan ETT secretions.     Electronically signed by Hiram Ramsey RN on 2/16/2023 at 12:13 AM

## 2023-02-16 NOTE — FLOWSHEET NOTE
02/16/23 1520   Treatment Team Notification   Reason for Communication Review case   Team Member Name Dr Anastacia Barreto Provider   Method of Communication Face to face   Response At bedside     Dr Bertha Mcguire updated earlier when HD will be complete for today, returned to bedside at this time. Dr Bertha Mcguire called pt's daughter, discussed plan of care.  Plan for extubation tomorrow AM.

## 2023-02-16 NOTE — PROGRESS NOTES
Late entry due to patient care. He had an uneventful night. His vitals remained stable and he hasn't been in any distress. He still is awake, tracks w/ his eyes, moves all of his extremities, but not follow commands. This RN had suctioned scant to moderate amounts of thick and tan ETT secretions. He tolerated his tube feeding, but no BM this shift.     Electronically signed by Teddy Farrell RN on 2/16/2023 at 7:55 AM

## 2023-02-16 NOTE — PROGRESS NOTES
[facility]  Palliative Care   Progress Note    NAME:  Cristóbal Page  MEDICAL RECORD NUMBER:  [id number]  AGE: 69 y.o. GENDER: male  : 1945  TODAY'S DATE:  2023    Subjective: Patient awake on vent. Objective:    Vitals:    23 0900   BP:    Pulse: 84   Resp: 15   Temp:    SpO2: 99%     Lab Results   Component Value Date    WBC 13.3 (H) 02/15/2023    HGB 10.7 (L) 02/15/2023    HCT 32.9 (L) 02/15/2023    MCV 96.4 02/15/2023     (L) 02/15/2023     Lab Results   Component Value Date    CREATININE 3.5 (H) 2023     (HH) 2023     (L) 2023    K 4.1 2023    CL 97 (L) 2023    CO2 23 2023     Lab Results   Component Value Date    ALT 15 02/10/2023    AST 42 (H) 02/10/2023    ALKPHOS 111 02/10/2023    BILITOT 1.4 (H) 02/10/2023       Plan: daughter at bedside, understands plan of care for today, HD and possible extubation. Code Status: Full Code  Discharge Environment:  [] Hospice Consult Agency:  [] Inpatient Hospice    [] Home with [facility]   [] ECF with Hospice  [] ECF skilled care with Hospice to follow   [] Other:    Teaching Time:  0hours  20 min     I will continue to follow Mr. Rojas Minor care as needed. Thank you for allowing me to participate in the care of Mr. Charli Thomas .      Electronically signed by Miriam Bailey, RN, BSN,CHPN on 2023 at 10:02 AM  Palliative Care Nurse [facility]  Office: [phone]

## 2023-02-16 NOTE — FLOWSHEET NOTE
02/16/23 1153 02/16/23 1530   Treatment   Time On 1153  --    Time Off  --  1530   Treatment Goal 3000  --    Observations & Evaluations   Level of Consciousness 1 1   Vital Signs   Temp 98 °F (36.7 °C) 97.9 °F (36.6 °C)   Weight 250 lb 14.1 oz (113.8 kg) 244 lb 11.4 oz (111 kg)   Weight Method Bed scale Bed scale   Percent Weight Change 0 -2.46   Pain Assessment   Pain Assessment 0-10 0-10   Pain Level 0 0   Treatment Initiation   Dialyze Hours 3.5  --    Treatment  Initiation Universal Precautions maintained;Lines secured to patient; Connections secured;Prime given;Venous Parameters set; Arterial Parameters set; Air foam detector engaged; Hemosafe Device; Dialysate;Saline line double clamped; Hemo-Safe Applied;Dialyzer;F180  --    During Hemodialysis Assessment   Blood Flow Rate (ml/min) 350 ml/min  --    Arterial Pressure (mmHg) -150 mmHg  --    Venous Pressure (mmHg) 140  --      --      --    Access Visible Yes  --    Ultrafiltration Rate (ml/hr) 1030 ml/hr  --    Vascath Right Neck   Placement Date/Time: 02/10/23 0930   Orientation: Right  Access Location: Neck   Continued need for line? Yes Yes   Site Assessment Clean, dry & intact Clean, dry & intact   Venous Lumen Status Blood return noted; Flushed Flushed;Capped;Heparin locked   Arterial Lumen Status Flushed; No blood return Flushed;Capped;Heparin locked   Line Care Connections checked and tightened  --    Dressing Type Transparent  --    Date of Last Dressing Change 02/14/23  --    Dressing Status Clean, dry & intact Clean, dry & intact   Post-Hemodialysis Assessment   Post-Treatment Procedures  --  Blood returned;Catheter capped, clamped and heparinized x 2 ports   Machine Disinfection Process  --  Acid/Vinegar Clean;Heat Disinfect; Exterior Machine Disinfection   Rinseback Volume (ml)  --  400 ml   Blood Volume Processed (Liters)  --  65.6 l/min   Dialyzer Clearance  --  Lightly streaked   Duration of Treatment (minutes)  --  210 minutes Hemodialysis Intake (ml)  --  400 ml   Hemodialysis Output (ml)  --  3427 ml   NET Removed (ml)  --  3027   Tolerated Treatment  --  Good   Dialysis Bath   K+ (Potassium) 3  --    Ca+ (Calcium) 2.5  --    Na+ (Sodium) 138  --    HCO3 (Bicarb) 32  --

## 2023-02-16 NOTE — PROGRESS NOTES
Leave pt in Spontaneous mode as RSBI is 28 and is tolerating current settings well. If at any time pt does not tolerate these settings I will switch back to pervious control settings.  Electronically signed by Deniz Pickard RCP on 2/15/2023 at 9:08 PM

## 2023-02-16 NOTE — PROGRESS NOTES
4 Eyes Skin Assessment     NAME:  Erna Monroe  YOB: 1945  MEDICAL RECORD NUMBER:  [id number]    The patient is being assessed for  Admission    I agree that One RN has performed a thorough Head to Toe Skin Assessment on the patient. ALL assessment sites listed below have been assessed. Areas assessed by both nurses:    Head, Face, Ears, Shoulders, Back, Chest, Arms, Elbows, Hands, Sacrum. Buttock, Coccyx, Ischium, and Legs. Feet and Heels        Does the Patient have a Wound?  No noted wound(s)       Tera Prevention initiated by RN: Yes   Wound Care Orders initiated by RN: NA    Pressure Injury (Stage 3,4, Unstageable, DTI, NWPT, and Complex wounds) if present, place referral order by RN under : NA    New and Established Ostomies, if present place, referral order under : NA      Nurse 1 eSignature: Electronically signed by Ariadne Thomas RN on 2/15/23 at 7:29 PM EST    **SHARE this note so that the co-signing nurse can place an eSignature**    Nurse 2 eSignature: Electronically signed by Suzy Schwab, RN on 2/15/23 at 11:55 PM EST

## 2023-02-16 NOTE — PROGRESS NOTES
Pulmonary Critical Care Progress Note     Patient's name:  Simona Maki  Medical Record Number: [id number]  Patient's account/billing number: [de-identified]  Patient's YOB: 1945  Age: 69 y.o. Date of Admission: 2/8/2023 12:23 PM  Date of Consult: 2/16/2023      Primary Care Physician: Jason Bell      Code Status: Full Code    Chief complaint: Acute hypoxic and hypercapnic respiratory failure. Assessment and Plan     Acute hypoxic and hypercapnic respiratory failure status post reintubation due to aspiration PNA/mucus plugging   Aspiration pneumonia  Mucous plugs s/p bronch   Acute kidney injury requiring hemodialysis  Ischemic CVA  Chronic right pleural effusion with trapped lung  Cardiomyopathy with EF of 30%  Coronary disease status post stenting  A-fib with RVR  V tach resolved. Plan:  On SBT planning to extubate after HD  HD per nephrology  Antibiotics started on Zosyn, follow-up culture results  Steroid x3 days  Aspiration precautions. Continue tube feeding  Propofol for sedation      Overnight:  Cultures negative to date    REVIEW OF SYSTEMS:  Review of Systems -   Unable to obtain sedated intubated on mechanical ventilation        Physical Exam:    Vitals: BP (!) 120/49   Pulse 82   Temp 98.1 °F (36.7 °C) (Oral)   Resp (!) 9   Ht 6' 1\" (1.854 m)   Wt 250 lb 14.1 oz (113.8 kg)   SpO2 98%   BMI 33.10 kg/m²     Last Body weight:   Wt Readings from Last 3 Encounters:   02/16/23 250 lb 14.1 oz (113.8 kg)   01/30/23 260 lb (117.9 kg)   01/20/23 260 lb 2.3 oz (118 kg)       Body Mass Index : Body mass index is 33.1 kg/m². Intake and Output summary:   Intake/Output Summary (Last 24 hours) at 2/16/2023 0823  Last data filed at 2/16/2023 0643  Gross per 24 hour   Intake 1489.65 ml   Output 495 ml   Net 994.65 ml         Physical Examination:     Gen: On the vent no acute distress  Eyes: PERRL. Anicteric sclera. No conjunctival injection. ENT: No discharge. Posterior oropharynx clear. External appearance of ears and nose normal.  Neck: Trachea midline. No mass   Resp: There is increased work of breathing with diminished breath sounds more on the right  CV: Regular rate. Regular rhythm. systolic murmur or rub. + edema. GI: Soft, Non-tender. Non-distended. +BS  Skin: Warm, dry, w/o erythema. Lymph: No cervical or supraclavicular LAD. M/S: No cyanosis. No clubbing. Neuro: Sedated on the vent      Laboratory findings:-    CBC:   Recent Labs     02/15/23  0340   WBC 13.3*   HGB 10.7*   *       BMP:    Recent Labs     02/14/23  0342 02/15/23  0340 02/16/23  0559    134* 132*   K 5.3* 4.2 4.1    98* 97*   CO2 27 22 23   BUN 50* 60* 100*   CREATININE 2.4* 2.7* 3.5*   GLUCOSE 156* 161* 118*       S. Calcium:  Recent Labs     02/16/23  0559   CALCIUM 9.2       S. Magnesium:  Recent Labs     02/13/23  1600   MG 2.70*       S. Phosphorus:  Recent Labs     02/13/23  1600   PHOS 3.4       S. Glucose:  Recent Labs     02/14/23  0342 02/15/23  0351   POCGLU 142* 152*             Radiology Review:  Pertinent images / reports were reviewed as a part of this visit.     CXR reviewed  Critical Care time 35 minutes       Marine Escamilla MD, M.D.            2/16/2023, 8:23 AM

## 2023-02-16 NOTE — PROCEDURES
[address]                          ELECTROENCEPHALOGRAM REPORT    PATIENT NAME: Bethanie Saint                   :        1945  MED REC NO:   [id number]                          ROOM:       1309  ACCOUNT NO:   [de-identified]                           ADMIT DATE: 2023  PROVIDER:     Blanca Bustos DO    DATE OF EE/15/2023    REFERRING PROVIDER:  Orlando Xavier NP    REASON FOR STUDY:  Possible seizure. BRIEF HISTORY AND NEUROLOGIC FINDINGS:  The patient is a 69-year-old  male being evaluated for concern for seizure. The patient is on a  ventilator and sedated. MEDICATIONS:  The patient's centrally acting medications listed include  Narcan, Versed, Ativan and fentanyl. EEG FINDINGS:  This is a 20-channel digital EEG performed utilizing  bipolar and referential montages. The patient was unresponsive during  the recording. The background consisted of generalized delta  frequencies with some superimposed theta frequencies with a maximum  background of 5-6 Hz. This was often of somewhat low amplitude though  reactive. Photic stimulation was performed at various flash frequencies and did  not evoke any significant posterior driving response. Hyperventilation  exercise was not performed due to the patient's clinical history and/or  age. A 1-channel EKG rhythm strip was reviewed and showed no obvious cardiac  dysrhythmias. EEG DIAGNOSIS:  This EEG is abnormal due to the presence of moderately  severe background slowing and disorganization. CLINICAL INTERPRETATION:  The background slowing and disorganization is  nonspecific and consistent with at least a moderate encephalopathy. This may be seen in multiple settings including toxic, metabolic, or  degenerative conditions as well as with medication effect. No seizures  were recorded.   If seizures remain a clinical concern, then a repeat  routine or prolonged EEG may be of further benefit. Clinical  correlation is advised.         Jacques Toribio DO    D: 02/15/2023 17:33:24       T: 02/15/2023 17:35:51     [id number]  Job#: [id number]     Doc#: [id number]    CC:

## 2023-02-16 NOTE — PROGRESS NOTES
[facility]   Daily Progress Note      Admit Date:  2/8/2023      Subjective:   Mr. Peg Ramirez is a 75yo male with chronic systolic CHF LVEF 98-26%, CAD and chronic atrial fibrillation who came for Impella assisted PCI to the RCA and LAD 2/8/23. The procedure went well and the Impella was explanted. Unfortunately, he developed mental status chnages after the procedure concerning for a potential CVA. Head CT/CTA was unremarkable though. He ultimately was intubated for airway protection. Interval History:  Doing well  Off all pressors  Potential extubation today     Objective:     BP (!) 129/55   Pulse 82   Temp 98.1 °F (36.7 °C) (Oral)   Resp 15   Ht 6' 1\" (1.854 m)   Wt 250 lb 14.1 oz (113.8 kg)   SpO2 98%   BMI 33.10 kg/m²      Intake/Output Summary (Last 24 hours) at 2/16/2023 1432  Last data filed at 2/16/2023 1339  Gross per 24 hour   Intake 1825.91 ml   Output 590 ml   Net 1235.91 ml         Physical Exam:  General:  Lethargic, NAD  Skin:  Warm and dry  Neck:  JVD<8, no carotid bruits  Chest: Intubated. Diminished bilaterally in bases.   No rhonchi or rales  Cardiovascular:  Irreg irreg and mildly tachy, normal S1/S2, no M/R/G  Abdomen:  Soft, nontender, +bowel sounds  Extremities:  2+ bilateral LE edema  Pulses: 2+ bilat carotid    2+ bilat radial    2+ bilat femoral        Medications:    famotidine (PEPCID) injection  20 mg IntraVENous Daily    piperacillin-tazobactam  3,375 mg IntraVENous Q12H    ipratropium-albuterol  1 ampule Inhalation TID    methylPREDNISolone  40 mg IntraVENous Daily    amiodarone  200 mg Oral Daily    heparin (porcine)  5,000 Units SubCUTAneous 3 times per day    clopidogrel  75 mg Oral Daily    aspirin  81 mg Oral Daily    Or    aspirin  300 mg Rectal Daily    atorvastatin  80 mg Oral Nightly    chlorhexidine  15 mL Mouth/Throat BID    sodium chloride flush  5-40 mL IntraVENous 2 times per day      propofol 10 mcg/kg/min (02/16/23 1339)    norepinephrine Stopped (02/15/23 4008)    sodium chloride Stopped (02/15/23 4612)       Lab Data:  CBC:   Recent Labs     02/14/23  0342 02/14/23  0345 02/15/23  0340   WBC 13.5*  --  13.3*   HGB 11.3* 14.0 10.7*     --  104*       BMP:    Recent Labs     02/14/23  0342 02/15/23  0340 02/16/23  0559    134* 132*   K 5.3* 4.2 4.1   CO2 27 22 23   BUN 50* 60* 100*   CREATININE 2.4* 2.7* 3.5*         FASTING LIPID PANEL:  Lab Results   Component Value Date/Time    CHOL 84 02/09/2023 03:46 AM    HDL 34 02/09/2023 03:46 AM    TRIG 61 02/09/2023 03:46 AM     LHC/PCI 2/8/23:  1. Right-dominant coronary arterial circulation. Successful  intervention to 90% lesion and 99% more distal RCA lesion with a 3 mm  overlapping drug-eluting stents dilated up to 3.2 mm proximally and 3.1  mm distally, resulting in 0% residual stenosis and SANTO III flow in the  vessel. There was reversal of flow to antegrade in the dominant right  coronary artery. The PDA had been fed by collaterals. In the left  system, there is trivial left main plaque disease. The LAD had an 80%  mid lesion that was treated with a 3.5 x 22 mm Edvin frontier  drug-eluting stent dilated to 3.6 mm in diameter resulting in 0%  residual stenosis and SANTO III flow. The circumflex has a chronic total  occlusion in the midportion but the distal vessel fills in from  collaterals. 2.  Left ventricular end-diastolic pressure 21 mmHg. 3.  No gradient across the aortic valve on pullback to suggest aortic  stenosis. PROCEDURES PERFORMED:  1. Left heart catheterization. 2.  Percutaneous coronary intervention with drug-eluting stents to the  dominant right coronary artery. 3.  Percutaneous coronary intervention with drug-eluting stent to the  mid left anterior descending artery. 4.  Insertion of short-term external heart assist system into heart,  percutaneous approach.   5.  Removal of short-term external heart assist system from heart,  percutaneous approach for assistance with cardiac output using impeller  pump, continuous. Stress Perfusion 1/29/23:  Pharmacological Stress/MPI Results:  1. Technically a satisfactory study. 2. Large mostly dense perfusion defect involving lateral and inferolateral wall suggestive of previous MI. No reversibility /ischemia  3. Gated Study shows markedly dilated LV with lateral and inferolateral akinesis; EF 29 %. Echo 1/5/23:  Poor image quality. Definity contrast administered. Patient appears to be in atrial fibrillation. Overall left ventricular systolic function appears severely reduced. Ejection fraction is visually estimated to be 20-25% with diffuse  hypokinesis and regional abnormalities including inferior akinesis. Normal left ventricular wall thickness and cavity size. The right ventricle appears normal in size with severely reduced systolic function. The left and right atria are moderately dilated. Possibly severe mitral regurgitation. The aortic valve leaflets are not well visualized. Mild aortic stenosis based on a peak velocity of 2.6 m/s and a mean pressure gradient  of 20 mmHg. Gradients may be underestimated with reduced LV function. Trivial aortic regurgitation. Trivial pericardial effusion. IVC size is dilated (>2.1 cm) and collapses < 50% with respiration consistent with elevated RA pressure (15 mmHg). Brain MRI 2/14/23:  Small acute right anterior and posterior external watershed infarcts. No acute hemorrhage or significant mass effect. Assessment / Plan: 1. Ventricular Tachycardia  No further VT. Continue oral amiodarone 200mg bid. LVEF 35%. 2.Acute on chronic systolic CHF, class 4  LVEF ~35 by repeat echocardiogram.  He appears more euvolemic at present  Hold beta-blockade given hypotension. Hold nephrotoxic agents such as Aldactone ACE inhibitors or ARB's while patient has acute renal failure and is on CRRT.     3.Unstable Angina  S/p PCI to the LAD and the RCA on 2/8/23. Continue DAPT with aspirin and clopidogrel. Will need to crush in apple sauce until tolerating more oral intake. Hold beta blockade. Continue statin therapy. 4.Small acute CVA  Neurology following. This may explain some of his mental status changes although his hypoxia and hypercarbia are also contributing. 5.Aspiration Pneumonia  S/p Bronchoscopy 2/14/23. Improved oxygenation and aeration on chest X-ray.        Signed:  Fabien Garner MD

## 2023-02-16 NOTE — PROGRESS NOTES
Comprehensive Nutrition Assessment    Type and Reason for Visit:  Reassess    Nutrition Recommendations/Plan:   Continue Current TF, Nepro @ 30 mL/hr +4 proteinex daily. Flush per provider. Monitor for changes in propofol dose to adjust TF if necessary, currently 6.9 ml/hr. Malnutrition Assessment:  Malnutrition Status:  Insufficient data (02/13/23 1039)    Context:  Acute Illness     Findings of the 6 clinical characteristics of malnutrition:  Energy Intake:  50% or less of estimated energy requirements for 5 or more days  Weight Loss:  No significant weight loss     Body Fat Loss:  Unable to assess     Muscle Mass Loss:  Unable to assess    Fluid Accumulation:  Mild Extremities   Strength:  Not Performed    Nutrition Assessment:    Follow-up. Pt was reintubated on 2/14 d/t hypoxia. Previous TF was resumed. Has been having SBT daily. Possible extubation after HD today. Propofol @ 6.9 ml/hr providing 182 kcal daily. Current TF regimen remains appropriate with propofol at this time. If pt to remain intubated, would need consult for RD to manage and order TF as it may change. Will continue to monitor for possible extubation today. Nutrition Related Findings:    +3 BLE, trace BUE edema; BM 2/15; Na 132 Wound Type: None       Current Nutrition Intake & Therapies:    Average Meal Intake: NPO  Average Supplements Intake: NPO  Diet NPO  ADULT TUBE FEEDING; Nasogastric; Renal Formula; Continuous; 10; Yes; 10; Q 4 hours; 30; 30; Q 4 hours; Protein; 4 boluses Proteinex daily via syringe. Flush with 30 mL H20 before and after. Do NOT mix directly with the tube feeding formula.   Current Tube Feeding (TF) Orders:  Feeding Route: Nasogastric  Formula: Renal Formula  Schedule: Continuous  Feeding Regimen: @ 30 mL/hr  Additives/Modulars: Protein (4 Proteinex daily)  Water Flushes: Per provider  Current TF & Flush Orders Provides: see below  Goal TF & Flush Orders Provides: Recommend Nepro with goal rate of 30 mL/hr (calculated over 20 hours for routine nsg care) + 4 bolus of Proteinex daily. This provides 600 mL TV, 1496 kcal (1080 TF + 416 Proteinex), 153 gm protein (49 gm TF + 104 gm Proteinex), and 436 mL free fl. Anthropometric Measures:  Height: 6' 1\" (185.4 cm)  Ideal Body Weight (IBW): 184 lbs (84 kg)       Current Body Weight: 250 lb (113.4 kg), 135.9 % IBW. Weight Source: Bed Scale  Current BMI (kg/m2): 33                          BMI Categories: Obese Class 1 (BMI 30.0-34. 9)    Estimated Daily Nutrient Needs:  Energy Requirements Based On: Kcal/kg  Weight Used for Energy Requirements: Current  Energy (kcal/day): 8441-5033 kcal (11-14 kcal/kg)  Weight Used for Protein Requirements: Ideal  Protein (g/day): 166 gm (2 g/kg IBW)     Fluid (ml/day): per provider    Nutrition Diagnosis:   Inadequate oral intake related to cognitive or neurological impairment as evidenced by NPO or clear liquid status due to medical condition, nutrition support - enteral nutrition    Nutrition Interventions:   Food and/or Nutrient Delivery: Continue Current Tube Feeding  Nutrition Education/Counseling: Education not indicated  Coordination of Nutrition Care: Continue to monitor while inpatient       Goals:     Goals: Tolerate nutrition support at goal rate       Nutrition Monitoring and Evaluation:   Behavioral-Environmental Outcomes: None Identified  Food/Nutrient Intake Outcomes: Enteral Nutrition Intake/Tolerance  Physical Signs/Symptoms Outcomes: Biochemical Data, Weight, Skin, Nutrition Focused Physical Findings, Fluid Status or Edema    Discharge Planning:     Too soon to determine     Watt KATIE Hale, LD  Contact: [phone]

## 2023-02-16 NOTE — PROGRESS NOTES
Late entry due to patient care. This RN informed Respiratory Therapist (RT) Marcelle Shrestha. regarding the nursing communication to adjust ventilator settings to previous full settings tonight. Please see RT's separate notes.     Electronically signed by Maine Mir RN on 2/15/2023 at 10:13 PM

## 2023-02-17 NOTE — FLOWSHEET NOTE
02/17/23 0845   Treatment Team Notification   Reason for Communication Evaluate  (bedside rounds)   Team Member Name Dr Janis Vásquez Team Role Attending Provider   Method of Communication Face to face   Response At bedside;See orders   Notification Time 0845     Dr Catherine Masters bedside for rounds  Status reviewed, informed of Platelet 66  New orders received.

## 2023-02-17 NOTE — PROGRESS NOTES
4 Eyes Skin Assessment     NAME:  Karmen Castillo  YOB: 1945  MEDICAL RECORD NUMBER:  [id number]    The patient is being assessed for  Shift Handoff    I agree that One RN has performed a thorough Head to Toe Skin Assessment on the patient. ALL assessment sites listed below have been assessed. Areas assessed by both nurses:    Head, Face, Ears, Shoulders, Back, Chest, Arms, Elbows, Hands, Sacrum. Buttock, Coccyx, Ischium, and Legs. Feet and Heels        Does the Patient have a Wound?  No noted wound(s)       Tera Prevention initiated by RN: Yes   Wound Care Orders initiated by RN: NA    Pressure Injury (Stage 3,4, Unstageable, DTI, NWPT, and Complex wounds) if present, place referral order by RN under : NA    New and Established Ostomies, if present place, referral order under : NA      Nurse 1 eSignature: Electronically signed by Austin Tong RN on 2/16/23 at 7:13 PM EST    **SHARE this note so that the co-signing nurse can place an eSignature**    Nurse 2 eSignature: Electronically signed by López Clark RN on 2/16/23 at 7:41 PM EST

## 2023-02-17 NOTE — FLOWSHEET NOTE
02/17/23 0856   Treatment Team Notification   Reason for Communication Review case   Team Member Name Dr Earlene Meng Team Role Consulting Provider   Method of Communication Face to face   Response At bedside     Dr Bhavin Martínez bedside with RT. Bedside bronchoscopy completed. Pt remains on SBT, PEEP and PS changed to 5/5. ABG order in 30 min to be assessed extubation criteria.

## 2023-02-17 NOTE — FLOWSHEET NOTE
Treatment time: 3.5 Hrs    Net UF: 3.0 L    Pre weight: 112.3 kg  Post weight: 109.1 kg  EDW: TBD    Access used: [facility]  Access function: Functioned well, but reversed lines 45 minutes into treatment d/t arterial pressure alarming greater than -220. Prescribed BFR achieved and maintained throughout treatment with exception of alarms at 45 minute rafael. Medications or blood products given: None    Regular outpatient schedule: TBD    Summary of response to treatment: HD complete. Rinseback per protocol. All blood returned. No s/s of complications r/t HD. No complaints voiced per patient. Plan next HD for Saturday 2.18.23. Patient aware and agreeable to treatment. Report given. Safe pateint handoff achieved. Copy of dialysis treatment record placed in chart, to be scanned into EMR.      RAPHAEL Lacey, RN       02/17/23 1130 02/17/23 1330 02/17/23 1515   Vital Signs   Temp 97.5 °F (36.4 °C)  --  97.8 °F (36.6 °C)   Temp Source Axillary  --  Oral   Heart Rate 73 69 81   Heart Rate Source Monitor Monitor Monitor   Resp 19 12 14   BP (!) 136/56 (!) 117/46 (!) 122/53   MAP (Calculated) 83 70 76   MAP (mmHg) 76  --   --    BP Location Arterial Arterial Arterial   BP Method Automatic Automatic Automatic   Patient Position Semi fowlers Semi fowlers Semi fowlers   Level of Consciousness 0  --  0   MEWS Score 1  --  0   Pain Assessment   Pain Assessment  --   --  0-10   Pain Level  --   --  0   Oxygen Therapy   SpO2 99 % 100 % 100 %   Pulse Oximetry Type Continuous Continuous Continuous   Pulse via Oximetry 72 beats per minute 69 beats per minute 74 beats per minute   Pulse Oximeter Device Mode Continuous Continuous Continuous   Pulse Oximeter Device Location Finger Finger Finger   O2 Device Nasal cannula Nasal cannula Nasal cannula   Skin Assessment Clean, dry, & intact Clean, dry, & intact  --    O2 Flow Rate (L/min) 5 L/min  --  5 L/min   End Tidal CO2  --  5 (%)  --    Height and Weight   Weight 247 lb 9.2 oz (112.3 kg)  --  240 lb 8.4 oz (109.1 kg)   Weight Method Bed scale  --  Bed scale   BMI (Calculated) 32.7  --  31.8

## 2023-02-17 NOTE — FLOWSHEET NOTE
02/17/23 0901   Treatment Team Notification   Reason for Communication Evaluate   Team Member Name Dr Marin Andrews Provider   Method of Communication Secure Message   Response Waiting for response   Notification Time [id number]     Perfect serve message sent to Dr Mauricio Emmanuel, nephrology. Updated on low platelets and ASA/heparin orders placed on hold.

## 2023-02-17 NOTE — PLAN OF CARE
Problem: Metabolic/Fluid and Electrolytes - Adult  Goal: Hemodynamic stability and optimal renal function maintained  Outcome: Not Progressing  Hemodynamic stability and optimal renal function maintained:   Monitor labs and assess for signs and symptoms of volume excess or deficit   Monitor intake, output and patient weight   Monitor response to interventions for patient's volume status, including labs, urine output, blood pressure (other measures as available)     Problem: Neurosensory - Adult  Goal: Achieves stable or improved neurological status  Outcome: Not Progressing  Achieves stable or improved neurological status: Assess for and report changes in neurological status     Problem: Respiratory - Adult  Goal: Achieves optimal ventilation and oxygenation  Outcome: Progressing  Achieves optimal ventilation and oxygenation:   Assess for changes in respiratory status   Assess for changes in mentation and behavior   Position to facilitate oxygenation and minimize respiratory effort   Oxygen supplementation based on oxygen saturation or arterial blood gases   Assess the need for suctioning and aspirate as needed   Respiratory therapy support as indicated     Problem: Cardiovascular - Adult  Goal: Maintains optimal cardiac output and hemodynamic stability  Outcome: Progressing  Maintains optimal cardiac output and hemodynamic stability:   Monitor blood pressure and heart rate   Assess for signs of decreased cardiac output     Problem: Skin/Tissue Integrity - Adult  Goal: Skin integrity remains intact  Outcome: Progressing  Skin Integrity Remains Intact: Monitor for areas of redness and/or skin breakdown     Problem: Genitourinary - Adult  Goal: Urinary catheter remains patent  Outcome: Progressing  Urinary catheter remains patent: Assess patency of urinary catheter     Problem: Infection - Adult  Goal: Absence of infection at discharge  Outcome: Progressing  Absence of infection at discharge:   Assess and monitor for signs and symptoms of infection   Monitor lab/diagnostic results   Monitor all insertion sites i.e., indwelling lines, tubes and drains   Administer medications as ordered   Instruct and encourage patient and family to use good hand hygiene technique     Problem: Infection - Adult  Goal: Absence of infection during hospitalization  Outcome: Progressing  Absence of infection during hospitalization:   Assess and monitor for signs and symptoms of infection   Monitor lab/diagnostic results   Monitor all insertion sites i.e., indwelling lines, tubes and drains   Administer medications as ordered   Instruct and encourage patient and family to use good hand hygiene technique     Problem: ABCDS Injury Assessment  Goal: Absence of physical injury  Outcome: Progressing  Absence of Physical Injury: Implement safety measures based on patient assessment     Problem: Safety - Medical Restraint  Goal: Remains free of injury from restraints (Restraint for Interference with Medical Device)  Outcome: Progressing  Remains free of injury from restraints (restraint for interference with medical device):   Determine that other, less restrictive measures have been tried or would not be effective before applying the restraint   Evaluate the patient's condition at the time of restraint application   Inform patient/family regarding the reason for restraint   Every 2 hours: Monitor safety, psychosocial status, comfort, nutrition and hydration     Electronically signed by Suzy Schwab, RN on 2/17/2023 at 1:26 AM

## 2023-02-17 NOTE — PROGRESS NOTES
Late entry due to patient care. He had an uneventful night. His vitals remained stable and he hasn't been in any distress. He easily wakes-up w/  minimal verbal stimuli & moves all of his extremities. This AM's assessment, he followed commands like wiggling his toes and squeezing and letting go of this RNs hands. This RN had suctioned small to moderate amounts of thick and tan (blood-tinged) ETT secretions. He tolerated his tube feeding.  This RN held his tube feeding a few minutes ago, in preparation for his possible extubation this AM.    Electronically signed by Lavern Brooks RN on 2/17/2023 at 6:21 AM

## 2023-02-17 NOTE — PROGRESS NOTES
Hospitalist Progress Note  2/16/2023 8:24 PM  Subjective:   Admit Date: 2/8/2023  PCP: Jerson Andrade Status: Inpatient   Interval History: Hospital Day: 9,  Remains intubated  Getting HD  On spontaneous mode ventilation    History of present illness:  Admitted for Impella assisted LHC. After Impella was removed patient's mentation changed and code stroke was called. Patient was lethargic and intubated for respiratory failure. Patient was extubated on 2/8. Hospital course was complicated by V. tach and patient was started on amiodarone. Patient is now in shock and is on pressors with GISSELLE and oliguria. Chronic systolic CHF LVEF 00-88%, CAD and chronic atrial fibrillation who came for Impella assisted PCI to the RCA and LAD 2/8/23. The procedure went well and the Impella was explanted. Unfortunately, he developed mental status chnages after the procedure concerning for a potential CVA. Head CT/CTA was unremarkable though. He ultimately was intubated for airway protection and extubated to BiPAP and requiring nonrebreather. CRRT required for euvolemia, managed by nephrology. Amiodarone initiated for ventricular tachycardia.       Diet: NPO (sips of water with meds)  Right cephalic double lumen PICC (2/8, day #4)  Rectal tube (2/10, day #3)  Urinary catheter (2/8, day #4)  Right arterial line (2/9, day #3)  Right neck Vascath (2/10, day #3)  Medications:     prismaSATE BGK 4/2.5 1,000 mL/hr   dexmedetomidine 0.2 mcg/kg/hr    norepinephrine 6 mcg/min      amiodarone  200 mg Oral Daily   heparin  5,000 Units SubCUTAneous TID   clopidogrel  75 mg Oral Daily   hydrocortisone   50 mg IntraVENous Q6H   aspirin  81 mg Oral Daily   atorvastatin  80 mg Oral Nightly   pantoprazole  40 mg IntraVENous Daily     Recent Labs     02/14/23  0342 02/14/23  0345 02/15/23  0340   WBC 13.5*  --  13.3*   HGB 11.3* 14.0 10.7*     --  104*   MCV 97.7  --  96.4       Recent Labs     02/14/23  0342 02/15/23  0340 02/16/23  0559    134* 132*   K 5.3* 4.2 4.1    98* 97*   CO2 27 22 23   BUN 50* 60* 100*   CREATININE 2.4* 2.7* 3.5*   GLUCOSE 156* 161* 118*       No results for input(s): CA, AST, ALT, ALB, BILITOT, ALKPHOS in the last 72 hours. Troponin T (2/8) 0.03 ng/mL  INR (2/8) 1.73, (2/9) 1.62  Magnesium (2/11) 2.30, (2/12) 2.50 mg/dL  Ionized calcium (2/11) 1.13, (2/12) 1.17 mmol/L  Venous pH (2/11) 7.29     Portable CXR (2/10) Stable exam with small right pleural effusion and perihilar/bibasilar airspace opacities which may be related to pulmonary edema or pneumonia. Left heart cath with PCI (2/8) Right-dominant coronary arterial circulation. Successful intervention to 90% lesion and 99% more distal RCA lesion with a 3 mm overlapping drug-eluting stents dilated up to 3.2 mm proximally and 3.1 mm distally, resulting in 0% residual stenosis and SANTO III flow in the vessel. There was reversal of flow to  antegrade in the dominant right coronary artery. The PDA had been fed by collaterals. In the left system, there is trivial left main plaque disease. The LAD had an 80% mid lesion that was treated with a 3.5 x 22 mm Viburnum frontier drug-eluting stent dilated to 3.6 mm in diameter resulting in 0% residual stenosis and SANTO III flow. The circumflex  has a chronic total occlusion in the midportion but the distal vessel fills in from collaterals. Left ventricular end-diastolic pressure 21 mmHg. No gradient across the aortic valve on pullback to suggest aortic stenosis. Insertion of short-term external heart assist system into heart, percutaneous approach. Removal of short-term external heart assist system from heart, percutaneous approach for assistance with cardiac output using impeller pump, continuous. Echo (2/9)  Limited echo to assess left ventriclar ejection fraction. Suboptimal echo due to lung interference.  Difficult to assess Left ventricular function but appears to be moderately depressed at about 35%. Cannot exclude regional wall motion abnormalities secondary to poor endocardial visualization. Objective:   Vitals:  /55   Pulse 67   Temp 97.2 °F (36.2 °C) (Axillary)   Resp 11   Ht 6' 1\" (1.854 m)   Wt 271 lb 2.7 oz (123 kg)   SpO2 97% on 4 LPM  BMI 35.78 kg/m²   General appearance: alert and cooperative with exam  Lungs: clear to auscultation bilaterally  Heart: regular rate and rhythm, S1, S2 normal, no murmur, click, rub or gallop  Abdomen: soft, non-tender; bowel sounds normal; no masses,  no organomegaly  Extremities: extremities normal, atraumatic, no cyanosis or edema  Neurologic: No obvious focal neurologic deficits. Assessment and Plan:   GISSELLE on CKD stage 3, requiring CRRT . HD done today  Acute on chronic systolic heart failure, class 4 with Cardiomyopathy (EF 30%) Cardiogenic shock with hypotension on norepinephrine 12 mcg/min and vasopressin 0.04 Units/min. MAP of 65. CAD with unstable angina s/p PCI to the LAD and the RCA (2/8) on aspirin and clopidogrel dual anti-platelet therapy. Metabolic encephalopathy with agitation on non-invasive ventilation -stable. Acute respiratory failure requiring intubation and mechanical ventilation breathing trials in the works  Leukocytosis:  Initially treated with cefepime. Pneumonia panel negative. Antibiotic discontinued. Low procalcitonin and lactate. Chronic right pleural effusion s/p chest tube in January. Trapped lung per pulmonary with no intervention required as lung will not re-expand. Hemoptysis:  Suspected related to anticoagulation loading. Atrial fibrillation with rapid ventricular response:  Completed amiodarone infusion. Rate improve off milrinone. Thyromegaly with TSH mildly elevated c/w euthyroid sick syndrome. Left ICA pseudoaneurysm:  Outpatient follow up post discharge.      Advance Directive: Full Code  DVT prophylaxis with heparin 5,000 units sub-Q TID  Discharge planning: unknown at this time, ICU level of care    Art Blank MD  Rounding Hospitalist

## 2023-02-17 NOTE — PROGRESS NOTES
[facility]                                               Progress note    Summary:   Jac Phoenix is being seen by nephrology for GISSELLE. Admitted with shortness of breath. Patient was admitted to the ICU on 2/8/2023. Prior to this on Wednesday he had a heart cath procedure with required Impella support. There was concern for possible stroke afterwards. Later he had not episode of unresponsiveness, cyanotic with ventricular tachycardia and required intubation for airway protection. Later was reintubated for bronchoscopy. Interval History  Seen at bedside  Has been extubated and receiving dialysis right now. More awake and alert   Had dialysis yesterday   Despite treatment BUN only came down from 100 > 83, this is likely due to increased catabolic state. Na 131 K 4.2 bicarb 25  BUN 83 Cr 2.6  Ca and phos wnl    Platelets dropping, heparin held, SHAUNA sent   Hgb 9.4    Plan:   Hemodialysis today for solute clearance and UF. UF 2-3 L as tolerated or SBP > 100  Will plan for dialysis again on Saturday      Quenten Leyden, MD  [facility]  Office: [phone]    Assessment:   #GISSELLE, oliguric  Likely due to hemodynamic mediated GISSELLE resulting in ATN. He was severely hypotensive requiring high-dose of pressors, was intubated with hypoxic respiratory failure and subsequently developed renal failure. He had a few doses of IV contrast 1 on 1/30/2023 and 2/8/2023. Etiology of his hypotension is unclear at this point  Urinalysis after catheter being placed showed dark yellow urine specific gravity 1.074,  WBC 39 present used 100 mg/dL of albumin  Hold off on further testing of GISSELLE at this time. Required CRRT initially. First HD was 2/14/23    #Hyperkalemia  Due to renal failure, oliguric    #Metabolic acidosis  Due to renal failure  Addressed with dialysis    #Shock  Etiology unclear, cultures are negative so does not peer to have any source of sepsis.   Procalcitonin was only mildly elevated 0.61  Discussed with cardiology about possible [place] to see how his cardiac index is. His EF 35%      ROS:   Positives Listed Bold. All other remaining systems are negative. Constitutional:  fever, chills, weakness, weight change, fatigue,      Skin:  rash, pruritus, hair loss, bruising, dry skin, petechiae. Head, Face, Neck   headaches, swelling,  cervical adenopathy. Respiratory: shortness of breath, cough, or wheezing  Cardiovascular: chest pain, palpitations, dizzy, edema  Gastrointestinal: nausea, vomiting, diarrhea, constipation,belly pain    Yellow skin, blood in stool  Musculoskeletal:  back pain, muscle weakness, gait problems,       joint pain or swelling. Genitourinary:  dysuria, poor urine flow, flank pain, blood in urine  Neurologic:  vertigo, TIA'S, syncope, seizures, focal weakness  Psychosocial:  insomnia, anxiety, or depression. Additional positive findings: -      PMH:   Past medical history, surgical history, social history, family history are reviewed and updated as appropriate. Reviewed current medication list.   Allergies reviewed and updated as needed. PE:   Vitals:    02/17/23 1245   BP: (!) 116/48   Pulse: 69   Resp: 13   Temp:    SpO2: 100%       General appearance:  in NAD, somnolent. Comfortable. HEENT: EOM intact, no icterus. Trachea is midline. Neck : No masses, appears symmetrical, no JVD  Respiratory: Respiratory effort appears normal, bilateral equal chest rise, no wheeze  Cardiovascular: Ausculation shows RRR ++ edema  Abdomen: No visible mass or tenderness, non distended. Musculoskeletal:  Joints with no swelling or deformity. Skin:no rashes, ulcers, induration, no jaundice. Neuro: face symmetric, no focal deficits.        Lab Results   Component Value Date    CREATININE 2.6 (H) 02/17/2023    BUN 83 (HH) 02/17/2023     (L) 02/17/2023    K 4.2 02/17/2023    CL 95 (L) 02/17/2023    CO2 25 02/17/2023      Lab Results   Component Value Date    WBC 13.4 (H) 02/17/2023    HGB 9.4 (L) 02/17/2023    HCT 29.5 (L) 02/17/2023    MCV 96.8 02/17/2023    PLT 66 (L) 02/17/2023     Lab Results   Component Value Date    CALCIUM 8.9 02/17/2023    CAION 1.37 (H) 02/14/2023    PHOS 3.7 02/17/2023

## 2023-02-17 NOTE — PROGRESS NOTES
Late entry due to patient care. @ 2045 to 2110 - Shift assessment completed. He had his eyes closed, but opened them easily w/ verbal stimuli. He tracks w/ his eyes and moves all of his extremities spontaneously. When asked to squeeze this RN's hands, he didn't follow, but after repeated requests to wiggle his toes, he did.     Electronically signed by Viet Chadwick RN on 2/16/2023 at 11:10 PM

## 2023-02-17 NOTE — PROGRESS NOTES
Speech Language Pathology    Swallow evaluation order received. Patient extubated ~ 11 AM this date after being re-intubated 2/14/2023. ST to hold swallow evaluation this date with plan for attempt pending patient's status 2/18/2023 unless otherwise notified. Thank you. Frances Kirkpatrick [id number]  Speech-Language Pathologist  Portable phone: [phone]

## 2023-02-17 NOTE — PROGRESS NOTES
4 Eyes Skin Assessment     NAME:  Stephanie Starkey  YOB: 1945  MEDICAL RECORD NUMBER:  [id number]    The patient is being assessed for  Shift Handoff    I agree that One RN has performed a thorough Head to Toe Skin Assessment on the patient. ALL assessment sites listed below have been assessed. Areas assessed by both nurses:    Head, Face, Ears, Shoulders, Back, Chest, Arms, Elbows, Hands, Sacrum. Buttock, Coccyx, Ischium, and Legs. Feet and Heels        Does the Patient have a Wound?  No noted wound(s)       Tera Prevention initiated by RN: Yes   Wound Care Orders initiated by RN: NA    Pressure Injury (Stage 3,4, Unstageable, DTI, NWPT, and Complex wounds) if present, place referral order by RN under : NA    New and Established Ostomies, if present place, referral order under : NA      Nurse 1 eSignature: Electronically signed by Sariah Hale RN on 2/17/23 at 7:26 AM EST    **SHARE this note so that the co-signing nurse can place an eSignature**    Nurse 2 eSignature: Electronically signed by Billie Gonzalez RN on 2/17/23 at 7:30 AM EST
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
## 2023-02-17 NOTE — FLOWSHEET NOTE
SBT ABG result sent to Dr Abram Garcia via perfect serve. Order received to extubate. Call placed to RT. RT bedside. ETT and oral suctioning completed. Pt extubated to 5L/min nasal cannula. Pt able to vocalize name,  and place. Pt with loose, moist cough, productive at times. Dr Abram Garcia to bedside post extubation.          23 1039   Treatment Team Notification   Reason for Communication Evaluate  (SBT ABGs)   Team Member Name Dr Ramonita Wilkerson Provider   Method of Communication Secure Message   Response Waiting for response   Notification Time 1039         Latest Reference Range & Units 23 09:34   Hemoglobin, Art, Extended 13.5 - 17.5 g/dL 10.2 (L)   pH, Arterial 7.350 - 7.450  7.357   pCO2, Arterial 35.0 - 45.0 mmHg 48.8 (H)   pO2, Arterial 75.0 - 108.0 mmHg 119.0 (H)   HCO3, Arterial 21.0 - 29.0 mmol/L 27.4   TCO2 (calc), Art Not Established mmol/L 28.9   Base Excess, Arterial -3.0 - 3.0 mmol/L 1.4   O2 Sat, Arterial >92 % 98.6   Methemoglobin, Arterial <1.5 % 0.4   Carboxyhgb, Arterial 0.0 - 1.5 % 0.8

## 2023-02-17 NOTE — PROGRESS NOTES
Pulmonary Critical Care Progress Note     Patient's name:  Shady Chahal  Medical Record Number: [id number]  Patient's account/billing number: [de-identified]  Patient's YOB: 1945  Age: 69 y.o. Date of Admission: 2/8/2023 12:23 PM  Date of Consult: 2/17/2023      Primary Care Physician: Prasanth Dennis      Code Status: Full Code    Chief complaint: Acute hypoxic and hypercapnic respiratory failure. Assessment and Plan     Acute hypoxic and hypercapnic respiratory failure status post reintubation due to aspiration PNA/mucus plugging   Aspiration pneumonia  Mucous plugs s/p bronch   Acute kidney injury requiring hemodialysis  Ischemic CVA  Chronic right pleural effusion with trapped lung  Cardiomyopathy with EF of 30%  Coronary disease status post stenting  A-fib with RVR  V tach resolved. Plan:  On SBT planning to extubate after bronch  D/c heparin, check HIT  HD per nephrology  Antibiotics started on Zosyn, follow-up culture results  Steroid x3 days  Aspiration precautions. Hold TF  D/c sedation      Overnight:  Cultures negative to date    REVIEW OF SYSTEMS:  Review of Systems -   Unable to obtain sedated intubated on mechanical ventilation        Physical Exam:    Vitals: BP (!) 122/52   Pulse 72   Temp 97.6 °F (36.4 °C) (Oral)   Resp 12   Ht 6' 1\" (1.854 m)   Wt 246 lb 14.6 oz (112 kg)   SpO2 100%   BMI 32.58 kg/m²     Last Body weight:   Wt Readings from Last 3 Encounters:   02/17/23 246 lb 14.6 oz (112 kg)   01/30/23 260 lb (117.9 kg)   01/20/23 260 lb 2.3 oz (118 kg)       Body Mass Index : Body mass index is 32.58 kg/m². Intake and Output summary:   Intake/Output Summary (Last 24 hours) at 2/17/2023 0849  Last data filed at 2/17/2023 0826  Gross per 24 hour   Intake 2031.28 ml   Output 3843 ml   Net -1811.72 ml       Physical Examination:     Gen: On the vent no acute distress  Eyes: PERRL. Anicteric sclera. No conjunctival injection. ENT: No discharge. Posterior oropharynx clear. External appearance of ears and nose normal.  Neck: Trachea midline. No mass   Resp: There is increased work of breathing with diminished breath sounds more on the right  CV: Regular rate. Regular rhythm. systolic murmur or rub. + edema. GI: Soft, Non-tender. Non-distended. +BS  Skin: Warm, dry, w/o erythema. Lymph: No cervical or supraclavicular LAD. M/S: No cyanosis. No clubbing. Neuro: Sedated on the vent      Laboratory findings:-    CBC:   Recent Labs     02/17/23  0536   WBC 13.4*   HGB 9.4*   PLT 66*     BMP:    Recent Labs     02/15/23  0340 02/16/23  0559 02/17/23  0536   * 132* 131*   K 4.2 4.1 4.2   CL 98* 97* 95*   CO2 22 23 25   BUN 60* 100* 83*   CREATININE 2.7* 3.5* 2.6*   GLUCOSE 161* 118* 98     S. Calcium:  Recent Labs     02/17/23 0536   CALCIUM 8.9     S. Magnesium:  No results for input(s): MG in the last 72 hours. S. Phosphorus:  Recent Labs     02/17/23 0536   PHOS 3.7     S. Glucose:  Recent Labs     02/15/23  0351   POCGLU 152*           Radiology Review:  Pertinent images / reports were reviewed as a part of this visit.     CXR reviewed  Critical Care time 35 minutes       Lina Palencia MD, M.D.            2/17/2023, 8:49 AM

## 2023-02-17 NOTE — PROGRESS NOTES
[facility]   Respiratory Therapy        Extubation Assessment        Name:  Justus Benito  Medical Record Number:  [id number]  Age: 69 y.o. Gender: male  : 1945  Today's Date:  2023  Room:  [place] [id number]      Assessment       Patient Admission Diagnosis      Allergies  Allergies   Allergen Reactions    Spironolactone             BP (!) 122/52   Pulse 75   Temp 97.6 °F (36.4 °C) (Oral)   Resp 15   Ht 6' 1\" (1.854 m)   Wt 246 lb 14.6 oz (112 kg)   SpO2 96%   BMI 32.58 kg/m²       The patient's spontaneous breathing trial results were reviewed with  MD.    The order was received to extubate the patient. The patient's sedation was turned off by the bedside nurse. The patient was extubated at 111:02 and placed on  Nasal Cannula at 5 lpm . The patient was stable at the time of extubation.  Stridor was not present      Patient/caregiver was educated on the extubation process:  Yes      Level of patient/caregiver understanding able to:   [] Verbalize understanding   [] Demonstrate understanding       [] Teach back        [] Needs reinforcement       []  No available caregiver               []  Other:       Response to education:  Good       Teaching Time:  10  minutes         Cecil Zamora RCP on 2023 at 11:07 AM

## 2023-02-17 NOTE — PROGRESS NOTES
Bedside shift hand-off done w/ oncoming ISAIAH Watt, to assume pt. care. This RN handed-off the pt. in a stable condition.     Electronically signed by Rupesh Harper RN on 2/17/2023 at 7:26 AM

## 2023-02-17 NOTE — PROGRESS NOTES
Hospitalist Progress Note  2/17/2023 1:47 PM  Subjective:   Admit Date: 2/8/2023  PCP: Arline Cooks Status: Inpatient   Interval History: Hospital Day: 10,  Remains intubated  On spontaneous mode ventilation  Patient more alert today off of pressors  Seems to be motioning that he wants to shake my hand. Minimal secretions. Likely extubation today. Had bronchoscopy earlier    History of present illness:  Admitted for Impella assisted LHC. After Impella was removed patient's mentation changed and code stroke was called. Patient was lethargic and intubated for respiratory failure. Patient was extubated on 2/8. Hospital course was complicated by V. tach and patient was started on amiodarone. Patient is now in shock and is on pressors with GISSELLE and oliguria. Chronic systolic CHF LVEF 97-09%, CAD and chronic atrial fibrillation who came for Impella assisted PCI to the RCA and LAD 2/8/23. The procedure went well and the Impella was explanted. Unfortunately, he developed mental status chnages after the procedure concerning for a potential CVA. Head CT/CTA was unremarkable though. He ultimately was intubated for airway protection and extubated to BiPAP and requiring nonrebreather. CRRT required for euvolemia, managed by nephrology. Amiodarone initiated for ventricular tachycardia.       Diet: NPO (sips of water with meds)  Right cephalic double lumen PICC (2/8, day #4)  Rectal tube (2/10, day #3)  Urinary catheter (2/8, day #4)  Right arterial line (2/9, day #3)  Right neck Vascath (2/10, day #3)  Medications:     prismaSATE BGK 4/2.5 1,000 mL/hr   dexmedetomidine 0.2 mcg/kg/hr    norepinephrine 6 mcg/min      amiodarone  200 mg Oral Daily   heparin  5,000 Units SubCUTAneous TID   clopidogrel  75 mg Oral Daily   hydrocortisone   50 mg IntraVENous Q6H   aspirin  81 mg Oral Daily   atorvastatin  80 mg Oral Nightly   pantoprazole  40 mg IntraVENous Daily     Recent Labs     02/15/23  0340 02/17/23  0515 WBC 13.3* 13.4*   HGB 10.7* 9.4*   * 66*   MCV 96.4 96.8       Recent Labs     02/15/23  0340 02/16/23  0559 02/17/23  0536   * 132* 131*   K 4.2 4.1 4.2   CL 98* 97* 95*   CO2 22 23 25   BUN 60* 100* 83*   CREATININE 2.7* 3.5* 2.6*   GLUCOSE 161* 118* 98       No results for input(s): CA, AST, ALT, ALB, BILITOT, ALKPHOS in the last 72 hours. Troponin T (2/8) 0.03 ng/mL  INR (2/8) 1.73, (2/9) 1.62  Magnesium (2/11) 2.30, (2/12) 2.50 mg/dL  Ionized calcium (2/11) 1.13, (2/12) 1.17 mmol/L  Venous pH (2/11) 7.29     Portable CXR (2/10) Stable exam with small right pleural effusion and perihilar/bibasilar airspace opacities which may be related to pulmonary edema or pneumonia. Left heart cath with PCI (2/8) Right-dominant coronary arterial circulation. Successful intervention to 90% lesion and 99% more distal RCA lesion with a 3 mm overlapping drug-eluting stents dilated up to 3.2 mm proximally and 3.1 mm distally, resulting in 0% residual stenosis and SANTO III flow in the vessel. There was reversal of flow to  antegrade in the dominant right coronary artery. The PDA had been fed by collaterals. In the left system, there is trivial left main plaque disease. The LAD had an 80% mid lesion that was treated with a 3.5 x 22 mm Edvin frontier drug-eluting stent dilated to 3.6 mm in diameter resulting in 0% residual stenosis and SANTO III flow. The circumflex  has a chronic total occlusion in the midportion but the distal vessel fills in from collaterals. Left ventricular end-diastolic pressure 21 mmHg. No gradient across the aortic valve on pullback to suggest aortic stenosis. Insertion of short-term external heart assist system into heart, percutaneous approach. Removal of short-term external heart assist system from heart, percutaneous approach for assistance with cardiac output using impeller pump, continuous. Echo (2/9)  Limited echo to assess left ventriclar ejection fraction. Suboptimal echo due to lung interference. Difficult to assess Left ventricular function but appears to be moderately depressed at about 35%. Cannot exclude regional wall motion abnormalities secondary to poor endocardial visualization. Objective:   Vitals:  /55   Pulse 67   Temp 97.2 °F (36.2 °C) (Axillary)   Resp 11   Ht 6' 1\" (1.854 m)   Wt 271 lb 2.7 oz (123 kg)   SpO2 97% on 4 LPM  BMI 35.78 kg/m²   General appearance: alert and cooperative with exam  Lungs: clear to auscultation bilaterally  Heart: regular rate and rhythm, S1, S2 normal, no murmur, click, rub or gallop  Abdomen: soft, non-tender; bowel sounds normal; no masses,  no organomegaly  Extremities: extremities normal, atraumatic, no cyanosis or edema  Neurologic: No obvious focal neurologic deficits. Assessment and Plan:   GISSELLE on CKD stage 3, requiring CRRT . HD per nephrology  Acute respiratory failure-improving on spontaneous breathing trial.  Extubation likely today. On antibiotics for pneumonia. Zosyn. Status post bronchoscopy  Acute on chronic systolic heart failure, class 4 with Cardiomyopathy (EF 30%) Cardiogenic shock with hypotension on norepinephrine 12 mcg/min and vasopressin 0.04 Units/min. MAP of 65. CAD with unstable angina s/p PCI to the LAD and the RCA (2/8) on aspirin and clopidogrel dual anti-platelet therapy. Chronic right pleural effusion s/p chest tube in January. Trapped lung per pulmonary with no intervention required as lung will not re-expand. Hemoptysis:  Suspected related to anticoagulation loading. Atrial fibrillation with rapid ventricular response:  Completed amiodarone infusion. Rate improve off milrinone. Thyromegaly with TSH mildly elevated c/w euthyroid sick syndrome. Left ICA pseudoaneurysm:  Outpatient follow up post discharge. Acute stroke-noted on MRI. PT OT to work with the patient along with speech once extubated. Patient will need to go to a nursing home likely.   Spoke with family they are in agreement     Advance Directive: Full Code  DVT prophylaxis with heparin 5,000 units sub-Q TID  Discharge planning: unknown at this time, ICU level of care    Scott Hammond MD  Rounding Hospitalist

## 2023-02-17 NOTE — FLOWSHEET NOTE
02/17/23 1004   Treatment Team Notification   Reason for Communication Evaluate  (run PVCs)   Team Member Name Dr Murphy Lung Team Role Attending Provider   Method of Communication Call   Response See orders     Pt telemetry alarm for run PVCs. Call placed to Dr Meghann Vázquez. Order received for amiodarone iv bolus.

## 2023-02-17 NOTE — PLAN OF CARE
Problem: Chronic Conditions and Co-morbidities  Goal: Patient's chronic conditions and co-morbidity symptoms are monitored and maintained or improved  Outcome: Progressing  Flowsheets (Taken 2/14/2023 2000 by Emile Malik RN)  Care Plan - Patient's Chronic Conditions and Co-Morbidity Symptoms are Monitored and Maintained or Improved: Monitor and assess patient's chronic conditions and comorbid symptoms for stability, deterioration, or improvement     Problem: Discharge Planning  Goal: Discharge to home or other facility with appropriate resources  Outcome: Progressing  Flowsheets (Taken 2/14/2023 2000 by Emile Malik RN)  Discharge to home or other facility with appropriate resources: Identify barriers to discharge with patient and caregiver     Problem: Pain  Goal: Verbalizes/displays adequate comfort level or baseline comfort level  Outcome: Progressing  Flowsheets (Taken 2/14/2023 2000 by Emile Malik RN)  Verbalizes/displays adequate comfort level or baseline comfort level:   Assess pain using appropriate pain scale   Administer analgesics based on type and severity of pain and evaluate response     Problem: Respiratory - Adult  Goal: Achieves optimal ventilation and oxygenation  2/17/2023 1511 by Rima Chanel RN  Outcome: Progressing  Flowsheets (Taken 2/16/2023 2045 by Luwana Duane, RN)  Achieves optimal ventilation and oxygenation:   Assess for changes in respiratory status   Assess for changes in mentation and behavior   Position to facilitate oxygenation and minimize respiratory effort   Oxygen supplementation based on oxygen saturation or arterial blood gases   Assess the need for suctioning and aspirate as needed   Respiratory therapy support as indicated  2/17/2023 0120 by Luwana Duane, RN  Outcome: Progressing  Flowsheets (Taken 2/16/2023 2045)  Achieves optimal ventilation and oxygenation:   Assess for changes in respiratory status   Assess for changes in mentation and behavior   Position to facilitate oxygenation and minimize respiratory effort   Oxygen supplementation based on oxygen saturation or arterial blood gases   Assess the need for suctioning and aspirate as needed   Respiratory therapy support as indicated     Problem: Cardiovascular - Adult  Goal: Maintains optimal cardiac output and hemodynamic stability  2/17/2023 1511 by Gabe Lomas RN  Outcome: Progressing  Flowsheets (Taken 2/16/2023 2045 by Jesse Castillo RN)  Maintains optimal cardiac output and hemodynamic stability:   Monitor blood pressure and heart rate   Assess for signs of decreased cardiac output  2/17/2023 0120 by Jesse Castillo RN  Outcome: Progressing  Flowsheets (Taken 2/16/2023 2045)  Maintains optimal cardiac output and hemodynamic stability:   Monitor blood pressure and heart rate   Assess for signs of decreased cardiac output     Problem: Metabolic/Fluid and Electrolytes - Adult  Goal: Electrolytes maintained within normal limits  Outcome: Progressing  Flowsheets (Taken 2/14/2023 2000 by Lexi Chen RN)  Electrolytes maintained within normal limits:   Monitor labs and assess patient for signs and symptoms of electrolyte imbalances   Administer electrolyte replacement as ordered  Goal: Hemodynamic stability and optimal renal function maintained  2/17/2023 1511 by Gabe Lomas RN  Outcome: Progressing  Flowsheets (Taken 2/16/2023 2045 by Jesse Castillo RN)  Hemodynamic stability and optimal renal function maintained:   Monitor labs and assess for signs and symptoms of volume excess or deficit   Monitor intake, output and patient weight   Monitor response to interventions for patient's volume status, including labs, urine output, blood pressure (other measures as available)  2/17/2023 0120 by Jesse Castillo RN  Outcome: Not Progressing  Flowsheets (Taken 2/16/2023 2045)  Hemodynamic stability and optimal renal function maintained:   Monitor labs and assess for signs and symptoms of volume excess or deficit   Monitor intake, output and patient weight   Monitor response to interventions for patient's volume status, including labs, urine output, blood pressure (other measures as available)     Problem: Hematologic - Adult  Goal: Maintains hematologic stability  2/17/2023 1511 by Maria Velez RN  Outcome: Progressing  Flowsheets (Taken 2/16/2023 2045 by Lavern Brooks RN)  Maintains hematologic stability:   Assess for signs and symptoms of bleeding or hemorrhage   Monitor labs for bleeding or clotting disorders  2/17/2023 0120 by Lavern Brooks RN  Outcome: Progressing  Flowsheets (Taken 2/16/2023 2045)  Maintains hematologic stability:   Assess for signs and symptoms of bleeding or hemorrhage   Monitor labs for bleeding or clotting disorders     Problem: Neurosensory - Adult  Goal: Achieves stable or improved neurological status  2/17/2023 1511 by Maria Velez RN  Outcome: Progressing  Flowsheets (Taken 2/16/2023 2045 by Lavern Brooks RN)  Achieves stable or improved neurological status: Assess for and report changes in neurological status  2/17/2023 0120 by Lavern Brooks RN  Outcome: Not Progressing  Flowsheets (Taken 2/16/2023 2045)  Achieves stable or improved neurological status: Assess for and report changes in neurological status     Problem: Skin/Tissue Integrity  Goal: Absence of new skin breakdown  Description: 1. Monitor for areas of redness and/or skin breakdown  2. Assess vascular access sites hourly  3. Every 4-6 hours minimum:  Change oxygen saturation probe site  4. Every 4-6 hours:  If on nasal continuous positive airway pressure, respiratory therapy assess nares and determine need for appliance change or resting period.   Outcome: Progressing     Problem: Safety - Adult  Goal: Free from fall injury  Outcome: Progressing  Flowsheets (Taken 2/15/2023 0853 by Igor Muller RN)  Free From Fall Injury: Instruct family/caregiver on patient safety     Problem: ABCDS Injury Assessment  Goal: Absence of physical injury  2/17/2023 1511 by Kathrin Navarrete RN  Outcome: Progressing  Flowsheets (Taken 2/16/2023 2045 by Pascale Perez RN)  Absence of Physical Injury: Implement safety measures based on patient assessment  2/17/2023 0120 by Pascale Perez RN  Outcome: Progressing  Flowsheets (Taken 2/16/2023 2045)  Absence of Physical Injury: Implement safety measures based on patient assessment     Problem: Skin/Tissue Integrity - Adult  Goal: Skin integrity remains intact  2/17/2023 1511 by Kathrin Navarrete RN  Outcome: Progressing  Flowsheets (Taken 2/16/2023 2045 by Pascale Perez RN)  Skin Integrity Remains Intact: Monitor for areas of redness and/or skin breakdown  2/17/2023 0120 by Pascale Perez RN  Outcome: Progressing  Flowsheets (Taken 2/16/2023 2045)  Skin Integrity Remains Intact: Monitor for areas of redness and/or skin breakdown     Problem: Gastrointestinal - Adult  Goal: Minimal or absence of nausea and vomiting  Outcome: Progressing  Flowsheets (Taken 2/16/2023 1401)  Minimal or absence of nausea and vomiting:   Administer ordered antiemetic medications as needed   Advance diet as tolerated, if ordered   Nutrition consult to assist patient with adequate nutrition and appropriate food choices   Provide nonpharmacologic comfort measures as appropriate   Nasogastric tube to low intermittent suction as ordered   Administer IV fluids as ordered to ensure adequate hydration   Maintain NPO status until nausea and vomiting are resolved  Goal: Maintains or returns to baseline bowel function  Outcome: Progressing  Flowsheets (Taken 2/14/2023 2000 by Socorro Harada, RN)  Maintains or returns to baseline bowel function: Assess bowel function  Goal: Maintains adequate nutritional intake  Outcome: Progressing  Flowsheets (Taken 2/16/2023 1401)  Maintains adequate nutritional intake:   Obtain nutritional consult as needed   Monitor intake and output, weight and lab values   Identify factors contributing to decreased intake, treat as appropriate     Problem: Genitourinary - Adult  Goal: Urinary catheter remains patent  2/17/2023 1511 by Francisco Javier Dial RN  Outcome: Progressing  Flowsheets (Taken 2/16/2023 2045 by Dominga Gao RN)  Urinary catheter remains patent: Assess patency of urinary catheter  2/17/2023 0120 by Dominga Gao RN  Outcome: Progressing  Flowsheets (Taken 2/16/2023 2045)  Urinary catheter remains patent: Assess patency of urinary catheter     Problem: Infection - Adult  Goal: Absence of infection at discharge  2/17/2023 0120 by Dominga Gao RN  Outcome: Progressing  Flowsheets (Taken 2/16/2023 2045)  Absence of infection at discharge:   Assess and monitor for signs and symptoms of infection   Monitor lab/diagnostic results   Monitor all insertion sites i.e., indwelling lines, tubes and drains   Administer medications as ordered   Instruct and encourage patient and family to use good hand hygiene technique  Goal: Absence of infection during hospitalization  2/17/2023 1511 by Francisco Javier Dial RN  Outcome: Progressing  Flowsheets (Taken 2/16/2023 2045 by Dominga Gao RN)  Absence of infection during hospitalization:   Assess and monitor for signs and symptoms of infection   Monitor lab/diagnostic results   Monitor all insertion sites i.e., indwelling lines, tubes and drains   Administer medications as ordered   Instruct and encourage patient and family to use good hand hygiene technique  2/17/2023 0120 by Dominga Gao RN  Outcome: Progressing  Flowsheets (Taken 2/16/2023 2045)  Absence of infection during hospitalization:   Assess and monitor for signs and symptoms of infection   Monitor lab/diagnostic results   Monitor all insertion sites i.e., indwelling lines, tubes and drains   Administer medications as ordered   Instruct and encourage patient and family to use good hand hygiene technique Problem: Anxiety  Goal: Will report anxiety at manageable levels  Description: INTERVENTIONS:  1. Administer medication as ordered  2. Teach and rehearse alternative coping skills  3. Provide emotional support with 1:1 interaction with staff  Outcome: Venita Prather (Taken 2/14/2023 2000 by Giselle Green RN)  Will report anxiety at manageable levels: Provide emotional support with 1:1 interaction with staff     Problem: Coping  Goal: Pt/Family able to verbalize concerns and demonstrate effective coping strategies  Description: INTERVENTIONS:  1. Assist patient/family to identify coping skills, available support systems and cultural and spiritual values  2. Provide emotional support, including active listening and acknowledgement of concerns of patient and caregivers  3. Reduce environmental stimuli, as able  4. Instruct patient/family in relaxation techniques, as appropriate  5. Assess for spiritual pain/suffering and initiate Spiritual Care, Psychosocial Clinical Specialist consults as needed  Outcome: Venita Prather (Taken 2/14/2023 2000 by Giselle Green RN)  Patient/family able to verbalize anxieties, fears, and concerns, and demonstrate effective coping:   Assist patient/family to identify coping skills, available support systems and cultural and spiritual values   Provide emotional support, including active listening and acknowledgement of concerns of patient and caregivers   Reduce environmental stimuli, as able     Problem: Confusion  Goal: Confusion, delirium, dementia, or psychosis is improved or at baseline  Description: INTERVENTIONS:  1. Assess for possible contributors to thought disturbance, including medications, impaired vision or hearing, underlying metabolic abnormalities, dehydration, psychiatric diagnoses, and notify attending LIP  2. [place] high risk fall precautions, as indicated  3.  Provide frequent short contacts to provide reality reorientation, refocusing and direction  4. Decrease environmental stimuli, including noise as appropriate  5. Monitor and intervene to maintain adequate nutrition, hydration, elimination, sleep and activity  6. If unable to ensure safety without constant attention obtain sitter and review sitter guidelines with assigned personnel  7. Initiate Psychosocial CNS and Spiritual Care consult, as indicated  Outcome: Progressing  Flowsheets (Taken 2/14/2023 2000 by Jaky Manzo, RN)  Effect of thought disturbance (confusion, delirium, dementia, or psychosis) are managed with adequate functional status:   Assess for contributors to thought disturbance, including medications, impaired vision or hearing, underlying metabolic abnormalities, dehydration, psychiatric diagnoses, notify [facility]   [place] high risk fall precautions, as indicated   Provide frequent short contacts to provide reality reorientation, refocusing and direction   Decrease environmental stimuli, including noise as appropriate     Problem: Nutrition Deficit:  Goal: Optimize nutritional status  Outcome: Progressing  Flowsheets (Taken 2/16/2023 1401)  Nutrient intake appropriate for improving, restoring, or maintaining nutritional needs:   Assess nutritional status and recommend course of action   Monitor oral intake, labs, and treatment plans   Recommend appropriate diets, oral nutritional supplements, and vitamin/mineral supplements   Order, calculate, and assess calorie counts as needed   Recommend, monitor, and adjust tube feedings and TPN/PPN based on assessed needs   Provide specific nutrition education to patient or family as appropriate     Problem: Cardiovascular - Adult  Goal: Absence of cardiac dysrhythmias or at baseline  Outcome: Not Progressing  Flowsheets (Taken 2/14/2023 2000 by Jaky Manzo, RN)  Absence of cardiac dysrhythmias or at baseline:   Monitor cardiac rate and rhythm   Assess for signs of decreased cardiac output   Administer antiarrhythmia medication and  electrolyte replacement as ordered     Problem: Neurosensory - Adult  Goal: Achieves maximal functionality and self care  Outcome: Not Progressing  Flowsheets (Taken 2/14/2023 2000 by Jaky Manzo RN)  Achieves maximal functionality and self care:   Monitor swallowing and airway patency with patient fatigue and changes in neurological status   Encourage and assist patient to increase activity and self care with guidance from physical therapy/occupational therapy     Problem: Musculoskeletal - Adult  Goal: Return mobility to safest level of function  Outcome: Not Progressing  Flowsheets (Taken 2/14/2023 2000 by Jaky Manzo, RN)  Return Mobility to Safest Level of Function: Assess patient stability and activity tolerance for standing, transferring and ambulating with or without assistive devices  Goal: Return ADL status to a safe level of function  Outcome: Not Progressing  Flowsheets (Taken 2/13/2023 2000 by Jaky Manzo RN)  Return ADL Status to a Safe Level of Function:   Administer medication as ordered   Obtain physical therapy/occupational therapy consults as needed     Problem: Cardiovascular - Adult  Goal: Absence of cardiac dysrhythmias or at baseline  Outcome: Not Progressing  Flowsheets (Taken 2/14/2023 2000 by Jaky Manzo RN)  Absence of cardiac dysrhythmias or at baseline:   Monitor cardiac rate and rhythm   Assess for signs of decreased cardiac output   Administer antiarrhythmia medication and electrolyte replacement as ordered     Problem: Metabolic/Fluid and Electrolytes - Adult  Goal: Hemodynamic stability and optimal renal function maintained  2/17/2023 1511 by Miladis De León RN  Outcome: Progressing  Flowsheets (Taken 2/16/2023 2045 by Silke Foster RN)  Hemodynamic stability and optimal renal function maintained:   Monitor labs and assess for signs and symptoms of volume excess or deficit   Monitor intake, output and patient weight   Monitor response to interventions for  patient's volume status, including labs, urine output, blood pressure (other measures as available)  2/17/2023 0120 by Ashley Duran RN  Outcome: Not Progressing  Flowsheets (Taken 2/16/2023 2045)  Hemodynamic stability and optimal renal function maintained:   Monitor labs and assess for signs and symptoms of volume excess or deficit   Monitor intake, output and patient weight   Monitor response to interventions for patient's volume status, including labs, urine output, blood pressure (other measures as available)     Problem: Neurosensory - Adult  Goal: Achieves stable or improved neurological status  2/17/2023 1511 by Catherine Mclaughlin RN  Outcome: Progressing  Flowsheets (Taken 2/16/2023 2045 by Ashley Duran RN)  Achieves stable or improved neurological status: Assess for and report changes in neurological status  2/17/2023 0120 by Ashley Duran RN  Outcome: Not Progressing  Flowsheets (Taken 2/16/2023 2045)  Achieves stable or improved neurological status: Assess for and report changes in neurological status  Goal: Achieves maximal functionality and self care  Outcome: Not Progressing  Flowsheets (Taken 2/14/2023 2000 by Daryn Morrow RN)  Achieves maximal functionality and self care:   Monitor swallowing and airway patency with patient fatigue and changes in neurological status   Encourage and assist patient to increase activity and self care with guidance from physical therapy/occupational therapy     Problem: Musculoskeletal - Adult  Goal: Return mobility to safest level of function  Outcome: Not Progressing  Flowsheets (Taken 2/14/2023 2000 by Daryn Morrow RN)  Return Mobility to Safest Level of Function: Assess patient stability and activity tolerance for standing, transferring and ambulating with or without assistive devices  Goal: Return ADL status to a safe level of function  Outcome: Not Progressing  Flowsheets (Taken 2/13/2023 2000 by Daryn Morrow RN)  Return ADL Status to a Safe Level of Function:   Administer medication as ordered   Obtain physical therapy/occupational therapy consults as needed

## 2023-02-17 NOTE — PROGRESS NOTES
Bedside shift hand-off done w/ off-going RN Miladis GIPSON Pt. was coughing. This RN suctioned his ETT and got moderate amounts of thick and tan (blood-tinged) ETT secretions. He also had moderate amounts of oral secretions that were suctioned w/ the [place]. He's SR w/ frequent PVCs on the monitor, on the spontaneous ventilator mode (PS/PEEP 8/8 FIO2 40%), not in any distress, w/ NG tube w/ Nepro tube feeding @ goal rate of 30 ml/hr, w/ ntact miller catheter and fecal management system, w/ RIJ Vascath, w/ R upper arm PICC line, & w/ R radial A-line. He's on Propofol @ 10 mcg/kg/min. He has bilateral soft wrist restraints on.     Electronically signed by Yousuf Singer RN on 2/16/2023 at 7:49 PM

## 2023-02-17 NOTE — PROGRESS NOTES
[facility]   Daily Progress Note      Admit Date:  2/8/2023      Subjective:   Mr. Kiran García is a 75yo male with chronic systolic CHF LVEF 70-19%, CAD and chronic atrial fibrillation who came for Impella assisted PCI to the RCA and LAD 2/8/23. The procedure went well and the Impella was explanted. Unfortunately, he developed mental status chnages after the procedure concerning for a potential CVA. Head CT/CTA was unremarkable though. He ultimately was intubated for airway protection. Interval History:  Anahi Friend is on to 40% FiO2 today with a PEEP of 8cmH2o. Plan for repeat bronchoscopy today and extubation. Sinus rhythm on telemetry. He is following commands overnight. Objective:     BP (!) 122/53   Pulse 78   Temp 97.8 °F (36.6 °C) (Oral)   Resp 13   Ht 6' 1\" (1.854 m)   Wt 240 lb 8.4 oz (109.1 kg)   SpO2 100%   BMI 31.73 kg/m²      Intake/Output Summary (Last 24 hours) at 2/17/2023 1647  Last data filed at 2/17/2023 1543  Gross per 24 hour   Intake 1760.45 ml   Output 3651 ml   Net -1890.55 ml       Physical Exam:  General:  Lethargic, NAD  Skin:  Warm and dry  Neck:  JVD<8, no carotid bruits  Chest: Intubated. Diminished bilaterally in bases.   No rhonchi or rales  Cardiovascular:  Irreg irreg and mildly tachy, normal S1/S2, no M/R/G  Abdomen:  Soft, nontender, +bowel sounds  Extremities:  2+ bilateral LE edema  Pulses: 2+ bilat carotid    2+ bilat radial    2+ bilat femoral        Medications:    famotidine (PEPCID) injection  20 mg IntraVENous Daily    piperacillin-tazobactam  3,375 mg IntraVENous Q12H    ipratropium-albuterol  1 ampule Inhalation TID    methylPREDNISolone  40 mg IntraVENous Daily    amiodarone  200 mg Oral Daily    [Held by provider] heparin (porcine)  5,000 Units SubCUTAneous 3 times per day    clopidogrel  75 mg Oral Daily    [Held by provider] aspirin  81 mg Oral Daily    Or    [Held by provider] aspirin  300 mg Rectal Daily    atorvastatin  80 mg Oral Nightly    chlorhexidine  15 mL Mouth/Throat BID    sodium chloride flush  5-40 mL IntraVENous 2 times per day      propofol Stopped (02/17/23 1057)    norepinephrine Stopped (02/15/23 0838)    sodium chloride 250 mL (02/17/23 1103)       Lab Data:  CBC:   Recent Labs     02/15/23  0340 02/17/23  0536   WBC 13.3* 13.4*   HGB 10.7* 9.4*   * 66*     BMP:    Recent Labs     02/15/23  0340 02/16/23  0559 02/17/23  0536   * 132* 131*   K 4.2 4.1 4.2   CO2 22 23 25   BUN 60* 100* 83*   CREATININE 2.7* 3.5* 2.6*       FASTING LIPID PANEL:  Lab Results   Component Value Date/Time    CHOL 84 02/09/2023 03:46 AM    HDL 34 02/09/2023 03:46 AM    TRIG 67 02/17/2023 05:36 AM     LHC/PCI 2/8/23:  1. Right-dominant coronary arterial circulation. Successful  intervention to 90% lesion and 99% more distal RCA lesion with a 3 mm  overlapping drug-eluting stents dilated up to 3.2 mm proximally and 3.1  mm distally, resulting in 0% residual stenosis and SANTO III flow in the  vessel. There was reversal of flow to antegrade in the dominant right  coronary artery. The PDA had been fed by collaterals. In the left  system, there is trivial left main plaque disease. The LAD had an 80%  mid lesion that was treated with a 3.5 x 22 mm Edvin frontier  drug-eluting stent dilated to 3.6 mm in diameter resulting in 0%  residual stenosis and SANTO III flow. The circumflex has a chronic total  occlusion in the midportion but the distal vessel fills in from  collaterals. 2.  Left ventricular end-diastolic pressure 21 mmHg. 3.  No gradient across the aortic valve on pullback to suggest aortic  stenosis. PROCEDURES PERFORMED:  1. Left heart catheterization. 2.  Percutaneous coronary intervention with drug-eluting stents to the  dominant right coronary artery. 3.  Percutaneous coronary intervention with drug-eluting stent to the  mid left anterior descending artery.   4.  Insertion of short-term external heart assist system into heart,  percutaneous approach. 5.  Removal of short-term external heart assist system from heart,  percutaneous approach for assistance with cardiac output using impeller  pump, continuous. Stress Perfusion 1/29/23:  Pharmacological Stress/MPI Results:  1. Technically a satisfactory study. 2. Large mostly dense perfusion defect involving lateral and inferolateral wall suggestive of previous MI. No reversibility /ischemia  3. Gated Study shows markedly dilated LV with lateral and inferolateral akinesis; EF 29 %. Echo 1/5/23:  Poor image quality. Definity contrast administered. Patient appears to be in atrial fibrillation. Overall left ventricular systolic function appears severely reduced. Ejection fraction is visually estimated to be 20-25% with diffuse  hypokinesis and regional abnormalities including inferior akinesis. Normal left ventricular wall thickness and cavity size. The right ventricle appears normal in size with severely reduced systolic function. The left and right atria are moderately dilated. Possibly severe mitral regurgitation. The aortic valve leaflets are not well visualized. Mild aortic stenosis based on a peak velocity of 2.6 m/s and a mean pressure gradient  of 20 mmHg. Gradients may be underestimated with reduced LV function. Trivial aortic regurgitation. Trivial pericardial effusion. IVC size is dilated (>2.1 cm) and collapses < 50% with respiration consistent with elevated RA pressure (15 mmHg). Brain MRI 2/14/23:  Small acute right anterior and posterior external watershed infarcts. No acute hemorrhage or significant mass effect. Assessment / Plan: 1. Ventricular Tachycardia  He had some repeat NSVT on telemetry. Rebolus amiodarone and start drip. Hold oral amiodarone. LVEF 35%. 2.Acute on chronic systolic CHF, class 4  LVEF ~35 by repeat echocardiogram.  He appears more euvolemic at present  Hold beta-blockade given hypotension.   Hold nephrotoxic agents such as Aldactone ACE inhibitors or ARB's while patient has acute renal failure and is on CRRT. 3.Unstable Angina  S/p PCI to the LAD and the RCA on 2/8/23. Continue DAPT with aspirin and clopidogrel. Will need to crush in apple sauce until tolerating more oral intake. Hold beta blockade. Continue statin therapy. 4.Small acute CVA  Neurology following. This may explain some of his mental status changes although his hypoxia and hypercarbia are also contributing. 5.Aspiration Pneumonia  S/p Bronchoscopy 2/14/23. Improved oxygenation and aeration on chest X-ray. Plan for extubation today.       Signed:  Ebenezer Duran MD

## 2023-02-18 NOTE — FLOWSHEET NOTE
Treatment time: 3.5 hrs     Net UF: 2.5 liters     Pre weight: 108 kg   Post weight: 105.4 kg   EDW: tbd, GISSELLE    Access used: R IJ NTDC  Access function: positional, lines reversed     Medications or blood products given: Heparin dwells    Regular outpatient schedule: TBD,     Summary of response to treatment: 2.5 liters net vol removed this tx. Pt tolerated tx fair, RIJ NTDC positional with pt movements. Lines reversed. Post VSS.      Copy of dialysis treatment record placed in chart, to be scanned into EMR.      02/18/23 0803 02/18/23 1140   Vital Signs   BP (!) 122/44 (!) 109/41   Temp 97.5 °F (36.4 °C) (!) 96.6 °F (35.9 °C)   Heart Rate 73 87   Weight 238 lb 1.6 oz (108 kg) 232 lb 5.8 oz (105.4 kg)   Weight Method Bed scale  --    Post-Hemodialysis Assessment   Hemodialysis Intake (ml)  --  500 ml   Hemodialysis Output (ml)  --  3000 ml   NET Removed (ml)  --  2500   Tolerated Treatment  --  Good

## 2023-02-18 NOTE — PROGRESS NOTES
[facility]   Daily Progress Note      Admit Date:  2/8/2023      Subjective:   Mr. Dilia Mccallum is a 75yo male with chronic systolic CHF LVEF 02-94%, CAD and chronic atrial fibrillation who came for Impella assisted PCI to the RCA and LAD 2/8/23. The procedure went well and the Impella was explanted. Unfortunately, he developed mental status chnages after the procedure concerning for a potential CVA. Head CT/CTA was unremarkable though. He ultimately was intubated for airway protection. Interval History:  Pressure better  Off levophed  Making small adjustments in medications    Objective:     BP (!) 147/60   Pulse 73   Temp 97.8 °F (36.6 °C) (Oral)   Resp 16   Ht 6' 1\" (1.854 m)   Wt 239 lb 13.8 oz (108.8 kg)   SpO2 98%   BMI 31.65 kg/m²      Intake/Output Summary (Last 24 hours) at 2/18/2023 3057  Last data filed at 2/18/2023 7801  Gross per 24 hour   Intake 1543.6 ml   Output 3610 ml   Net -2066.4 ml         Physical Exam:  General:  Lethargic, NAD  Skin:  Warm and dry  Neck:  JVD<8, no carotid bruits  Chest: Intubated. Diminished bilaterally in bases.   No rhonchi or rales  Cardiovascular:  Irreg irreg and mildly tachy, normal S1/S2, no M/R/G  Abdomen:  Soft, nontender, +bowel sounds  Extremities:  2+ bilateral LE edema  Pulses: 2+ bilat carotid    2+ bilat radial    2+ bilat femoral        Medications:    famotidine (PEPCID) injection  20 mg IntraVENous Daily    piperacillin-tazobactam  3,375 mg IntraVENous Q12H    ipratropium-albuterol  1 ampule Inhalation TID    methylPREDNISolone  40 mg IntraVENous Daily    [Held by provider] heparin (porcine)  5,000 Units SubCUTAneous 3 times per day    clopidogrel  75 mg Oral Daily    [Held by provider] aspirin  81 mg Oral Daily    Or    [Held by provider] aspirin  300 mg Rectal Daily    atorvastatin  80 mg Oral Nightly    chlorhexidine  15 mL Mouth/Throat BID    sodium chloride flush  5-40 mL IntraVENous 2 times per day      amiodarone 450mg/250ml D5W infusion 0.5 mg/min (02/18/23 0553)    norepinephrine Stopped (02/15/23 8986)    sodium chloride 5 mL/hr at 02/17/23 1736       Lab Data:  CBC:   Recent Labs     02/17/23  0536 02/18/23  0510   WBC 13.4* 17.6*   HGB 9.4* 10.6*   PLT 66* 97*       BMP:    Recent Labs     02/16/23  0559 02/17/23  0536 02/18/23  0510   * 131* 133*   K 4.1 4.2 4.4   CO2 23 25 25   * 83* 79*   CREATININE 3.5* 2.6* 2.6*         FASTING LIPID PANEL:  Lab Results   Component Value Date/Time    CHOL 84 02/09/2023 03:46 AM    HDL 34 02/09/2023 03:46 AM    TRIG 67 02/17/2023 05:36 AM     LHC/PCI 2/8/23:  1. Right-dominant coronary arterial circulation. Successful  intervention to 90% lesion and 99% more distal RCA lesion with a 3 mm  overlapping drug-eluting stents dilated up to 3.2 mm proximally and 3.1  mm distally, resulting in 0% residual stenosis and SANTO III flow in the  vessel. There was reversal of flow to antegrade in the dominant right  coronary artery. The PDA had been fed by collaterals. In the left  system, there is trivial left main plaque disease. The LAD had an 80%  mid lesion that was treated with a 3.5 x 22 mm Edvin frontier  drug-eluting stent dilated to 3.6 mm in diameter resulting in 0%  residual stenosis and SANTO III flow. The circumflex has a chronic total  occlusion in the midportion but the distal vessel fills in from  collaterals. 2.  Left ventricular end-diastolic pressure 21 mmHg. 3.  No gradient across the aortic valve on pullback to suggest aortic  stenosis. PROCEDURES PERFORMED:  1. Left heart catheterization. 2.  Percutaneous coronary intervention with drug-eluting stents to the  dominant right coronary artery. 3.  Percutaneous coronary intervention with drug-eluting stent to the  mid left anterior descending artery. 4.  Insertion of short-term external heart assist system into heart,  percutaneous approach.   5.  Removal of short-term external heart assist system from heart,  percutaneous approach for assistance with cardiac output using impeller  pump, continuous. Stress Perfusion 1/29/23:  Pharmacological Stress/MPI Results:  1. Technically a satisfactory study. 2. Large mostly dense perfusion defect involving lateral and inferolateral wall suggestive of previous MI. No reversibility /ischemia  3. Gated Study shows markedly dilated LV with lateral and inferolateral akinesis; EF 29 %. Echo 1/5/23:  Poor image quality. Definity contrast administered. Patient appears to be in atrial fibrillation. Overall left ventricular systolic function appears severely reduced. Ejection fraction is visually estimated to be 20-25% with diffuse  hypokinesis and regional abnormalities including inferior akinesis. Normal left ventricular wall thickness and cavity size. The right ventricle appears normal in size with severely reduced systolic function. The left and right atria are moderately dilated. Possibly severe mitral regurgitation. The aortic valve leaflets are not well visualized. Mild aortic stenosis based on a peak velocity of 2.6 m/s and a mean pressure gradient  of 20 mmHg. Gradients may be underestimated with reduced LV function. Trivial aortic regurgitation. Trivial pericardial effusion. IVC size is dilated (>2.1 cm) and collapses < 50% with respiration consistent with elevated RA pressure (15 mmHg). Brain MRI 2/14/23:  Small acute right anterior and posterior external watershed infarcts. No acute hemorrhage or significant mass effect. Assessment / Plan: 1. Ventricular Tachycardia  Continue amiodarone 400mg BID PO x 2 weeks and 200mg Daily thereafter   LVEF 35%.     2.Acute on chronic systolic CHF, class 4  LVEF ~35 by repeat echocardiogram.  He appears more euvolemic at present  Start Toprol XL 25mg Daily   Will hold ACE-I/ARNI/ARB/MRA given renal dysfunction can be addressed as outpatient  Will hold SGLT2 given renal dysfunction can be addressed as outpatient  Patient will need period of GDMT followed by repeat TTE for evaluation of candidacy for ICD therapy given systolic dysfunction    3. Unstable Angina  S/p PCI to the LAD and the RCA on 2/8/23. Continue DAPT with aspirin and clopidogrel. Will need to crush in apple sauce until tolerating more oral intake. BB as above  Continue statin therapy. 4.Small acute CVA  Neurology following. This may explain some of his mental status changes although his hypoxia and hypercarbia are also contributing. 5.Aspiration Pneumonia  S/p Bronchoscopy 2/14/23. Improved oxygenation and aeration on chest X-ray.    - Off levophed, pressure rebounding      Signed:  Nahomy Leonard MD

## 2023-02-18 NOTE — PLAN OF CARE
Problem: Chronic Conditions and Co-morbidities  Goal: Patient's chronic conditions and co-morbidity symptoms are monitored and maintained or improved  2/18/2023 1847 by Sharan Agrawal RN  Outcome: Progressing  2/18/2023 1847 by Sharan Agrawal RN  Outcome: Progressing     Problem: Discharge Planning  Goal: Discharge to home or other facility with appropriate resources  2/18/2023 1847 by Sharan Agrawal RN  Outcome: Progressing  2/18/2023 1847 by Sharan Agrawal RN  Outcome: Progressing     Problem: Pain  Goal: Verbalizes/displays adequate comfort level or baseline comfort level  2/18/2023 1847 by Sharan Agrawal RN  Outcome: Progressing  2/18/2023 1847 by Sharan Agrawal RN  Outcome: Progressing     Problem: Respiratory - Adult  Goal: Achieves optimal ventilation and oxygenation  2/18/2023 1847 by Sharan Agrawal RN  Outcome: Progressing  2/18/2023 1847 by Sharan Agrawal RN  Outcome: Progressing     Problem: Cardiovascular - Adult  Goal: Maintains optimal cardiac output and hemodynamic stability  2/18/2023 1847 by Sharan Agrawal RN  Outcome: Progressing  2/18/2023 1847 by Sharan Agrawal RN  Outcome: Progressing  Flowsheets (Taken 2/18/2023 0800)  Maintains optimal cardiac output and hemodynamic stability:   Monitor blood pressure and heart rate   Monitor urine output and notify Licensed Independent Practitioner for values outside of normal range  Goal: Absence of cardiac dysrhythmias or at baseline  2/18/2023 1847 by Sharan Agrawal RN  Outcome: Progressing  2/18/2023 1847 by Sharan Agrawal RN  Outcome: Progressing  Flowsheets (Taken 2/18/2023 0800)  Absence of cardiac dysrhythmias or at baseline: Monitor cardiac rate and rhythm     Problem: Metabolic/Fluid and Electrolytes - Adult  Goal: Electrolytes maintained within normal limits  2/18/2023 1847 by Sharan Agrawal RN  Outcome: Progressing  2/18/2023 1847 by Sharan Agrawal RN  Outcome: Progressing  Goal: Hemodynamic stability and optimal renal function maintained  2/18/2023 1847 by Lauren Spears RN  Outcome: Progressing  2/18/2023 1847 by Lauren Spears RN  Outcome: Progressing     Problem: Hematologic - Adult  Goal: Maintains hematologic stability  2/18/2023 1847 by Lauren Spears RN  Outcome: Progressing  2/18/2023 1847 by Lauren Spears RN  Outcome: Progressing     Problem: Neurosensory - Adult  Goal: Achieves stable or improved neurological status  2/18/2023 1847 by Lauren Spears RN  Outcome: Progressing  2/18/2023 1847 by Lauren Spears RN  Outcome: Progressing  Flowsheets (Taken 2/18/2023 0800)  Achieves stable or improved neurological status: Assess for and report changes in neurological status  Goal: Absence of seizures  2/18/2023 1847 by Lauren Spears RN  Outcome: Progressing  2/18/2023 1847 by Lauren Spears RN  Outcome: Progressing  Goal: Achieves maximal functionality and self care  2/18/2023 1847 by Lauren Spears RN  Outcome: Progressing  2/18/2023 1847 by Lauren Spears RN  Outcome: Progressing  Flowsheets (Taken 2/18/2023 0800)  Achieves maximal functionality and self care: Monitor swallowing and airway patency with patient fatigue and changes in neurological status     Problem: Skin/Tissue Integrity  Goal: Absence of new skin breakdown  Description: 1. Monitor for areas of redness and/or skin breakdown  2. Assess vascular access sites hourly  3. Every 4-6 hours minimum:  Change oxygen saturation probe site  4. Every 4-6 hours:  If on nasal continuous positive airway pressure, respiratory therapy assess nares and determine need for appliance change or resting period.   2/18/2023 1847 by Lauren Spears RN  Outcome: Progressing  2/18/2023 1847 by Lauren Spears RN  Outcome: Progressing     Problem: Safety - Adult  Goal: Free from fall injury  2/18/2023 1847 by Lauren Spears RN  Outcome: Progressing  2/18/2023 1847 by Lauren Spears RN  Outcome: Progressing     Problem: ABCDS Injury Assessment  Goal: Absence of physical injury  2/18/2023 1847 by Horald Hymen, RN  Outcome: Progressing  2/18/2023 1847 by Leeann Gamino RN  Outcome: Progressing     Problem: Skin/Tissue Integrity - Adult  Goal: Skin integrity remains intact  2/18/2023 1847 by Leeann Gamino RN  Outcome: Progressing  2/18/2023 1847 by Leeann Gamino RN  Outcome: Progressing     Problem: Musculoskeletal - Adult  Goal: Return mobility to safest level of function  2/18/2023 1847 by Leeann Gamino RN  Outcome: Progressing  2/18/2023 1847 by Leeann Gamino RN  Outcome: Progressing  Goal: Return ADL status to a safe level of function  2/18/2023 1847 by Leeann Gamino RN  Outcome: Progressing  2/18/2023 1847 by Leeann Gamino RN  Outcome: Progressing     Problem: Gastrointestinal - Adult  Goal: Minimal or absence of nausea and vomiting  2/18/2023 1847 by Leeann Gamino RN  Outcome: Progressing  2/18/2023 1847 by Leeann Gamino RN  Outcome: Progressing  Flowsheets (Taken 2/18/2023 0800)  Minimal or absence of nausea and vomiting: Advance diet as tolerated, if ordered  Goal: Maintains or returns to baseline bowel function  2/18/2023 1847 by Leeann Gamino RN  Outcome: Progressing  2/18/2023 1847 by Leeann Gamino RN  Outcome: Progressing  Flowsheets (Taken 2/18/2023 0800)  Maintains or returns to baseline bowel function: Assess bowel function  Goal: Maintains adequate nutritional intake  2/18/2023 1847 by Leeann Gamino RN  Outcome: Progressing  2/18/2023 1847 by Leeann Gamino RN  Outcome: Progressing  Flowsheets (Taken 2/18/2023 0800)  Maintains adequate nutritional intake: Monitor intake and output, weight and lab values     Problem: Genitourinary - Adult  Goal: Absence of urinary retention  2/18/2023 1847 by Leeann Gamino RN  Outcome: Progressing  2/18/2023 1847 by Leeann Gamino RN  Outcome: Progressing  Goal: Urinary catheter remains patent  2/18/2023 1847 by Leeann Gamino RN  Outcome: Progressing  2/18/2023 1847 by Leeann Gamino RN  Outcome: Progressing     Problem: Infection - Adult  Goal: Absence of infection at discharge  2/18/2023 1847 by Eulalia Felder RN  Outcome: Progressing  2/18/2023 1847 by Eulalia Felder RN  Outcome: Progressing  Goal: Absence of infection during hospitalization  2/18/2023 1847 by Eulalia Felder RN  Outcome: Progressing  2/18/2023 1847 by Eulalia Felder RN  Outcome: Progressing     Problem: Anxiety  Goal: Will report anxiety at manageable levels  Description: INTERVENTIONS:  1. Administer medication as ordered  2. Teach and rehearse alternative coping skills  3. Provide emotional support with 1:1 interaction with staff  2/18/2023 1847 by Eulalia Felder RN  Outcome: Progressing  2/18/2023 1847 by Eulalia Felder RN  Outcome: Progressing     Problem: Coping  Goal: Pt/Family able to verbalize concerns and demonstrate effective coping strategies  Description: INTERVENTIONS:  1. Assist patient/family to identify coping skills, available support systems and cultural and spiritual values  2. Provide emotional support, including active listening and acknowledgement of concerns of patient and caregivers  3. Reduce environmental stimuli, as able  4. Instruct patient/family in relaxation techniques, as appropriate  5. Assess for spiritual pain/suffering and initiate Spiritual Care, Psychosocial Clinical Specialist consults as needed  2/18/2023 1847 by Eulalia Felder RN  Outcome: Progressing  2/18/2023 1847 by Eulalia Felder RN  Outcome: Progressing     Problem: Confusion  Goal: Confusion, delirium, dementia, or psychosis is improved or at baseline  Description: INTERVENTIONS:  1. Assess for possible contributors to thought disturbance, including medications, impaired vision or hearing, underlying metabolic abnormalities, dehydration, psychiatric diagnoses, and notify attending LIP  2. [place] high risk fall precautions, as indicated  3. Provide frequent short contacts to provide reality reorientation, refocusing and direction  4.  Decrease environmental stimuli, including noise as appropriate  5. Monitor and intervene to maintain adequate nutrition, hydration, elimination, sleep and activity  6. If unable to ensure safety without constant attention obtain sitter and review sitter guidelines with assigned personnel  7.  Initiate Psychosocial CNS and Spiritual Care consult, as indicated  2/18/2023 1847 by Joseph Robles RN  Outcome: Progressing  2/18/2023 1847 by Joseph Robles RN  Outcome: Progressing     Problem: Nutrition Deficit:  Goal: Optimize nutritional status  2/18/2023 1847 by Joseph Robles RN  Outcome: Progressing  2/18/2023 1847 by Joseph Robles RN  Outcome: Progressing

## 2023-02-18 NOTE — PROGRESS NOTES
Speech Language Pathology  Facility/Department: [address] CVICU  Initial Assessment  DYSPHAGIA BEDSIDE SWALLOW EVALUATION     Patient: Shady Chahal   : [id number]   MRN: [id number]      Evaluation Date: 2023   Admitting Diagnosis: AMS (altered mental status) [R41.82]  Course of Admit  Admit 2023 with Summer Swan MD H&P:  The patient is a 69 y.o. male with a PMH of atrial fibrillation with chronic anticoagulation on Eliquis chronic systolic CHF hypertension history of coronary artery disease and CVA. Patient has recently been treated for pneumonia as an outpatient. Patient was brought to the hospital for Impella assisted [facility]. Patient developed mental status change on removal of the Impella. Code stroke was called. Patient also developed significant respiratory distress for which patient was intubated. Medical team is consulted for management of any medical issues, due to patient's multiple medical problems. 2023 intubated; extubated 2023  JOSE JUAN 2/10/2023 with NPO recommendations  Bronch 2023  Re- intubated 2023; extubated 2023  Consultations included Neurology; Pulmonology; Nephrology; Palliative Care  Past Medical History  has a past medical history of Arrhythmia, Cellulitis (2021), Cerebral artery occlusion with cerebral infarction [facility]) (2019), CHF (congestive heart failure) ([facility] [address] Chronic kidney disease, Gout (), HLD (hyperlipidemia), Hypertension, Obesity, Pleural effusion (), and Sciatica.   Past Surgical History : chart reviewed  Onset 2023  Pain: Denies                                                         Chart Review:   H&P: see above course of admit    Current Diet Level (prior to evaluation): NPO with nutrition/hydration via alternative means  ;NG tube feeding   Respiratory Status:   []Room Air   [x]O2 via nasal cannula recently weaned to 1L/min O2 via nasal cannula   []Other:    Imaging:  Chest X-ray: 2/15/2023  Impression   1. Stable appropriate positions of support apparatus. 2. Interval decrease in size of the patient's right pleural effusion, now   moderate in size, with improved aeration of the mid and upper right lung   zones. There remains right basilar airspace consolidation, most likely   passive atelectasis, less likely aspiration or pneumonia. 3. Slight interval improvement in appearance of moderate pulmonary edema. 4. Stable cardiomegaly. MRI: 2/8/2023  Impression   Small acute right anterior and posterior external watershed infarcts. No acute hemorrhage or significant mass effect. Reason for referral: SLP evaluation orders received due to MD orders for resuming SLP for dysphagia and ordering a Swallowing Evaluation    History/Prior Level of Function:   Living Status: Prior to onset; reportedly lived alone; IND  Baseline diet: Prior to onset ; reportedly on a regular diet  Prior Dysphagia History: Prior to onset; pt was a conflicting historian      Dysphagia Impressions/Diagnosis: Oropharyngeal Dysphagia   1. Pt was awake in bed. Pt was oriented to self and partially to place/date. Pt with slow rate of responses; poor eye contact and visual tracking; followed one step commands wit mild to moderate tactile/cues demonstration. Pre feeding oral motor exam: generalized weakness with reduced volitional om rom/om weakness; delayed/weak to absent volitional cough; delayed to absent volitional saliva swallow    2.  OROPHARYNGEAL DYSPHAGIA DIAGNOSIS: Oropharyngeal dysphagia characterized by om muscular weakness  impacting bolus control and manipulation for A-P oral transit; delayed initiation of swallow and concern for reduced laryngeal rom (as noted via laryngeal palpation) and pharyngeal clearance  Episodes of anterior loss during tsp presentations and bolus prep  Multiple swallows per one presentation (3-4)  Inconsistent voice quality change post swallow  Inconsistent ability to achieve volitional cough/airway clearance  Testing discontinued after thin and thick liquids (to moist pudding thick) due to concerns for penetration/aspiration without strong volitional airway clearance  Recommend:   Npo; has NG tube feeding  SLP for Oropharyngeal dysphagia  Pt will need MBSS for more objective assessment of pharyngeal phase of the swallow  Discussed with RN    3. MEDICAL OR COGNITIVE/BEHAVIORAL FACTORS WHICH CAN EXACERBATE: Medical Diagnosis; generalized weakness; medical co-morbidities further exacerbate risks      Recommended Diet and Intervention 2/18/2023:  Diet Solids Recommendation:  NPO with nutrition/hydration via alternative means  Liquid Consistency Recommendation:  NPO Recommended form of Meds: Meds via alternative means      Recommend completion of Modified Barium Swallow study to further assess pharyngeal phase of swallow     Compensatory Swallowing Strategies:  Oral care; staff assisted occasional ice chip or tsp mildly thick H20    SHORT TERM DYSPHAGIA GOALS/PLAN OF CARE: Speech therapy for dysphagia tx 3-5 times per week during acute care stay.     Goals    Pt will demonstrate improved labial/buccal/lingual rom/coordination for improved simulated bolus control via sensory stimulation  and/or therapy po trials without anterior loss and with <minimal oral residue  Pt will demonstrate improved swallow response via laryngeal/pharyngeal ex and/or during sensory stimulation with therapy trials of varied thin and thick H20 x 10 without overt clinical s/s of aspiration  Pt will participate in MBSS assessment for more objective assessment of pharyngeal phase of the swallow with modification of tx poc or advancement to po diet         Dysphagia Therapeutic Intervention:  Oral Care, Laryngeal Exercises , Pharyngeal Exercise, Oral Motor Exercises , Patient/Family Education , Therapeutic Trials with SLP , Instrumental assessment of swallow function (Modified Barium Swallow Study)     Dysphagia Prognosis: [x] good []fair  [x]guarded []poor  Barriers to progress: Medical diagnosis; medical co morbidities; generalized weakness and Oropharyngeal Dysphagia features at eval    Discharge Recommendations: Discharge recommendations to be determined pending ongoing follow-up during acute care stay      TEST DATA  Vision: Poor eye contact and reduced visual attention/tracking  Hearing: Craig    Consistencies Presented:   Ice chips; Thin liquid; Mildly / nectar thick liquid ; Moderately / honey thick liquid; Pudding thick / extremely thick liquid  Due to concern for deficits the following were not presented: Puree food; Minced and moist food ; Mech soft food; Soft/bite size food; Easy to chew food      Cognitive/behavioral:   [x]orientation [x] to self [x] place : partally [x] Date: partially [] reason for admit [] purpose of evaluation  []distractible  []verbally responsive  []follows one step commands  []agitated  []impulsive  [] other:       Patient Positioning: Upright in bed  ~80 to 90 degrees.  Dependent in positioning including for midline head (otherwise - hyperextended)    Dentition:  []Adequate  []Dentures   [x]Missing Many Teeth  []Edentulous  []Other:    Baseline Vocal Quality:  []Normal  []Dysphonic   []Aphonic   []Hoarse  []Wet  [x]Weak  []Other:    Volitional Cough:  []Strong  [x]Weak  []Wet  []Absent  [x]Congested  []Other:    Volitional Swallow:   [x]Absent   [x]Delayed     []Adequate     []Required use of drink     Oral Mechanism Exam:     []Left side      []Right side    [x]Labial ROM/Coordination : weak volitional rom/coordination/strength   []Labial Symmetry   [x]Lingual ROM/Coordination : weak volitional lingual rom/strength/coordination  []Lingual Symmetry  [x]Gag: absent  []Other:     Oral Phase:    [x]Impaired/Prolonged Mastication:  DNT   [x]Spillage Left:   [x]Spillage Right:  []Pocketing Left:   []Pocketing Right:   [x]Decreased Anterior to Posterior Transit:   [x]Suspected Premature Bolus Loss:   [x]Lingual/Palatal Residue:   []Other:     Pharyngeal Phase:    [x]Delayed Swallow:   [x]Suspected Pharyngeal Pooling:   [x]Decreased Laryngeal Elevation:   []Absent Swallow:  [x]Vocal Quality change post swallow:   []Throat Clearing-Immediate:   []Throat Clearing-Delayed:   []Cough-Immediate:   []Cough-Delayed:  []Change in Vital Signs:  [x]Suspected Delayed Pharyngeal Clearing:  []Other:       Eating Assistance: N/A  []Independent  []Setup or clean-up assistance   [] Supervision or touching assistance   [] Partial or moderate assistance   [] Substantial or maximal assistance  [] Dependent       EDUCATION:   Provided education regarding role of SLP, results of assessment, recommendations and general speech pathology plan of care. [] Pt verbalized understanding and agreement   [x] Pt requires ongoing learning   [] No evidence of comprehension     If patient discharges prior to next visit, this note will serve as discharge. Timed Code Minutes: 0  Total Treatment Minutes: 25 minutes    Electronically signed by: Erwin Cooks Flum,MS,CCC,SLP [id number]  Speech and Language Pathologist  2/18/2023 at

## 2023-02-18 NOTE — PROGRESS NOTES
No acute events overnight. Non-productive moist cough. Weak gag present. Dayanara-pharyngeal suctioning performed several times overnight with copious thick cloudy secretions removed. Patient follows command but is only oriented to self & place. VSS, afebrile, O2 weaned to 2LPM via NC with SpO2 >95% throughout the night.

## 2023-02-18 NOTE — PROGRESS NOTES
4 Eyes Skin Assessment     NAME:  Jac Phoenix  YOB: 1945  MEDICAL RECORD NUMBER:  [id number]    The patient is being assessed for  Shift Handoff    I agree that One RN has performed a thorough Head to Toe Skin Assessment on the patient. ALL assessment sites listed below have been assessed. Areas assessed by both nurses:    Head, Face, Ears, Shoulders, Back, Chest, Arms, Elbows, Hands, Sacrum. Buttock, Coccyx, Ischium, and Legs. Feet and Heels        Does the Patient have a Wound?  No noted wound(s)       Tera Prevention initiated by RN: Yes   Wound Care Orders initiated by RN: NA    Pressure Injury (Stage 3,4, Unstageable, DTI, NWPT, and Complex wounds) if present, place referral order by RN under : NA    New and Established Ostomies, if present place, referral order under : NA      Nurse 1 eSignature: Electronically signed by Marine King RN on 2/18/23 at 5:16 AM EST    **SHARE this note so that the co-signing nurse can place an eSignature**    Nurse 2 eSignature: Electronically signed by Bridget Jones RN on 2/18/23 at 7:19 AM EST

## 2023-02-18 NOTE — PROGRESS NOTES
Call received from Dialysis service; plan to do HD @ approximately 07:30 am today. Daughter, Tasia Claude, notified per her request, she plans to visit after completion of HD. Lisa Slater

## 2023-02-18 NOTE — PROGRESS NOTES
Pt was stable for this RN. SR- ST at time. Hd today with 2.5 L removed. Per Neph plan for lasix challenge tomorrow. 95ml of dark urine for this Shift. Pt was Skylifted to chair for approx 4 hours. Tolerated well and was happy about progress. Pt is still very weak and has not truck control. SLP saw patient and continues to recommend NPO at this time with a MBS when patient improves.

## 2023-02-18 NOTE — PROGRESS NOTES
4 Eyes Skin Assessment     NAME:  Reagan Sacks  YOB: 1945  MEDICAL RECORD NUMBER:  [id number]    The patient is being assessed for  Shift Handoff    I agree that One RN has performed a thorough Head to Toe Skin Assessment on the patient. ALL assessment sites listed below have been assessed. Areas assessed by both nurses:    Head, Face, Ears, Shoulders, Back, Chest, Arms, Elbows, Hands, Sacrum. Buttock, Coccyx, Ischium, and Legs. Feet and Heels        Does the Patient have a Wound?  No noted wound(s)       Tera Prevention initiated by RN: Yes   Wound Care Orders initiated by RN: NA    Pressure Injury (Stage 3,4, Unstageable, DTI, NWPT, and Complex wounds) if present, place referral order by RN under : NA    New and Established Ostomies, if present place, referral order under : NA      Nurse 1 eSignature: Electronically signed by Ollie Fonseca RN on 2/17/23 at 7:28 PM EST    **SHARE this note so that the co-signing nurse can place an eSignature**    Nurse 2 eSignature: Electronically signed by Chicho Aviles RN on 2/17/23 at 7:51 PM EST

## 2023-02-18 NOTE — PROGRESS NOTES
Speech Language Pathology      Speech Therapy note regarding SLP Evaluation and treatment orders    Attempt 2/18/2023 at 0730 am but notified that pt was going yo have dialysis. Plan : Re-attempt later am 2/18/2023    Signed  Leora Jasso MS,CCC,SLP [id number]  Speech and Language Pathologist  2/18/2023 at 0730 am

## 2023-02-18 NOTE — PROGRESS NOTES
Hospitalist Progress Note  2/18/2023 3:10 PM  Subjective:   Admit Date: 2/8/2023  PCP: Beth Phelan Status: Inpatient   Interval History: Hospital Day: 11,  Seen in ICU. Getting hemodialysis. Much more animated today he is answering questions and following some commands      History of present illness:  Admitted for Impella assisted LHC. After Impella was removed patient's mentation changed and code stroke was called. Patient was lethargic and intubated for respiratory failure. Patient was extubated on 2/8. Hospital course was complicated by V. tach and patient was started on amiodarone. Chronic systolic CHF LVEF 30-14%, CAD and chronic atrial fibrillation who came for Impella assisted PCI to the RCA and LAD 2/8/23. The procedure went well and the Impella was explanted. Unfortunately, he developed mental status chnages after the procedure concerning for a potential CVA. Head CT/CTA was unremarkable though. He ultimately was intubated for airway protection and extubated to BiPAP and requiring nonrebreather. CRRT required for euvolemia, managed by nephrology which has been transitioned to permanent hemodialysis   amiodarone initiated for ventricular tachycardia    Patient was extubated on 2/16/2023  MRI done while he was intubated showed small stroke  .       Medications:     prismaSATE BGK 4/2.5 1,000 mL/hr   dexmedetomidine 0.2 mcg/kg/hr    norepinephrine 6 mcg/min      amiodarone  200 mg Oral Daily   heparin  5,000 Units SubCUTAneous TID   clopidogrel  75 mg Oral Daily   hydrocortisone   50 mg IntraVENous Q6H   aspirin  81 mg Oral Daily   atorvastatin  80 mg Oral Nightly   pantoprazole  40 mg IntraVENous Daily     Recent Labs     02/17/23  0536 02/18/23  0510   WBC 13.4* 17.6*   HGB 9.4* 10.6*   PLT 66* 97*   MCV 96.8 94.7       Recent Labs     02/16/23  0559 02/17/23  0536 02/18/23  0510   * 131* 133*   K 4.1 4.2 4.4   CL 97* 95* 95*   CO2 23 25 25   * 83* 79*   CREATININE 3.5* 2.6* 2.6*   GLUCOSE 118* 98 111*          Portable CXR (2/10) Stable exam with small right pleural effusion and perihilar/bibasilar airspace opacities which may be related to pulmonary edema or pneumonia. Left heart cath with PCI (2/8) Right-dominant coronary arterial circulation. Successful intervention to 90% lesion and 99% more distal RCA lesion with a 3 mm overlapping drug-eluting stents dilated up to 3.2 mm proximally and 3.1 mm distally, resulting in 0% residual stenosis and SANTO III flow in the vessel. There was reversal of flow to  antegrade in the dominant right coronary artery. The PDA had been fed by collaterals. In the left system, there is trivial left main plaque disease. The LAD had an 80% mid lesion that was treated with a 3.5 x 22 mm [place] frontier drug-eluting stent dilated to 3.6 mm in diameter resulting in 0% residual stenosis and SANTO III flow. The circumflex  has a chronic total occlusion in the midportion but the distal vessel fills in from collaterals. Left ventricular end-diastolic pressure 21 mmHg. No gradient across the aortic valve on pullback to suggest aortic stenosis. Insertion of short-term external heart assist system into heart, percutaneous approach. Removal of short-term external heart assist system from heart, percutaneous approach for assistance with cardiac output using impeller pump, continuous. Echo (2/9)  Limited echo to assess left ventriclar ejection fraction. Suboptimal echo due to lung interference. Difficult to assess Left ventricular function but appears to be moderately depressed at about 35%. Cannot exclude regional wall motion abnormalities secondary to poor endocardial visualization. Objective:     Vitals:    02/19/23 1200   BP: 113/63   Pulse: 66   Resp: 24   Temp: 98.3 °F (36.8 °C)   SpO2:        General appearance: alert and cooperative with exam.  NG tube in place.   Follows commands but does have some expressive aphasia  Lungs: clear to auscultation bilaterally  Heart: regular rate and rhythm, S1, S2 normal, no murmur, click, rub or gallop  Abdomen: soft, non-tender; bowel sounds normal; no masses,  no organomegaly  Extremities: extremities normal, atraumatic, no cyanosis or edema  Neurologic: No obvious focal neurologic deficits. Assessment and Plan:   GISSELLE on CKD stage 3, requiring CRRT . HD per nephrology--> will likely require outpatient dialysis  Acute respiratory failure-doing quite well. Extubated. I do not suspect he will require reintubation. Can transfer to medical stepdown    Acute on chronic systolic heart failure, class 4 with Cardiomyopathy (EF 30%)-compensated on cardiac meds  CAD with unstable angina s/p PCI to the LAD and the RCA (2/8) on aspirin and clopidogrel dual anti-platelet therapy. Chronic right pleural effusion s/p chest tube in January. Trapped lung per pulmonary with no intervention required as lung will not re-expand. Hemoptysis:  Suspected related to anticoagulation loading. Atrial fibrillation with rapid ventricular response:  Completed amiodarone infusion. Rate improve off milrinone. Thyromegaly with TSH mildly elevated c/w euthyroid sick syndrome. Left ICA pseudoaneurysm:  Outpatient follow up post discharge. Acute stroke-noted on MRI. PT OT to work with the patient along with speech once extubated. Patient will need to go to a nursing home likely. Spoke with family they are in agreement     Advance Directive: Full Code  DVT prophylaxis with heparin 5,000 units sub-Q TID  Discharge planning: Discharge in the next 48 hours. Working on placement.   Likely needs a nursing home    Gracie Osborn MD  [address] [place] [address]

## 2023-02-18 NOTE — PROGRESS NOTES
Pulmonary Critical Care Progress Note     Patient's name:  Arlin Sun  Medical Record Number: [id number]  Patient's account/billing number: [de-identified]  Patient's YOB: 1945  Age: 69 y.o. Date of Admission: 2/8/2023 12:23 PM  Date of Consult: 2/18/2023      Primary Care Physician: Ruthy Christopher      Code Status: Full Code    Chief complaint: Acute hypoxic and hypercapnic respiratory failure. Assessment and Plan     Acute hypoxic and hypercapnic respiratory failure status post reintubation due to aspiration PNA/mucus plugging s/p extubation 2/17  Aspiration pneumonia on antibiotics   Mucous plugs s/p bronch   Acute kidney injury requiring hemodialysis  Ischemic CVA  Chronic right pleural effusion with trapped lung  Cardiomyopathy with EF of 30%  Coronary disease status post stenting  A-fib with RVR  V tach resolved. Plan:  Wean oxygen as tolerated  Advance pt ot, up to chair  HD per nephrology  Antibiotics, Zosyn for total of 7 days, follow-up culture results  Aspiration precautions. TF continue  Follow-up HIT panel  Swallow evaluation        Overnight:  Cultures negative to date    REVIEW OF SYSTEMS:  Review of Systems -   Weak  No fever  No chest pain          Physical Exam:    Vitals: BP (!) 147/60   Pulse 73   Temp 97.8 °F (36.6 °C) (Oral)   Resp 16   Ht 6' 1\" (1.854 m)   Wt 239 lb 13.8 oz (108.8 kg)   SpO2 98%   BMI 31.65 kg/m²     Last Body weight:   Wt Readings from Last 3 Encounters:   02/18/23 239 lb 13.8 oz (108.8 kg)   01/30/23 260 lb (117.9 kg)   01/20/23 260 lb 2.3 oz (118 kg)       Body Mass Index : Body mass index is 31.65 kg/m². Intake and Output summary:   Intake/Output Summary (Last 24 hours) at 2/18/2023 0804  Last data filed at 2/18/2023 0525  Gross per 24 hour   Intake 1543.6 ml   Output 3610 ml   Net -2066.4 ml       Physical Examination:     Gen: no acute distress  Eyes: PERRL. Anicteric sclera. No conjunctival injection.    ENT: No discharge. Posterior oropharynx clear. External appearance of ears and nose normal.  Neck: Trachea midline. No mass   Resp: There is increased work of breathing with diminished breath sounds more on the right  CV: Regular rate. Regular rhythm. systolic murmur or rub. + edema. GI: Soft, Non-tender. Non-distended. +BS  Skin: Warm, dry, w/o erythema. Lymph: No cervical or supraclavicular LAD. M/S: No cyanosis. No clubbing. Neuro: awake, lethargic, generalized weakness no focal deficit. Laboratory findings:-    CBC:   Recent Labs     02/18/23  0510   WBC 17.6*   HGB 10.6*   PLT 97*     BMP:    Recent Labs     02/16/23  0559 02/17/23  0536 02/18/23  0510   * 131* 133*   K 4.1 4.2 4.4   CL 97* 95* 95*   CO2 23 25 25   * 83* 79*   CREATININE 3.5* 2.6* 2.6*   GLUCOSE 118* 98 111*     S. Calcium:  Recent Labs     02/18/23  0510   CALCIUM 9.4     S. Magnesium:  No results for input(s): MG in the last 72 hours. S. Phosphorus:  Recent Labs     02/18/23 0510   PHOS 3.8     S. Glucose:  Recent Labs     02/17/23 2033   POCGLU 109*           Radiology Review:  Pertinent images / reports were reviewed as a part of this visit.     CXR reviewed  Critical Care time 35 minutes       Speedy Dodd MD, M.D.            2/18/2023, 8:04 AM

## 2023-02-18 NOTE — PROCEDURES
Bronchoscopy Inpatient Procedure Note    Date of Procedure: 2/18/2023      Anesthesia: Conscious Sedation. Patient on propofol    Procedure: Flexible fiberoptic bronchoscopy     Estimated Blood Loss: Minimal    Complications: None      Consent to Procedure  The risks, benefits, complications, treatment options and expected outcomes were discussed with the patient and/or POA  The possibilities of reaction to medication, pulmonary aspiration, perforation of a viscus, bleeding, failure to diagnose a condition and creating a complication requiring transfusion or operation were discussed with the patient who freely signed the consent. Description of Procedure   Rob Negrete was monitored  standard ICU monitoring devices. Arlin Sun and the procedure were verified as Flexible Fiberoptic Bronchoscopy. A Time Out was held and the above information confirmed. The patient was placed in the appropriate position. The patient was sedated. The bronchoscope was then passed through ET tube lidocaine 1% solution 2 ml at a time was applied topically to vocal cords and airways. After careful inspection of the tracheal, the bronchoscope was sequentially passed into all segments of the left and right endobronchial trees to the second and/or third divisions. Endobronchial findings  Severe inflammation noted on the right, there is no significant secretions or mucous plugging bilaterally.

## 2023-02-18 NOTE — PROGRESS NOTES
[facility]                                               Progress note    Summary:   Erasmo Cooper is being seen by nephrology for GISSELLE. Admitted with shortness of breath. Patient was admitted to the ICU on 2/8/2023. Prior to this on Wednesday he had a heart cath procedure with required Impella support. There was concern for possible stroke afterwards. Later he had not episode of unresponsiveness, cyanotic with ventricular tachycardia and required intubation for airway protection. Later was reintubated for bronchoscopy. Interval History  Seen on dialysis  On 2 LPM O2 via NC  A little confused today  Edema is improving but still quite edematous  /41  310 mL UO yesterday  Had HD yesterday  BUN today 79, Cr 2.6  High catabolic state. Plan:   HD today for further solute clearance and volume management  Got 2.6 L UF today  BP is soft. UO is improving slowly, will trial IV lasix 80 mg tomorrow AM  Tentatively plan on next HD on Monday      Gisselle Fischer MD  [facility]  Office: [phone]    Assessment:   #GISSELLE, oliguric  Likely due to hemodynamic mediated GISSELLE resulting in ATN. He was severely hypotensive requiring high-dose of pressors, was intubated with hypoxic respiratory failure and subsequently developed renal failure. He had a few doses of IV contrast 1 on 1/30/2023 and 2/8/2023. Etiology of his hypotension is unclear at this point  Urinalysis after catheter being placed showed dark yellow urine specific gravity 1.074,  WBC 39 present used 100 mg/dL of albumin  Hold off on further testing of GISSELLE at this time. Required CRRT initially. First HD was 2/14/23    #Hyperkalemia  Due to renal failure, oliguric    #Metabolic acidosis  Due to renal failure  Addressed with dialysis    #Shock  Etiology unclear, cultures are negative so does not peer to have any source of sepsis.   Procalcitonin was only mildly elevated 0.61  Discussed with cardiology about possible [place] to see how his cardiac index is. His EF 35%      ROS:   Positives Listed Bold. All other remaining systems are negative. Constitutional:  fever, chills, weakness, weight change, fatigue,      Skin:  rash, pruritus, hair loss, bruising, dry skin, petechiae. Head, Face, Neck   headaches, swelling,  cervical adenopathy. Respiratory: shortness of breath, cough, or wheezing  Cardiovascular: chest pain, palpitations, dizzy, edema  Gastrointestinal: nausea, vomiting, diarrhea, constipation,belly pain    Yellow skin, blood in stool  Musculoskeletal:  back pain, muscle weakness, gait problems,       joint pain or swelling. Genitourinary:  dysuria, poor urine flow, flank pain, blood in urine  Neurologic:  vertigo, TIA'S, syncope, seizures, focal weakness  Psychosocial:  insomnia, anxiety, or depression. Additional positive findings: -      PMH:   Past medical history, surgical history, social history, family history are reviewed and updated as appropriate. Reviewed current medication list.   Allergies reviewed and updated as needed. PE:   Vitals:    02/18/23 0815   BP:    Pulse: 73   Resp: 16   Temp:    SpO2: 99%       General appearance:  in NAD, somnolent. Comfortable. HEENT: EOM intact, no icterus. Trachea is midline. Neck : No masses, appears symmetrical, no JVD  Respiratory: Respiratory effort appears normal, bilateral equal chest rise, no wheeze  Cardiovascular: Ausculation shows RRR ++ edema  Abdomen: No visible mass or tenderness, non distended. Musculoskeletal:  Joints with no swelling or deformity. Skin:no rashes, ulcers, induration, no jaundice. Neuro: face symmetric, no focal deficits.        Lab Results   Component Value Date    CREATININE 2.6 (H) 02/18/2023    BUN 79 (H) 02/18/2023     (L) 02/18/2023    K 4.4 02/18/2023    CL 95 (L) 02/18/2023    CO2 25 02/18/2023      Lab Results   Component Value Date    WBC 17.6 (H) 02/18/2023    HGB 10.6 (L) 02/18/2023    HCT 32.6 (L) 02/18/2023 MCV 94.7 02/18/2023    PLT 97 (L) 02/18/2023     Lab Results   Component Value Date    CALCIUM 9.4 02/18/2023    CAION 1.37 (H) 02/14/2023    PHOS 3.8 02/18/2023

## 2023-02-18 NOTE — PROGRESS NOTES
4 Eyes Skin Assessment     NAME:  Luan Pope  YOB: 1945  MEDICAL RECORD NUMBER:  [id number]    The patient is being assessed for  Shift Handoff    I agree that One RN has performed a thorough Head to Toe Skin Assessment on the patient. ALL assessment sites listed below have been assessed. Areas assessed by both nurses:    Head, Face, Ears, Shoulders, Back, Chest, Arms, Elbows, Hands, Sacrum. Buttock, Coccyx, Ischium, and Legs. Feet and Heels        Does the Patient have a Wound?  No noted wound(s)       Tera Prevention initiated by RN: Yes   Wound Care Orders initiated by RN: No    Pressure Injury (Stage 3,4, Unstageable, DTI, NWPT, and Complex wounds) if present, place referral order by RN under : No    New and Established Ostomies, if present place, referral order under : No      Nurse 1 eSignature: Electronically signed by Henry Brown RN on 2/18/23 at 6:58 PM EST    **SHARE this note so that the co-signing nurse can place an eSignature**    Nurse 2 eSignature: Electronically signed by Gabby Mccall RN on 2/18/23 at 9:37 PM EST

## 2023-02-19 NOTE — PLAN OF CARE
Problem: Chronic Conditions and Co-morbidities  Goal: Patient's chronic conditions and co-morbidity symptoms are monitored and maintained or improved  Outcome: Progressing     Problem: Discharge Planning  Goal: Discharge to home or other facility with appropriate resources  Outcome: Progressing     Problem: Pain  Goal: Verbalizes/displays adequate comfort level or baseline comfort level  Outcome: Progressing  Flowsheets (Taken 2/19/2023 0740)  Verbalizes/displays adequate comfort level or baseline comfort level:   Encourage patient to monitor pain and request assistance   Assess pain using appropriate pain scale   Administer analgesics based on type and severity of pain and evaluate response   Implement non-pharmacological measures as appropriate and evaluate response     Problem: Respiratory - Adult  Goal: Achieves optimal ventilation and oxygenation  Outcome: Progressing  Flowsheets (Taken 2/19/2023 0800)  Achieves optimal ventilation and oxygenation:   Assess for changes in respiratory status   Assess for changes in mentation and behavior   Position to facilitate oxygenation and minimize respiratory effort   Oxygen supplementation based on oxygen saturation or arterial blood gases     Problem: Cardiovascular - Adult  Goal: Maintains optimal cardiac output and hemodynamic stability  Outcome: Progressing  Flowsheets (Taken 2/19/2023 0800)  Maintains optimal cardiac output and hemodynamic stability:   Monitor blood pressure and heart rate   Monitor urine output and notify Licensed Independent Practitioner for values outside of normal range   Assess for signs of decreased cardiac output   Administer fluid and/or volume expanders as ordered   Administer vasoactive medications as ordered  Goal: Absence of cardiac dysrhythmias or at baseline  Outcome: Progressing  Flowsheets (Taken 2/19/2023 0800)  Absence of cardiac dysrhythmias or at baseline:   Monitor cardiac rate and rhythm   Assess for signs of decreased cardiac output   Administer antiarrhythmia medication and electrolyte replacement as ordered     Problem: Metabolic/Fluid and Electrolytes - Adult  Goal: Electrolytes maintained within normal limits  Outcome: Progressing  Goal: Hemodynamic stability and optimal renal function maintained  Outcome: Progressing     Problem: Hematologic - Adult  Goal: Maintains hematologic stability  Outcome: Progressing     Problem: Neurosensory - Adult  Goal: Achieves stable or improved neurological status  Outcome: Progressing  Flowsheets (Taken 2/19/2023 0800)  Achieves stable or improved neurological status:   Assess for and report changes in neurological status   Initiate measures to prevent increased intracranial pressure   Maintain blood pressure and fluid volume within ordered parameters to optimize cerebral perfusion and minimize risk of hemorrhage   Monitor temperature, glucose, and sodium. Initiate appropriate interventions as ordered  Goal: Absence of seizures  Outcome: Progressing  Flowsheets (Taken 2/19/2023 0800)  Absence of seizures:   Monitor for seizure activity. If seizure occurs, document type and location of movements and any associated apnea   If seizure occurs, turn head to side and suction secretions as needed  Goal: Achieves maximal functionality and self care  Outcome: Progressing  Flowsheets (Taken 2/19/2023 0800)  Achieves maximal functionality and self care:   Monitor swallowing and airway patency with patient fatigue and changes in neurological status   Encourage and assist patient to increase activity and self care with guidance from physical therapy/occupational therapy     Problem: Skin/Tissue Integrity  Goal: Absence of new skin breakdown  Description: 1. Monitor for areas of redness and/or skin breakdown  2. Assess vascular access sites hourly  3. Every 4-6 hours minimum:  Change oxygen saturation probe site  4.   Every 4-6 hours:  If on nasal continuous positive airway pressure, respiratory therapy assess nares and determine need for appliance change or resting period. Outcome: Progressing     Problem: Safety - Adult  Goal: Free from fall injury  Outcome: Progressing     Problem: ABCDS Injury Assessment  Goal: Absence of physical injury  Outcome: Progressing     Problem: Skin/Tissue Integrity - Adult  Goal: Skin integrity remains intact  Outcome: Progressing     Problem: Musculoskeletal - Adult  Goal: Return mobility to safest level of function  Outcome: Progressing  Goal: Return ADL status to a safe level of function  Outcome: Progressing     Problem: Gastrointestinal - Adult  Goal: Minimal or absence of nausea and vomiting  Outcome: Progressing  Flowsheets (Taken 2/19/2023 0800)  Minimal or absence of nausea and vomiting:   Maintain NPO status until nausea and vomiting are resolved   Nasogastric tube to low intermittent suction as ordered   Provide nonpharmacologic comfort measures as appropriate  Goal: Maintains or returns to baseline bowel function  Outcome: Progressing  Goal: Maintains adequate nutritional intake  Outcome: Progressing     Problem: Genitourinary - Adult  Goal: Absence of urinary retention  Outcome: Progressing  Goal: Urinary catheter remains patent  Outcome: Progressing     Problem: Infection - Adult  Goal: Absence of infection at discharge  Outcome: Progressing  Goal: Absence of infection during hospitalization  Outcome: Progressing     Problem: Anxiety  Goal: Will report anxiety at manageable levels  Description: INTERVENTIONS:  1. Administer medication as ordered  2. Teach and rehearse alternative coping skills  3. Provide emotional support with 1:1 interaction with staff  Outcome: Progressing  Flowsheets (Taken 2/19/2023 0800)  Will report anxiety at manageable levels:   Administer medication as ordered   Teach and rehearse alternative coping skills     Problem: Coping  Goal: Pt/Family able to verbalize concerns and demonstrate effective coping strategies  Description: INTERVENTIONS:  1. Assist patient/family to identify coping skills, available support systems and cultural and spiritual values  2. Provide emotional support, including active listening and acknowledgement of concerns of patient and caregivers  3. Reduce environmental stimuli, as able  4. Instruct patient/family in relaxation techniques, as appropriate  5. Assess for spiritual pain/suffering and initiate Spiritual Care, Psychosocial Clinical Specialist consults as needed  Outcome: Progressing  Flowsheets (Taken 2/19/2023 0800)  Patient/family able to verbalize anxieties, fears, and concerns, and demonstrate effective coping:   Assist patient/family to identify coping skills, available support systems and cultural and spiritual values   Provide emotional support, including active listening and acknowledgement of concerns of patient and caregivers   Reduce environmental stimuli, as able   Instruct patient/family in relaxation techniques, as appropriate   Assess for spiritual pain/suffering and initiate Spiritual Care, Psychosocial Clinical Specialist consults as needed     Problem: Confusion  Goal: Confusion, delirium, dementia, or psychosis is improved or at baseline  Description: INTERVENTIONS:  1. Assess for possible contributors to thought disturbance, including medications, impaired vision or hearing, underlying metabolic abnormalities, dehydration, psychiatric diagnoses, and notify attending LIP  2. Bath high risk fall precautions, as indicated  3. Provide frequent short contacts to provide reality reorientation, refocusing and direction  4. Decrease environmental stimuli, including noise as appropriate  5. Monitor and intervene to maintain adequate nutrition, hydration, elimination, sleep and activity  6. If unable to ensure safety without constant attention obtain sitter and review sitter guidelines with assigned personnel  7.  Initiate Psychosocial CNS and Spiritual Care consult, as indicated  Outcome: Progressing  Flowsheets (Taken 2/19/2023 0800)  Effect of thought disturbance (confusion, delirium, dementia, or psychosis) are managed with adequate functional status:   Assess for contributors to thought disturbance, including medications, impaired vision or hearing, underlying metabolic abnormalities, dehydration, psychiatric diagnoses, notify Krystian De Leon high risk fall precautions, as indicated   Provide frequent short contacts to provide reality reorientation, refocusing and direction   Decrease environmental stimuli, including noise as appropriate   Monitor and intervene to maintain adequate nutrition, hydration, elimination, sleep and activity     Problem: Nutrition Deficit:  Goal: Optimize nutritional status  Outcome: Progressing

## 2023-02-19 NOTE — PROGRESS NOTES
4 Eyes Skin Assessment     NAME:  Karmen Castillo  YOB: 1945  MEDICAL RECORD NUMBER:  [id number]    The patient is being assessed for  Shift Handoff    I agree that One RN has performed a thorough Head to Toe Skin Assessment on the patient. ALL assessment sites listed below have been assessed. Areas assessed by both nurses:    Head, Face, Ears, Shoulders, Back, Chest, Arms, Elbows, Hands, Sacrum. Buttock, Coccyx, Ischium, and Legs. Feet and Heels        Does the Patient have a Wound?  No noted wound(s)       Tera Prevention initiated by RN: Yes   Wound Care Orders initiated by RN: NA    Pressure Injury (Stage 3,4, Unstageable, DTI, NWPT, and Complex wounds) if present, place referral order by RN under : NA    New and Established Ostomies, if present place, referral order under : NA      Nurse 1 eSignature: Electronically signed by Jatinder Caba RN on 2/19/23 at 5:52 PM EST    **SHARE this note so that the co-signing nurse can place an eSignature**    Nurse 2 eSignature: Electronically signed by Zachary Vasques RN on 2/19/23 at 7:05 PM EST

## 2023-02-19 NOTE — PROGRESS NOTES
[facility]                                               Progress note    Summary:   Jac Phoenix is being seen by nephrology for GISSELLE. Admitted with shortness of breath. Patient was admitted to the ICU on 2/8/2023. Prior to this on Wednesday he had a heart cath procedure with required Impella support. There was concern for possible stroke afterwards. Later he had not episode of unresponsiveness, cyanotic with ventricular tachycardia and required intubation for airway protection. Later was reintubated for bronchoscopy. Interval History  Seen  at bedside  On 3 LPM O2 via NC  Edema is improving but still quite edematous  /52 today  235 mL UO yesterday  Had HD yesterday, 2.5 L UF done  BUN today 783 Cr 2.9  High catabolic state. BUN high despite dialysis    Plan:   No acute indication for HD today  Plan for HD tomorrow for further solute management and volume management. Lasix IV 80 mg once today  BP is soft. Anuj Mir MD  [facility]  Office: [phone]    Assessment:   #GISSELLE, oliguric  Likely due to hemodynamic mediated GISSELLE resulting in ATN. He was severely hypotensive requiring high-dose of pressors, was intubated with hypoxic respiratory failure and subsequently developed renal failure. He had a few doses of IV contrast 1 on 1/30/2023 and 2/8/2023. Etiology of his hypotension is unclear at this point  Urinalysis after catheter being placed showed dark yellow urine specific gravity 1.074,  WBC 39 present used 100 mg/dL of albumin  Hold off on further testing of GISSELLE at this time. Required CRRT initially. First HD was 2/14/23    #Hyperkalemia  Due to renal failure, oliguric    #Metabolic acidosis  Due to renal failure  Addressed with dialysis    #Shock  Etiology unclear, cultures are negative so does not peer to have any source of sepsis.   Procalcitonin was only mildly elevated 0.61  Discussed with cardiology about possible [place] to see how his cardiac index is. His EF 35%      ROS:   Positives Listed Bold. All other remaining systems are negative. Constitutional:  fever, chills, weakness, weight change, fatigue,      Skin:  rash, pruritus, hair loss, bruising, dry skin, petechiae. Head, Face, Neck   headaches, swelling,  cervical adenopathy. Respiratory: shortness of breath, cough, or wheezing  Cardiovascular: chest pain, palpitations, dizzy, edema  Gastrointestinal: nausea, vomiting, diarrhea, constipation,belly pain    Yellow skin, blood in stool  Musculoskeletal:  back pain, muscle weakness, gait problems,       joint pain or swelling. Genitourinary:  dysuria, poor urine flow, flank pain, blood in urine  Neurologic:  vertigo, TIA'S, syncope, seizures, focal weakness  Psychosocial:  insomnia, anxiety, or depression. Additional positive findings: -      PMH:   Past medical history, surgical history, social history, family history are reviewed and updated as appropriate. Reviewed current medication list.   Allergies reviewed and updated as needed. PE:   Vitals:    02/19/23 1700   BP: (!) 103/52   Pulse: 62   Resp: 17   Temp:    SpO2:        General appearance:  in NAD, somnolent. Comfortable. HEENT: EOM intact, no icterus. Trachea is midline. Neck : No masses, appears symmetrical, no JVD  Respiratory: Respiratory effort appears normal, bilateral equal chest rise, no wheeze  Cardiovascular: Ausculation shows RRR ++ edema  Abdomen: No visible mass or tenderness, non distended. Musculoskeletal:  Joints with no swelling or deformity. Skin:no rashes, ulcers, induration, no jaundice. Neuro: face symmetric, no focal deficits.        Lab Results   Component Value Date    CREATININE 2.9 (H) 02/19/2023    BUN 83 (HH) 02/19/2023     (L) 02/19/2023    K 4.3 02/19/2023    CL 96 (L) 02/19/2023    CO2 25 02/19/2023      Lab Results   Component Value Date    WBC 14.8 (H) 02/19/2023    HGB 10.1 (L) 02/19/2023    HCT 30.8 (L) 02/19/2023    MCV 94.8 02/19/2023    PLT 89 (L) 02/19/2023     Lab Results   Component Value Date    CALCIUM 9.0 02/19/2023    CAION 1.37 (H) 02/14/2023    PHOS 3.7 02/19/2023

## 2023-02-19 NOTE — PROGRESS NOTES
Patient removed NG tube shortly after shift change. Walker-feed enteral tube placed @ 70cm via right naris, CXR obtained & verified placement in stomach. Attempts made to secure tube with nasal bridle device failed x 3, with small amount of bloody drainage becoming evident so no further attempts made d/t use of blood thinners. NG tube secured using adhesive tape securing device. Tube feeds recommenced at previous rate.

## 2023-02-19 NOTE — PROGRESS NOTES
Order received for arterial line removal.  Right radial arterial line discontinued. Manual pressure held for 15 minutes and tegaderm on site. No hematoma, no oozing noted. Patient tolerated well.

## 2023-02-19 NOTE — PROGRESS NOTES
[facility]   Daily Progress Note      Admit Date:  2/8/2023      Subjective:   Mr. Lady Shields is a 77yo male with chronic systolic CHF LVEF 14-64%, CAD and chronic atrial fibrillation who came for Impella assisted PCI to the RCA and LAD 2/8/23. The procedure went well and the Impella was explanted. Unfortunately, he developed mental status chnages after the procedure concerning for a potential CVA. Head CT/CTA was unremarkable though. He ultimately was intubated for airway protection. Interval History:  Maintained on small dose BB  Extubated   On NC      Objective:     /63   Pulse 66   Temp 98.3 °F (36.8 °C) (Axillary)   Resp 24   Ht 6' 1\" (1.854 m)   Wt 235 lb 10.8 oz (106.9 kg)   SpO2 98%   BMI 31.09 kg/m²      Intake/Output Summary (Last 24 hours) at 2/19/2023 1414  Last data filed at 2/19/2023 1200  Gross per 24 hour   Intake 1440.88 ml   Output 305 ml   Net 1135.88 ml         Physical Exam:  General:  Lethargic, NAD  Skin:  Warm and dry  Neck:  JVD<8, no carotid bruits  Chest: Intubated. Diminished bilaterally in bases.   No rhonchi or rales  Cardiovascular:  Irreg irreg and mildly tachy, normal S1/S2, no M/R/G  Abdomen:  Soft, nontender, +bowel sounds  Extremities:  2+ bilateral LE edema  Pulses: 2+ bilat carotid    2+ bilat radial    2+ bilat femoral        Medications:    aspirin  81 mg Oral Daily    ipratropium-albuterol  1 ampule Inhalation BID    metoprolol succinate  25 mg Oral Daily    amiodarone  400 mg Oral BID    famotidine (PEPCID) injection  20 mg IntraVENous Daily    piperacillin-tazobactam  3,375 mg IntraVENous Q12H    heparin (porcine)  5,000 Units SubCUTAneous 3 times per day    clopidogrel  75 mg Oral Daily    atorvastatin  80 mg Oral Nightly    chlorhexidine  15 mL Mouth/Throat BID    sodium chloride flush  5-40 mL IntraVENous 2 times per day      sodium chloride Stopped (02/19/23 0608)       Lab Data:  CBC:   Recent Labs     02/17/23  0536 02/18/23  0510 02/19/23  0600   WBC 13.4* 17.6* 14.8*   HGB 9.4* 10.6* 10.1*   PLT 66* 97* 89*       BMP:    Recent Labs     02/17/23  0536 02/18/23  0510 02/19/23  0600   * 133* 132*   K 4.2 4.4 4.3   CO2 25 25 25   BUN 83* 79* 83*   CREATININE 2.6* 2.6* 2.9*         FASTING LIPID PANEL:  Lab Results   Component Value Date/Time    CHOL 84 02/09/2023 03:46 AM    HDL 34 02/09/2023 03:46 AM    TRIG 67 02/17/2023 05:36 AM     LHC/PCI 2/8/23:  1. Right-dominant coronary arterial circulation. Successful  intervention to 90% lesion and 99% more distal RCA lesion with a 3 mm  overlapping drug-eluting stents dilated up to 3.2 mm proximally and 3.1  mm distally, resulting in 0% residual stenosis and SANTO III flow in the  vessel. There was reversal of flow to antegrade in the dominant right  coronary artery. The PDA had been fed by collaterals. In the left  system, there is trivial left main plaque disease. The LAD had an 80%  mid lesion that was treated with a 3.5 x 22 mm [place] frontier  drug-eluting stent dilated to 3.6 mm in diameter resulting in 0%  residual stenosis and SANTO III flow. The circumflex has a chronic total  occlusion in the midportion but the distal vessel fills in from  collaterals. 2.  Left ventricular end-diastolic pressure 21 mmHg. 3.  No gradient across the aortic valve on pullback to suggest aortic  stenosis. PROCEDURES PERFORMED:  1. Left heart catheterization. 2.  Percutaneous coronary intervention with drug-eluting stents to the  dominant right coronary artery. 3.  Percutaneous coronary intervention with drug-eluting stent to the  mid left anterior descending artery. 4.  Insertion of short-term external heart assist system into heart,  percutaneous approach. 5.  Removal of short-term external heart assist system from heart,  percutaneous approach for assistance with cardiac output using impeller  pump, continuous. Stress Perfusion 1/29/23:  Pharmacological Stress/MPI Results:  1. Technically a satisfactory study. 2. Large mostly dense perfusion defect involving lateral and inferolateral wall suggestive of previous MI. No reversibility /ischemia  3. Gated Study shows markedly dilated LV with lateral and inferolateral akinesis; EF 29 %. Echo 1/5/23:  Poor image quality. Definity contrast administered. Patient appears to be in atrial fibrillation. Overall left ventricular systolic function appears severely reduced. Ejection fraction is visually estimated to be 20-25% with diffuse  hypokinesis and regional abnormalities including inferior akinesis. Normal left ventricular wall thickness and cavity size. The right ventricle appears normal in size with severely reduced systolic function. The left and right atria are moderately dilated. Possibly severe mitral regurgitation. The aortic valve leaflets are not well visualized. Mild aortic stenosis based on a peak velocity of 2.6 m/s and a mean pressure gradient  of 20 mmHg. Gradients may be underestimated with reduced LV function. Trivial aortic regurgitation. Trivial pericardial effusion. IVC size is dilated (>2.1 cm) and collapses < 50% with respiration consistent with elevated RA pressure (15 mmHg). Brain MRI 2/14/23:  Small acute right anterior and posterior external watershed infarcts. No acute hemorrhage or significant mass effect. Assessment / Plan: 1. Ventricular Tachycardia  Continue amiodarone 400mg BID PO x 2 weeks and 200mg Daily thereafter   LVEF 35%. 2.Acute on chronic systolic CHF, class 4  LVEF ~35 by repeat echocardiogram.  He appears more euvolemic at present  Start Toprol XL 25mg Daily   Will hold ACE-I/ARNI/ARB/MRA given renal dysfunction can be addressed as outpatient  Will hold SGLT2 given renal dysfunction can be addressed as outpatient  Patient will need period of GDMT followed by repeat TTE for evaluation of candidacy for ICD therapy given systolic dysfunction    3. Unstable Angina  S/p PCI to the LAD and the RCA on 2/8/23. Continue DAPT with aspirin and clopidogrel. Will need to crush in apple sauce until tolerating more oral intake. BB as above  Continue statin therapy. 4.Small acute CVA  Neurology following. This may explain some of his mental status changes although his hypoxia and hypercarbia are also contributing. 5.Aspiration Pneumonia  S/p Bronchoscopy 2/14/23. Improved oxygenation and aeration on chest X-ray. - Off levophed, pressure rebounding      Feeding via NG tube, discussed CXR with Dr. Danial Franco of radiology, several images, first one there appears to be a tube directed to the pleural space but it is actually tubing that was lying on top of the patient. Subsequent images with NG tube in appropriate position.        Signed:  Taylor Aviles MD

## 2023-02-19 NOTE — PROGRESS NOTES
Pulmonary Critical Care Progress Note     Patient's name:  Simona Maki  Medical Record Number: [id number]  Patient's account/billing number: [de-identified]  Patient's YOB: 1945  Age: 69 y.o. Date of Admission: 2/8/2023 12:23 PM  Date of Consult: 2/19/2023      Primary Care Physician: Jason Bell      Code Status: Full Code    Chief complaint: Acute hypoxic and hypercapnic respiratory failure. Assessment and Plan     Acute hypoxic and hypercapnic respiratory failure status post reintubation due to aspiration PNA/mucus plugging s/p extubation 2/17  Aspiration pneumonia on antibiotics 6/7  Mucous plugs s/p bronch   Acute kidney injury requiring hemodialysis  Ischemic CVA  Chronic right pleural effusion with trapped lung  Cardiomyopathy with EF of 30%  Coronary disease status post stenting  A-fib with RVR  V tach resolved. Plan:  Wean oxygen as tolerated  Advance pt ot, up to chair  HD per nephrology  Antibiotics, Zosyn for total of 7 days,   Aspiration precautions. TF continue  Monitor platelets  Swallow evaluation        Overnight:  Cultures negative to date  Afebrile. Very weak. REVIEW OF SYSTEMS:  Review of Systems -   Weak  No fever  No chest pain          Physical Exam:    Vitals: BP (!) 124/54   Pulse 69   Temp 97.8 °F (36.6 °C) (Axillary)   Resp 18   Ht 6' 1\" (1.854 m)   Wt 235 lb 10.8 oz (106.9 kg)   SpO2 98%   BMI 31.09 kg/m²     Last Body weight:   Wt Readings from Last 3 Encounters:   02/19/23 235 lb 10.8 oz (106.9 kg)   01/30/23 260 lb (117.9 kg)   01/20/23 260 lb 2.3 oz (118 kg)       Body Mass Index : Body mass index is 31.09 kg/m². Intake and Output summary:   Intake/Output Summary (Last 24 hours) at 2/19/2023 1039  Last data filed at 2/19/2023 0900  Gross per 24 hour   Intake 1534.88 ml   Output 3280 ml   Net -1745.12 ml         Physical Examination:     Gen: no acute distress  Eyes: PERRL. Anicteric sclera. No conjunctival injection.    ENT: No discharge. Posterior oropharynx clear. External appearance of ears and nose normal.  Neck: Trachea midline. No mass   Resp: There is increased work of breathing with diminished breath sounds more on the right  CV: Regular rate. Regular rhythm. systolic murmur or rub. - edema. GI: Soft, Non-tender. Non-distended. +BS  Skin: Warm, dry, w/o erythema. Lymph: No cervical or supraclavicular LAD. M/S: No cyanosis. No clubbing. Neuro: awake, lethargic, generalized weakness no focal deficit. Laboratory findings:-    CBC:   Recent Labs     02/19/23  0600   WBC 14.8*   HGB 10.1*   PLT 89*       BMP:    Recent Labs     02/17/23  0536 02/17/23  0536 02/18/23  0510 02/19/23  0600   *  --  133* 132*   K 4.2   < > 4.4 4.3   CL 95*  --  95* 96*   CO2 25  --  25 25   BUN 83*  --  79* 83*   CREATININE 2.6*  --  2.6* 2.9*   GLUCOSE 98  --  111* 117*    < > = values in this interval not displayed. S. Calcium:  Recent Labs     02/19/23  0600   CALCIUM 9.0       S. Magnesium:  No results for input(s): MG in the last 72 hours. S. Phosphorus:  Recent Labs     02/19/23  0600   PHOS 3.7       S. Glucose:  Recent Labs     02/17/23 2033   POCGLU 109*             Radiology Review:  Pertinent images / reports were reviewed as a part of this visit.     CXR reviewed  Critical Care time 35 minutes       Davina Dominguez MD, [facility].            2/19/2023, 10:39 AM

## 2023-02-19 NOTE — PROGRESS NOTES
Plan to remove ART line today per MD.    Pt has been downgraded to PCU status. 1130 - NG tube was removed from R nare by pt and replaced in the L nare. Placement xray verified. 1220 - ART line removed     1800 - Zosyn was administered secondary tubing changed. Tube feed set up replaced.      Bedside shift report has been given to [employer]

## 2023-02-19 NOTE — PROGRESS NOTES
4 Eyes Skin Assessment     NAME:  Karmen Castillo  YOB: 1945  MEDICAL RECORD NUMBER:  [id number]    The patient is being assessed for  Shift Handoff    I agree that One RN has performed a thorough Head to Toe Skin Assessment on the patient. ALL assessment sites listed below have been assessed. Areas assessed by both nurses:    Head, Face, Ears, Shoulders, Back, Chest, Arms, Elbows, Hands, Sacrum. Buttock, Coccyx, Ischium, and Legs. Feet and Heels        Does the Patient have a Wound?  No noted wound(s)       Tera Prevention initiated by RN: Yes   Wound Care Orders initiated by RN: NA    Pressure Injury (Stage 3,4, Unstageable, DTI, NWPT, and Complex wounds) if present, place referral order by RN under : NA    New and Established Ostomies, if present place, referral order under : NA      Nurse 1 eSignature: Electronically signed by Zachary Vasques RN on 2/19/23 at 7:06 AM EST    **SHARE this note so that the co-signing nurse can place an eSignature**    Nurse 2 eSignature: Electronically signed by Geovanni Shepard RN on 2/19/23 at 8:06 AM EST

## 2023-02-19 NOTE — PROGRESS NOTES
Patient states he is experiencing difficulty breathing. RR 22, SpO2 98% on NC @ 2LPM. Occasional weak congested cough throughout the shift, with moderate amounts of thick secretions suctioned from back of throat. He asked for the \"breathing mask\". BiPAP initiated with FiO2 at 30%. Tolerating well at the time of this writing. Jj Salgado RT notified by phone, will come to unit to review PAP & settings.

## 2023-02-20 NOTE — PROGRESS NOTES
Patient less responsive with 0200 turn than previously at midnight. Concern for CO2 retention. BiPAP initiated, RT Deuce at bedside to review settings. Will monitor for further changes to mentation.

## 2023-02-20 NOTE — FLOWSHEET NOTE
Treatment time: 3 hrs    Net UF: 2 liters    Pre weight: 108.1 kg  Post weight: 106.1 kg  EDW: TBD/GISSELLE    Access used: [facility]  Access function: No issues after reversing lines. BFR per orders. Medications or blood products given: None    Regular outpatient schedule: TBD    Summary of response to treatment: Pt tolerated treatment with levo for BP support. No issues noted.      Copy of dialysis treatment record placed in chart, to be scanned into EMR.        02/20/23 0730 02/20/23 1046   Treatment   Time On 0746  --    Time Off  --  1046   Treatment Goal  --  2000   Vital Signs   BP (!) 103/53 99/61   Heart Rate 61 85   Resp  --  30   Weight 238 lb 5.1 oz (108.1 kg) 233 lb 14.5 oz (106.1 kg)   Weight Method Bed scale  --    Post-Hemodialysis Assessment   Blood Volume Processed (Liters)  --  57.1 l/min   Duration of Treatment (minutes)  --  180 minutes   NET Removed (ml)  --  2000

## 2023-02-20 NOTE — PROGRESS NOTES
Comprehensive Nutrition Assessment    Type and Reason for Visit:  Reassess    Nutrition Recommendations/Plan:   Consult RD for \"TF ordering and management\". Recommend adjusting TF to better meet nutrition needs. Recommend Nepro with new goal rate of 50 mL/hr +1 proteinex daily. Flush per provider while in ICU. Malnutrition Assessment:  Malnutrition Status:  Insufficient data (02/13/23 1039)    Context:  Acute Illness     Findings of the 6 clinical characteristics of malnutrition:  Energy Intake:  50% or less of estimated energy requirements for 5 or more days  Weight Loss:  No significant weight loss     Body Fat Loss:  Unable to assess     Muscle Mass Loss:  Unable to assess    Fluid Accumulation:  Mild Extremities   Strength:  Not Performed    Nutrition Assessment:    Follow-up. Pt extubated on 2/17. SLP saw on 2/18, recommended to continue NPO until MBS could be completed. NG remained in place with TF running. Pt requiring bipap over night. TF now on hold for aspiration risk. Currently on levo. Will monitor for ability to advance TF as respiratory status improves. Will need to adjust TF to better meet nutrition needs. Nutrition Related Findings:    +3 BLE, trace BUE edema; BM 2/19; Glu 132, Phos 5.3; -6.3 L fluid Wound Type: None       Current Nutrition Intake & Therapies:    Average Meal Intake: NPO  Average Supplements Intake: NPO  Diet NPO  ADULT TUBE FEEDING; Nasogastric; Renal Formula; Continuous; 10; Yes; 10; Q 4 hours; 30; 30; Q 4 hours; Protein; 4 boluses Proteinex daily via syringe. Flush with 30 mL H20 before and after. Do NOT mix directly with the tube feeding formula. Current Tube Feeding (TF) Orders:**Currently not off**  Goal TF & Flush Orders Provides: Recommend Nepro with goal rate of 50 mL/hr +1 proteinex daily. Flush per provider. TF regimen provides: 1000 mL TV, 1904 kcal, 107 gm pro, 727 mL free water.     Anthropometric Measures:  Height: 6' 1\" (185.4 cm)  Ideal Body Weight (IBW): 184 lbs (84 kg)       Current Body Weight: 238 lb (108 kg), 129.3 % IBW. Weight Source: Bed Scale  Current BMI (kg/m2): 31.4                          BMI Categories: Obese Class 1 (BMI 30.0-34. 9)    Estimated Daily Nutrient Needs:  Energy Requirements Based On: Kcal/kg  Weight Used for Energy Requirements: Current  Energy (kcal/day): 5240-8849 kcal (15-18 kcal/kg 108 kg CBW)  Weight Used for Protein Requirements: Ideal  Protein (g/day): 100-167 gm (1.2-2 gm/kg IBW)     Fluid (ml/day): per provider    Nutrition Diagnosis:   Inadequate oral intake related to cognitive or neurological impairment as evidenced by NPO or clear liquid status due to medical condition, nutrition support - enteral nutrition    Nutrition Interventions:   Food and/or Nutrient Delivery: Modify Tube Feeding  Nutrition Education/Counseling: Education not indicated  Coordination of Nutrition Care: Continue to monitor while inpatient       Goals:  Previous Goal Met: No Progress toward Goal(s)  Goals: Tolerate nutrition support at goal rate       Nutrition Monitoring and Evaluation:   Behavioral-Environmental Outcomes: None Identified  Food/Nutrient Intake Outcomes: Enteral Nutrition Intake/Tolerance  Physical Signs/Symptoms Outcomes: Biochemical Data, Weight, Skin, Nutrition Focused Physical Findings, Fluid Status or Edema    Discharge Planning:     Too soon to determine     Phylicia Miles RD, LD  Contact: [phone]

## 2023-02-20 NOTE — PROGRESS NOTES
Patient a little more responsive, though remians drowsy, following simple commands again. Call received from Dialysis RN, planning to be in unit for HD run between 7-7:30am. Daughter Tyrone Mccall updated on patient status & notified of planned dialysis, she plans to visit after HD is completed.

## 2023-02-20 NOTE — PROGRESS NOTES
Hospitalist Progress Note      PCP: Skylar Townsend    Date of Admission: 2/8/2023    Chief Complaint: SOB    Hospital Course: 71m with afib, chf, htn, presented for impella assisted [facility] for worsening angina with pci to LAD and RCA. Patient developed ams, acute resp failure, hypotension following impella explantation requiring intubation / icu admission. initial CT/CTA head and neck were negative, patient was extubated 2/9. He initially remained hypoxemic/hypercapnic requiring bilevel, precedex for agitation, pressor support, developed leesa and placed on crrt, vt on amiodarone. He has since tolerated nrb, underwent bronchoscopy 2/14 for mucous plugging / asp pna. He has been on HD since 2/14. AMS has required propfol with MRI brain showing small bilateral watershed acute infarcts likely insignificant clinically. Subjective: patient seen on bilevel, s/p HD removel 2 L, required levo overnight.        Medications:  Reviewed    Infusion Medications    norepinephrine 15 mcg/min (02/20/23 0859)    sodium chloride Stopped (02/20/23 0535)     Scheduled Medications    aspirin  81 mg Oral Daily    ipratropium-albuterol  1 ampule Inhalation BID    metoprolol succinate  25 mg Oral Daily    amiodarone  400 mg Oral BID    famotidine (PEPCID) injection  20 mg IntraVENous Daily    piperacillin-tazobactam  3,375 mg IntraVENous Q12H    heparin (porcine)  5,000 Units SubCUTAneous 3 times per day    clopidogrel  75 mg Oral Daily    atorvastatin  80 mg Oral Nightly    chlorhexidine  15 mL Mouth/Throat BID    sodium chloride flush  5-40 mL IntraVENous 2 times per day     PRN Meds: midodrine, heparin (porcine), hydrALAZINE, ipratropium-albuterol, acetaminophen **OR** acetaminophen, ondansetron **OR** ondansetron, sodium chloride flush, sodium chloride, naloxone      Intake/Output Summary (Last 24 hours) at 2/20/2023 0934  Last data filed at 2/20/2023 0859  Gross per 24 hour   Intake 1331.56 ml   Output 535 ml   Net 796.56 ml Physical Exam Performed:    BP (!) 94/53   Pulse 70   Temp 98 °F (36.7 °C)   Resp 14   Ht 6' 1\" (1.854 m)   Wt 238 lb 5.1 oz (108.1 kg)   SpO2 100%   BMI 31.44 kg/m²     General appearance: No apparent distress  HEENT: Pupils equal, round,  Conjunctivae/corneas clear. Neck: Supple, with full range of motion. No jugular venous distention. Trachea midline. Respiratory:  Normal respiratory effort. Clear to auscultation, bilaterally without Rales/Wheezes/Rhonchi. Cardiovascular: Regular rate and rhythm    Abdomen: Soft, non-tender, non-distended with normal bowel sounds. Musculoskeletal: No clubbing, cyanosis or (+) edema bilaterally. No calf tenderness  Skin: Skin  texture, turgor normal.     Psychiatric: Alert, minimally responsive, in no distress      Labs:   Recent Labs     02/18/23  0510 02/19/23  0600 02/20/23  0445   WBC 17.6* 14.8* 15.7*   HGB 10.6* 10.1* 10.5*   HCT 32.6* 30.8* 32.4*   PLT 97* 89* 132*     Recent Labs     02/18/23  0510 02/19/23  0600 02/20/23  0445   * 132* 138   K 4.4 4.3 4.5   CL 95* 96* 98*   CO2 25 25 25   BUN 79* 83* 136*   CREATININE 2.6* 2.9* 4.0*   CALCIUM 9.4 9.0 9.2   PHOS 3.8 3.7 5.3*     No results for input(s): AST, ALT, BILIDIR, BILITOT, ALKPHOS in the last 72 hours. No results for input(s): INR in the last 72 hours. No results for input(s): Maurice Last in the last 72 hours. Urinalysis:      Lab Results   Component Value Date/Time    NITRU Negative 02/09/2023 03:55 PM    WBCUA 39 02/09/2023 03:55 PM    BACTERIA None Seen 02/09/2023 03:55 PM    NIGFA 951 02/09/2023 03:55 PM    BLOODU LARGE 02/09/2023 03:55 PM    SPECGRAV 1.074 02/09/2023 03:55 PM    GLUCOSEU Negative 02/09/2023 03:55 PM       Radiology:  XR CHEST PORTABLE   Final Result   There is new tubing coursing inferiorly overlying the soft tissues of the   neck and upper to mid chest with the tip overlying the left perihilar region   seen on the 1st image.   If this is a new NG tube the tip is likely in the   left upper lobe airway and needs to be repositioned. Of note, this new   tubing is not visualized on the 2nd and the 3rd images and this may have been   already removed. Previously seen feeding tube with the tip likely within the body of the   stomach. Additional supporting devices as above      Redemonstration of hazy opacity of the right lower lung field, which may   reflect a combination of underlying pleural effusion with adjacent   atelectasis. Enlarged cardiac silhouette. Critical results were called by Dr. Vladimir Montaño to Dr. Maggi Tirado On 2/19/2023 at   14:01. XR CHEST PORTABLE   Final Result      Feeding tube distal tip is at the expected location of the stomach. Unchanged mild pulmonary edema. Unchanged moderate right pleural effusion and consolidative changes of right   lower lobe. XR CHEST PORTABLE   Final Result   1. Stable appropriate positions of support apparatus. 2. Interval decrease in size of the patient's right pleural effusion, now   moderate in size, with improved aeration of the mid and upper right lung   zones. There remains right basilar airspace consolidation, most likely   passive atelectasis, less likely aspiration or pneumonia. 3. Slight interval improvement in appearance of moderate pulmonary edema. 4. Stable cardiomegaly. MRI BRAIN WO CONTRAST   Final Result   Small acute right anterior and posterior external watershed infarcts. No acute hemorrhage or significant mass effect. XR CHEST PORTABLE   Final Result   1. Complete opacification of the right hemithorax likely represents a large   pleural effusion with airspace disease. 2.  Patchy airspace infiltrates throughout the left lung. XR CHEST PORTABLE   Final Result   Worsening CHF/pulmonary edema.          XR CHEST PORTABLE   Final Result   Stable exam with small right pleural effusion and perihilar/bibasilar   airspace opacities which may be related to pulmonary edema or pneumonia. XR CHEST PORTABLE   Final Result   Status post right PICC line placement in good position with no change in the   ET tube and gastric tube. Stable moderate cardiomegaly and moderate central pulmonary congestion which   is slightly less prominent. Hazy perihilar and bibasilar opacities which is increased and may be related   to pulmonary edema and/or worsening pneumonia. Small right pleural effusion which is more prominent         XR CHEST PORTABLE   Final Result   1. Increasing right basilar airspace consolidation. Increasing bilateral   perihilar airspace infiltrates. This could represent edema versus pneumonia. Likely small right pleural effusion as well      2. Lines and tubes appear properly placed as described above. CTA HEAD NECK W CONTRAST   Final Result   1. No large vessel occlusion identified within the brain. 2. No significant arterial stenosis identified within the neck. 3. 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 4. Worsening mediastinal lymphadenopathy consistent with a neoplastic process   or lymphoproliferative disorder. 5. Enlarged, heterogeneous appearance of the thyroid gland, new from the   previous chest CT concerning for a thyroiditis. CT HEAD WO CONTRAST   Final Result   Addendum (preliminary) 1 of 1   ADDENDUM:   Results were communicated to the cath lab per protocol on 02/08/2023 at    1300. Final   No acute intracranial abnormality.                     Assessment/Plan:    Active Hospital Problems    Diagnosis Date Noted    Mucus plug in respiratory tract [T17.998A] 02/14/2023     Priority: Medium    Acute and chronic respiratory failure with hypercapnia (HCC) [J96.22] 02/10/2023     Priority: Medium    Cardiogenic shock ([address] [R57.0] 02/10/2023     Priority: Medium    Unstable angina ([address] [I20.0] 02/10/2023     Priority: Medium    Ventricular tachycardia [I47.20] 02/10/2023     Priority: Medium    AMS (altered mental status) [R41.82] 02/08/2023     Priority: Medium    Acute respiratory failure with hypoxia and hypercapnia (HCC) [J96.01, J96.02] 02/08/2023     Priority: Medium    Hemoptysis [R04.2] 02/08/2023     Priority: Medium    Abnormal CXR [R93.89]     Pleural effusion on right [J90] 01/20/2022    Acute on chronic systolic CHF (congestive heart failure), NYHA class 4 (HCC) [I50.23]        Hypotension  - continued levophed gtts    GISSELLE  - continued HD, tolerated     Acute hypoxemic resp failure with hypercarbia  - pCO2 53.9, on bilevel  - duonebs, completed solumedrol x 5 days on 2/18    HCAP /Asp Pna  - on zosyn since 2/14, finishing on 2/21    Afivb/vt  - amiodarone PO per Card  - anticoag held     AMS  - adding thiamine, per prior family med notes (+) etoh use, unclear amount      Diet: Diet NPO  ADULT TUBE FEEDING; Nasogastric; Renal Formula; Continuous; 10; Yes; 10; Q 4 hours; 30; 30; Q 4 hours; Protein; 4 boluses Proteinex daily via syringe. Flush with 30 mL H20 before and after. Do NOT mix directly with the tube feeding formula.   Code Status: Full Code       Sarajane Eisenmenger, MD

## 2023-02-20 NOTE — PROGRESS NOTES
-Patient more alert as shift progresses.    -Patient changed to 3L NC. Tolerating well.     -urine output for shift: 182mL. -Patient still oriented x1.     -Levo as high as 15mcg/min, patient currently on 10mcg/min at end of shift.     -Code status changed to limited code.

## 2023-02-20 NOTE — PROGRESS NOTES
Pulmonary Progress Note    Date of Admission: 2/8/2023   LOS: 12 days       CC:  No chief complaint on file.       Subjective:  Started on bipap last night secondary to confusion with hypercapnia.      blood pressure decreased after bipap and restarted on levo.      ROS:   No nausea  No Vomiting  No chest pain       Assessment:          Plan:     This note may have been transcribed using Dragon Dictation software. Please disregard any translational errors.       Hospital Day: 12     Acute hypercapnia   Started on bipap with Rate 20.  This was yielding MV ~ 9 with mix of spontaneous and vent triggers.  Changed to AVAPS with rate 4, target Vt 700.  Changed yielded MV 11-14.     Wean O2 to sat >90%   Variable resp rate.   May be Cheyne-Shaikh but this is difficult to assess on mechanical ventilation/BiPAP.  Check VBG in 2 hours      Hypertension/hypotension/shock  Levophed      Aspiration pneumonia  Status post bronchoscopy  Completed antibiotics  Repeat chest x-ray    Pleural effusion , right  Chronic since at least 2019  Appears loculated on CT chest and US.       Neuro  Small acute CVA  Decreased mental status with elevated BUN and hypercapnia      Cardiovascular  Unstable angina status post PCI to LAD and RCA 2/8  EF approximately 30%  Bradycardia  Cardiology following  DAPT, aspirin, Plavix  Amiodarone, Toprol-XL    End-stage renal disease  HD today  -6.2 L      Nutrition  - Diet NPO  ADULT TUBE FEEDING; Nasogastric; Renal Formula; Continuous; 10; Yes; 10; Q 4 hours; 30; 30; Q 4 hours; Protein; 4 boluses Proteinex daily via syringe. Flush with 30 mL H20 before and after.  Do NOT mix directly with the tube feeding formula.  Tube feeds currently on hold while on BiPAP/decreased mental status.  -   Intake/Output Summary (Last 24 hours) at 2/20/2023 0702  Last data filed at 2/20/2023 0459  Gross per 24 hour   Intake 1680.81 ml   Output 480 ml   Net 1200.81 ml       Mobility      Access  Arterial      PICC      PICC  Double Lumen 55/31/48 Right Cephalic (Active)   Central Line Being Utilized Yes 02/19/23 0600   Criteria for Appropriate Use Limited/no vessel suitable for conventional peripheral access 02/20/23 0600   Site Assessment Clean, dry & intact 02/20/23 0600   Phlebitis Assessment No symptoms 02/20/23 0600   Infiltration Assessment 0 02/20/23 0600   Extremity Circumference (cm) 35 cm 02/19/23 0800   External Catheter Length (cm) 0 cm 02/20/23 0600   Proximal Lumen Color/Status Red; Intermittent infusions 02/20/23 0600   Distal Lumen Color/Status Purple; Alcohol cap applied; Capped 02/20/23 0600   Line Care Connections checked and tightened 02/19/23 2000   Alcohol Cap Used Yes 02/20/23 0600   Date of Last Dressing Change 02/15/23 02/19/23 0600   Dressing Type Transparent; Antimicrobial;Securing device 02/20/23 0600   Dressing Status Clean, dry & intact 02/20/23 0600   Dressing Intervention New;Dressing changed;Security device changed 02/15/23 0800   Number of days: 11        CVC                      I spent  45 minutes of critical care time with this patient excluding any procedures. Data:        PHYSICAL EXAM:   Blood pressure 95/60, pulse 60, temperature 98 °F (36.7 °C), temperature source Axillary, resp. rate 13, height 6' 1\" (1.854 m), weight 238 lb 5.1 oz (108.1 kg), SpO2 98 %.'  Body mass index is 31.44 kg/m². Gen: No distress. ENT:   Resp: No accessory muscle use. No crackles. No wheezes. No rhonchi. CV: Regular rate. Regular rhythm. No murmur or rub. No edema. Skin: Warm, dry, normal texture and turgor. No nodule on exposed extremities. M/S: No cyanosis. No clubbing. No joint deformity. Psych: Currently on BiPAP. Sleeping. Decreased mental status as evident by not waking to alarm.     Medications:    Scheduled Meds:   aspirin  81 mg Oral Daily    ipratropium-albuterol  1 ampule Inhalation BID    metoprolol succinate  25 mg Oral Daily    amiodarone  400 mg Oral BID    famotidine (PEPCID) injection  20 mg IntraVENous Daily    piperacillin-tazobactam  3,375 mg IntraVENous Q12H    heparin (porcine)  5,000 Units SubCUTAneous 3 times per day    clopidogrel  75 mg Oral Daily    atorvastatin  80 mg Oral Nightly    chlorhexidine  15 mL Mouth/Throat BID    sodium chloride flush  5-40 mL IntraVENous 2 times per day       Continuous Infusions:   norepinephrine 15 mcg/min (02/20/23 0459)    sodium chloride Stopped (02/19/23 2203)       PRN Meds:  midodrine, heparin (porcine), hydrALAZINE, ipratropium-albuterol, acetaminophen **OR** acetaminophen, ondansetron **OR** ondansetron, sodium chloride flush, sodium chloride, naloxone    Labs reviewed:  CBC:   Recent Labs     02/18/23 0510 02/19/23  0600 02/20/23  0445   WBC 17.6* 14.8* 15.7*   HGB 10.6* 10.1* 10.5*   HCT 32.6* 30.8* 32.4*   MCV 94.7 94.8 95.4   PLT 97* 89* 132*     BMP:   Recent Labs     02/18/23 0510 02/19/23  0600 02/20/23  0445   * 132* 138   K 4.4 4.3 4.5   CL 95* 96* 98*   CO2 25 25 25   PHOS 3.8 3.7 5.3*   BUN 79* 83* 136*   CREATININE 2.6* 2.9* 4.0*     LIVER PROFILE: No results for input(s): AST, ALT, LIPASE, BILIDIR, BILITOT, ALKPHOS in the last 72 hours. Invalid input(s): AMYLASE,  ALB  PT/INR: No results for input(s): PROTIME, INR in the last 72 hours. APTT: No results for input(s): APTT in the last 72 hours. UA:No results for input(s): NITRITE, COLORU, PHUR, LABCAST, WBCUA, RBCUA, MUCUS, TRICHOMONAS, YEAST, BACTERIA, CLARITYU, SPECGRAV, LEUKOCYTESUR, UROBILINOGEN, BILIRUBINUR, BLOODU, GLUCOSEU, AMORPHOUS in the last 72 hours. Invalid input(s): Nguyen Baird  No results for input(s): PH, PCO2, PO2 in the last 72 hours. Cx:      Films:  Radiology Review:  Pertinent images / reports were reviewed as a part of this visit.     CT Chest w/ contrast: Results for orders placed during the hospital encounter of 10/07/18    CT CHEST W CONTRAST    Narrative  EXAMINATION:  CT OF THE CHEST WITH CONTRAST 10/8/2018 9:34 am    TECHNIQUE:  CT of the chest was performed with the administration of intravenous  contrast. Multiplanar reformatted images are provided for review. Dose  modulation, iterative reconstruction, and/or weight based adjustment of the  mA/kV was utilized to reduce the radiation dose to as low as reasonably  achievable. COMPARISON:  Chest radiograph dated 10/07/2018    HISTORY:  ORDERING SYSTEM PROVIDED HISTORY: PNEUMONIA, UNRESOLVED  TECHNOLOGIST PROVIDED HISTORY:  Ordering Physician Provided Reason for Exam: fu pn a,  sob,  Acuity: Chronic  Type of Exam: Subsequent/Follow-up    FINDINGS:  Mediastinum: Calcification of the thoracic aorta. Coronary artery  calcification. AP window lymph node is approximately 1.8 cm in short axis. Additional shotty mediastinal lymph nodes are seen. Thyroid is symmetric in  size. No axillary adenopathy. Pericardial calcification posteriorly. Lungs/pleura: Small to moderate bilateral pleural effusions. There is dense  consolidation within the lateral right upper lobe with sub solid character,  compatible with that opacity on recent radiograph. Additional heterogeneous  and ground-glass opacity is seen bilaterally. Sub solid nodular  consolidation is seen within the left upper and lower lobes. Bilateral  bronchial thickening is demonstrated. No evidence of pneumothorax. Upper Abdomen: Liver is fatty. Calcified granulomas within the liver. No  acute process within the upper-most abdomen. Soft Tissues/Bones: DISH of the thoracic spine. Impression  Multifocal bilateral consolidation, greatest within the lateral right upper  lobe, compatible with given patient history of pneumonia. Mild mediastinal  adenopathy. Small to moderate bilateral pleural effusions. Follow-up to  resolution recommended.       CT Chest w/o contrast: Results for orders placed during the hospital encounter of 12/06/22    CT CHEST WO CONTRAST    Narrative  EXAMINATION:  CT OF THE CHEST WITHOUT CONTRAST 12/6/2022 2:23 pm    TECHNIQUE:  CT of the chest was performed without the administration of intravenous  contrast. Multiplanar reformatted images are provided for review. Automated  exposure control, iterative reconstruction, and/or weight based adjustment of  the mA/kV was utilized to reduce the radiation dose to as low as reasonably  achievable. COMPARISON:  01/24/2022 CT and recent radiograph 12/06/2022    HISTORY:  ORDERING SYSTEM PROVIDED HISTORY: Abnormal chest x-ray  TECHNOLOGIST PROVIDED HISTORY:  Reason for exam:->Abnormal chest x-ray  Decision Support Exception - unselect if not a suspected or confirmed  emergency medical condition->Emergency Medical Condition (MA)  Reason for Exam: Abnormal chest x-ray    FINDINGS:  Mediastinum: The heart is enlarged. Chronic calcification of the pericardium  is stable. There is mild atherosclerotic calcification of the coronary  arteries. Aorta and pulmonary arteries are normal in caliber. Thyroid and  esophagus normal.  Progressive pattern of mediastinal lymphadenopathy. Increase in number of shotty subcentimeter lymph nodes. An index left  paratracheal node is more prominent measuring 18 mm. Lungs/pleura: Airways are patent. Dense peribronchial thickening in the  right upper, middle and lower lobes with chronic pleural thickening and  chronic loculated pleural effusion in the right chest.  The overall volume of  fluid is similar to the prior CT although the degree of loculation appears  increased. There is near complete opacification of the right middle and  lower lobes likely a combination of atelectasis and airspace change. Trace  left pleural effusion with mild ground-glass opacities in the lingula and  left lower lobe. Upper Abdomen: Adrenal glands are normal.  No significant findings in the  visualized upper abdomen. Soft Tissues/Bones: No skeletal abnormalities are appreciated within the  chest.    Impression  1.  Chronic loculated right pleural effusion with similar volume to prior CT  01/24/2022.  2. Worsening atelectasis and airspace opacification in the right middle and  lower lobes. Chronic atelectasis or fibrotic change is suspected. Chronic  infectious or inflammatory process also suspected due to progression. 3. There is also a new small left pleural effusion with mild ground-glass  opacification on the left. Asymmetric edema or pneumonitis may be considered. 4. Worsening mediastinal lymphadenopathy. 5. Cardiomegaly with calcification of pericardium suggesting a prior history  of pericarditis. Stable appearance from prior exam.      CTPA: No results found for this or any previous visit. CXR PA/LAT: Results for orders placed during the hospital encounter of 01/19/23    XR CHEST (2 VW)    Narrative  EXAMINATION:  TWO XRAY VIEWS OF THE CHEST    1/19/2023 11:35 pm    COMPARISON:  Chest radiograph performed 01/06/2023. HISTORY:  ORDERING SYSTEM PROVIDED HISTORY: SOB  TECHNOLOGIST PROVIDED HISTORY:  Reason for exam:->SOB  Reason for Exam: very sob    FINDINGS:  There is a right basilar effusion with adjacent infiltrate. There is  underlying chronic pulmonary change. There is no pneumothorax. The heart is  enlarged. The upper abdomen is unremarkable. The extrathoracic soft tissues  are unremarkable. Impression  Cardiomegaly with right basilar effusion and adjacent right basilar  infiltrate representing atelectasis versus pneumonia. CXR portable: Results for orders placed during the hospital encounter of 02/08/23    XR CHEST PORTABLE    Narrative  EXAMINATION:  ONE XRAY VIEW OF THE CHEST    2/19/2023 12:40 pm    COMPARISON:  02/18/2023.     HISTORY:  ORDERING SYSTEM PROVIDED HISTORY: NG placement  TECHNOLOGIST PROVIDED HISTORY:  Reason for exam:->NG placement  Reason for Exam: NG    FINDINGS:  Evaluation is limited due to multiple wires and tubings overlying chest.  There is right-sided PICC line with the tip overlying the cavoatrial  junction. There is redemonstration of right IJ central venous catheter,  appearing similar to the prior study. Previously seen feeding tube is seen  with the tip below the diaphragm overlying the left upper abdomen, likely  within the body of the stomach. There is new additional tubing. The  superior portion of this tubing is collimated from the view. This tubing  extends inferiorly along the soft tissues of the neck overlying superior  midportion of the chest.  The distal portion of the tubing is coursing  superiorly to the left mid abdomen with the tip overlying the left perihilar  region. On the 2nd and the 3rd images this was not visualized. The cardiac silhouette is magnified and likely enlarged. There is  redemonstration opacity of the right lung base, likely reflecting layering  pleural effusion with adjacent atelectasis. No definite pneumothorax seen. Impression  There is new tubing coursing inferiorly overlying the soft tissues of the  neck and upper to mid chest with the tip overlying the left perihilar region  seen on the 1st image. If this is a new NG tube the tip is likely in the  left upper lobe airway and needs to be repositioned. Of note, this new  tubing is not visualized on the 2nd and the 3rd images and this may have been  already removed. Previously seen feeding tube with the tip likely within the body of the  stomach. Additional supporting devices as above    Redemonstration of hazy opacity of the right lower lung field, which may  reflect a combination of underlying pleural effusion with adjacent  atelectasis. Enlarged cardiac silhouette. Critical results were called by Dr. Jose Armando Castellanos to Dr. Gayatri Owens On 2/19/2023 at  14:01. This note was transcribed using [id number] [address]. Please disregard any translational errors.       Jackie [facility] Pul[facility], Sleep and [facility] [phone]

## 2023-02-20 NOTE — PROGRESS NOTES
[facility]  Palliative Care   Progress Note    NAME:  Kathi Henao  MEDICAL RECORD NUMBER:  [id number]  AGE: 69 y.o. GENDER: male  : 1945  TODAY'S DATE:  2023    Subjective: patient on bipap, lethargic, he has declined overnight. Objective:    Vitals:    23 0900   BP: (!) 94/53   Pulse: 70   Resp: 14   Temp:    SpO2: 100%     Lab Results   Component Value Date    WBC 15.7 (H) 2023    HGB 10.5 (L) 2023    HCT 32.4 (L) 2023    MCV 95.4 2023     (L) 2023     Lab Results   Component Value Date    CREATININE 4.0 (H) 2023     (HH) 2023     2023    K 4.5 2023    CL 98 (L) 2023    CO2 25 2023     Lab Results   Component Value Date    ALT 15 02/10/2023    AST 42 (H) 02/10/2023    ALKPHOS 111 02/10/2023    BILITOT 1.4 (H) 02/10/2023       Plan: His daughter David called and requested I met with her this AM. His daughter wanted to discuss what she should do for her father in light of his decline last night. We discussed his improvement and his decline. She stated he was doing better on Saturday and he asked her if was going to get better. She does feel if needed  ventilator again he would not want to be re intubated. She also agrees to no chest compressions/shocking or resuscitative meds given during CPR. She also discussed with Dr Kathie Doyle and they agree to give him another 48 hours with current medical treatment and evaluate his progress at that time. Code Status: Limited   Discharge Environment:  [] Hospice Consult Agency:  [] Inpatient Hospice    [] Home with  [facility]   [] ECF with Hospice  [] ECF skilled care with Hospice to follow   [] Other:    Teaching Time:  0hours  30 min     I will continue to follow Mr. Ana Banegas care as needed. Thank you for allowing me to participate in the care of Mr. Erica Monge .      Electronically signed by Mireille Ty, RN, BSN,CHPN on 2/20/2023 at 12:03 PM  [address] [place] [address]  Office: [phone]

## 2023-02-20 NOTE — PROGRESS NOTES
[address]   Daily Progress Note      Admit Date:  2/8/2023      Subjective:   Mr. Robyn Mauricio is a 77yo male with chronic systolic CHF LVEF 62-61%, CAD and chronic atrial fibrillation who came for Impella assisted PCI to the RCA and LAD 2/8/23. The procedure went well and the Impella was explanted. Unfortunately, he developed mental status chnages after the procedure concerning for a potential CVA. Head CT/CTA was unremarkable though. He ultimately was intubated for airway protection. Interval History:  Lori Hand is on BiPAP ventilation. Overnight he had hypotension and is on 15 mcg/kg/min of norepinephrine. He is lethargic but does wake up to voice. Over the weekend he had been responding and answering questions appropriately. Rate controlled atrial fibrillation on telemetry. Objective:     /70   Pulse 71   Temp 97.4 °F (36.3 °C) (Axillary)   Resp 14   Ht 6' 1\" (1.854 m)   Wt 233 lb 14.5 oz (106.1 kg)   SpO2 100%   BMI 30.86 kg/m²      Intake/Output Summary (Last 24 hours) at 2/20/2023 1727  Last data filed at 2/20/2023 1718  Gross per 24 hour   Intake 1502.74 ml   Output 2850 ml   Net -1347.26 ml       Physical Exam:  General:  Lethargic, NAD  Skin:  Warm and dry  Neck:  JVD<8, no carotid bruits  Chest: Intubated. Diminished bilaterally in bases.   No rhonchi or rales  Cardiovascular:  Irreg irreg and mildly tachy, normal S1/S2, no M/R/G  Abdomen:  Soft, nontender, +bowel sounds  Extremities:  2+ bilateral LE edema  Pulses: 2+ bilat carotid    2+ bilat radial    2+ bilat femoral        Medications:    thiamine  100 mg IntraVENous Daily    aspirin  81 mg Oral Daily    ipratropium-albuterol  1 ampule Inhalation BID    amiodarone  400 mg Oral BID    famotidine (PEPCID) injection  20 mg IntraVENous Daily    piperacillin-tazobactam  3,375 mg IntraVENous Q12H    heparin (porcine)  5,000 Units SubCUTAneous 3 times per day    clopidogrel  75 mg Oral Daily    atorvastatin  80 mg Oral Nightly    chlorhexidine  15 mL Mouth/Throat BID    sodium chloride flush  5-40 mL IntraVENous 2 times per day      norepinephrine 11 mcg/min (02/20/23 1718)    sodium chloride Stopped (02/20/23 1228)       Lab Data:  CBC:   Recent Labs     02/18/23  0510 02/19/23  0600 02/20/23  0445   WBC 17.6* 14.8* 15.7*   HGB 10.6* 10.1* 10.5*   PLT 97* 89* 132*     BMP:    Recent Labs     02/18/23  0510 02/19/23  0600 02/20/23  0445   * 132* 138   K 4.4 4.3 4.5   CO2 25 25 25   BUN 79* 83* 136*   CREATININE 2.6* 2.9* 4.0*       FASTING LIPID PANEL:  Lab Results   Component Value Date/Time    CHOL 84 02/09/2023 03:46 AM    HDL 34 02/09/2023 03:46 AM    TRIG 67 02/17/2023 05:36 AM     LHC/PCI 2/8/23:  1. Right-dominant coronary arterial circulation. Successful  intervention to 90% lesion and 99% more distal RCA lesion with a 3 mm  overlapping drug-eluting stents dilated up to 3.2 mm proximally and 3.1  mm distally, resulting in 0% residual stenosis and SANTO III flow in the  vessel. There was reversal of flow to antegrade in the dominant right  coronary artery. The PDA had been fed by collaterals. In the left  system, there is trivial left main plaque disease. The LAD had an 80%  mid lesion that was treated with a 3.5 x 22 mm Edvin frontier  drug-eluting stent dilated to 3.6 mm in diameter resulting in 0%  residual stenosis and SANTO III flow. The circumflex has a chronic total  occlusion in the midportion but the distal vessel fills in from  collaterals. 2.  Left ventricular end-diastolic pressure 21 mmHg. 3.  No gradient across the aortic valve on pullback to suggest aortic  stenosis. PROCEDURES PERFORMED:  1. Left heart catheterization. 2.  Percutaneous coronary intervention with drug-eluting stents to the  dominant right coronary artery. 3.  Percutaneous coronary intervention with drug-eluting stent to the  mid left anterior descending artery.   4.  Insertion of short-term external heart assist system into heart,  percutaneous approach. 5.  Removal of short-term external heart assist system from heart,  percutaneous approach for assistance with cardiac output using impeller  pump, continuous. Stress Perfusion 1/29/23:  Pharmacological Stress/MPI Results:  1. Technically a satisfactory study. 2. Large mostly dense perfusion defect involving lateral and inferolateral wall suggestive of previous MI. No reversibility /ischemia  3. Gated Study shows markedly dilated LV with lateral and inferolateral akinesis; EF 29 %. Echo 1/5/23:  Poor image quality. Definity contrast administered. Patient appears to be in atrial fibrillation. Overall left ventricular systolic function appears severely reduced. Ejection fraction is visually estimated to be 20-25% with diffuse  hypokinesis and regional abnormalities including inferior akinesis. Normal left ventricular wall thickness and cavity size. The right ventricle appears normal in size with severely reduced systolic function. The left and right atria are moderately dilated. Possibly severe mitral regurgitation. The aortic valve leaflets are not well visualized. Mild aortic stenosis based on a peak velocity of 2.6 m/s and a mean pressure gradient  of 20 mmHg. Gradients may be underestimated with reduced LV function. Trivial aortic regurgitation. Trivial pericardial effusion. IVC size is dilated (>2.1 cm) and collapses < 50% with respiration consistent with elevated RA pressure (15 mmHg). Brain MRI 2/14/23:  Small acute right anterior and posterior external watershed infarcts. No acute hemorrhage or significant mass effect. Assessment / Plan: 1. Ventricular Tachycardia  He had some repeat NSVT on telemetry. Rebolus amiodarone and start drip. Hold oral amiodarone. LVEF 35%. 2.Acute on chronic systolic CHF, class 4  LVEF ~35 by repeat echocardiogram.  He appears more euvolemic at present  Hold beta-blockade given hypotension.   Hold nephrotoxic agents such as Aldactone ACE inhibitors or ARB's while patient has acute renal failure and is on CRRT. 3.Unstable Angina  S/p PCI to the LAD and the RCA on 2/8/23. Continue DAPT with aspirin and clopidogrel. Will need to crush in apple sauce until tolerating more oral intake. Hold beta blockade. Continue statin therapy. 4.Encephalopathy   Neurology followed but no further Recs. I do not know what explains his mental status changes. 5.Aspiration Pneumonia  S/p Bronchoscopy 2/14/23. Improved oxygenation and aeration on chest X-ray. Continue antibiotics. I had an extensive discussion with his daughter Sara Mehta. We agreed that he will be DNR/DNI with no chest compressions or defibrillation shocks.           Signed:  Loren Odonnell MD

## 2023-02-20 NOTE — PROGRESS NOTES
Patient continues to be somnolent, remains on BiPAP. Tube feed stopped related to aspiration risk. ABG analyzed as below; Latest Reference Range & Units 2/20/23 03:57   pH, Arterial 7.350 - 7.450  7.269 (L)   pCO2, Arterial 35.0 - 45.0 mm Hg 56.5 (H)   pO2, Arterial 75.0 - 108.0 mm Hg 72.2 (L)   HCO3, Arterial 21.0 - 29.0 mmol/L 25.9   TCO2 (calc), Art Not Established mmol/L 28   Base Excess, Arterial -3 - 3  -1   O2 Sat, Arterial 93 - 100 % 92 (L)   (L): Data is abnormally low  (H): Data is abnormally high  Patient becoming bradycardic with more frequent ventricular actopy on monitor, & SBP now in 70's. Urgent page to Hospitalist; start Levophed for MAP goal >65. Pulmonology notified of ABG result, no new orders at this time. Call placed to daughter, Paddy Blackburn, informed of current status & treatment plan, will notify again if patient condition deteriorates further.

## 2023-02-20 NOTE — PROGRESS NOTES
4 Eyes Skin Assessment     NAME:  Cata Fisher  YOB: 1945  MEDICAL RECORD NUMBER:  [id number]    The patient is being assessed for  Shift Handoff    I agree that One RN has performed a thorough Head to Toe Skin Assessment on the patient. ALL assessment sites listed below have been assessed. Areas assessed by both nurses:    Head, Face, Ears, Shoulders, Back, Chest, Arms, Elbows, Hands, Sacrum. Buttock, Coccyx, Ischium, and Legs. Feet and Heels        Does the Patient have a Wound?  No noted wound(s)       Tera Prevention initiated by RN: Yes   Wound Care Orders initiated by RN: Yes    Pressure Injury (Stage 3,4, Unstageable, DTI, NWPT, and Complex wounds) if present, place referral order by RN under : No    New and Established Ostomies, if present place, referral order under : No      Nurse 1 eSignature: Electronically signed by Evangelina Bishop RN on 2/20/23 at 4:26 PM EST    **SHARE this note so that the co-signing nurse can place an eSignature**    Nurse 2 eSignature: Electronically signed by Vida Savage RN on 2/20/23 at 7:21 PM EST

## 2023-02-20 NOTE — PROGRESS NOTES
[facility]                                               Progress note    Summary:   Orion Ko is being seen by nephrology for GISSELLE. Admitted with shortness of breath. Patient was admitted to the ICU on 2/8/2023. Prior to this on Wednesday he had a heart cath procedure with required Impella support. There was concern for possible stroke afterwards. Later he had not episode of unresponsiveness, cyanotic with ventricular tachycardia and required intubation for airway protection. Later was reintubated for bronchoscopy. Interval History  Seen  at bedside  Decompensated last night, hypotensive, hypoxic, hypercapnic  Back on levo  /59 today  480 mL UO yesterday, a little better with IV lasix 80 mg yesterday  BUN up to 136 again today. Plan:   HD today for solute and volume management  HD complicated by hypotension, levo support increased for volume removal. Tolertaed 2 L UF  No signs of renal recovery so far  Severe azotemia, rapidly building up inbetween treatments. Discussed with his family present at bedside today that if he remains hypotensive over the next few days we might have to resume CRRT. They are agreeable to CRRT despite the code status changes that occurred today. Kwabena Romero MD  [facility]  Office: [phone]    Assessment:   #GISSELLE, oliguric  Likely due to hemodynamic mediated GISSELLE resulting in ATN. He was severely hypotensive requiring high-dose of pressors, was intubated with hypoxic respiratory failure and subsequently developed renal failure. He had a few doses of IV contrast 1 on 1/30/2023 and 2/8/2023. Etiology of his hypotension is unclear at this point  Urinalysis after catheter being placed showed dark yellow urine specific gravity 1.074,  WBC 39 present used 100 mg/dL of albumin  Hold off on further testing of GISSELLE at this time. Required CRRT initially.    First HD was 2/14/23    #Hyperkalemia  Due to renal failure, oliguric    #Metabolic acidosis  Due to renal failure  Addressed with dialysis    #Shock  Etiology unclear, cultures are negative so does not peer to have any source of sepsis. Procalcitonin was only mildly elevated 0.61  Discussed with cardiology about possible [place] to see how his cardiac index is. His EF 35%      ROS:   Positives Listed Bold. All other remaining systems are negative. Constitutional:  fever, chills, weakness, weight change, fatigue,      Skin:  rash, pruritus, hair loss, bruising, dry skin, petechiae. Head, Face, Neck   headaches, swelling,  cervical adenopathy. Respiratory: shortness of breath, cough, or wheezing  Cardiovascular: chest pain, palpitations, dizzy, edema  Gastrointestinal: nausea, vomiting, diarrhea, constipation,belly pain    Yellow skin, blood in stool  Musculoskeletal:  back pain, muscle weakness, gait problems,       joint pain or swelling. Genitourinary:  dysuria, poor urine flow, flank pain, blood in urine  Neurologic:  vertigo, TIA'S, syncope, seizures, focal weakness  Psychosocial:  insomnia, anxiety, or depression. Additional positive findings: -      PMH:   Past medical history, surgical history, social history, family history are reviewed and updated as appropriate. Reviewed current medication list.   Allergies reviewed and updated as needed. PE:   Vitals:    02/20/23 1434   BP:    Pulse:    Resp: 12   Temp:    SpO2:        General appearance:  in NAD, somnolent. Comfortable. HEENT: EOM intact, no icterus. Trachea is midline. Neck : No masses, appears symmetrical, no JVD  Respiratory: Respiratory effort appears normal, bilateral equal chest rise, no wheeze  Cardiovascular: Ausculation shows RRR ++ edema  Abdomen: No visible mass or tenderness, non distended. Musculoskeletal:  Joints with no swelling or deformity. Skin:no rashes, ulcers, induration, no jaundice. Neuro: face symmetric, no focal deficits.        Lab Results   Component Value Date CREATININE 4.0 (H) 02/20/2023     (HH) 02/20/2023     02/20/2023    K 4.5 02/20/2023    CL 98 (L) 02/20/2023    CO2 25 02/20/2023      Lab Results   Component Value Date    WBC 15.7 (H) 02/20/2023    HGB 10.5 (L) 02/20/2023    HCT 32.4 (L) 02/20/2023    MCV 95.4 02/20/2023     (L) 02/20/2023     Lab Results   Component Value Date    CALCIUM 9.2 02/20/2023    CAION 1.37 (H) 02/14/2023    PHOS 5.3 (H) 02/20/2023

## 2023-02-20 NOTE — PROGRESS NOTES
4 Eyes Skin Assessment     NAME:  Jimy Turcios  YOB: 1945  MEDICAL RECORD NUMBER:  [id number]    The patient is being assessed for  Shift Handoff    I agree that One RN has performed a thorough Head to Toe Skin Assessment on the patient. ALL assessment sites listed below have been assessed. Areas assessed by both nurses:    Head, Face, Ears, Shoulders, Back, Chest, Arms, Elbows, Hands, Sacrum. Buttock, Coccyx, Ischium, and Legs. Feet and Heels        Does the Patient have a Wound?  No noted wound(s)       Tera Prevention initiated by RN: Yes   Wound Care Orders initiated by RN: NA    Pressure Injury (Stage 3,4, Unstageable, DTI, NWPT, and Complex wounds) if present, place referral order by RN under : NA    New and Established Ostomies, if present place, referral order under : NA      Nurse 1 eSignature: Electronically signed by Sean Quiroga RN on 2/20/23 at 7:23 AM EST    **SHARE this note so that the co-signing nurse can place an eSignature**    Nurse 2 eSignature: Electronically signed by Dwayne Rodriguez RN on 2/20/23 at 4:23 PM EST

## 2023-02-20 NOTE — CARE COORDINATION
Chart Reviewed. To start New HD, will need pt/ot therapy evals. Call placed to D[facility], [facility], to review. Emailed a list of SNF agenices with /without in house dialysis. Provided education regarding CMS star rating of each facility. She reports she lives in [place] on the [facility] and she is an only child. He lives on the [address] [place] [address]. Pointed out to her the SNF agencies that have in house HD: [facility], [address] [facility] and [place] [address]. She will review list and call me back. The Plan for Transition of Care is related to the following treatment goals: to continue his progress in personal care and ambulation in SNF setting taking in consideration for need for transportation to/from SNF. The Patient daughter, [facility],  was provided with a choice of provider and agrees   with the discharge plan. [x] Yes [] No    Freedom of choice list was provided with basic dialogue that supports the patient's individualized plan of care/goals, treatment preferences and shares the quality data associated with the providers. [x] Yes [] No  Vaishnavi Montilla     Case Management   [phone]    2/20/2023  10:31 AM    Emailed list to [facility] at:    [email or web address] Vaishnavi Pinto     Case Management   [phone]    2/20/2023  10:32 AM

## 2023-02-20 NOTE — PROGRESS NOTES
Speech Language Pathology    2:10pm - re-attempt for dysphagia tx. Per RN, Radha Gallardo, pt remains on bi-pap at this time. Will hold SLP today and re-attempt t+1.    8:43am - Dysphagia tx attempted. Per RN, pt declined overnight and is on bi-pap. ST to re-attempt as schedule permits unless otherwise notified. Thank you.   Mireya Garcia MA [facility] #[id number]  Speech-Language Pathologist  02/20/23  2:10pm

## 2023-02-21 NOTE — PROGRESS NOTES
Pulmonary Progress Note    Date of Admission: 2/8/2023   LOS: 13 days       CC:  No chief complaint on file. Subjective:  Bipap yesterday with confusion. Adjusted with hypercapnia improved. Yesterday blood pressure decreased after bipap and restarted on levo and continues at 15. ROS:   No nausea  No Vomiting  No chest pain       Assessment:          Plan: This note may have been transcribed using [id number] [employer]. Please disregard any translational errors. Hospital Day: 13     Acute hypercapnia   AVAPS with rate 4, target Vt 700. Use as needed. Try NC today   Was on NC last night and placed on bipap over night 2/2 work of breathing    Wean O2 to sat >90%   Consider Cheyne-Shaikh       Hypertension/hypotension/shock  Levophed      Aspiration pneumonia  Status post bronchoscopy  Completed antibiotics  Repeat chest x-ray    Pleural effusion , right  Chronic since at least 2019  Appears loculated on CT chest and US. Neuro  Small acute CVA  Decreased mental status with elevated BUN and hypercapnia  CO2 improved with AVAPS / bipap  BUN improved with HD but still 111. Cardiovascular  Unstable angina status post PCI to LAD and RCA 2/8  EF approximately 30%  Bradycardia  Cardiology following  DAPT, aspirin, Plavix  Amiodarone, Toprol-XL    End-stage renal disease  HD yesterday   -7.9L      Nutrition  - Diet NPO  ADULT TUBE FEEDING; Nasogastric; Renal Formula; Continuous; 10; Yes; 10; Q 4 hours; 30; 30; Q 4 hours; Protein; 4 boluses Proteinex daily via syringe. Flush with 30 mL H20 before and after. Do NOT mix directly with the tube feeding formula.      -   Intake/Output Summary (Last 24 hours) at 2/21/2023 0725  Last data filed at 2/21/2023 0615  Gross per 24 hour   Intake 1008.07 ml   Output 2652 ml   Net -1643.93 ml         Mobility      Access  Arterial      PICC      PICC Double Lumen 99/56/79 Right Cephalic (Active)   Central Line Being Utilized Yes 02/19/23 0600 Criteria for Appropriate Use Limited/no vessel suitable for conventional peripheral access 02/21/23 0600   Site Assessment Clean;Dry; Intact 02/21/23 0600   Phlebitis Assessment No symptoms 02/21/23 0600   Infiltration Assessment 0 02/20/23 0600   Extremity Circumference (cm) 35 cm 02/19/23 0800   External Catheter Length (cm) 0 cm 02/20/23 0600   Proximal Lumen Color/Status Red; Intermittent infusions 02/21/23 0600   Distal Lumen Color/Status Purple; Infusing 02/21/23 0600   Line Care Connections checked and tightened 02/21/23 0600   Alcohol Cap Used Yes 02/21/23 0600   Date of Last Dressing Change 02/15/23 02/21/23 0600   Dressing Type Transparent;Transparent w/CHG gel 02/21/23 0600   Dressing Status Clean, dry & intact 02/21/23 0600   Dressing Intervention New;Dressing changed;Security device changed 02/15/23 0800   Number of days: 12        CVC               D/w nurse. Data:        PHYSICAL EXAM:   Blood pressure (!) 104/57, pulse 95, temperature 98.2 °F (36.8 °C), temperature source Oral, resp. rate 13, height 6' 1\" (1.854 m), weight 234 lb 9.1 oz (106.4 kg), SpO2 98 %.'  Body mass index is 30.95 kg/m². Gen: No distress. ENT:   Resp: No accessory muscle use. No crackles. No wheezes. No rhonchi. CV: Regular rate. Regular rhythm. No murmur or rub. No edema. Skin: Warm, dry, normal texture and turgor. No nodule on exposed extremities. M/S: No cyanosis. No clubbing. No joint deformity. Psych: Currently on BiPAP. Sleeping. Decreased mental status as evident by not waking to alarm.     Medications:    Scheduled Meds:   thiamine  100 mg IntraVENous Daily    aspirin  81 mg Oral Daily    ipratropium-albuterol  1 ampule Inhalation BID    amiodarone  400 mg Oral BID    famotidine (PEPCID) injection  20 mg IntraVENous Daily    heparin (porcine)  5,000 Units SubCUTAneous 3 times per day    clopidogrel  75 mg Oral Daily    atorvastatin  80 mg Oral Nightly    chlorhexidine  15 mL Mouth/Throat BID sodium chloride flush  5-40 mL IntraVENous 2 times per day       Continuous Infusions:   norepinephrine 15 mcg/min (02/20/23 2144)    sodium chloride 100 mL/hr at 02/20/23 2144       PRN Meds:  midodrine, heparin (porcine), hydrALAZINE, ipratropium-albuterol, acetaminophen **OR** acetaminophen, ondansetron **OR** ondansetron, sodium chloride flush, sodium chloride, naloxone    Labs reviewed:  CBC:   Recent Labs     02/19/23  0600 02/20/23  0445 02/21/23  0510   WBC 14.8* 15.7* 15.6*   HGB 10.1* 10.5* 10.1*   HCT 30.8* 32.4* 31.5*   MCV 94.8 95.4 95.0   PLT 89* 132* 110*       BMP:   Recent Labs     02/19/23  0600 02/20/23  0445 02/21/23  0510   * 138 135*   K 4.3 4.5 4.1   CL 96* 98* 99   CO2 25 25 21   PHOS 3.7 5.3* 2.8   BUN 83* 136* 111*   CREATININE 2.9* 4.0* 3.7*       LIVER PROFILE: No results for input(s): AST, ALT, LIPASE, BILIDIR, BILITOT, ALKPHOS in the last 72 hours. Invalid input(s): AMYLASE,  ALB  PT/INR: No results for input(s): PROTIME, INR in the last 72 hours. APTT: No results for input(s): APTT in the last 72 hours. UA:No results for input(s): NITRITE, COLORU, PHUR, LABCAST, WBCUA, RBCUA, MUCUS, TRICHOMONAS, YEAST, BACTERIA, CLARITYU, SPECGRAV, LEUKOCYTESUR, UROBILINOGEN, BILIRUBINUR, BLOODU, GLUCOSEU, AMORPHOUS in the last 72 hours. Invalid input(s): James Purl  No results for input(s): PH, PCO2, PO2 in the last 72 hours. Cx:      Films:  Radiology Review:  Pertinent images / reports were reviewed as a part of this visit. CT Chest w/ contrast: Results for orders placed during the hospital encounter of 10/07/18    CT CHEST W CONTRAST    Narrative  EXAMINATION:  CT OF THE CHEST WITH CONTRAST 10/8/2018 9:34 am    TECHNIQUE:  CT of the chest was performed with the administration of intravenous  contrast. Multiplanar reformatted images are provided for review.  Dose  modulation, iterative reconstruction, and/or weight based adjustment of the  mA/kV was utilized to reduce the radiation dose to as low as reasonably  achievable. COMPARISON:  Chest radiograph dated 10/07/2018    HISTORY:  ORDERING SYSTEM PROVIDED HISTORY: PNEUMONIA, UNRESOLVED  TECHNOLOGIST PROVIDED HISTORY:  Ordering Physician Provided Reason for Exam: fu pn a,  sob,  Acuity: Chronic  Type of Exam: Subsequent/Follow-up    FINDINGS:  Mediastinum: Calcification of the thoracic aorta. Coronary artery  calcification. AP window lymph node is approximately 1.8 cm in short axis. Additional shotty mediastinal lymph nodes are seen. Thyroid is symmetric in  size. No axillary adenopathy. Pericardial calcification posteriorly. Lungs/pleura: Small to moderate bilateral pleural effusions. There is dense  consolidation within the lateral right upper lobe with sub solid character,  compatible with that opacity on recent radiograph. Additional heterogeneous  and ground-glass opacity is seen bilaterally. Sub solid nodular  consolidation is seen within the left upper and lower lobes. Bilateral  bronchial thickening is demonstrated. No evidence of pneumothorax. Upper Abdomen: Liver is fatty. Calcified granulomas within the liver. No  acute process within the upper-most abdomen. Soft Tissues/Bones: DISH of the thoracic spine. Impression  Multifocal bilateral consolidation, greatest within the lateral right upper  lobe, compatible with given patient history of pneumonia. Mild mediastinal  adenopathy. Small to moderate bilateral pleural effusions. Follow-up to  resolution recommended. CT Chest w/o contrast: Results for orders placed during the hospital encounter of 12/06/22    CT CHEST WO CONTRAST    Narrative  EXAMINATION:  CT OF THE CHEST WITHOUT CONTRAST 12/6/2022 2:23 pm    TECHNIQUE:  CT of the chest was performed without the administration of intravenous  contrast. Multiplanar reformatted images are provided for review.  Automated  exposure control, iterative reconstruction, and/or weight based adjustment of  the mA/kV was utilized to reduce the radiation dose to as low as reasonably  achievable. COMPARISON:  01/24/2022 CT and recent radiograph 12/06/2022    HISTORY:  ORDERING SYSTEM PROVIDED HISTORY: Abnormal chest x-ray  TECHNOLOGIST PROVIDED HISTORY:  Reason for exam:->Abnormal chest x-ray  Decision Support Exception - unselect if not a suspected or confirmed  emergency medical condition->Emergency Medical Condition (MA)  Reason for Exam: Abnormal chest x-ray    FINDINGS:  Mediastinum: The heart is enlarged. Chronic calcification of the pericardium  is stable. There is mild atherosclerotic calcification of the coronary  arteries. Aorta and pulmonary arteries are normal in caliber. Thyroid and  esophagus normal.  Progressive pattern of mediastinal lymphadenopathy. Increase in number of shotty subcentimeter lymph nodes. An index left  paratracheal node is more prominent measuring 18 mm. Lungs/pleura: Airways are patent. Dense peribronchial thickening in the  right upper, middle and lower lobes with chronic pleural thickening and  chronic loculated pleural effusion in the right chest.  The overall volume of  fluid is similar to the prior CT although the degree of loculation appears  increased. There is near complete opacification of the right middle and  lower lobes likely a combination of atelectasis and airspace change. Trace  left pleural effusion with mild ground-glass opacities in the lingula and  left lower lobe. Upper Abdomen: Adrenal glands are normal.  No significant findings in the  visualized upper abdomen. Soft Tissues/Bones: No skeletal abnormalities are appreciated within the  chest.    Impression  1. Chronic loculated right pleural effusion with similar volume to prior CT  01/24/2022.  2. Worsening atelectasis and airspace opacification in the right middle and  lower lobes. Chronic atelectasis or fibrotic change is suspected.   Chronic  infectious or inflammatory process also suspected due to progression. 3. There is also a new small left pleural effusion with mild ground-glass  opacification on the left. Asymmetric edema or pneumonitis may be considered. 4. Worsening mediastinal lymphadenopathy. 5. Cardiomegaly with calcification of pericardium suggesting a prior history  of pericarditis. Stable appearance from prior exam.      CTPA: No results found for this or any previous visit. CXR PA/LAT: Results for orders placed during the hospital encounter of 01/19/23    XR CHEST (2 VW)    Narrative  EXAMINATION:  TWO XRAY VIEWS OF THE CHEST    1/19/2023 11:35 pm    COMPARISON:  Chest radiograph performed 01/06/2023. HISTORY:  ORDERING SYSTEM PROVIDED HISTORY: SOB  TECHNOLOGIST PROVIDED HISTORY:  Reason for exam:->SOB  Reason for Exam: very sob    FINDINGS:  There is a right basilar effusion with adjacent infiltrate. There is  underlying chronic pulmonary change. There is no pneumothorax. The heart is  enlarged. The upper abdomen is unremarkable. The extrathoracic soft tissues  are unremarkable. Impression  Cardiomegaly with right basilar effusion and adjacent right basilar  infiltrate representing atelectasis versus pneumonia. CXR portable: Results for orders placed during the hospital encounter of 02/08/23    XR CHEST PORTABLE    Narrative  EXAMINATION:  ONE XRAY VIEW OF THE CHEST    2/19/2023 12:40 pm    COMPARISON:  02/18/2023. HISTORY:  ORDERING SYSTEM PROVIDED HISTORY: NG placement  TECHNOLOGIST PROVIDED HISTORY:  Reason for exam:->NG placement  Reason for Exam: NG    FINDINGS:  Evaluation is limited due to multiple wires and tubings overlying chest.  There is right-sided PICC line with the tip overlying the cavoatrial  junction. There is redemonstration of right IJ central venous catheter,  appearing similar to the prior study. Previously seen feeding tube is seen  with the tip below the diaphragm overlying the left upper abdomen, likely  within the body of the stomach. There is new additional tubing. The  superior portion of this tubing is collimated from the view. This tubing  extends inferiorly along the soft tissues of the neck overlying superior  midportion of the chest.  The distal portion of the tubing is coursing  superiorly to the left mid abdomen with the tip overlying the left perihilar  region. On the 2nd and the 3rd images this was not visualized. The cardiac silhouette is magnified and likely enlarged. There is  redemonstration opacity of the right lung base, likely reflecting layering  pleural effusion with adjacent atelectasis. No definite pneumothorax seen. Impression  There is new tubing coursing inferiorly overlying the soft tissues of the  neck and upper to mid chest with the tip overlying the left perihilar region  seen on the 1st image. If this is a new NG tube the tip is likely in the  left upper lobe airway and needs to be repositioned. Of note, this new  tubing is not visualized on the 2nd and the 3rd images and this may have been  already removed. Previously seen feeding tube with the tip likely within the body of the  stomach. Additional supporting devices as above    Redemonstration of hazy opacity of the right lower lung field, which may  reflect a combination of underlying pleural effusion with adjacent  atelectasis. Enlarged cardiac silhouette. Critical results were called by Dr. Funes Links to Dr. Melisa Abreu On 2/19/2023 at  14:01. This note was transcribed using [id number] [employer]. Please disregard any translational errors.       Jackie P[facility]rez [facility] [phone]

## 2023-02-21 NOTE — PLAN OF CARE
Problem: Chronic Conditions and Co-morbidities  Goal: Patient's chronic conditions and co-morbidity symptoms are monitored and maintained or improved  Outcome: Not Progressing  Flowsheets (Taken 2/21/2023 0537)  Care Plan - Patient's Chronic Conditions and Co-Morbidity Symptoms are Monitored and Maintained or Improved:   Monitor and assess patient's chronic conditions and comorbid symptoms for stability, deterioration, or improvement   Collaborate with multidisciplinary team to address chronic and comorbid conditions and prevent exacerbation or deterioration   Update acute care plan with appropriate goals if chronic or comorbid symptoms are exacerbated and prevent overall improvement and discharge     Problem: Discharge Planning  Goal: Discharge to home or other facility with appropriate resources  Outcome: Not Progressing  Flowsheets (Taken 2/21/2023 0537)  Discharge to home or other facility with appropriate resources:   Identify barriers to discharge with patient and caregiver   Refer to discharge planning if patient needs post-hospital services based on physician order or complex needs related to functional status, cognitive ability or social support system     Problem: Pain  Goal: Verbalizes/displays adequate comfort level or baseline comfort level  Outcome: Progressing  Flowsheets (Taken 2/21/2023 0537)  Verbalizes/displays adequate comfort level or baseline comfort level:   Encourage patient to monitor pain and request assistance   Assess pain using appropriate pain scale   Implement non-pharmacological measures as appropriate and evaluate response     Problem: Respiratory - Adult  Goal: Achieves optimal ventilation and oxygenation  Outcome: Not Progressing  Flowsheets (Taken 2/21/2023 0537)  Achieves optimal ventilation and oxygenation:   Assess for changes in respiratory status   Assess for changes in mentation and behavior   Position to facilitate oxygenation and minimize respiratory effort   Oxygen supplementation based on oxygen saturation or arterial blood gases   Assess the need for suctioning and aspirate as needed   Assess and instruct to report shortness of breath or any respiratory difficulty   Respiratory therapy support as indicated     Problem: Cardiovascular - Adult  Goal: Maintains optimal cardiac output and hemodynamic stability  Outcome: Not Progressing  Flowsheets (Taken 2/21/2023 0537)  Maintains optimal cardiac output and hemodynamic stability:   Monitor blood pressure and heart rate   Assess for signs of decreased cardiac output   Administer vasoactive medications as ordered  Note: Remains on Levo for hypotension  Goal: Absence of cardiac dysrhythmias or at baseline  Outcome: Not Progressing  Flowsheets (Taken 2/21/2023 0537)  Absence of cardiac dysrhythmias or at baseline:   Monitor cardiac rate and rhythm   Assess for signs of decreased cardiac output   Administer antiarrhythmia medication and electrolyte replacement as ordered  Note: Frequent ventricular ectopy despite antiarrhythmics       Problem: Metabolic/Fluid and Electrolytes - Adult  Goal: Electrolytes maintained within normal limits  Outcome: Progressing  Flowsheets (Taken 2/21/2023 0537)  Electrolytes maintained within normal limits:   Monitor labs and assess patient for signs and symptoms of electrolyte imbalances   Fluid restriction as ordered     Problem: Skin/Tissue Integrity - Adult  Goal: Skin integrity remains intact  Outcome: Progressing  Flowsheets (Taken 2/21/2023 0537)  Skin Integrity Remains Intact:   Monitor for areas of redness and/or skin breakdown   Assess vascular access sites hourly     Problem: Musculoskeletal - Adult  Goal: Return ADL status to a safe level of function  Outcome: Not Progressing  Flowsheets (Taken 2/21/2023 0537)  Return ADL Status to a Safe Level of Function:   Assess activities of daily living deficits and provide assistive devices as needed   Obtain physical therapy/occupational therapy consults as needed   Assist and instruct patient to increase activity and self care as tolerated     Problem: Gastrointestinal - Adult  Goal: Maintains adequate nutritional intake  Outcome: Progressing  Flowsheets (Taken 2/21/2023 0537)  Maintains adequate nutritional intake: Monitor intake and output, weight and lab values  Note: Tube feeds as NPO       Problem: Genitourinary - Adult  Goal: Urinary catheter remains patent  Outcome: Progressing  Flowsheets (Taken 2/21/2023 0537)  Urinary catheter remains patent: Assess patency of urinary catheter     Problem: Infection - Adult  Goal: Absence of infection during hospitalization  Outcome: Progressing  Absence of infection during hospitalization:   Assess and monitor for signs and symptoms of infection   Monitor lab/diagnostic results   Monitor all insertion sites i.e., indwelling lines, tubes and drains   Administer medications as ordered   Instruct and encourage patient and family to use good hand hygiene technique     Problem: Skin/Tissue Integrity  Goal: Absence of new skin breakdown  Description: 1. Monitor for areas of redness and/or skin breakdown  2. Assess vascular access sites hourly  3. Every 4-6 hours minimum:  Change oxygen saturation probe site  4. Every 4-6 hours:  If on nasal continuous positive airway pressure, respiratory therapy assess nares and determine need for appliance change or resting period.   Outcome: Progressing     Problem: Safety - Adult  Goal: Free from fall injury  Outcome: Progressing  Flowsheets (Taken 2/21/2023 0537)  Free From Fall Injury: Based on caregiver fall risk screen, instruct family/caregiver to ask for assistance with transferring infant if caregiver noted to have fall risk factors     Problem: ABCDS Injury Assessment  Goal: Absence of physical injury  Outcome: Progressing  Absence of Physical Injury: Implement safety measures based on patient assessment

## 2023-02-21 NOTE — PROGRESS NOTES
[facility]                                               Progress note    Summary:   Barb Sanon is being seen by nephrology for GISSELLE. Admitted with shortness of breath. Patient was admitted to the ICU on 2/8/2023. Prior to this on Wednesday he had a heart cath procedure with required Impella support. There was concern for possible stroke afterwards. Later he had not episode of unresponsiveness, cyanotic with ventricular tachycardia and required intubation for airway protection. Later was reintubated for bronchoscopy. Interval History  Seen at bedside  Doing better with BiPAP  Still on levo, 10   , did not get good quality dialysis yesterday  BP 98/50  252 mL urine output    Plan:   Pressors needs de-escalating today  Clinically volume status looks much better today, edema is much better, O2 needs much better. Will give him a break from HD today to allow his hemodynamics to get better and plan for HD tomorrow  Will run HD longer tomorrow to optimize his clearance and might consider daily HD for a few days to clear out uremic toxins. If he remains on pressors tomorrow might just resume CRRT. Roscoe Graves MD  [facility]  Office: [phone]    Assessment:   #GISSELLE, oliguric  Likely due to hemodynamic mediated GISSELLE resulting in ATN. He was severely hypotensive requiring high-dose of pressors, was intubated with hypoxic respiratory failure and subsequently developed renal failure. He had a few doses of IV contrast 1 on 1/30/2023 and 2/8/2023. Etiology of his hypotension is unclear at this point  Urinalysis after catheter being placed showed dark yellow urine specific gravity 1.074,  WBC 39 present used 100 mg/dL of albumin  Hold off on further testing of GISSELLE at this time. Required CRRT initially.    First HD was 2/14/23    #Hyperkalemia  Due to renal failure, oliguric    #Metabolic acidosis  Due to renal failure  Addressed with dialysis    #Shock  Etiology unclear, cultures are negative so does not peer to have any source of sepsis. Procalcitonin was only mildly elevated 0.61  Discussed with cardiology about possible Riki to see how his cardiac index is. His EF 35%      ROS:   Positives Listed Bold. All other remaining systems are negative. Constitutional:  fever, chills, weakness, weight change, fatigue,      Skin:  rash, pruritus, hair loss, bruising, dry skin, petechiae. Head, Face, Neck   headaches, swelling,  cervical adenopathy. Respiratory: shortness of breath, cough, or wheezing  Cardiovascular: chest pain, palpitations, dizzy, edema  Gastrointestinal: nausea, vomiting, diarrhea, constipation,belly pain    Yellow skin, blood in stool  Musculoskeletal:  back pain, muscle weakness, gait problems,       joint pain or swelling. Genitourinary:  dysuria, poor urine flow, flank pain, blood in urine  Neurologic:  vertigo, TIA'S, syncope, seizures, focal weakness  Psychosocial:  insomnia, anxiety, or depression. Additional positive findings: -      PMH:   Past medical history, surgical history, social history, family history are reviewed and updated as appropriate. Reviewed current medication list.   Allergies reviewed and updated as needed. PE:   Vitals:    02/21/23 0845   BP: (!) 98/50   Pulse: 86   Resp: 14   Temp:    SpO2: 99%       General appearance:  in NAD, somnolent. Comfortable. HEENT: EOM intact, no icterus. Trachea is midline. Neck : No masses, appears symmetrical, no JVD  Respiratory: Respiratory effort appears normal, bilateral equal chest rise, no wheeze  Cardiovascular: Ausculation shows RRR ++ edema  Abdomen: No visible mass or tenderness, non distended. Musculoskeletal:  Joints with no swelling or deformity. Skin:no rashes, ulcers, induration, no jaundice. Neuro: face symmetric, no focal deficits.        Lab Results   Component Value Date    CREATININE 3.7 (H) 02/21/2023     (HH) 02/21/2023     (L) 02/21/2023 K 4.1 02/21/2023    CL 99 02/21/2023    CO2 21 02/21/2023      Lab Results   Component Value Date    WBC 15.6 (H) 02/21/2023    HGB 10.1 (L) 02/21/2023    HCT 31.5 (L) 02/21/2023    MCV 95.0 02/21/2023     (L) 02/21/2023     Lab Results   Component Value Date    CALCIUM 9.2 02/21/2023    CAION 1.37 (H) 02/14/2023    PHOS 2.8 02/21/2023

## 2023-02-21 NOTE — PROGRESS NOTES
4 Eyes Skin Assessment     NAME:  Mack Pratt  YOB: 1945  MEDICAL RECORD NUMBER:  [id number]    The patient is being assessed for  Shift Handoff    I agree that One RN has performed a thorough Head to Toe Skin Assessment on the patient. ALL assessment sites listed below have been assessed. Areas assessed by both nurses:    Head, Face, Ears, Shoulders, Back, Chest, Arms, Elbows, Hands, Sacrum. Buttock, Coccyx, Ischium, and Legs. Feet and Heels        Does the Patient have a Wound?  No noted wound(s)       Tera Prevention initiated by RN: Yes   Wound Care Orders initiated by RN: NA    Pressure Injury (Stage 3,4, Unstageable, DTI, NWPT, and Complex wounds) if present, place referral order by RN under : NA    New and Established Ostomies, if present place, referral order under : NA      Nurse 1 eSignature: Electronically signed by Catherine Garner RN on 2/21/23 at 7:20 AM EST    **SHARE this note so that the co-signing nurse can place an eSignature**    Nurse 2 eSignature: Electronically signed by Matt Marrufo RN on 2/21/23 at 7:21 AM EST

## 2023-02-21 NOTE — PROGRESS NOTES
[facility]   Speech Therapy  Daily Dysphagia Treatment Note        Nisa Aj  AGE: 69 y.o. GENDER: male  : 1945  [id number]  EPISODE DATE:  2023  Patient Active Problem List   Diagnosis    Pneumonia of right lung due to infectious organism    Acute combined systolic and diastolic HF (heart failure) (HCC)    Persistent atrial fibrillation (HCC)    GISSELLE (acute kidney injury) ([address]    Primary hypertension    Acute on chronic systolic CHF (congestive heart failure), NYHA class 4 (HCC)    Paroxysmal atrial fibrillation (HCC)    Aortic atherosclerosis ([address]    Morbid obesity with BMI of 40.0-44.9, adult ([address]    Cerebrovascular accident (CVA) ([address]    Pleural effusion, right    Lung nodules    Pleural effusion on right    Chronic combined systolic and diastolic CHF (congestive heart failure) (HCC)    Transaminitis    Abnormal CXR    Shortness of breath    Cardiomyopathy ([address]    Acute on chronic congestive heart failure (HCC)    Stage 3b chronic kidney disease (HCC)    Dyspnea    Abnormal cardiovascular stress test    AMS (altered mental status)    Acute respiratory failure with hypoxia and hypercapnia (HCC)    Hemoptysis    Acute and chronic respiratory failure with hypercapnia (HCC)    Cardiogenic shock (HCC)    Unstable angina (HCC)    Ventricular tachycardia    Mucus plug in respiratory tract     Allergies   Allergen Reactions    Spironolactone      Treatment Diagnosis: Dysphagia     Chart review:   Admit 2023 with AMS   HISTORY OF PRESENT ILLNESS PER MD H&P:  The patient is a 69 y.o. male with a PMH of atrial fibrillation with chronic anticoagulation on Eliquis chronic systolic CHF hypertension history of coronary artery disease and CVA. Patient has recently been treated for pneumonia as an outpatient. Patient was brought to the hospital for Impella assisted [facility]. Patient developed mental status change on removal of the Impella. Code stroke was called.   Patient also developed significant respiratory distress for which patient was intubated. Medical team is consulted for management of any medical issues, due to patient's multiple medical problems. 2/8/2023 intubated; extubated 2/9/2023  JOSE JUAN 2/10/2023 with NPO recommendations  Bronch 2/14/2023  Re- intubated 2/14/2023; extubated 2/17/2023  Consultations included Neurology; Pulmonology; Nephrology; Palliative Care  Past Medical History  has a past medical history of Arrhythmia, Cellulitis (06/2021), Cerebral artery occlusion with cerebral infarction [facility]) (03/2019), CHF (congestive heart failure) [address] [address] [facility] Chronic kidney disease, Gout (2019), HLD (hyperlipidemia), Hypertension, Obesity, Pleural effusion (2018), and Sciatica. Current Diet Level: NPO with nutrition/hydration via NG tube feeding   Respiratory Status:   []Room Air              [x]O2 via nasal cannula recently weaned to 4L/min O2 via nasal cannula, was on bi-pap all day yesterday. []Other:     Imaging:  Chest X-ray: 2/19/2023  Impression   There is new tubing coursing inferiorly overlying the soft tissues of the   neck and upper to mid chest with the tip overlying the left perihilar region   seen on the 1st image. If this is a new NG tube the tip is likely in the   left upper lobe airway and needs to be repositioned. Of note, this new   tubing is not visualized on the 2nd and the 3rd images and this may have been   already removed. Previously seen feeding tube with the tip likely within the body of the   stomach. Additional supporting devices as above       Redemonstration of hazy opacity of the right lower lung field, which may   reflect a combination of underlying pleural effusion with adjacent   atelectasis. Enlarged cardiac silhouette. Chest X-ray:  2/15/2023  Impression   1. Stable appropriate positions of support apparatus.    2. Interval decrease in size of the patient's right pleural effusion, now   moderate in size, with improved aeration of the mid and upper right lung   zones. There remains right basilar airspace consolidation, most likely   passive atelectasis, less likely aspiration or pneumonia. 3. Slight interval improvement in appearance of moderate pulmonary edema. 4. Stable cardiomegaly. MRI: 2/14/2023  Impression   Small acute right anterior and posterior external watershed infarcts. No acute hemorrhage or significant mass effect. Subjective:     Current diet: NPO with NG tube  Comments regarding tolerating Current Diet: per nurse, pt requesting water    Objective:     Pain: none reported  Cognitive/Behavior: awake, verbal, requesting water. Oriented to self, not oriented to place or time. Daughter at bedside. Positioning: upright in bed, approx 80 degrees. Difficulty with holding head upright in neutral position with leaning to right. PO Trials: Thin Liquids; single ice chips/single spoon sips of thin water only: suspect high risk for posterior loss to pharynx, delayed initiation, reduced laryngeal elevation; overt cough with initial spoon sip water; anterior spillage of all other spoon sip attempts of water; no overt s/s aspiration with single ice chip trials  Nectar thick liquids; DNT  Honey Thick liquids; DNT  Puree; DNT  Soft food; DNT  Regular food; DNT    Dysphagia Tx:   Direct Dysphagia tx: PO trials tolerated as described above  Dysphagia ex: effortful swallow exercises completed x8 with max cues and suspected reduced effort; lingual press exercises x10 for protrusion with max cues to increase effort; attempted lingual elevation against resistance, however pt unable to accurately complete exercise. Training in compensatory strategies; reviewed strategies for effortful swallow, upright position with PO trials  Pt response to ex/training: no evidence of comprehension; max cues throughout tx.      Goals:   Pt will demonstrate improved labial/buccal/lingual rom/coordination for improved simulated bolus control via sensory stimulation  and/or therapy po trials without anterior loss and with <minimal oral residue, continue  Pt will demonstrate improved swallow response via laryngeal/pharyngeal ex and/or during sensory stimulation with therapy trials of varied thin and thick H20 x 10 without overt clinical s/s of aspiration, continue  Pt will participate in MBSS assessment for more objective assessment of pharyngeal phase of the swallow with modification of tx poc or advancement to po diet, continue to assess for readiness    Assessment:   Impressions:   Diagnosis: Oropharyngeal Dysphagia   Pt awake, verbal, agreeable to tx. Reduced endurance and reduced cognition negatively impacted ability during tx. Moderate to severe zeferino-pharyngeal dysphagia cahracterized by reduced lingual control, anterior spillage with spoon sips water, suspect high risk for posterior loss of bolus to pharynx. Subjectively delayed initiation and reduced laryngeal elevation. Suspect reduced pharyngeal clearance evidenced by multiple swallows per bolus. Overt, weak cough noted with initial sip of thin via spoon. No s/s aspiration observed with single ice chip trials. Max cues for utilization of strategies and completion of lingual and pharyngeal strengthening exercises. Reduced endurance, reduced cognition and reduced effort negatively impacted pt performance. Recommend continue NPO that this time with single ice chips for comfort only. Continue with regular oral care 2-3x/day. SLP will continue for ongoing dysphagia tx. Diet Recommendations:  Solids: NPO  Liquids: NPO  Medications: NPO    Education:  Provided education regarding role of SLP, results of assessment, recommendations and general speech pathology plan of care.    [] Pt verbalized understanding and agreement   [] Pt requires ongoing learning   [x] No evidence of comprehension     Prognosis: guarded  Barriers:  Medical diagnosis; medical co morbidities; generalized weakness and Oropharyngeal Dysphagia features at eval    Plan:     Continue Dysphagia Therapy: yes  Interventions: PO trials with SLP, OM exercises, pharyngeal exercises, ongoing education with pt/family, MBSS as indicated  Duration/Frequency of therapy while on unit:3-5x/week  Discharge Instructions:   Anticipate Yes__x__No__ for further skilled Speech Therapy for Dysphagia at discharge    This note serves as a D/C Summary in the event that this patient is discharged prior to the next therapy session.     Coded treatment time: 0  Total treatment time: 23    Time in: 7921  Time out: 1103    Electronically signed by   Ike Hoang  [facility]  Speech-Language Pathologist  02/21/23  11:03am

## 2023-02-21 NOTE — PROGRESS NOTES
Hospitalist Progress Note      PCP: Skylar Townsend    Date of Admission: 2/8/2023    Chief Complaint: SOB    Hospital Course: 71m with afib, chf, htn, presented for impella assisted [facility] for worsening angina with pci to LAD and RCA. Patient developed ams, acute resp failure, hypotension following impella explantation requiring intubation / icu admission. initial CT/CTA head and neck were negative, patient was extubated 2/9. He initially remained hypoxemic/hypercapnic requiring bilevel, precedex for agitation, pressor support, developed leesa and placed on crrt, vt on amiodarone. He has since tolerated nrb, underwent bronchoscopy 2/14 for mucous plugging / asp pna. He has been on HD since 2/14. AMS has required propfol with MRI brain showing small bilateral watershed acute infarcts likely insignificant clinically.      Subjective:  Patient is seen on 3 lpm NC O2, denies worsening sob, chest pain, abd pain, n/v, currently tolerating tube feeds      Medications:  Reviewed    Infusion Medications    norepinephrine 15 mcg/min (02/20/23 2144)    sodium chloride 100 mL/hr at 02/20/23 2144     Scheduled Medications    thiamine  100 mg IntraVENous Daily    aspirin  81 mg Oral Daily    ipratropium-albuterol  1 ampule Inhalation BID    amiodarone  400 mg Oral BID    famotidine (PEPCID) injection  20 mg IntraVENous Daily    heparin (porcine)  5,000 Units SubCUTAneous 3 times per day    clopidogrel  75 mg Oral Daily    atorvastatin  80 mg Oral Nightly    chlorhexidine  15 mL Mouth/Throat BID    sodium chloride flush  5-40 mL IntraVENous 2 times per day     PRN Meds: midodrine, heparin (porcine), hydrALAZINE, ipratropium-albuterol, acetaminophen **OR** acetaminophen, ondansetron **OR** ondansetron, sodium chloride flush, sodium chloride, naloxone      Intake/Output Summary (Last 24 hours) at 2/21/2023 0815  Last data filed at 2/21/2023 0615  Gross per 24 hour   Intake 1008.07 ml   Output 2552 ml   Net -1543.93 ml Physical Exam Performed:    BP (!) 95/47   Pulse 90   Temp 98.2 °F (36.8 °C) (Oral)   Resp 17   Ht 6' 1\" (1.854 m)   Wt 234 lb 9.1 oz (106.4 kg)   SpO2 99%   BMI 30.95 kg/m²     General appearance: No apparent distress  HEENT: Pupils equal, round,  Conjunctivae/corneas clear. Neck: Supple, with full range of motion. No jugular venous distention. Trachea midline. Respiratory:  Normal respiratory effort. Clear to auscultation, bilaterally without Rales/Wheezes/Rhonchi. Cardiovascular: Regular rate and rhythm    Abdomen: Soft, non-tender, non-distended with normal bowel sounds. Musculoskeletal: No clubbing, cyanosis or (+) edema bilaterally. No calf tenderness  Skin: Skin  texture, turgor normal.     Psychiatric: Alert, minimally responsive, in no distress      Labs:   Recent Labs     02/19/23  0600 02/20/23  0445 02/21/23  0510   WBC 14.8* 15.7* 15.6*   HGB 10.1* 10.5* 10.1*   HCT 30.8* 32.4* 31.5*   PLT 89* 132* 110*       Recent Labs     02/19/23  0600 02/20/23  0445 02/21/23  0510   * 138 135*   K 4.3 4.5 4.1   CL 96* 98* 99   CO2 25 25 21   BUN 83* 136* 111*   CREATININE 2.9* 4.0* 3.7*   CALCIUM 9.0 9.2 9.2   PHOS 3.7 5.3* 2.8       No results for input(s): AST, ALT, BILIDIR, BILITOT, ALKPHOS in the last 72 hours. No results for input(s): INR in the last 72 hours. No results for input(s): Randene Holes in the last 72 hours. Urinalysis:      Lab Results   Component Value Date/Time    NITRU Negative 02/09/2023 03:55 PM    WBCUA 39 02/09/2023 03:55 PM    BACTERIA None Seen 02/09/2023 03:55 PM    TGFNJ 018 02/09/2023 03:55 PM    BLOODU LARGE 02/09/2023 03:55 PM    SPECGRAV 1.074 02/09/2023 03:55 PM    GLUCOSEU Negative 02/09/2023 03:55 PM       Radiology:  XR CHEST PORTABLE   Final Result   There is new tubing coursing inferiorly overlying the soft tissues of the   neck and upper to mid chest with the tip overlying the left perihilar region   seen on the 1st image.   If this is a new NG tube the tip is likely in the   left upper lobe airway and needs to be repositioned. Of note, this new   tubing is not visualized on the 2nd and the 3rd images and this may have been   already removed. Previously seen feeding tube with the tip likely within the body of the   stomach. Additional supporting devices as above      Redemonstration of hazy opacity of the right lower lung field, which may   reflect a combination of underlying pleural effusion with adjacent   atelectasis. Enlarged cardiac silhouette. Critical results were called by Dr. Shaquille Kiser to Dr. Redd Matt On 2/19/2023 at   14:01. XR CHEST PORTABLE   Final Result      Feeding tube distal tip is at the expected location of the stomach. Unchanged mild pulmonary edema. Unchanged moderate right pleural effusion and consolidative changes of right   lower lobe. XR CHEST PORTABLE   Final Result   1. Stable appropriate positions of support apparatus. 2. Interval decrease in size of the patient's right pleural effusion, now   moderate in size, with improved aeration of the mid and upper right lung   zones. There remains right basilar airspace consolidation, most likely   passive atelectasis, less likely aspiration or pneumonia. 3. Slight interval improvement in appearance of moderate pulmonary edema. 4. Stable cardiomegaly. MRI BRAIN WO CONTRAST   Final Result   Small acute right anterior and posterior external watershed infarcts. No acute hemorrhage or significant mass effect. XR CHEST PORTABLE   Final Result   1. Complete opacification of the right hemithorax likely represents a large   pleural effusion with airspace disease. 2.  Patchy airspace infiltrates throughout the left lung. XR CHEST PORTABLE   Final Result   Worsening CHF/pulmonary edema.          XR CHEST PORTABLE   Final Result   Stable exam with small right pleural effusion and perihilar/bibasilar   airspace opacities which may be related to pulmonary edema or pneumonia. XR CHEST PORTABLE   Final Result   Status post right PICC line placement in good position with no change in the   ET tube and gastric tube. Stable moderate cardiomegaly and moderate central pulmonary congestion which   is slightly less prominent. Hazy perihilar and bibasilar opacities which is increased and may be related   to pulmonary edema and/or worsening pneumonia. Small right pleural effusion which is more prominent         XR CHEST PORTABLE   Final Result   1. Increasing right basilar airspace consolidation. Increasing bilateral   perihilar airspace infiltrates. This could represent edema versus pneumonia. Likely small right pleural effusion as well      2. Lines and tubes appear properly placed as described above. CTA HEAD NECK W CONTRAST   Final Result   1. No large vessel occlusion identified within the brain. 2. No significant arterial stenosis identified within the neck. 3. 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 4. Worsening mediastinal lymphadenopathy consistent with a neoplastic process   or lymphoproliferative disorder. 5. Enlarged, heterogeneous appearance of the thyroid gland, new from the   previous chest CT concerning for a thyroiditis. CT HEAD WO CONTRAST   Final Result   Addendum (preliminary) 1 of 1   ADDENDUM:   Results were communicated to the cath lab per protocol on 02/08/2023 at    1300. Final   No acute intracranial abnormality.                     Assessment/Plan:    Active Hospital Problems    Diagnosis Date Noted    Mucus plug in respiratory tract [T17.998A] 02/14/2023     Priority: Medium    Acute and chronic respiratory failure with hypercapnia ([address] [J96.22] 02/10/2023     Priority: Medium    Cardiogenic shock ([address] [R57.0] 02/10/2023     Priority: Medium    Unstable angina ([facility] [I20.0] 02/10/2023     Priority: Medium    Ventricular tachycardia [I47.20] 02/10/2023     Priority: Medium    AMS (altered mental status) [R41.82] 02/08/2023     Priority: Medium    Acute respiratory failure with hypoxia and hypercapnia (HCC) [J96.01, J96.02] 02/08/2023     Priority: Medium    Hemoptysis [R04.2] 02/08/2023     Priority: Medium    Abnormal CXR [R93.89]     Pleural effusion on right [J90] 01/20/2022    Acute on chronic systolic CHF (congestive heart failure), NYHA class 4 (HCC) [I50.23]        Hypotension  - continued levophed gtts    GISSELLE  - continued HD, tolerated   - remains fluid overloaded    Acute hypoxemic resp failure with hypercarbia  - pCO2 53.9, on bilevel  - duonebs, completed solumedrol x 5 days on 2/18    HCAP /Asp Pna  - on zosyn since 2/14, finishing on 2/21  - procal sl up, however afebrile, wbcs stable    Afivb/vt  - amiodarone PO per Card  - anticoag held     AMS  - adding thiamine, per prior family med notes (+) etoh use, unclear amount  - patient is seen with his dtr, both deny significant etoh use  - dc thiamine  - unclear etio    Diet: Diet NPO  ADULT TUBE FEEDING; Nasogastric; Renal Formula; Continuous; 10; Yes; 10; Q 4 hours; 30; 30; Q 4 hours; Protein; 4 boluses Proteinex daily via syringe. Flush with 30 mL H20 before and after. Do NOT mix directly with the tube feeding formula.   Code Status: Olamide Lucero MD

## 2023-02-21 NOTE — PROGRESS NOTES
4 Eyes Skin Assessment     NAME:  Kathi Henao  YOB: 1945  MEDICAL RECORD NUMBER:  [id number]    The patient is being assessed for  Shift Handoff    I agree that One RN has performed a thorough Head to Toe Skin Assessment on the patient. ALL assessment sites listed below have been assessed. Areas assessed by both nurses:    Head, Face, Ears, Shoulders, Back, Chest, Arms, Elbows, Hands, Sacrum. Buttock, Coccyx, Ischium, and Legs. Feet and Heels        Does the Patient have a Wound?  No noted wound(s)       Tera Prevention initiated by RN: Yes   Wound Care Orders initiated by RN: NA    Pressure Injury (Stage 3,4, Unstageable, DTI, NWPT, and Complex wounds) if present, place referral order by RN under : NA    New and Established Ostomies, if present place, referral order under : NA      Nurse 1 eSignature: Electronically signed by Dorina Ibarra RN on 2/21/23 at 6:56 PM EST    **SHARE this note so that the co-signing nurse can place an eSignature**    Nurse 2 eSignature: Electronically signed by Berkley Peralta RN on 2/21/23 at 9:58 PM EST

## 2023-02-21 NOTE — PROGRESS NOTES
Late entry due to patient care requirements in the unit. Patient was complaining of difficulty breathing during shift assessment, SpO2 100% on monitor on O2 @ 3lpm per NC, air entry diminished over all lung fields, no apparent increase in work of breathing or RR rate. Clinical decision made to initiate BiPAP for comfort overnight, and to prevent CO2 retention whilst asleep. RT Billy in unit for breathing treatment, will initiate PAP. This RN will restart TF's at a reduced rate, 10ml/hr whilst on BiPAP, due to patient weakness & aspiration risk. Patient boosted to top of bed, HOB @ 45 degrees as aspiration precaution. Will monitor.

## 2023-02-21 NOTE — PROGRESS NOTES
[address]   Daily Progress Note      Admit Date:  2/8/2023      Subjective:   Mr. Morro Vidales is a 77yo male with chronic systolic CHF LVEF 73-21%, CAD and chronic atrial fibrillation who came for Impella assisted PCI to the RCA and LAD 2/8/23. The procedure went well and the Impella was explanted. Unfortunately, he developed mental status chnages after the procedure concerning for a potential CVA. Head CT/CTA was unremarkable though. He ultimately was intubated for airway protection. Interval History:  Rob Negrete is more alert today and off Bipap. He is lethargic but answers questions appropriately. His daughter is at the bedside. Rate controlled atrial fibrillation on telemetry. Objective:     BP (!) 109/54   Pulse 81   Temp 98.2 °F (36.8 °C) (Axillary)   Resp 20   Ht 6' 1\" (1.854 m)   Wt 234 lb 9.1 oz (106.4 kg)   SpO2 100%   BMI 30.95 kg/m²      Intake/Output Summary (Last 24 hours) at 2/21/2023 1349  Last data filed at 2/21/2023 1325  Gross per 24 hour   Intake 703.13 ml   Output 222 ml   Net 481.13 ml       Physical Exam:  General:  Lethargic, NAD  Skin:  Warm and dry  Neck:  JVD<8, no carotid bruits  Chest: Intubated. Diminished bilaterally in bases.   No rhonchi or rales  Cardiovascular:  Irreg irreg and mildly tachy, normal S1/S2, no M/R/G  Abdomen:  Soft, nontender, +bowel sounds  Extremities:  2+ bilateral LE edema  Pulses: 2+ bilat carotid    2+ bilat radial    2+ bilat femoral        Medications:    aspirin  81 mg Oral Daily    ipratropium-albuterol  1 ampule Inhalation BID    amiodarone  400 mg Oral BID    famotidine (PEPCID) injection  20 mg IntraVENous Daily    heparin (porcine)  5,000 Units SubCUTAneous 3 times per day    clopidogrel  75 mg Oral Daily    atorvastatin  80 mg Oral Nightly    chlorhexidine  15 mL Mouth/Throat BID    sodium chloride flush  5-40 mL IntraVENous 2 times per day      norepinephrine 4 mcg/min (02/21/23 1325)    sodium chloride Stopped (02/20/23 2159)       Lab Data:  CBC:   Recent Labs     02/19/23  0600 02/20/23  0445 02/21/23  0510   WBC 14.8* 15.7* 15.6*   HGB 10.1* 10.5* 10.1*   PLT 89* 132* 110*     BMP:    Recent Labs     02/19/23  0600 02/20/23  0445 02/21/23  0510   * 138 135*   K 4.3 4.5 4.1   CO2 25 25 21   BUN 83* 136* 111*   CREATININE 2.9* 4.0* 3.7*       FASTING LIPID PANEL:  Lab Results   Component Value Date/Time    CHOL 84 02/09/2023 03:46 AM    HDL 34 02/09/2023 03:46 AM    TRIG 67 02/17/2023 05:36 AM     LHC/PCI 2/8/23:  1. Right-dominant coronary arterial circulation. Successful  intervention to 90% lesion and 99% more distal RCA lesion with a 3 mm  overlapping drug-eluting stents dilated up to 3.2 mm proximally and 3.1  mm distally, resulting in 0% residual stenosis and SANTO III flow in the  vessel. There was reversal of flow to antegrade in the dominant right  coronary artery. The PDA had been fed by collaterals. In the left  system, there is trivial left main plaque disease. The LAD had an 80%  mid lesion that was treated with a 3.5 x 22 mm [place] frontier  drug-eluting stent dilated to 3.6 mm in diameter resulting in 0%  residual stenosis and SANTO III flow. The circumflex has a chronic total  occlusion in the midportion but the distal vessel fills in from  collaterals. 2.  Left ventricular end-diastolic pressure 21 mmHg. 3.  No gradient across the aortic valve on pullback to suggest aortic  stenosis. PROCEDURES PERFORMED:  1. Left heart catheterization. 2.  Percutaneous coronary intervention with drug-eluting stents to the  dominant right coronary artery. 3.  Percutaneous coronary intervention with drug-eluting stent to the  mid left anterior descending artery. 4.  Insertion of short-term external heart assist system into heart,  percutaneous approach. 5.  Removal of short-term external heart assist system from heart,  percutaneous approach for assistance with cardiac output using impeller  pump, continuous. Stress Perfusion 1/29/23:  Pharmacological Stress/MPI Results:  1. Technically a satisfactory study. 2. Large mostly dense perfusion defect involving lateral and inferolateral wall suggestive of previous MI. No reversibility /ischemia  3. Gated Study shows markedly dilated LV with lateral and inferolateral akinesis; EF 29 %. Echo 1/5/23:  Poor image quality. Definity contrast administered. Patient appears to be in atrial fibrillation. Overall left ventricular systolic function appears severely reduced. Ejection fraction is visually estimated to be 20-25% with diffuse  hypokinesis and regional abnormalities including inferior akinesis. Normal left ventricular wall thickness and cavity size. The right ventricle appears normal in size with severely reduced systolic function. The left and right atria are moderately dilated. Possibly severe mitral regurgitation. The aortic valve leaflets are not well visualized. Mild aortic stenosis based on a peak velocity of 2.6 m/s and a mean pressure gradient  of 20 mmHg. Gradients may be underestimated with reduced LV function. Trivial aortic regurgitation. Trivial pericardial effusion. IVC size is dilated (>2.1 cm) and collapses < 50% with respiration consistent with elevated RA pressure (15 mmHg). Brain MRI 2/14/23:  Small acute right anterior and posterior external watershed infarcts. No acute hemorrhage or significant mass effect. Assessment / Plan: 1. Ventricular Tachycardia  He had some repeat NSVT on telemetry. Rebolus amiodarone and start drip. Continue oral 400mg bid but change to 200mg bid at discharge. LVEF 35%. 2.Acute on chronic systolic CHF, class 4  LVEF ~35 by repeat echocardiogram.  He appears more euvolemic at present after his last dialysis. Hold beta-blockade given hypotension. Hold nephrotoxic agents such as Aldactone ACE inhibitors or ARB's while patient has acute renal failure and is on CRRT.     3.Unstable Angina  S/p PCI to the LAD and the RCA on 2/8/23. Continue DAPT with aspirin and clopidogrel. Will need to crush in apple sauce until tolerating more oral intake. Hold beta blockade with hypotension. Continue statin therapy. 4.Encephalopathy   Neurology followed but no further Recs. I do not know what explains his mental status changes. 5.Aspiration Pneumonia  Doing better and on less oxygen/off Bipap today. Improved oxygenation and aeration on chest X-ray. Continue antibiotics. 6.Acute on chronic kidney Injury  Stable and Nephrology following. Appreciate input and assistance with dialysis. Still making urine. Extensive PT/OT. I had an extensive discussion with his daughter Rik Blair. We agreed that he will be DNR/DNI with no chest compressions or defibrillation shocks.           Signed:  Magui Ferrara MD

## 2023-02-21 NOTE — PROGRESS NOTES
Pt able to be weaned off Levo throughout shift, pt up to chair for a couple of hours today. Per speech, able to have a single ice chip at a time to moisten mouth.  Pt having black tarry stool starting around 1750, stool sample sent, protonix gtt to be started per Dr. Hua Caputo

## 2023-02-22 NOTE — PROGRESS NOTES
[facility]   Speech Therapy  Daily Dysphagia Treatment Note        Dennis Alfaro  AGE: 69 y.o. GENDER: male  : 1945  [id number]  EPISODE DATE:  2023  Patient Active Problem List   Diagnosis    Pneumonia of right lung due to infectious organism    Acute combined systolic and diastolic HF (heart failure) (HCC)    Persistent atrial fibrillation (HCC)    GISSELLE (acute kidney injury) ([address]    Primary hypertension    Acute on chronic systolic CHF (congestive heart failure), NYHA class 4 (HCC)    Paroxysmal atrial fibrillation (HCC)    Aortic atherosclerosis ([address]    Morbid obesity with BMI of 40.0-44.9, adult ([address]    Cerebrovascular accident (CVA) ([address]    Pleural effusion, right    Lung nodules    Pleural effusion on right    Chronic combined systolic and diastolic CHF (congestive heart failure) (HCC)    Transaminitis    Abnormal CXR    Shortness of breath    Cardiomyopathy ([address]    Acute on chronic congestive heart failure (HCC)    Stage 3b chronic kidney disease (HCC)    Dyspnea    Abnormal cardiovascular stress test    AMS (altered mental status)    Acute respiratory failure with hypoxia and hypercapnia (HCC)    Hemoptysis    Acute and chronic respiratory failure with hypercapnia (HCC)    Cardiogenic shock (HCC)    Unstable angina (HCC)    Ventricular tachycardia    Mucus plug in respiratory tract     Allergies   Allergen Reactions    Spironolactone      Treatment Diagnosis: Dysphagia     Chart review:   Admit 2023 with AMS   HISTORY OF PRESENT ILLNESS PER MD H&P:  The patient is a 69 y.o. male with a PMH of atrial fibrillation with chronic anticoagulation on Eliquis chronic systolic CHF hypertension history of coronary artery disease and CVA. Patient has recently been treated for pneumonia as an outpatient. Patient was brought to the hospital for Impella assisted [facility]. Patient developed mental status change on removal of the Impella. Code stroke was called.   Patient also developed significant respiratory distress for which patient was intubated. Medical team is consulted for management of any medical issues, due to patient's multiple medical problems. 2/8/2023 intubated; extubated 2/9/2023  JOSE JUAN 2/10/2023 with NPO recommendations  Bronch 2/14/2023  Re- intubated 2/14/2023; extubated 2/17/2023  Consultations included Neurology; Pulmonology; Nephrology; Palliative Care  Past Medical History  has a past medical history of Arrhythmia, Cellulitis (06/2021), Cerebral artery occlusion with cerebral infarction [facility]) (03/2019), CHF (congestive heart failure) ([facility] Chronic kidney disease, Gout (2019), HLD (hyperlipidemia), Hypertension, Obesity, Pleural effusion (2018), and Sciatica. Current Diet Level: NPO with nutrition/hydration via NG tube feeding   Respiratory Status:   []Room Air              [x]O2 via nasal cannula recently weaned to 4L/min O2 via nasal cannula, was on bi-pap all day yesterday. []Other:     Imaging:  Chest X-ray: 2/19/2023  Impression   There is new tubing coursing inferiorly overlying the soft tissues of the   neck and upper to mid chest with the tip overlying the left perihilar region   seen on the 1st image. If this is a new NG tube the tip is likely in the   left upper lobe airway and needs to be repositioned. Of note, this new   tubing is not visualized on the 2nd and the 3rd images and this may have been   already removed. Previously seen feeding tube with the tip likely within the body of the   stomach. Additional supporting devices as above       Redemonstration of hazy opacity of the right lower lung field, which may   reflect a combination of underlying pleural effusion with adjacent   atelectasis. Enlarged cardiac silhouette. Chest X-ray:  2/15/2023  Impression   1. Stable appropriate positions of support apparatus.    2. Interval decrease in size of the patient's right pleural effusion, now   moderate in size, with improved aeration of the mid and upper right lung   zones. There remains right basilar airspace consolidation, most likely   passive atelectasis, less likely aspiration or pneumonia. 3. Slight interval improvement in appearance of moderate pulmonary edema. 4. Stable cardiomegaly. MRI: 2/14/2023  Impression   Small acute right anterior and posterior external watershed infarcts. No acute hemorrhage or significant mass effect. Subjective:     Current diet: NPO with NG tube  Comments regarding tolerating Current Diet: n/a    Objective:     Pain: none reported  Cognitive/Behavior: awake but lethargic, verbally responsive. Oriented to self, not oriented to place or time. Daughter at bedside. Positioning: upright in bed, approx 80 degrees. Difficulty with holding head upright in neutral position with leaning to right. PO Trials: Thin Liquids; single ice chips/single spoon sips of thin water only: suspect high risk for posterior loss to pharynx, delayed initiation, reduced laryngeal elevation; no immediate overt s/s aspiration with single ice chip trials or with thin water via spoon; delayed, wet, unproductive cough following all trials  Nectar thick liquids; DNT  Honey Thick liquids; DNT  Puree; pt refused  Soft food; DNT  Regular food; DNT    Dysphagia Tx:   Direct Dysphagia tx: PO trials tolerated as described above  Dysphagia ex: effortful swallow exercises completed x9 with max cues and suspected reduced effort; lingual press exercises x10 for protrusion with max cues to increase effort; labial resistance with air hold completed x5 with reduced significantly reduced strength  Training in compensatory strategies; reviewed strategies for effortful swallow, upright position with PO trials  Pt response to ex/training: no evidence of comprehension; max cues throughout tx.      Goals:   Pt will demonstrate improved labial/buccal/lingual rom/coordination for improved simulated bolus control via sensory stimulation  and/or therapy po trials without anterior loss and with <minimal oral residue, continue  Pt will demonstrate improved swallow response via laryngeal/pharyngeal ex and/or during sensory stimulation with therapy trials of varied thin and thick H20 x 10 without overt clinical s/s of aspiration, continue  Pt will participate in MBSS assessment for more objective assessment of pharyngeal phase of the swallow with modification of tx poc or advancement to po diet, continue to assess for readiness    Assessment:   Impressions:   Diagnosis: Oropharyngeal Dysphagia   Pt awake, verbal, agreeable to tx. Reduced endurance and reduced cognition negatively impacted ability during tx. Moderate to severe zeferino-pharyngeal dysphagia cahracterized by reduced lingual control, reduced labial rounding, suspect high risk for posterior loss of bolus to pharynx. Subjectively delayed initiation and reduced laryngeal elevation. Suspect reduced pharyngeal clearance evidenced by multiple swallows per bolus. Delayed, weak, wet cough noted following PO trials. Max cues for utilization of strategies and completion of lingual and pharyngeal strengthening exercises. Reduced endurance, reduced cognition and reduced effort negatively impacted pt performance. Recommend continue NPO that this time with single ice chips for comfort only. Continue with regular oral care 2-3x/day. SLP will continue for ongoing dysphagia tx. Diet Recommendations:  Solids: NPO  Liquids: NPO  Medications: NPO    Education:  Provided education regarding role of SLP, results of assessment, recommendations and general speech pathology plan of care.    [] Pt verbalized understanding and agreement   [] Pt requires ongoing learning   [x] No evidence of comprehension     Prognosis: guarded  Barriers:  Medical diagnosis; medical co morbidities; generalized weakness and Oropharyngeal Dysphagia features at eval    Plan:     Continue Dysphagia Therapy: yes  Interventions: PO trials with SLP, OM exercises, pharyngeal exercises, ongoing education with pt/family, MBSS as indicated  Duration/Frequency of therapy while on unit:3-5x/week  Discharge Instructions:   Anticipate Yes__x__No__ for further skilled Speech Therapy for Dysphagia at discharge    This note serves as a D/C Summary in the event that this patient is discharged prior to the next therapy session.     Coded treatment time: 10  Total treatment time: 27    Time in: 6775  Time out: [address]    Electronically signed by   Dwight Denney MA [address] [place] [address]  Speech-Language Pathologist  02/22/23  9:17am

## 2023-02-22 NOTE — PROGRESS NOTES
[facility]   Daily Progress Note      Admit Date:  2/8/2023      Subjective:   Mr. Morro Vidales is a 77yo male with chronic systolic CHF LVEF 59-72%, CAD and chronic atrial fibrillation who came for Impella assisted PCI to the RCA and LAD 2/8/23. The procedure went well and the Impella was explanted. Unfortunately, he developed mental status chnages after the procedure concerning for a potential CVA. Head CT/CTA was unremarkable though. He ultimately was intubated for airway protection. Interval History:  FARIDEH is now in a sinus rhythm with frequent premature ventricular contractions. Overnight he has required the reintroduction of of low-dose norepinephrine at 6 mcg/kg/min. He had some melenic stools as well. He is able to converse with me and answer questions appropriately      Objective:     BP (!) 65/38   Pulse 63   Temp 97.2 °F (36.2 °C) (Axillary)   Resp 16   Ht 6' 1\" (1.854 m)   Wt 234 lb 2.1 oz (106.2 kg)   SpO2 100%   BMI 30.89 kg/m²      Intake/Output Summary (Last 24 hours) at 2/22/2023 1634  Last data filed at 2/22/2023 1600  Gross per 24 hour   Intake 1220.72 ml   Output 345 ml   Net 875.72 ml       Physical Exam:  General:  Lethargic, NAD  Skin:  Warm and dry  Neck:  JVD<8, no carotid bruits  Chest: Intubated. Diminished bilaterally in bases.   No rhonchi or rales  Cardiovascular:  Irreg irreg and mildly tachy, normal S1/S2, no M/R/G  Abdomen:  Soft, nontender, +bowel sounds  Extremities:  2+ bilateral LE edema  Pulses: 2+ bilat carotid    2+ bilat radial    2+ bilat femoral        Medications:    midodrine  10 mg Oral TID WC    aspirin  81 mg Oral Daily    ipratropium-albuterol  1 ampule Inhalation BID    amiodarone  400 mg Oral BID    heparin (porcine)  5,000 Units SubCUTAneous 3 times per day    clopidogrel  75 mg Oral Daily    atorvastatin  80 mg Oral Nightly    chlorhexidine  15 mL Mouth/Throat BID    sodium chloride flush  5-40 mL IntraVENous 2 times per day prismaSATE BGK 4/2.5 1,500 mL/hr at 02/22/23 1347    prismaSATE BGK 4/2.5 1,000 mL/hr at 02/22/23 1347    prismaSATE BGK 4/2.5 500 mL/hr at 02/22/23 1346    pantoprozole (PROTONIX) infusion 8 mg/hr (02/22/23 1600)    norepinephrine 8 mcg/min (02/22/23 1627)    sodium chloride Stopped (02/20/23 2159)       Lab Data:  CBC:   Recent Labs     02/20/23  0445 02/21/23  0510 02/22/23  0255   WBC 15.7* 15.6* 15.4*   HGB 10.5* 10.1* 8.4*   * 110* 92*     BMP:    Recent Labs     02/21/23  0510 02/22/23  0255 02/22/23  0935 02/22/23  1525   * 139 137  --    K 4.1 4.0 3.7  --    CO2 21 21 22 20*   * 147* 160*  --    CREATININE 3.7* 4.6* 4.8* 4.2*       FASTING LIPID PANEL:  Lab Results   Component Value Date/Time    CHOL 84 02/09/2023 03:46 AM    HDL 34 02/09/2023 03:46 AM    TRIG 67 02/17/2023 05:36 AM     LHC/PCI 2/8/23:  1. Right-dominant coronary arterial circulation. Successful  intervention to 90% lesion and 99% more distal RCA lesion with a 3 mm  overlapping drug-eluting stents dilated up to 3.2 mm proximally and 3.1  mm distally, resulting in 0% residual stenosis and SANTO III flow in the  vessel. There was reversal of flow to antegrade in the dominant right  coronary artery. The PDA had been fed by collaterals. In the left  system, there is trivial left main plaque disease. The LAD had an 80%  mid lesion that was treated with a 3.5 x 22 mm [employer] frontier  drug-eluting stent dilated to 3.6 mm in diameter resulting in 0%  residual stenosis and SANTO III flow. The circumflex has a chronic total  occlusion in the midportion but the distal vessel fills in from  collaterals. 2.  Left ventricular end-diastolic pressure 21 mmHg. 3.  No gradient across the aortic valve on pullback to suggest aortic  stenosis. PROCEDURES PERFORMED:  1. Left heart catheterization. 2.  Percutaneous coronary intervention with drug-eluting stents to the  dominant right coronary artery.   3.  Percutaneous coronary intervention with drug-eluting stent to the  mid left anterior descending artery. 4.  Insertion of short-term external heart assist system into heart,  percutaneous approach. 5.  Removal of short-term external heart assist system from heart,  percutaneous approach for assistance with cardiac output using impeller  pump, continuous. Stress Perfusion 1/29/23:  Pharmacological Stress/MPI Results:  1. Technically a satisfactory study. 2. Large mostly dense perfusion defect involving lateral and inferolateral wall suggestive of previous MI. No reversibility /ischemia  3. Gated Study shows markedly dilated LV with lateral and inferolateral akinesis; EF 29 %. Echo 1/5/23:  Poor image quality. Definity contrast administered. Patient appears to be in atrial fibrillation. Overall left ventricular systolic function appears severely reduced. Ejection fraction is visually estimated to be 20-25% with diffuse  hypokinesis and regional abnormalities including inferior akinesis. Normal left ventricular wall thickness and cavity size. The right ventricle appears normal in size with severely reduced systolic function. The left and right atria are moderately dilated. Possibly severe mitral regurgitation. The aortic valve leaflets are not well visualized. Mild aortic stenosis based on a peak velocity of 2.6 m/s and a mean pressure gradient  of 20 mmHg. Gradients may be underestimated with reduced LV function. Trivial aortic regurgitation. Trivial pericardial effusion. IVC size is dilated (>2.1 cm) and collapses < 50% with respiration consistent with elevated RA pressure (15 mmHg). Brain MRI 2/14/23:  Small acute right anterior and posterior external watershed infarcts. No acute hemorrhage or significant mass effect. Assessment / Plan: 1. Ventricular Tachycardia  He had some repeat NSVT on telemetry. Change oral amiodarone to 200mg bid now. LVEF 35%.     2.Acute on chronic systolic CHF, class 4  LVEF ~35 by repeat echocardiogram.  He appears more euvolemic at present after his last dialysis. Hold beta-blockade given hypotension. Hold nephrotoxic agents such as Aldactone ACE inhibitors or ARB's while patient has acute renal failure and is on CRRT. 3.Unstable Angina  S/p PCI to the LAD and the RCA on 2/8/23. Continue DAPT with aspirin and clopidogrel. Will need to crush in apple sauce until tolerating more oral intake. Hold beta blockade with hypotension. Continue statin therapy. 4.Encephalopathy   Neurology followed but no further Recs. I do not know what explains his mental status changes. 5.Aspiration Pneumonia  Doing better and on less oxygen/off Bipap today. Improved oxygenation and aeration on chest X-ray. He has finished antibiotics. 6.Acute on chronic kidney Injury  Stable and Nephrology following. Appreciate input and assistance with dialysis. Still making urine. CRRT to be done today due to relative hypotension for clearance. 7.GI Bleeding  GI consulted  Continue IV Protonix drip for now. EGD planned for 2/23/23. 8.  Atrial fibrillation  Koko Mojica is now converted to sinus rhythm on his ECG. We will continue the amiodarone at a lower dose of 200 mg twice daily    Extensive PT/OT. He is currently DNR/DNI with no chest compressions or defibrillation shocks.           Signed:  Kami Bradley MD

## 2023-02-22 NOTE — PROGRESS NOTES
Moving to [place] next yr  PMD; Dr Javier     She has numbness, tingling and swelling right foot  Worse when on her feet for a long time  No shooting pain in the leg on that side   The numbness is in the foot area, more in the ankle area   Started 2 mths ago , more of puffiness than edema    Has had neck pain and compression- DJD noted - MRI done in past in , was seeing chiropracter  Gets tingling and numbness and shooting pain to right hand   She did PT and it helped    Has had FLD and has had dull intermittent pain on the Lt side of abd, last US was 2 yrs ago    PreDM, on metformin, trying to do weight loss    Ran out of bp meds 2 days but this is the worst she has seen     Anxiety and depression - stable , on zoloft 50mg , working well with depression, same dose of meds for the past 20 yrs, anxiety is not under control  She feels stressed all the time    She had hyst for endometriosis  She takes occ PPI for gerd, uses it q week  She gets c/c body aches, uses mobic prn pain     She watches her diet, weight had recently gone up  She eats sugar free food, more veggies and fruits, less of meat, walks qd . Pushes fluids    CHIEF COMPLAINT:  Chief Complaint   Patient presents with   • Establish Care     Moving to the area. Hx of pre DM requesting labs    • Neck Pain     Chronic neck pain getting worse   • Abdominal Pain     Has hx of fatty liver recent abd pain left sided   • Foot     Rt foot swelling with numbness        HISTORY OF PRESENT ILLNESS: Alexa Thompson is a pleasant 46 year old female who presents with the followin.med fu       Allergies  ALLERGIES:   Allergen Reactions   • Mecrylate HIVES     Dermabond surgical adhesive       Medical History  Past Medical History:   Diagnosis Date   • Essential (primary) hypertension    • High cholesterol        Surgical History  Past Surgical History:   Procedure Laterality Date   • Abcess drainage      MRSA - LEFT arm    • Abdomen surgery      endometrial surgery ,  Restarted his Levo gtt for 2 mcg/kg/min for consistent MAP >60. 2013    •  
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
## 2023-02-22 NOTE — PROGRESS NOTES
[facility]                                               Progress note    Summary:   Lola Gregory is being seen by nephrology for GISSELLE. Admitted with shortness of breath. Patient was admitted to the ICU on 2/8/2023. Prior to this on Wednesday he had a heart cath procedure with required Impella support. There was concern for possible stroke afterwards. Later he had not episode of unresponsiveness, cyanotic with ventricular tachycardia and required intubation for airway protection. Later was reintubated for bronchoscopy. Interval History  Seen at bedside  Off bipap now, doing better from a respiratory status  On de-escalating doses of levo, only on 2 mcg right now but BP is quite soft  , Cr 4.6, rising significantly in between treatments  335 mL urine output  From a fluid status perspective he looks fairly euvolemic    Plan: With soft blood pressures would anticipate poor tolerability with iHD  Will start CRRT today more for solute clearance. Once metabolic profile looks better will trial iHD again if no significant renal recovery by then. Keep fluid balance net even for now. Start midodrine to facilitate weaning off pressors      Marnie Padilla MD  [facility]  Office: [phone]    Assessment:   #GISSELLE, oliguric  Likely due to hemodynamic mediated GISSELLE resulting in ATN. He was severely hypotensive requiring high-dose of pressors, was intubated with hypoxic respiratory failure and subsequently developed renal failure. He had a few doses of IV contrast 1 on 1/30/2023 and 2/8/2023. Etiology of his hypotension is unclear at this point  Urinalysis after catheter being placed showed dark yellow urine specific gravity 1.074,  WBC 39 present used 100 mg/dL of albumin  Hold off on further testing of GISSELLE at this time. Required CRRT initially.    First HD was 2/14/23    #Hyperkalemia  Due to renal failure, oliguric    #Metabolic acidosis  Due to renal failure  Addressed with dialysis    #Shock  Etiology unclear, cultures are negative so does not peer to have any source of sepsis. Procalcitonin was only mildly elevated 0.61  Discussed with cardiology about possible [place] to see how his cardiac index is. His EF 35%      ROS:   Positives Listed Bold. All other remaining systems are negative. Constitutional:  fever, chills, weakness, weight change, fatigue,      Skin:  rash, pruritus, hair loss, bruising, dry skin, petechiae. Head, Face, Neck   headaches, swelling,  cervical adenopathy. Respiratory: shortness of breath, cough, or wheezing  Cardiovascular: chest pain, palpitations, dizzy, edema  Gastrointestinal: nausea, vomiting, diarrhea, constipation,belly pain    Yellow skin, blood in stool  Musculoskeletal:  back pain, muscle weakness, gait problems,       joint pain or swelling. Genitourinary:  dysuria, poor urine flow, flank pain, blood in urine  Neurologic:  vertigo, TIA'S, syncope, seizures, focal weakness  Psychosocial:  insomnia, anxiety, or depression. Additional positive findings: -      PMH:   Past medical history, surgical history, social history, family history are reviewed and updated as appropriate. Reviewed current medication list.   Allergies reviewed and updated as needed. PE:   Vitals:    02/22/23 0839   BP:    Pulse: 78   Resp: 14   Temp:    SpO2: 96%       General appearance:  in NAD, somnolent. Comfortable. HEENT: EOM intact, no icterus. Trachea is midline. Neck : No masses, appears symmetrical, no JVD  Respiratory: Respiratory effort appears normal, bilateral equal chest rise, no wheeze  Cardiovascular: Ausculation shows RRR ++ edema  Abdomen: No visible mass or tenderness, non distended. Musculoskeletal:  Joints with no swelling or deformity. Skin:no rashes, ulcers, induration, no jaundice. Neuro: face symmetric, no focal deficits.        Lab Results   Component Value Date    CREATININE 4.6 (H) 02/22/2023     (HH) 02/22/2023    NA 139 02/22/2023    K 4.0 02/22/2023     02/22/2023    CO2 21 02/22/2023      Lab Results   Component Value Date    WBC 15.4 (H) 02/22/2023    HGB 8.4 (L) 02/22/2023    HCT 26.0 (L) 02/22/2023    MCV 95.0 02/22/2023     (L) 02/21/2023     Lab Results   Component Value Date    CALCIUM 9.2 02/22/2023    CAION 1.37 (H) 02/14/2023    PHOS 2.9 02/22/2023

## 2023-02-22 NOTE — PROGRESS NOTES
Hospitalist Progress Note      PCP: Rupesh Green    Date of Admission: 2/8/2023    Chief Complaint: SOB    Hospital Course: 71m with afib, chf, htn, presented for impella assisted [facility] for worsening angina with pci to LAD and RCA. Patient developed ams, acute resp failure, hypotension following impella explantation requiring intubation / icu admission. initial CT/CTA head and neck were negative, patient was extubated 2/9. He initially remained hypoxemic/hypercapnic requiring bilevel, precedex for agitation, pressor support, developed leesa and placed on crrt, vt on amiodarone. He has since tolerated nrb, underwent bronchoscopy 2/14 for mucous plugging / asp pna. He has been on HD since 2/14. AMS has required propfol with MRI brain showing small bilateral watershed acute infarcts likely insignificant clinically.      Subjective:  Patient is seen on RA, denies chest pain, sob, abd pain, alert and oriented      Medications:  Reviewed    Infusion Medications    pantoprozole (PROTONIX) infusion 8 mg/hr (02/22/23 8409)    norepinephrine 2 mcg/min (02/22/23 7018)    sodium chloride Stopped (02/20/23 5992)     Scheduled Medications    aspirin  81 mg Oral Daily    ipratropium-albuterol  1 ampule Inhalation BID    amiodarone  400 mg Oral BID    heparin (porcine)  5,000 Units SubCUTAneous 3 times per day    clopidogrel  75 mg Oral Daily    atorvastatin  80 mg Oral Nightly    chlorhexidine  15 mL Mouth/Throat BID    sodium chloride flush  5-40 mL IntraVENous 2 times per day     PRN Meds: midodrine, heparin (porcine), hydrALAZINE, ipratropium-albuterol, acetaminophen **OR** acetaminophen, ondansetron **OR** ondansetron, sodium chloride flush, sodium chloride, naloxone      Intake/Output Summary (Last 24 hours) at 2/22/2023 0839  Last data filed at 2/22/2023 0600  Gross per 24 hour   Intake 886.45 ml   Output 310 ml   Net 576.45 ml         Physical Exam Performed:    BP (!) 86/47   Pulse 72   Temp 97.3 °F (36.3 °C) (Axillary)   Resp 16   Ht 6' 1\" (1.854 m)   Wt 234 lb 2.1 oz (106.2 kg)   SpO2 96%   BMI 30.89 kg/m²     General appearance: No apparent distress  HEENT: Pupils equal, round,  Conjunctivae/corneas clear. Neck: Supple, with full range of motion. No jugular venous distention. Trachea midline. Respiratory:  Normal respiratory effort. Clear to auscultation, bilaterally without Rales/Wheezes/Rhonchi. Cardiovascular: Regular rate and rhythm    Abdomen: Soft, non-tender, non-distended with normal bowel sounds. Musculoskeletal: No clubbing, cyanosis or (+) edema bilaterally. No calf tenderness  Skin: Skin  texture, turgor normal.     Psychiatric: Alert, minimally responsive, in no distress      Labs:   Recent Labs     02/20/23 0445 02/21/23  0510 02/22/23  0255   WBC 15.7* 15.6* 15.4*   HGB 10.5* 10.1* 8.4*   HCT 32.4* 31.5* 26.0*   * 110*  --        Recent Labs     02/20/23 0445 02/21/23  0510 02/22/23  0255    135* 139   K 4.5 4.1 4.0   CL 98* 99 104   CO2 25 21 21   * 111* 147*   CREATININE 4.0* 3.7* 4.6*   CALCIUM 9.2 9.2 9.2   PHOS 5.3* 2.8 2.9       No results for input(s): AST, ALT, BILIDIR, BILITOT, ALKPHOS in the last 72 hours. No results for input(s): INR in the last 72 hours. No results for input(s): [place]le Carrow in the last 72 hours. Urinalysis:      Lab Results   Component Value Date/Time    NITRU Negative 02/21/2023 02:05 PM    WBCUA 27 02/21/2023 02:05 PM    BACTERIA None Seen 02/21/2023 02:05 PM    RBCUA 137 02/21/2023 02:05 PM    BLOODU LARGE 02/21/2023 02:05 PM    SPECGRAV 1.018 02/21/2023 02:05 PM    GLUCOSEU Negative 02/21/2023 02:05 PM       Radiology:  XR CHEST PORTABLE   Final Result   There is new tubing coursing inferiorly overlying the soft tissues of the   neck and upper to mid chest with the tip overlying the left perihilar region   seen on the 1st image.   If this is a new NG tube the tip is likely in the   left upper lobe airway and needs to be repositioned. Of note, this new   tubing is not visualized on the 2nd and the 3rd images and this may have been   already removed. Previously seen feeding tube with the tip likely within the body of the   stomach. Additional supporting devices as above      Redemonstration of hazy opacity of the right lower lung field, which may   reflect a combination of underlying pleural effusion with adjacent   atelectasis. Enlarged cardiac silhouette. Critical results were called by Dr. Thaddeus Loya to Dr. Inder Magallon On 2/19/2023 at   14:01. XR CHEST PORTABLE   Final Result      Feeding tube distal tip is at the expected location of the stomach. Unchanged mild pulmonary edema. Unchanged moderate right pleural effusion and consolidative changes of right   lower lobe. XR CHEST PORTABLE   Final Result   1. Stable appropriate positions of support apparatus. 2. Interval decrease in size of the patient's right pleural effusion, now   moderate in size, with improved aeration of the mid and upper right lung   zones. There remains right basilar airspace consolidation, most likely   passive atelectasis, less likely aspiration or pneumonia. 3. Slight interval improvement in appearance of moderate pulmonary edema. 4. Stable cardiomegaly. MRI BRAIN WO CONTRAST   Final Result   Small acute right anterior and posterior external watershed infarcts. No acute hemorrhage or significant mass effect. XR CHEST PORTABLE   Final Result   1. Complete opacification of the right hemithorax likely represents a large   pleural effusion with airspace disease. 2.  Patchy airspace infiltrates throughout the left lung. XR CHEST PORTABLE   Final Result   Worsening CHF/pulmonary edema. XR CHEST PORTABLE   Final Result   Stable exam with small right pleural effusion and perihilar/bibasilar   airspace opacities which may be related to pulmonary edema or pneumonia. XR CHEST PORTABLE   Final Result   Status post right PICC line placement in good position with no change in the   ET tube and gastric tube. Stable moderate cardiomegaly and moderate central pulmonary congestion which   is slightly less prominent. Hazy perihilar and bibasilar opacities which is increased and may be related   to pulmonary edema and/or worsening pneumonia. Small right pleural effusion which is more prominent         XR CHEST PORTABLE   Final Result   1. Increasing right basilar airspace consolidation. Increasing bilateral   perihilar airspace infiltrates. This could represent edema versus pneumonia. Likely small right pleural effusion as well      2. Lines and tubes appear properly placed as described above. CTA HEAD NECK W CONTRAST   Final Result   1. No large vessel occlusion identified within the brain. 2. No significant arterial stenosis identified within the neck. 3. 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 4. Worsening mediastinal lymphadenopathy consistent with a neoplastic process   or lymphoproliferative disorder. 5. Enlarged, heterogeneous appearance of the thyroid gland, new from the   previous chest CT concerning for a thyroiditis. CT HEAD WO CONTRAST   Final Result   Addendum (preliminary) 1 of 1   ADDENDUM:   Results were communicated to the cath lab per protocol on 02/08/2023 at    1300. Final   No acute intracranial abnormality.                     Assessment/Plan:    Active Hospital Problems    Diagnosis Date Noted    Mucus plug in respiratory tract [T17.998A] 02/14/2023     Priority: Medium    Acute and chronic respiratory failure with hypercapnia (HCC) [J96.22] 02/10/2023     Priority: Medium    Cardiogenic shock ([address] [R57.0] 02/10/2023     Priority: Medium    Unstable angina ([address] [I20.0] 02/10/2023     Priority: Medium    Ventricular tachycardia [I47.20] 02/10/2023     Priority: Medium    AMS (altered mental status) [R41.82] 02/08/2023     Priority: Medium    Acute respiratory failure with hypoxia and hypercapnia (HCC) [J96.01, J96.02] 02/08/2023     Priority: Medium    Hemoptysis [R04.2] 02/08/2023     Priority: Medium    Abnormal CXR [R93.89]     Pleural effusion on right [J90] 01/20/2022    Acute on chronic systolic CHF (congestive heart failure), NYHA class 4 (HCC) [I50.23]        Hypotension  - continued levophed gtts    GISSELLE  - continued HD, tolerated   - remains fluid overloaded    Acute hypoxemic resp failure with hypercarbia  - pCO2 53.9, on bilevel  - duonebs, completed solumedrol x 5 days on 2/18    HCAP /Asp Pna  - on zosyn since 2/14, finishing on 2/21  - procal sl up, however afebrile, wbcs stable  - resolved, on room air, afebrile    Afivb/vt  - amiodarone PO per Card  - anticoag held     AMS  - normal ammonia, likely uremic enceph    Anemia  - iron studies    Diet: Diet NPO  ADULT TUBE FEEDING; Nasogastric; Renal Formula; Continuous; 10; Yes; 10; Q 4 hours; 30; 30; Q 4 hours; Protein; 4 boluses Proteinex daily via syringe. Flush with 30 mL H20 before and after. Do NOT mix directly with the tube feeding formula.   Code Status: Yolis Liu MD

## 2023-02-22 NOTE — PROGRESS NOTES
Pulmonary Progress Note    Date of Admission: 2/8/2023   LOS: 14 days       CC:  No chief complaint on file. Acute hypoercapnia     Subjective:  Off bipap since yesterday   Continues to be confused. ROS:        Assessment:          Plan: This note may have been transcribed using [id number] [employer]. Please disregard any translational errors. Hospital Day: 14     Acute hypercapnia   AVAPS with rate 4, target Vt 700. Off since 2/21   Wean O2 to sat >90% - on room air   Consider Cheyne-Shaikh. Awake. No sign of Cheyne-Shaikh. hypotension/shock  Levophed      Aspiration pneumonia  Status post bronchoscopy  Completed antibiotics      Pleural effusion , right  Chronic since at least 2019  Appears loculated on CT chest and US. Neuro  Small acute CVA  Decreased mental status with elevated BUN and hypercapnia  CO2 improved with AVAPS / bipap  BUN icnreased to 147. Likely etiology of confusion with CVA   More alert then yesterday       Cardiovascular  Unstable angina status post PCI to LAD and RCA 2/8  EF approximately 30%  Bradycardia  Cardiology following  DAPT, aspirin, Plavix  Amiodarone, Toprol-XL    End-stage renal disease  HD planning today.    -7 L      Nutrition  - Diet NPO  ADULT TUBE FEEDING; Nasogastric; Renal Formula; Continuous; 10; Yes; 10; Q 4 hours; 30; 30; Q 4 hours; Protein; 4 boluses Proteinex daily via syringe. Flush with 30 mL H20 before and after. Do NOT mix directly with the tube feeding formula. -   Intake/Output Summary (Last 24 hours) at 2/22/2023 0659  Last data filed at 2/22/2023 0600  Gross per 24 hour   Intake 946.45 ml   Output 335 ml   Net 611.45 ml         Mobility      Access  Arterial      PICC      PICC Double Lumen 64/98/29 Right Cephalic (Active)   Central Line Being Utilized Yes 02/19/23 0600   Criteria for Appropriate Use Limited/no vessel suitable for conventional peripheral access 02/21/23 0600   Site Assessment Clean;Dry; Intact 02/21/23 0600   Phlebitis Assessment No symptoms 02/21/23 0600   Infiltration Assessment 0 02/20/23 0600   Extremity Circumference (cm) 35 cm 02/19/23 0800   External Catheter Length (cm) 0 cm 02/20/23 0600   Proximal Lumen Color/Status Red; Intermittent infusions 02/21/23 0600   Distal Lumen Color/Status Purple; Infusing 02/21/23 0600   Line Care Connections checked and tightened 02/21/23 0600   Alcohol Cap Used Yes 02/21/23 0600   Date of Last Dressing Change 02/15/23 02/21/23 0600   Dressing Type Transparent;Transparent w/CHG gel 02/21/23 0600   Dressing Status Clean, dry & intact 02/21/23 0600   Dressing Intervention New;Dressing changed;Security device changed 02/15/23 0800   Number of days: 12        CVC               D/w nurse. Data:        PHYSICAL EXAM:   Blood pressure (!) 93/49, pulse 68, temperature 97.8 °F (36.6 °C), temperature source Axillary, resp. rate 15, height 6' 1\" (1.854 m), weight 234 lb 2.1 oz (106.2 kg), SpO2 99 %.'  Body mass index is 30.89 kg/m². Gen: No distress. ENT:   Resp: No accessory muscle use. No crackles. No wheezes. No rhonchi. CV: Regular rate. Regular rhythm. No murmur or rub. No edema. Skin: Warm, dry, normal texture and turgor. No nodule on exposed extremities. M/S: No cyanosis. No clubbing. No joint deformity. Psych: wake. Year : 0. [facility] .....      Medications:    Scheduled Meds:   aspirin  81 mg Oral Daily    ipratropium-albuterol  1 ampule Inhalation BID    amiodarone  400 mg Oral BID    heparin (porcine)  5,000 Units SubCUTAneous 3 times per day    clopidogrel  75 mg Oral Daily    atorvastatin  80 mg Oral Nightly    chlorhexidine  15 mL Mouth/Throat BID    sodium chloride flush  5-40 mL IntraVENous 2 times per day       Continuous Infusions:   pantoprozole (PROTONIX) infusion 8 mg/hr (02/22/23 7147)    norepinephrine 2 mcg/min (02/22/23 4669)    sodium chloride Stopped (02/20/23 2159)       PRN Meds:  midodrine, heparin (porcine), hydrALAZINE, ipratropium-albuterol, acetaminophen **OR** acetaminophen, ondansetron **OR** ondansetron, sodium chloride flush, sodium chloride, naloxone    Labs reviewed:  CBC:   Recent Labs     02/20/23  0445 02/21/23  0510 02/22/23  0255   WBC 15.7* 15.6* 15.4*   HGB 10.5* 10.1* 8.4*   HCT 32.4* 31.5* 26.0*   MCV 95.4 95.0 95.0   * 110*  --        BMP:   Recent Labs     02/20/23  0445 02/21/23  0510 02/22/23  0255    135* 139   K 4.5 4.1 4.0   CL 98* 99 104   CO2 25 21 21   PHOS 5.3* 2.8 2.9   * 111* 147*   CREATININE 4.0* 3.7* 4.6*       LIVER PROFILE: No results for input(s): AST, ALT, LIPASE, BILIDIR, BILITOT, ALKPHOS in the last 72 hours. Invalid input(s): AMYLASE,  ALB  PT/INR: No results for input(s): PROTIME, INR in the last 72 hours. APTT: No results for input(s): APTT in the last 72 hours. UA:  Recent Labs     02/21/23  1405   COLORU Yellow   PHUR 5.0   WBCUA 27*   RBCUA 137*   MUCUS Rare*   YEAST Present*   BACTERIA None Seen   CLARITYU TURBID*   SPECGRAV 1.018   LEUKOCYTESUR MODERATE*   UROBILINOGEN 1.0   BILIRUBINUR Negative   BLOODU LARGE*   GLUCOSEU Negative   AMORPHOUS 1+     No results for input(s): PH, PCO2, PO2 in the last 72 hours. Cx:      Films: This note was transcribed using [id number] [email or web address]. Please disregard any translational errors.       Jackie Pérez Pul[facility], [facility] [phone]

## 2023-02-22 NOTE — PLAN OF CARE
Problem: Chronic Conditions and Co-morbidities  Goal: Patient's chronic conditions and co-morbidity symptoms are monitored and maintained or improved  Outcome: Progressing     Problem: Discharge Planning  Goal: Discharge to home or other facility with appropriate resources  Outcome: Progressing     Problem: Cardiovascular - Adult  Goal: Maintains optimal cardiac output and hemodynamic stability  Outcome: Progressing     Problem: Cardiovascular - Adult  Goal: Absence of cardiac dysrhythmias or at baseline  Outcome: Progressing     Problem: Skin/Tissue Integrity - Adult  Goal: Skin integrity remains intact  Outcome: Progressing     Problem: Musculoskeletal - Adult  Goal: Return mobility to safest level of function  Outcome: Progressing     Problem: Musculoskeletal - Adult  Goal: Return ADL status to a safe level of function  Outcome: Progressing     Problem: Confusion  Goal: Confusion, delirium, dementia, or psychosis is improved or at baseline  Description: INTERVENTIONS:  1. Assess for possible contributors to thought disturbance, including medications, impaired vision or hearing, underlying metabolic abnormalities, dehydration, psychiatric diagnoses, and notify attending LIP  2. [place] high risk fall precautions, as indicated  3. Provide frequent short contacts to provide reality reorientation, refocusing and direction  4. Decrease environmental stimuli, including noise as appropriate  5. Monitor and intervene to maintain adequate nutrition, hydration, elimination, sleep and activity  6. If unable to ensure safety without constant attention obtain sitter and review sitter guidelines with assigned personnel  7.  Initiate Psychosocial CNS and Spiritual Care consult, as indicated  Outcome: Progressing

## 2023-02-22 NOTE — PROGRESS NOTES
4 Eyes Skin Assessment     NAME:  Littie Holter  YOB: 1945  MEDICAL RECORD NUMBER:  [id number]    The patient is being assessed for  Shift Handoff    I agree that One RN has performed a thorough Head to Toe Skin Assessment on the patient. ALL assessment sites listed below have been assessed. Areas assessed by both nurses:    Head, Face, Ears, Shoulders, Back, Chest, Arms, Elbows, Hands, Sacrum. Buttock, Coccyx, Ischium, and Legs. Feet and Heels        Does the Patient have a Wound?  No noted wound(s)       Tera Prevention initiated by RN: Yes   Wound Care Orders initiated by RN: NA    Pressure Injury (Stage 3,4, Unstageable, DTI, NWPT, and Complex wounds) if present, place referral order by RN under : NA    New and Established Ostomies, if present place, referral order under : NA      Nurse 1 eSignature: Electronically signed by Randa Alfonso RN on 2/22/23 at 5:30 PM EST    **SHARE this note so that the co-signing nurse can place an eSignature**    Nurse 2 eSignature: Electronically signed by Mercedes Baptiste RN on 2/22/23 at 7:27 PM EST

## 2023-02-22 NOTE — PLAN OF CARE
Problem: Chronic Conditions and Co-morbidities  Goal: Patient's chronic conditions and co-morbidity symptoms are monitored and maintained or improved  2/21/2023 1859 by Ricardo Morris RN  Outcome: Progressing  Flowsheets (Taken 2/21/2023 0537 by Carla Simms RN)  Care Plan - Patient's Chronic Conditions and Co-Morbidity Symptoms are Monitored and Maintained or Improved:   Monitor and assess patient's chronic conditions and comorbid symptoms for stability, deterioration, or improvement   Collaborate with multidisciplinary team to address chronic and comorbid conditions and prevent exacerbation or deterioration   Update acute care plan with appropriate goals if chronic or comorbid symptoms are exacerbated and prevent overall improvement and discharge  2/21/2023 0537 by Carla Simms RN  Outcome: Not Progressing  Flowsheets (Taken 2/21/2023 0537)  Care Plan - Patient's Chronic Conditions and Co-Morbidity Symptoms are Monitored and Maintained or Improved:   Monitor and assess patient's chronic conditions and comorbid symptoms for stability, deterioration, or improvement   Collaborate with multidisciplinary team to address chronic and comorbid conditions and prevent exacerbation or deterioration   Update acute care plan with appropriate goals if chronic or comorbid symptoms are exacerbated and prevent overall improvement and discharge     Problem: Discharge Planning  Goal: Discharge to home or other facility with appropriate resources  2/21/2023 1859 by Ricardo Morris RN  Outcome: Progressing  Flowsheets (Taken 2/21/2023 0537 by Carla Simms RN)  Discharge to home or other facility with appropriate resources:   Identify barriers to discharge with patient and caregiver   Refer to discharge planning if patient needs post-hospital services based on physician order or complex needs related to functional status, cognitive ability or social support system  2/21/2023 0537 by Junnie Cogan Tessa Mitchell RN  Outcome: Not Progressing  Flowsheets (Taken 2/21/2023 1158)  Discharge to home or other facility with appropriate resources:   Identify barriers to discharge with patient and caregiver   Refer to discharge planning if patient needs post-hospital services based on physician order or complex needs related to functional status, cognitive ability or social support system     Problem: Pain  Goal: Verbalizes/displays adequate comfort level or baseline comfort level  2/21/2023 1859 by Keanu Castro RN  Outcome: Progressing  Flowsheets (Taken 2/21/2023 0537 by Harleen Whiteside RN)  Verbalizes/displays adequate comfort level or baseline comfort level:   Encourage patient to monitor pain and request assistance   Assess pain using appropriate pain scale   Implement non-pharmacological measures as appropriate and evaluate response  2/21/2023 0537 by Harleen Whiteside RN  Outcome: Progressing  Flowsheets (Taken 2/21/2023 7049)  Verbalizes/displays adequate comfort level or baseline comfort level:   Encourage patient to monitor pain and request assistance   Assess pain using appropriate pain scale   Implement non-pharmacological measures as appropriate and evaluate response     Problem: Respiratory - Adult  Goal: Achieves optimal ventilation and oxygenation  2/21/2023 1859 by Keanu Castro RN  Outcome: Progressing  Flowsheets (Taken 2/21/2023 0537 by Harleen Whiteside RN)  Achieves optimal ventilation and oxygenation:   Assess for changes in respiratory status   Assess for changes in mentation and behavior   Position to facilitate oxygenation and minimize respiratory effort   Oxygen supplementation based on oxygen saturation or arterial blood gases   Assess the need for suctioning and aspirate as needed   Assess and instruct to report shortness of breath or any respiratory difficulty   Respiratory therapy support as indicated  2/21/2023 0537 by Harleen Whiteside RN  Outcome: Not Progressing  Flowsheets (Taken 2/21/2023 0537)  Achieves optimal ventilation and oxygenation:   Assess for changes in respiratory status   Assess for changes in mentation and behavior   Position to facilitate oxygenation and minimize respiratory effort   Oxygen supplementation based on oxygen saturation or arterial blood gases   Assess the need for suctioning and aspirate as needed   Assess and instruct to report shortness of breath or any respiratory difficulty   Respiratory therapy support as indicated     Problem: Cardiovascular - Adult  Goal: Maintains optimal cardiac output and hemodynamic stability  2/21/2023 1859 by Kiara Gutierrez RN  Outcome: Progressing  Flowsheets (Taken 2/21/2023 0537 by Paxton Scheafer RN)  Maintains optimal cardiac output and hemodynamic stability:   Monitor blood pressure and heart rate   Assess for signs of decreased cardiac output   Administer vasoactive medications as ordered  2/21/2023 0537 by Paxton Schaefer RN  Outcome: Not Progressing  Flowsheets (Taken 2/21/2023 4218)  Maintains optimal cardiac output and hemodynamic stability:   Monitor blood pressure and heart rate   Assess for signs of decreased cardiac output   Administer vasoactive medications as ordered  Note: Remains on Levo for hypotension  Goal: Absence of cardiac dysrhythmias or at baseline  2/21/2023 1859 by Kiara Gutierrez RN  Outcome: Progressing  Flowsheets (Taken 2/21/2023 0537 by Paxton Schaefer RN)  Absence of cardiac dysrhythmias or at baseline:   Monitor cardiac rate and rhythm   Assess for signs of decreased cardiac output   Administer antiarrhythmia medication and electrolyte replacement as ordered  2/21/2023 0537 by Paxton Schaefer RN  Outcome: Not Progressing  Flowsheets (Taken 2/21/2023 0537)  Absence of cardiac dysrhythmias or at baseline:   Monitor cardiac rate and rhythm   Assess for signs of decreased cardiac output   Administer antiarrhythmia medication and electrolyte replacement as ordered  Note: Frequent ventricular ectopy despite antiarrhythmics       Problem: Metabolic/Fluid and Electrolytes - Adult  Goal: Electrolytes maintained within normal limits  2/21/2023 1859 by Bartolo Ribeiro RN  Outcome: Progressing  Flowsheets (Taken 2/21/2023 0537 by Meka Plummer RN)  Electrolytes maintained within normal limits:   Monitor labs and assess patient for signs and symptoms of electrolyte imbalances   Fluid restriction as ordered  2/21/2023 0537 by Meka Plummer RN  Outcome: Progressing  Flowsheets (Taken 2/21/2023 9984)  Electrolytes maintained within normal limits:   Monitor labs and assess patient for signs and symptoms of electrolyte imbalances   Fluid restriction as ordered  Goal: Hemodynamic stability and optimal renal function maintained  Outcome: Progressing  Flowsheets (Taken 2/16/2023 2045 by Shiela Miller RN)  Hemodynamic stability and optimal renal function maintained:   Monitor labs and assess for signs and symptoms of volume excess or deficit   Monitor intake, output and patient weight   Monitor response to interventions for patient's volume status, including labs, urine output, blood pressure (other measures as available)     Problem: Hematologic - Adult  Goal: Maintains hematologic stability  Outcome: Progressing  Flowsheets (Taken 2/16/2023 2045 by Shiela Miller RN)  Maintains hematologic stability:   Assess for signs and symptoms of bleeding or hemorrhage   Monitor labs for bleeding or clotting disorders     Problem: Neurosensory - Adult  Goal: Achieves stable or improved neurological status  Outcome: Progressing  Flowsheets (Taken 2/19/2023 0800 by Kathy Ojeda RN)  Achieves stable or improved neurological status:   Assess for and report changes in neurological status   Initiate measures to prevent increased intracranial pressure   Maintain blood pressure and fluid volume within ordered parameters to optimize cerebral perfusion and minimize risk of hemorrhage   Monitor temperature, glucose, and sodium. Initiate appropriate interventions as ordered  Goal: Absence of seizures  Outcome: Progressing  Flowsheets (Taken 2/19/2023 0800 by Mario Conrad RN)  Absence of seizures:   Monitor for seizure activity. If seizure occurs, document type and location of movements and any associated apnea   If seizure occurs, turn head to side and suction secretions as needed  Goal: Achieves maximal functionality and self care  Outcome: Progressing  Flowsheets (Taken 2/19/2023 0800 by Mario Conrad RN)  Achieves maximal functionality and self care:   Monitor swallowing and airway patency with patient fatigue and changes in neurological status   Encourage and assist patient to increase activity and self care with guidance from physical therapy/occupational therapy     Problem: Skin/Tissue Integrity  Goal: Absence of new skin breakdown  Description: 1. Monitor for areas of redness and/or skin breakdown  2. Assess vascular access sites hourly  3. Every 4-6 hours minimum:  Change oxygen saturation probe site  4. Every 4-6 hours:  If on nasal continuous positive airway pressure, respiratory therapy assess nares and determine need for appliance change or resting period.   2/21/2023 1859 by Balta Cruz RN  Outcome: Progressing  2/21/2023 0537 by Maddie Leyva RN  Outcome: Progressing     Problem: Safety - Adult  Goal: Free from fall injury  2/21/2023 1859 by Balta Cruz RN  Outcome: Progressing  Flowsheets (Taken 2/21/2023 0537 by Maddie Leyva RN)  Free From Fall Injury: Based on caregiver fall risk screen, instruct family/caregiver to ask for assistance with transferring infant if caregiver noted to have fall risk factors  2/21/2023 0537 by Maddie Leyva RN  Outcome: Progressing  Flowsheets (Taken 2/21/2023 0537)  Free From Fall Injury: Based on caregiver fall risk screen, instruct family/caregiver to ask for assistance with transferring infant if caregiver noted to have fall risk factors     Problem: ABCDS Injury Assessment  Goal: Absence of physical injury  2/21/2023 1859 by Levy Kirk RN  Outcome: Yue Rey (Taken 2/16/2023 2045 by Ladan Wagner RN)  Absence of Physical Injury: Implement safety measures based on patient assessment  2/21/2023 0537 by Brent Suh RN  Outcome: Progressing  Flowsheets (Taken 2/16/2023 2045 by Ladan Wagner RN)  Absence of Physical Injury: Implement safety measures based on patient assessment     Problem: Skin/Tissue Integrity - Adult  Goal: Skin integrity remains intact  2/21/2023 1859 by Levy Kirk RN  Outcome: Progressing  Flowsheets (Taken 2/21/2023 0537 by Brent Suh RN)  Skin Integrity Remains Intact:   Monitor for areas of redness and/or skin breakdown   Assess vascular access sites hourly  2/21/2023 0537 by Brent Suh RN  Outcome: Progressing  Flowsheets (Taken 2/21/2023 0537)  Skin Integrity Remains Intact:   Monitor for areas of redness and/or skin breakdown   Assess vascular access sites hourly     Problem: Musculoskeletal - Adult  Goal: Return mobility to safest level of function  Outcome: Progressing  Flowsheets (Taken 2/14/2023 2000 by Malcom Severin, RN)  Return Mobility to Safest Level of Function: Assess patient stability and activity tolerance for standing, transferring and ambulating with or without assistive devices  Goal: Return ADL status to a safe level of function  2/21/2023 1859 by Levy Kirk RN  Outcome: Progressing  Flowsheets (Taken 2/21/2023 0537 by Brent Suh RN)  Return ADL Status to a Safe Level of Function:   Assess activities of daily living deficits and provide assistive devices as needed   Obtain physical therapy/occupational therapy consults as needed   Assist and instruct patient to increase activity and self care as tolerated  2/21/2023 0537 by Praneeth Mejia Eulalia Jhaveri RN  Outcome: Not Progressing  Flowsheets (Taken 2/21/2023 1878)  Return ADL Status to a Safe Level of Function:   Assess activities of daily living deficits and provide assistive devices as needed   Obtain physical therapy/occupational therapy consults as needed   Assist and instruct patient to increase activity and self care as tolerated     Problem: Gastrointestinal - Adult  Goal: Minimal or absence of nausea and vomiting  Outcome: Progressing  Flowsheets (Taken 2/19/2023 0800 by Sil Hendrix RN)  Minimal or absence of nausea and vomiting:   Maintain NPO status until nausea and vomiting are resolved   Nasogastric tube to low intermittent suction as ordered   Provide nonpharmacologic comfort measures as appropriate  Goal: Maintains or returns to baseline bowel function  Outcome: Progressing  Flowsheets (Taken 2/18/2023 0800 by Dominic Arora RN)  Maintains or returns to baseline bowel function: Assess bowel function  Goal: Maintains adequate nutritional intake  2/21/2023 1859 by Gauri Castano RN  Outcome: Progressing  Flowsheets (Taken 2/21/2023 0537 by Jomar Floyd RN)  Maintains adequate nutritional intake: Monitor intake and output, weight and lab values  2/21/2023 0537 by Jomar Floyd RN  Outcome: Progressing  Flowsheets (Taken 2/21/2023 0537)  Maintains adequate nutritional intake: Monitor intake and output, weight and lab values  Note: Tube feeds as NPO       Problem: Genitourinary - Adult  Goal: Urinary catheter remains patent  2/21/2023 1859 by Gauri Castano RN  Outcome: Progressing  Flowsheets (Taken 2/21/2023 1859)  Urinary catheter remains patent: Assess patency of urinary catheter  2/21/2023 2848 by Jomar Floyd RN  Outcome: Progressing  Flowsheets (Taken 2/21/2023 0895)  Urinary catheter remains patent: Assess patency of urinary catheter     Problem: Infection - Adult  Goal: Absence of infection at discharge  Outcome: Progressing  Flowsheets (Taken 2/16/2023 2045 by Maci Mckeon RN)  Absence of infection at discharge:   Assess and monitor for signs and symptoms of infection   Monitor lab/diagnostic results   Monitor all insertion sites i.e., indwelling lines, tubes and drains   Administer medications as ordered   Instruct and encourage patient and family to use good hand hygiene technique  Goal: Absence of infection during hospitalization  2/21/2023 1859 by Raleigh Castleman, RN  Outcome: Progressing  Flowsheets (Taken 2/16/2023 2045 by Maci Mckeon RN)  Absence of infection during hospitalization:   Assess and monitor for signs and symptoms of infection   Monitor lab/diagnostic results   Monitor all insertion sites i.e., indwelling lines, tubes and drains   Administer medications as ordered   Instruct and encourage patient and family to use good hand hygiene technique  2/21/2023 0537 by Natalia Jeffries RN  Outcome: Progressing  Flowsheets (Taken 2/16/2023 2045 by Maci Mckeon RN)  Absence of infection during hospitalization:   Assess and monitor for signs and symptoms of infection   Monitor lab/diagnostic results   Monitor all insertion sites i.e., indwelling lines, tubes and drains   Administer medications as ordered   Instruct and encourage patient and family to use good hand hygiene technique     Problem: Nutrition Deficit:  Goal: Optimize nutritional status  Outcome: Progressing  Flowsheets (Taken 2/20/2023 0849 by Guanakito Lara, KATIE, LD)  Nutrient intake appropriate for improving, restoring, or maintaining nutritional needs:   Assess nutritional status and recommend course of action   Monitor oral intake, labs, and treatment plans   Recommend, monitor, and adjust tube feedings and TPN/PPN based on assessed needs     Problem: Chronic Conditions and Co-morbidities  Goal: Patient's chronic conditions and co-morbidity symptoms are monitored and maintained or improved  2/21/2023 1859 by Raleigh Castleman, RN  Outcome: Progressing  Flowsheets (Taken 2/21/2023 0537 by Natalia Jeffries RN)  Care Plan - Patient's Chronic Conditions and Co-Morbidity Symptoms are Monitored and Maintained or Improved:   Monitor and assess patient's chronic conditions and comorbid symptoms for stability, deterioration, or improvement   Collaborate with multidisciplinary team to address chronic and comorbid conditions and prevent exacerbation or deterioration   Update acute care plan with appropriate goals if chronic or comorbid symptoms are exacerbated and prevent overall improvement and discharge  2/21/2023 0537 by Natalia Jeffries RN  Outcome: Not Progressing  Flowsheets (Taken 2/21/2023 0537)  Care Plan - Patient's Chronic Conditions and Co-Morbidity Symptoms are Monitored and Maintained or Improved:   Monitor and assess patient's chronic conditions and comorbid symptoms for stability, deterioration, or improvement   Collaborate with multidisciplinary team to address chronic and comorbid conditions and prevent exacerbation or deterioration   Update acute care plan with appropriate goals if chronic or comorbid symptoms are exacerbated and prevent overall improvement and discharge     Problem: Discharge Planning  Goal: Discharge to home or other facility with appropriate resources  2/21/2023 1859 by Raleigh Castleman, RN  Outcome: Progressing  Flowsheets (Taken 2/21/2023 0537 by Natalia Jeffries RN)  Discharge to home or other facility with appropriate resources:   Identify barriers to discharge with patient and caregiver   Refer to discharge planning if patient needs post-hospital services based on physician order or complex needs related to functional status, cognitive ability or social support system  2/21/2023 0537 by Natalia Jeffries RN  Outcome: Not Progressing  Flowsheets (Taken 2/21/2023 7196)  Discharge to home or other facility with appropriate resources:   Identify barriers to discharge with patient and caregiver Refer to discharge planning if patient needs post-hospital services based on physician order or complex needs related to functional status, cognitive ability or social support system     Problem: Respiratory - Adult  Goal: Achieves optimal ventilation and oxygenation  2/21/2023 1859 by Raleigh Castleman, RN  Outcome: Progressing  Flowsheets (Taken 2/21/2023 0537 by Natalia Jeffries RN)  Achieves optimal ventilation and oxygenation:   Assess for changes in respiratory status   Assess for changes in mentation and behavior   Position to facilitate oxygenation and minimize respiratory effort   Oxygen supplementation based on oxygen saturation or arterial blood gases   Assess the need for suctioning and aspirate as needed   Assess and instruct to report shortness of breath or any respiratory difficulty   Respiratory therapy support as indicated  2/21/2023 0537 by Natalia Jeffries RN  Outcome: Not Progressing  Flowsheets (Taken 2/21/2023 0537)  Achieves optimal ventilation and oxygenation:   Assess for changes in respiratory status   Assess for changes in mentation and behavior   Position to facilitate oxygenation and minimize respiratory effort   Oxygen supplementation based on oxygen saturation or arterial blood gases   Assess the need for suctioning and aspirate as needed   Assess and instruct to report shortness of breath or any respiratory difficulty   Respiratory therapy support as indicated     Problem: Cardiovascular - Adult  Goal: Maintains optimal cardiac output and hemodynamic stability  2/21/2023 1859 by Raleigh Castleman, RN  Outcome: Progressing  Flowsheets (Taken 2/21/2023 0537 by Natalia Jeffries RN)  Maintains optimal cardiac output and hemodynamic stability:   Monitor blood pressure and heart rate   Assess for signs of decreased cardiac output   Administer vasoactive medications as ordered  2/21/2023 0537 by aNtalia Jeffries RN  Outcome: Not Progressing  Flowsheets (Taken 2/21/2023 0537)  Maintains optimal cardiac output and hemodynamic stability:   Monitor blood pressure and heart rate   Assess for signs of decreased cardiac output   Administer vasoactive medications as ordered  Note: Remains on Levo for hypotension  Goal: Absence of cardiac dysrhythmias or at baseline  2/21/2023 1859 by Urban Abad RN  Outcome: Progressing  Flowsheets (Taken 2/21/2023 0537 by Carissa Ribeiro RN)  Absence of cardiac dysrhythmias or at baseline:   Monitor cardiac rate and rhythm   Assess for signs of decreased cardiac output   Administer antiarrhythmia medication and electrolyte replacement as ordered  2/21/2023 0537 by Carissa Ribeiro RN  Outcome: Not Progressing  Flowsheets (Taken 2/21/2023 7581)  Absence of cardiac dysrhythmias or at baseline:   Monitor cardiac rate and rhythm   Assess for signs of decreased cardiac output   Administer antiarrhythmia medication and electrolyte replacement as ordered  Note: Frequent ventricular ectopy despite antiarrhythmics       Problem: Musculoskeletal - Adult  Goal: Return ADL status to a safe level of function  2/21/2023 1859 by Urban Abad RN  Outcome: Progressing  Flowsheets (Taken 2/21/2023 0537 by Carissa Ribeiro RN)  Return ADL Status to a Safe Level of Function:   Assess activities of daily living deficits and provide assistive devices as needed   Obtain physical therapy/occupational therapy consults as needed   Assist and instruct patient to increase activity and self care as tolerated  2/21/2023 0537 by Carissa Ribeiro RN  Outcome: Not Progressing  Flowsheets (Taken 2/21/2023 1893)  Return ADL Status to a Safe Level of Function:   Assess activities of daily living deficits and provide assistive devices as needed   Obtain physical therapy/occupational therapy consults as needed   Assist and instruct patient to increase activity and self care as tolerated

## 2023-02-22 NOTE — PROCEDURES
Arterial Catheter Insertion Procedure Note    Consent: The family members were counseled regarding the procedure, its indications, risks, potential complications and alternatives, and any questions were answered. Consent was obtained to proceed. Procedure: Insertion of Arterial Catheter    Indications:  Hypotension, Shock    Estimated Blood Loss: Minimal    Procedure Details   Informed consent was obtained for the procedure, including sedation. Risks of hemorrhage, arrhythmia, infection and adverse drug reaction were discussed. A time out was performed at 1100  Ultrasound used and Left Radial artery was noted to be patent. Collatoral flow was confirmed with an Atul's Test.  Under sterile conditions the skin above the Left Radial artery was prepped with Chloraprep and covered with a sterile drape after donning mask, sterile gown, annd sterile gloves. A 22-gauge needle was inserted into the Left Radial artery. A guide wire was then passed easily through the needle which was advanced with no resistance. Good flash of blood returned. The catheter was advanced over the existing wire without resistance or complication and subsequently sutured into place after pressure tubing connected and waveform verified on monitor. Findings: There were no complications nor changes to vital signs. Patient tolerated procedure well.     Total time of procedure: 15 minutes

## 2023-02-23 NOTE — PROGRESS NOTES
23:16- Family left bedside. Postmortem care completed and security notified that patient is ready to be transported to [place]. Family will call with name of  home as they do not have one at this time.

## 2023-02-23 NOTE — PROGRESS NOTES
21:09- Pt desaturating and bradycardic. Rapid response called. Pt limited code no to everything. Daughter Renetta Colon, called and updated on patient condition and code status re-affirmed with Renetta Colon. 21:10-Pt apneic and in PEA. Dr. Beck Sires at bedside to pronounce time of death. 21:41- Daughter Renetta Colon at bedside.

## 2023-02-23 NOTE — DEATH NOTES
Death Pronouncement Note  Patient's Name: Dennis Alfaro   Patient's YOB: 1945  MRN Number: [id number]    Admitting Provider: Kenisha Henley MD  Attending Provider: Vandana Gonzales MD    Patient was examined and the following were absent: Pulses, Blood Pressure, and Respiratory effort    I declared the patient dead on 02/22/2023 at 2110    Preliminary Cause of Death: PEA arrest    Electronically signed by Edmund Hinds DO on 2/22/23 at 9:12 PM EST

## 2023-02-23 NOTE — DISCHARGE SUMMARY
[facility]   Discharge Summary    Patient:  Mary Alice Gee  YOB: 1945    MRN: [id number]   Acct: [de-identified]    Primary Care Physician: Margarita Romero    Admit date:  2/8/2023    Discharge date:  2/23/2023       Discharge Diagnoses:   AMS (altered mental status)  Principal Problem:    AMS (altered mental status)  Active Problems:    Acute respiratory failure with hypoxia and hypercapnia (HCC)    Hemoptysis    Acute and chronic respiratory failure with hypercapnia (HCC)    Cardiogenic shock (HCC)    Unstable angina (HCC)    Ventricular tachycardia    Mucus plug in respiratory tract    Acute on chronic systolic CHF (congestive heart failure), NYHA class 4 (HCC)    Pleural effusion on right    Abnormal CXR  Resolved Problems:    * No resolved hospital problems. *        Admitted for: (HPI) SOB    Hospital Course:    77m with afib, chf, htn, presented for impella assisted [facility] for worsening angina with pci to LAD and RCA. Patient developed ams, acute resp failure, hypotension following impella explantation requiring intubation / icu admission. initial CT/CTA head and neck were negative, patient was extubated 2/9. He initially remained hypoxemic/hypercapnic requiring bilevel, precedex for agitation, pressor support, developed leesa and placed on crrt, vt on amiodarone. He has since tolerated nrb, underwent bronchoscopy 2/14 for mucous plugging / asp pna. He has been on HD since 2/14. AMS has required propfol with MRI brain showing small bilateral watershed acute infarcts likely insignificant clinically. Mentation improved, antibiotic course was completed, patient was weaned to room air. Patient remained hypotensive requiring continued levophed allowing patient to continue hemodialysis. Melanotic stools were noted with a drop in hgb. An arterial line was placed, continuous IV PPI was started with adequate blood pressures maintained.      Patient developed acute onset hypoxemia with bradycardia. Limited code status of patient was confirmed with patient's daughter. Patient's condition progressed to PEA arrest.  Patient was declared dead 2/22/23 at 2110. Discharge Medications:             24 hour intake/output:  Intake/Output Summary (Last 24 hours) at 2/23/2023 8754  Last data filed at 2/22/2023 2100  Gross per 24 hour   Intake 967.04 ml   Output 227 ml   Net 740.04 ml          Radiology reports as per the Radiologist  Radiology: CT HEAD WO CONTRAST    Addendum Date: 2/13/2023    ADDENDUM: Results were communicated to the cath lab per protocol on 02/08/2023 at 1300. Result Date: 2/13/2023  EXAMINATION: CT OF THE HEAD WITHOUT CONTRAST  2/8/2023 12:48 pm TECHNIQUE: CT of the head was performed without the administration of intravenous contrast. Automated exposure control, iterative reconstruction, and/or weight based adjustment of the mA/kV was utilized to reduce the radiation dose to as low as reasonably achievable. COMPARISON: None. HISTORY: ORDERING SYSTEM PROVIDED HISTORY: r/o stroke TECHNOLOGIST PROVIDED HISTORY: Reason for exam:->r/o stroke Has a \"code stroke\" or \"stroke alert\" been called? ->Yes Reason for Exam: code stroke, ams Additional signs and symptoms: pt was given appx 200ml iv contrast just prior to code FINDINGS: BRAIN/VENTRICLES: There is no acute intracranial hemorrhage, mass effect or midline shift. No abnormal extra-axial fluid collection. The gray-white differentiation is maintained without evidence of an acute infarct. There is prominence of the ventricles and sulci due to global parenchymal volume loss. There are nonspecific areas of hypoattenuation within the periventricular and subcortical white matter, which likely represent chronic microvascular ischemic change. Contrast from recent heart catheterization opacifies the intracranial vascular system. ORBITS: The visualized portion of the orbits demonstrate no acute abnormality.  SINUSES: The visualized paranasal sinuses and mastoid air cells demonstrate no acute abnormality. SOFT TISSUES/SKULL: No acute abnormality of the visualized skull or soft tissues. No acute intracranial abnormality. XR CHEST PORTABLE    Result Date: 2/8/2023  EXAMINATION: ONE XRAY VIEW OF THE CHEST 2/8/2023 6:00 pm COMPARISON: 02/08/2023 HISTORY: ORDERING SYSTEM PROVIDED HISTORY: PICC placement TECHNOLOGIST PROVIDED HISTORY: Reason for exam:->PICC placement Reason for Exam: PICC placement FINDINGS: The heart is moderately enlarged but unchanged. The pulmonary vessels are engorged centrally and slightly less prominent. There are some hazy perihilar and bibasilar opacities which is increased. There is a small right pleural effusion which is more prominent. There is a PICC line in place on the right with tip in the distal superior vena cava. There is an ET tube place with the tip approximately 6 cm above the halley. There is a gastric tube in place extending into the distal stomach with the tip not visualized. There is no pneumothorax. Status post right PICC line placement in good position with no change in the ET tube and gastric tube. Stable moderate cardiomegaly and moderate central pulmonary congestion which is slightly less prominent. Hazy perihilar and bibasilar opacities which is increased and may be related to pulmonary edema and/or worsening pneumonia. Small right pleural effusion which is more prominent     XR CHEST PORTABLE    Result Date: 2/8/2023  EXAMINATION: ONE XRAY VIEW OF THE CHEST 2/8/2023 1:18 pm COMPARISON: Chest x-ray dated 19 January 2023 HISTORY: ORDERING SYSTEM PROVIDED HISTORY: intubation TECHNOLOGIST PROVIDED HISTORY: Reason for exam:->intubation Reason for Exam: intubation, OG FINDINGS: There is an endotracheal tube with the tip above the halley at the level of the clavicular heads. There is an enteric tube with the tip and side port in the stomach.   Increasing airspace consolidation in the right lower lobe and right perihilar region. Increasing opacification in the left perihilar region as well. No pneumothorax. 1.  Increasing right basilar airspace consolidation. Increasing bilateral perihilar airspace infiltrates. This could represent edema versus pneumonia. Likely small right pleural effusion as well 2. Lines and tubes appear properly placed as described above. CTA HEAD NECK W CONTRAST    Result Date: 2/8/2023  EXAMINATION: CTA OF THE HEAD AND NECK WITH CONTRAST 2/8/2023 12:55 pm: TECHNIQUE: CTA of the head and neck was performed with the administration of intravenous contrast. Multiplanar reformatted images are provided for review. MIP images are provided for review. Stenosis of the internal carotid arteries measured using NASCET criteria. Automated exposure control, iterative reconstruction, and/or weight based adjustment of the mA/kV was utilized to reduce the radiation dose to as low as reasonably achievable. This scan was analyzed using [employer]. ai contact LVO. Identification of suspected findings is not for diagnostic use beyond notification. Viz LVO is limited to analysis of imaging data and should not be used in-lieu of full patient evaluation or relied upon to make or confirm diagnosis. COMPARISON: Noncontrast CT brain earlier same day, CT chest 12/06/2022 HISTORY: ORDERING SYSTEM PROVIDED HISTORY: Code stroke TECHNOLOGIST PROVIDED HISTORY: Reason for exam:->Code stroke Has a \"code stroke\" or \"stroke alert\" been called? ->Yes Reason for Exam: code stroke, ams FINDINGS: CTA NECK: AORTIC ARCH/ARCH VESSELS: No dissection or arterial injury. No significant stenosis of the brachiocephalic or subclavian arteries. Mild atherosclerotic calcifications are present within the aortic arch. CAROTID ARTERIES: The common carotid arteries are normal without significant stenosis. There are calcified plaques at the origins of the internal carotid arteries.   There is no significant stenosis of the internal carotid arteries based on NASCET criteria. There is no dissection identified. 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 VERTEBRAL ARTERIES: Mild calcified atherosclerotic plaque is present within the proximal vertebral arteries. There is no significant stenosis, dissection or pseudoaneurysm. SOFT TISSUES: There has been interval worsening of the superior mediastinal lymphadenopathy. There is interstitial septal thickening within the upper lobes. There is no cervical lymphadenopathy or soft tissue mass. The parotid glands and submandibular glands are normal in appearance. The thyroid gland has increased in size since the previous exam.  Heterogeneity is noted throughout the thyroid gland concerning for a thyroiditis. BONES: No lytic or blastic osseous lesions are identified. Multilevel degenerative changes of the cervical spine are noted, most pronounced at C3-4 where a posterior disc osteophyte complex results in moderate spinal canal stenosis. 3D surface reformations were performed and concur with the above findings. CTA HEAD: ANTERIOR CIRCULATION: Atherosclerotic calcifications are present within the cavernous segments of the internal carotid arteries. No significant stenosis or aneurysm is identified. The anterior and middle cerebral arteries are normal.  There is no significant stenosis or aneurysm identified. POSTERIOR CIRCULATION: The distal vertebral arteries are normal in appearance. The basilar artery is normal.  No significant stenosis or aneurysm is identified. The posterior cerebral arteries are normal in appearance. A fetal origin of the right posterior cerebral artery is noted, an anatomic variant. OTHER: No dural venous sinus thrombosis on this non-dedicated study. BRAIN: There is no mass effect or midline shift. No vascular malformation is identified.   Enlargement of the bilateral superior ophthalmic veins is not significantly changed since 05/02/2019, allowing for differences in technique. 3D surface reformations were performed and concur with the above findings. 1. No large vessel occlusion identified within the brain. 2. No significant arterial stenosis identified within the neck. 3. 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 4. Worsening mediastinal lymphadenopathy consistent with a neoplastic process or lymphoproliferative disorder. 5. Enlarged, heterogeneous appearance of the thyroid gland, new from the previous chest CT concerning for a thyroiditis. Results for orders placed or performed during the hospital encounter of 02/08/23   MRSA DNA Probe, Nasal    Specimen: Nares; Nasal   Result Value Ref Range    MRSA SCREEN RT-PCR       Negative  MRSA DNA not detected.   Normal Range: Not detected     Culture, Respiratory    Specimen: Sputum Expectorated; Mouth   Result Value Ref Range    CULTURE, RESPIRATORY Normal respiratory mitch     Gram Stain Result       1+ WBC's (Polymorphonuclear)  1+ Gram positive cocci     Pneumonia Panel, Molecular    Specimen: Sputum Expectorated; Mouth   Result Value Ref Range    Pneumonia Panel Molecular       Pneumonia Pathogens Panel PCR Result: Not Detected  See additional report for complete Pneumonia Pathogens Panel     Culture, Urine    Specimen: Urine, clean catch   Result Value Ref Range    Urine Culture, Routine No growth at 18 to 36 hours    PNEUMONIA PANEL FILM ARRAY REPORT   Result Value Ref Range    Report SEE IMAGE    Culture, Respiratory    Specimen: Bronchial Washing; Lung   Result Value Ref Range    CULTURE, RESPIRATORY Normal respiratory mitch     Gram Stain Result       No Epithelial Cells seen  2+ WBC's (Polymorphonuclear)  No organisms seen     Culture, Blood 2    Specimen: Blood   Result Value Ref Range    Culture, Blood 2       No Growth to date.  Any change in status will be called. Culture, Blood 1    Specimen: Blood   Result Value Ref Range    Blood Culture, Routine       No Growth to date. Any change in status will be called.    Culture, Urine    Specimen: Urine, clean catch   Result Value Ref Range    Urine Culture, Routine No growth at 18 to 36 hours    CBC   Result Value Ref Range    WBC 7.2 4.0 - 11.0 K/uL    RBC 4.31 4.20 - 5.90 M/uL    Hemoglobin 13.5 13.5 - 17.5 g/dL    Hematocrit 42.3 40.5 - 52.5 %    MCV 98.0 80.0 - 100.0 fL    MCH 31.3 26.0 - 34.0 pg    MCHC 32.0 31.0 - 36.0 g/dL    RDW 16.9 (H) 12.4 - 15.4 %    Platelets 299 431 - 703 K/uL    MPV 9.2 5.0 - 10.5 fL   CBC   Result Value Ref Range    WBC 9.4 4.0 - 11.0 K/uL    RBC 3.70 (L) 4.20 - 5.90 M/uL    Hemoglobin 11.6 (L) 13.5 - 17.5 g/dL    Hematocrit 36.6 (L) 40.5 - 52.5 %    MCV 99.0 80.0 - 100.0 fL    MCH 31.5 26.0 - 34.0 pg    MCHC 31.8 31.0 - 36.0 g/dL    RDW 17.3 (H) 12.4 - 15.4 %    Platelets 709 561 - 495 K/uL    MPV 9.5 5.0 - 10.5 fL   Protime-INR   Result Value Ref Range    Protime 20.2 (H) 11.7 - 14.5 sec    INR 1.73 (H) 0.87 - 9.11   Basic Metabolic Panel   Result Value Ref Range    Sodium 138 136 - 145 mmol/L    Potassium 5.0 3.5 - 5.1 mmol/L    Chloride 98 (L) 99 - 110 mmol/L    CO2 26 21 - 32 mmol/L    Anion Gap 14 3 - 16    Glucose 106 (H) 70 - 99 mg/dL    BUN 32 (H) 7 - 20 mg/dL    Creatinine 1.3 0.8 - 1.3 mg/dL    Est, Glom Filt Rate 57 (A) >60    Calcium 9.0 8.3 - 10.6 mg/dL   Troponin   Result Value Ref Range    Troponin 0.03 (H) <0.01 ng/mL   Blood Gas, Arterial   Result Value Ref Range    pH, Arterial 7.413 7.350 - 7.450    pCO2, Arterial 41.0 35.0 - 45.0 mmHg    pO2, Arterial 103.0 75.0 - 108.0 mmHg    HCO3, Arterial 26.1 21.0 - 29.0 mmol/L    Base Excess, Arterial 1.4 -3.0 - 3.0 mmol/L    Hemoglobin, Art, Extended 11.9 (L) 13.5 - 17.5 g/dL    O2 Sat, Arterial 98.8 >92 %    Carboxyhgb, Arterial 1.2 0.0 - 1.5 %    Methemoglobin, Arterial 0.2 <1.5 %    TCO2, Arterial 27.4 Not Established mmol/L    O2 Therapy See comment    Blood Gas, Arterial   Result Value Ref Range    pH, Arterial 7.458 (H) 7.350 - 7.450    pCO2, Arterial 38.8 35.0 - 45.0 mmHg    pO2, Arterial 52.3 (L) 75.0 - 108.0 mmHg    HCO3, Arterial 27.5 21.0 - 29.0 mmol/L    Base Excess, Arterial 3.4 (H) -3.0 - 3.0 mmol/L    Hemoglobin, Art, Extended 11.6 (L) 13.5 - 17.5 g/dL    O2 Sat, Arterial 87.6 (L) >92 %    Carboxyhgb, Arterial 1.3 0.0 - 1.5 %    Methemoglobin, Arterial 0.2 <1.5 %    TCO2, Arterial 28.7 Not Established mmol/L    O2 Therapy Unknown    Blood Gas, Arterial   Result Value Ref Range    pH, Arterial 7.420 7.350 - 7.450    pCO2, Arterial 39.4 35.0 - 45.0 mmHg    pO2, Arterial 102.0 75.0 - 108.0 mmHg    HCO3, Arterial 25.5 21.0 - 29.0 mmol/L    Base Excess, Arterial 1.0 -3.0 - 3.0 mmol/L    Hemoglobin, Art, Extended 13.2 (L) 13.5 - 17.5 g/dL    O2 Sat, Arterial 97.9 >92 %    Carboxyhgb, Arterial 0.8 0.0 - 1.5 %    Methemoglobin, Arterial 0.4 <1.5 %    TCO2, Arterial 26.7 Not Established mmol/L    O2 Therapy Unknown    Basic Metabolic Panel w/ Reflex to MG   Result Value Ref Range    Sodium 139 136 - 145 mmol/L    Potassium reflex Magnesium 4.4 3.5 - 5.1 mmol/L    Chloride 101 99 - 110 mmol/L    CO2 26 21 - 32 mmol/L    Anion Gap 12 3 - 16    Glucose 79 70 - 99 mg/dL    BUN 37 (H) 7 - 20 mg/dL    Creatinine 1.7 (H) 0.8 - 1.3 mg/dL    Est, Glom Filt Rate 41 (A) >60    Calcium 9.0 8.3 - 10.6 mg/dL   CBC   Result Value Ref Range    WBC 9.0 4.0 - 11.0 K/uL    RBC 3.32 (L) 4.20 - 5.90 M/uL    Hemoglobin 10.9 (L) 13.5 - 17.5 g/dL    Hematocrit 31.7 (L) 40.5 - 52.5 %    MCV 95.4 80.0 - 100.0 fL    MCH 32.9 26.0 - 34.0 pg    MCHC 34.5 31.0 - 36.0 g/dL    RDW 16.8 (H) 12.4 - 15.4 %    Platelets 030 309 - 263 K/uL    MPV 8.4 5.0 - 10.5 fL   Hemoglobin A1c   Result Value Ref Range    Hemoglobin A1C 5.3 See comment %    eAG 105.4 mg/dL   Lipid Panel   Result Value Ref Range    Cholesterol, Total 84 0 - 199 mg/dL    Triglycerides 61 0 - 150 mg/dL    HDL 34 (L) 40 - 60 mg/dL    LDL Calculated 38 <100 mg/dL    VLDL Cholesterol Calculated 12 Not Established mg/dL   Protime-INR   Result Value Ref Range    Protime 19.3 (H) 11.7 - 14.5 sec    INR 1.62 (H) 0.87 - 1.14   Blood Gas, Arterial   Result Value Ref Range    pH, Arterial 7.348 (L) 7.350 - 7.450    pCO2, Arterial 46.8 (H) 35.0 - 45.0 mmHg    pO2, Arterial 91.0 75.0 - 108.0 mmHg    HCO3, Arterial 25.7 21.0 - 29.0 mmol/L    Base Excess, Arterial -0.3 -3.0 - 3.0 mmol/L    Hemoglobin, Art, Extended 12.4 (L) 13.5 - 17.5 g/dL    O2 Sat, Arterial 96.2 >92 %    Carboxyhgb, Arterial 0.8 0.0 - 1.5 %    Methemoglobin, Arterial 0.6 <1.5 %    TCO2, Arterial 27.2 Not Established mmol/L    O2 Therapy Unknown    TSH with Reflex to FT4   Result Value Ref Range    TSH Reflex FT4 7.76 (H) 0.27 - 4.20 uIU/mL   T4, Free   Result Value Ref Range    T4 Free 1.4 0.9 - 1.8 ng/dL   Magnesium   Result Value Ref Range    Magnesium 2.00 1.80 - 2.40 mg/dL   Brain Natriuretic Peptide   Result Value Ref Range    Pro-BNP 4,623 (H) 0 - 449 pg/mL   Urinalysis with Reflex to Culture    Specimen: Urine   Result Value Ref Range    Color, UA DARK YELLOW (A) Straw/Yellow    Clarity, UA CLOUDY (A) Clear    Glucose, Ur Negative Negative mg/dL    Bilirubin Urine SMALL (A) Negative    Ketones, Urine TRACE (A) Negative mg/dL    Specific Gravity, UA 1.074 1.005 - 1.030    Blood, Urine LARGE (A) Negative    pH, UA 5.0 5.0 - 8.0    Protein,  (A) Negative mg/dL    Urobilinogen, Urine 1.0 <2.0 E.U./dL    Nitrite, Urine Negative Negative    Leukocyte Esterase, Urine MODERATE (A) Negative    Microscopic Examination YES     Urine Type NotGiven     Urine Reflex to Culture Yes    Procalcitonin   Result Value Ref Range    Procalcitonin 0.17 (H) 0.00 - 0.15 ng/mL   Microscopic Urinalysis   Result Value Ref Range    Mucus, UA 1+ (A) None Seen /LPF    Renal Epithelial, UA 2-5 (A) 0 - 1 /HPF    Bacteria, UA None Seen None Seen /HPF    Yeast, UA Present (A) None Seen /HPF    Urinalysis Comments see below     Hyaline Casts, UA 2 0 - 8 /LPF    WBC, UA 39 (H) 0 - 5 /HPF    RBC,  (H) 0 - 4 /HPF    Epithelial Cells, UA 1 0 - 5 /HPF   Blood Gas, Arterial   Result Value Ref Range    pH, Arterial 7.252 (L) 7.350 - 7.450    pCO2, Arterial 53.5 (H) 35.0 - 45.0 mmHg    pO2, Arterial 343.0 (H) 75.0 - 108.0 mmHg    HCO3, Arterial 23.5 21.0 - 29.0 mmol/L    Base Excess, Arterial -4.1 (L) -3.0 - 3.0 mmol/L    Hemoglobin, Art, Extended 11.5 (L) 13.5 - 17.5 g/dL    O2 Sat, Arterial >100.0 >92 %    Carboxyhgb, Arterial 0.7 0.0 - 1.5 %    Methemoglobin, Arterial 0.5 <1.5 %    TCO2, Arterial 25.2 Not Established mmol/L    O2 Therapy Unknown    Basic Metabolic Panel   Result Value Ref Range    Sodium 139 136 - 145 mmol/L    Potassium 4.7 3.5 - 5.1 mmol/L    Chloride 102 99 - 110 mmol/L    CO2 23 21 - 32 mmol/L    Anion Gap 14 3 - 16    Glucose 126 (H) 70 - 99 mg/dL    BUN 40 (H) 7 - 20 mg/dL    Creatinine 2.3 (H) 0.8 - 1.3 mg/dL    Est, Glom Filt Rate 29 (A) >60    Calcium 8.7 8.3 - 10.6 mg/dL   CBC with Auto Differential   Result Value Ref Range    WBC 14.9 (H) 4.0 - 11.0 K/uL    RBC 3.57 (L) 4.20 - 5.90 M/uL    Hemoglobin 10.8 (L) 13.5 - 17.5 g/dL    Hematocrit 35.4 (L) 40.5 - 52.5 %    MCV 99.0 80.0 - 100.0 fL    MCH 30.3 26.0 - 34.0 pg    MCHC 30.6 (L) 31.0 - 36.0 g/dL    RDW 17.8 (H) 12.4 - 15.4 %    Platelets 177 773 - 916 K/uL    MPV 8.3 5.0 - 10.5 fL    Neutrophils % 93.5 %    Lymphocytes % 1.4 %    Monocytes % 4.8 %    Eosinophils % 0.0 %    Basophils % 0.3 %    Neutrophils Absolute 14.0 (H) 1.7 - 7.7 K/uL    Lymphocytes Absolute 0.2 (L) 1.0 - 5.1 K/uL    Monocytes Absolute 0.7 0.0 - 1.3 K/uL    Eosinophils Absolute 0.0 0.0 - 0.6 K/uL    Basophils Absolute 0.0 0.0 - 0.2 K/uL   Magnesium   Result Value Ref Range    Magnesium 2.00 1.80 - 2.40 mg/dL   Comprehensive Metabolic Panel   Result Value Ref Range    Sodium 136 136 - 145 mmol/L Potassium 5.2 (H) 3.5 - 5.1 mmol/L    Chloride 98 (L) 99 - 110 mmol/L    CO2 21 21 - 32 mmol/L    Anion Gap 17 (H) 3 - 16    Glucose 170 (H) 70 - 99 mg/dL    BUN 48 (H) 7 - 20 mg/dL    Creatinine 2.7 (H) 0.8 - 1.3 mg/dL    Est, Glom Filt Rate 24 (A) >60    Calcium 8.9 8.3 - 10.6 mg/dL    Total Protein 6.9 6.4 - 8.2 g/dL    Albumin 3.3 (L) 3.4 - 5.0 g/dL    Albumin/Globulin Ratio 0.9 (L) 1.1 - 2.2    Total Bilirubin 1.4 (H) 0.0 - 1.0 mg/dL    Alkaline Phosphatase 111 40 - 129 U/L    ALT 15 10 - 40 U/L    AST 42 (H) 15 - 37 U/L   Hepatic Function Panel   Result Value Ref Range    Total Protein 6.6 6.4 - 8.2 g/dL    Albumin 3.2 (L) 3.4 - 5.0 g/dL    Alkaline Phosphatase 114 40 - 129 U/L    ALT 14 10 - 40 U/L    AST 40 (H) 15 - 37 U/L    Total Bilirubin 1.7 (H) 0.0 - 1.0 mg/dL    Bilirubin, Direct 0.9 (H) 0.0 - 0.3 mg/dL    Bilirubin, Indirect 0.8 0.0 - 1.0 mg/dL   Blood Gas, Arterial   Result Value Ref Range    pH, Arterial 7.296 (L) 7.350 - 7.450    pCO2, Arterial 47.7 (H) 35.0 - 45.0 mmHg    pO2, Arterial 196.0 (H) 75.0 - 108.0 mmHg    HCO3, Arterial 23.3 21.0 - 29.0 mmol/L    Base Excess, Arterial -3.4 (L) -3.0 - 3.0 mmol/L    Hemoglobin, Art, Extended 11.3 (L) 13.5 - 17.5 g/dL    O2 Sat, Arterial 99.5 >92 %    Carboxyhgb, Arterial 0.5 0.0 - 1.5 %    Methemoglobin, Arterial 0.7 <1.5 %    TCO2, Arterial 24.7 Not Established mmol/L    O2 Therapy Unknown    Procalcitonin   Result Value Ref Range    Procalcitonin 0.61 (H) 0.00 - 0.15 ng/mL   Basic Metabolic Panel   Result Value Ref Range    Sodium 138 136 - 145 mmol/L    Potassium 5.1 3.5 - 5.1 mmol/L    Chloride 99 99 - 110 mmol/L    CO2 22 21 - 32 mmol/L    Anion Gap 17 (H) 3 - 16    Glucose 154 (H) 70 - 99 mg/dL    BUN 46 (H) 7 - 20 mg/dL    Creatinine 2.8 (H) 0.8 - 1.3 mg/dL    Est, Glom Filt Rate 23 (A) >60    Calcium 8.6 8.3 - 10.6 mg/dL   Magnesium   Result Value Ref Range    Magnesium 2.40 1.80 - 2.40 mg/dL   Phosphorus   Result Value Ref Range    Phosphorus 6.7 (H) 2.5 - 4.9 mg/dL   Calcium, Ionized   Result Value Ref Range    Calcium, Ionized 1.18 1.12 - 1.32 mmol/L    pH, Jeff 7.212 (L) 7.350 - 7.450   Magnesium   Result Value Ref Range    Magnesium 2.40 1.80 - 2.40 mg/dL   Phosphorus   Result Value Ref Range    Phosphorus 5.4 (H) 2.5 - 4.9 mg/dL   Calcium, Ionized   Result Value Ref Range    Calcium, Ionized 1.13 1.12 - 1.32 mmol/L    pH, Jeff 7.289 (L) 7.350 - 6.725   Basic Metabolic Panel   Result Value Ref Range    Sodium 143 136 - 145 mmol/L    Potassium 5.2 (H) 3.5 - 5.1 mmol/L    Chloride 103 99 - 110 mmol/L    CO2 18 (L) 21 - 32 mmol/L    Anion Gap 22 (H) 3 - 16    Glucose 139 (H) 70 - 99 mg/dL    BUN 45 (H) 7 - 20 mg/dL    Creatinine 2.8 (H) 0.8 - 1.3 mg/dL    Est, Glom Filt Rate 23 (A) >60    Calcium 8.9 8.3 - 10.6 mg/dL   CBC with Auto Differential   Result Value Ref Range    WBC 16.3 (H) 4.0 - 11.0 K/uL    RBC 3.27 (L) 4.20 - 5.90 M/uL    Hemoglobin 10.3 (L) 13.5 - 17.5 g/dL    Hematocrit 31.3 (L) 40.5 - 52.5 %    MCV 95.7 80.0 - 100.0 fL    MCH 31.4 26.0 - 34.0 pg    MCHC 32.8 31.0 - 36.0 g/dL    RDW 16.9 (H) 12.4 - 15.4 %    Platelets 591 990 - 579 K/uL    MPV 7.9 5.0 - 10.5 fL    Neutrophils % 93.2 %    Lymphocytes % 1.8 %    Monocytes % 4.8 %    Eosinophils % 0.0 %    Basophils % 0.2 %    Neutrophils Absolute 15.1 (H) 1.7 - 7.7 K/uL    Lymphocytes Absolute 0.3 (L) 1.0 - 5.1 K/uL    Monocytes Absolute 0.8 0.0 - 1.3 K/uL    Eosinophils Absolute 0.0 0.0 - 0.6 K/uL    Basophils Absolute 0.0 0.0 - 0.2 K/uL   Magnesium   Result Value Ref Range    Magnesium 2.40 1.80 - 2.40 mg/dL   Phosphorus   Result Value Ref Range    Phosphorus 4.8 2.5 - 4.9 mg/dL   Calcium, Ionized   Result Value Ref Range    Calcium, Ionized 1.17 1.12 - 1.32 mmol/L    pH, Jeff 7.286 (L) 7.350 - 4.462   Basic Metabolic Panel   Result Value Ref Range    Sodium 136 136 - 145 mmol/L    Potassium 4.9 3.5 - 5.1 mmol/L    Chloride 100 99 - 110 mmol/L    CO2 21 21 - 32 mmol/L    Anion Gap 15 3 - 16 Glucose 138 (H) 70 - 99 mg/dL    BUN 36 (H) 7 - 20 mg/dL    Creatinine 2.3 (H) 0.8 - 1.3 mg/dL    Est, Glom Filt Rate 29 (A) >60    Calcium 8.7 8.3 - 10.6 mg/dL   Basic Metabolic Panel   Result Value Ref Range    Sodium 137 136 - 145 mmol/L    Potassium 4.8 3.5 - 5.1 mmol/L    Chloride 101 99 - 110 mmol/L    CO2 22 21 - 32 mmol/L    Anion Gap 14 3 - 16    Glucose 131 (H) 70 - 99 mg/dL    BUN 40 (H) 7 - 20 mg/dL    Creatinine 2.4 (H) 0.8 - 1.3 mg/dL    Est, Glom Filt Rate 27 (A) >60    Calcium 8.7 8.3 - 10.6 mg/dL   Magnesium   Result Value Ref Range    Magnesium 2.50 (H) 1.80 - 2.40 mg/dL   Phosphorus   Result Value Ref Range    Phosphorus 4.4 2.5 - 4.9 mg/dL   Calcium, Ionized   Result Value Ref Range    Calcium, Ionized 1.17 1.12 - 1.32 mmol/L    pH, Jeff 7.305 (L) 7.350 - 7.450   Magnesium   Result Value Ref Range    Magnesium 2.30 1.80 - 2.40 mg/dL   Basic Metabolic Panel   Result Value Ref Range    Sodium 136 136 - 145 mmol/L    Potassium 4.8 3.5 - 5.1 mmol/L    Chloride 99 99 - 110 mmol/L    CO2 24 21 - 32 mmol/L    Anion Gap 13 3 - 16    Glucose 122 (H) 70 - 99 mg/dL    BUN 29 (H) 7 - 20 mg/dL    Creatinine 2.1 (H) 0.8 - 1.3 mg/dL    Est, Glom Filt Rate 32 (A) >60    Calcium 8.9 8.3 - 10.6 mg/dL   Magnesium   Result Value Ref Range    Magnesium 2.50 (H) 1.80 - 2.40 mg/dL   Phosphorus   Result Value Ref Range    Phosphorus 3.9 2.5 - 4.9 mg/dL   Calcium, Ionized   Result Value Ref Range    Calcium, Ionized 1.20 1. 12 - 1.32 mmol/L    pH, Jeff 7.309 (L) 7.350 - 8.536   Basic Metabolic Panel   Result Value Ref Range    Sodium 136 136 - 145 mmol/L    Potassium 4.8 3.5 - 5.1 mmol/L    Chloride 101 99 - 110 mmol/L    CO2 24 21 - 32 mmol/L    Anion Gap 11 3 - 16    Glucose 112 (H) 70 - 99 mg/dL    BUN 33 (H) 7 - 20 mg/dL    Creatinine 2.1 (H) 0.8 - 1.3 mg/dL    Est, Glom Filt Rate 32 (A) >60    Calcium 8.8 8.3 - 10.6 mg/dL   CBC with Auto Differential   Result Value Ref Range    WBC 13.2 (H) 4.0 - 11.0 K/uL    RBC 3.29 (L) 4.20 - 5.90 M/uL    Hemoglobin 10.3 (L) 13.5 - 17.5 g/dL    Hematocrit 32.2 (L) 40.5 - 52.5 %    MCV 97.8 80.0 - 100.0 fL    MCH 31.3 26.0 - 34.0 pg    MCHC 32.0 31.0 - 36.0 g/dL    RDW 17.6 (H) 12.4 - 15.4 %    Platelets 326 926 - 174 K/uL    MPV 8.9 5.0 - 10.5 fL    Neutrophils % 94.0 %    Lymphocytes % 1.5 %    Monocytes % 4.4 %    Eosinophils % 0.0 %    Basophils % 0.1 %    Neutrophils Absolute 12.4 (H) 1.7 - 7.7 K/uL    Lymphocytes Absolute 0.2 (L) 1.0 - 5.1 K/uL    Monocytes Absolute 0.6 0.0 - 1.3 K/uL    Eosinophils Absolute 0.0 0.0 - 0.6 K/uL    Basophils Absolute 0.0 0.0 - 0.2 K/uL   Renal Function Panel   Result Value Ref Range    Sodium 137 136 - 145 mmol/L    Potassium 4.8 3.5 - 5.1 mmol/L    Chloride 102 99 - 110 mmol/L    CO2 23 21 - 32 mmol/L    Anion Gap 12 3 - 16    Glucose 110 (H) 70 - 99 mg/dL    BUN 33 (H) 7 - 20 mg/dL    Creatinine 2.1 (H) 0.8 - 1.3 mg/dL    Est, Glom Filt Rate 32 (A) >60    Calcium 8.6 8.3 - 10.6 mg/dL    Phosphorus 3.8 2.5 - 4.9 mg/dL    Albumin 3.3 (L) 3.4 - 5.0 g/dL   Magnesium   Result Value Ref Range    Magnesium 2.50 (H) 1.80 - 2.40 mg/dL   Phosphorus   Result Value Ref Range    Phosphorus 3.4 2.5 - 4.9 mg/dL   Calcium, Ionized   Result Value Ref Range    Calcium, Ionized 1.17 1.12 - 1.32 mmol/L    pH, Jeff 7.386 7.350 - 6.241   Basic Metabolic Panel   Result Value Ref Range    Sodium 137 136 - 145 mmol/L    Potassium 5.0 3.5 - 5.1 mmol/L    Chloride 102 99 - 110 mmol/L    CO2 24 21 - 32 mmol/L    Anion Gap 11 3 - 16    Glucose 110 (H) 70 - 99 mg/dL    BUN 33 (H) 7 - 20 mg/dL    Creatinine 2.1 (H) 0.8 - 1.3 mg/dL    Est, Glom Filt Rate 32 (A) >60    Calcium 8.6 8.3 - 10.6 mg/dL   Magnesium   Result Value Ref Range    Magnesium 2.50 (H) 1.80 - 2.40 mg/dL   Phosphorus   Result Value Ref Range    Phosphorus 2.9 2.5 - 4.9 mg/dL   Calcium, Ionized   Result Value Ref Range    Calcium, Ionized 1.18 1.12 - 1.32 mmol/L    pH, Jeff 7.404 7.350 - 4.623   Basic Metabolic Panel Result Value Ref Range    Sodium 134 (L) 136 - 145 mmol/L    Potassium 4.8 3.5 - 5.1 mmol/L    Chloride 99 99 - 110 mmol/L    CO2 23 21 - 32 mmol/L    Anion Gap 12 3 - 16    Glucose 110 (H) 70 - 99 mg/dL    BUN 26 (H) 7 - 20 mg/dL    Creatinine 1.8 (H) 0.8 - 1.3 mg/dL    Est, Glom Filt Rate 38 (A) >60    Calcium 8.6 8.3 - 10.6 mg/dL   Magnesium   Result Value Ref Range    Magnesium 2.50 (H) 1.80 - 2.40 mg/dL   Phosphorus   Result Value Ref Range    Phosphorus 2.6 2.5 - 4.9 mg/dL   Calcium, Ionized   Result Value Ref Range    Calcium, Ionized 1.19 1.12 - 1.32 mmol/L    pH, Jeff 7.443 7.350 - 8.082   Basic Metabolic Panel   Result Value Ref Range    Sodium 134 (L) 136 - 145 mmol/L    Potassium 4.7 3.5 - 5.1 mmol/L    Chloride 99 99 - 110 mmol/L    CO2 23 21 - 32 mmol/L    Anion Gap 12 3 - 16    Glucose 104 (H) 70 - 99 mg/dL    BUN 24 (H) 7 - 20 mg/dL    Creatinine 1.6 (H) 0.8 - 1.3 mg/dL    Est, Glom Filt Rate 44 (A) >60    Calcium 8.8 8.3 - 10.6 mg/dL   Magnesium   Result Value Ref Range    Magnesium 2.70 (H) 1.80 - 2.40 mg/dL   Phosphorus   Result Value Ref Range    Phosphorus 2.6 2.5 - 4.9 mg/dL   Calcium, Ionized   Result Value Ref Range    Calcium, Ionized 1.21 1.12 - 1.32 mmol/L    pH, Jeff 7.430 7.350 - 1.494   Basic Metabolic Panel   Result Value Ref Range    Sodium 137 136 - 145 mmol/L    Potassium 4.8 3.5 - 5.1 mmol/L    Chloride 101 99 - 110 mmol/L    CO2 23 21 - 32 mmol/L    Anion Gap 13 3 - 16    Glucose 104 (H) 70 - 99 mg/dL    BUN 25 (H) 7 - 20 mg/dL    Creatinine 1.6 (H) 0.8 - 1.3 mg/dL    Est, Glom Filt Rate 44 (A) >60    Calcium 8.9 8.3 - 10.6 mg/dL   CBC with Auto Differential   Result Value Ref Range    WBC 10.0 4.0 - 11.0 K/uL    RBC 3.32 (L) 4.20 - 5.90 M/uL    Hemoglobin 10.4 (L) 13.5 - 17.5 g/dL    Hematocrit 32.1 (L) 40.5 - 52.5 %    MCV 96.7 80.0 - 100.0 fL    MCH 31.2 26.0 - 34.0 pg    MCHC 32.3 31.0 - 36.0 g/dL    RDW 17.2 (H) 12.4 - 15.4 %    Platelets 683 (L) 181 - 450 K/uL    MPV 8.4 5.0 - 10.5 fL    Neutrophils % 91.9 %    Lymphocytes % 2.2 %    Monocytes % 5.6 %    Eosinophils % 0.0 %    Basophils % 0.3 %    Neutrophils Absolute 9.2 (H) 1.7 - 7.7 K/uL    Lymphocytes Absolute 0.2 (L) 1.0 - 5.1 K/uL    Monocytes Absolute 0.6 0.0 - 1.3 K/uL    Eosinophils Absolute 0.0 0.0 - 0.6 K/uL    Basophils Absolute 0.0 0.0 - 0.2 K/uL   Basic Metabolic Panel   Result Value Ref Range    Sodium 136 136 - 145 mmol/L    Potassium 4.8 3.5 - 5.1 mmol/L    Chloride 100 99 - 110 mmol/L    CO2 24 21 - 32 mmol/L    Anion Gap 12 3 - 16    Glucose 119 (H) 70 - 99 mg/dL    BUN 31 (H) 7 - 20 mg/dL    Creatinine 1.9 (H) 0.8 - 1.3 mg/dL    Est, Glom Filt Rate 36 (A) >60    Calcium 9.3 8.3 - 10.6 mg/dL   Magnesium   Result Value Ref Range    Magnesium 2.70 (H) 1.80 - 2.40 mg/dL   Phosphorus   Result Value Ref Range    Phosphorus 3.4 2.5 - 4.9 mg/dL   Calcium, Ionized   Result Value Ref Range    Calcium, Ionized 1.23 1.12 - 1.32 mmol/L    pH, Jeff 7.401 7.350 - 7.450   CBC with Auto Differential   Result Value Ref Range    WBC 13.5 (H) 4.0 - 11.0 K/uL    RBC 3.63 (L) 4.20 - 5.90 M/uL    Hemoglobin 11.3 (L) 13.5 - 17.5 g/dL    Hematocrit 35.4 (L) 40.5 - 52.5 %    MCV 97.7 80.0 - 100.0 fL    MCH 31.2 26.0 - 34.0 pg    MCHC 31.9 31.0 - 36.0 g/dL    RDW 17.7 (H) 12.4 - 15.4 %    Platelets 269 345 - 474 K/uL    MPV 8.1 5.0 - 10.5 fL    Neutrophils % 90.7 %    Lymphocytes % 2.7 %    Monocytes % 6.4 %    Eosinophils % 0.0 %    Basophils % 0.2 %    Neutrophils Absolute 12.2 (H) 1.7 - 7.7 K/uL    Lymphocytes Absolute 0.4 (L) 1.0 - 5.1 K/uL    Monocytes Absolute 0.9 0.0 - 1.3 K/uL    Eosinophils Absolute 0.0 0.0 - 0.6 K/uL    Basophils Absolute 0.0 0.0 - 0.2 K/uL   Basic Metabolic Panel   Result Value Ref Range    Sodium 137 136 - 145 mmol/L    Potassium 5.3 (H) 3.5 - 5.1 mmol/L    Chloride 101 99 - 110 mmol/L    CO2 27 21 - 32 mmol/L    Anion Gap 9 3 - 16    Glucose 156 (H) 70 - 99 mg/dL    BUN 50 (H) 7 - 20 mg/dL    Creatinine 2.4 (H) 0.8 - 1.3 mg/dL    Est, Glom Filt Rate 27 (A) >60    Calcium 9.3 8.3 - 10.6 mg/dL   Hepatitis Panel, Acute   Result Value Ref Range    Hep A IgM Non-reactive Non-reactive    Hep B Core Ab, IgM Non-reactive Non-reactive    Hep B S Ag Interp Non-reactive Non-reactive    Hep C Ab Interp Non-reactive Non-reactive   Hepatitis B Surface Antibody   Result Value Ref Range    Hep B S Ab <3.50 mIU/mL   Procalcitonin   Result Value Ref Range    Procalcitonin 0.25 (H) 0.00 - 0.15 ng/mL   CBC with Auto Differential   Result Value Ref Range    WBC 13.3 (H) 4.0 - 11.0 K/uL    RBC 3.42 (L) 4.20 - 5.90 M/uL    Hemoglobin 10.7 (L) 13.5 - 17.5 g/dL    Hematocrit 32.9 (L) 40.5 - 52.5 %    MCV 96.4 80.0 - 100.0 fL    MCH 31.3 26.0 - 34.0 pg    MCHC 32.5 31.0 - 36.0 g/dL    RDW 17.3 (H) 12.4 - 15.4 %    Platelets 217 (L) 896 - 450 K/uL    MPV 9.2 5.0 - 10.5 fL    Neutrophils % 91.1 %    Lymphocytes % 2.0 %    Monocytes % 6.8 %    Eosinophils % 0.0 %    Basophils % 0.1 %    Neutrophils Absolute 12.1 (H) 1.7 - 7.7 K/uL    Lymphocytes Absolute 0.3 (L) 1.0 - 5.1 K/uL    Monocytes Absolute 0.9 0.0 - 1.3 K/uL    Eosinophils Absolute 0.0 0.0 - 0.6 K/uL    Basophils Absolute 0.0 0.0 - 0.2 K/uL   Basic Metabolic Panel   Result Value Ref Range    Sodium 134 (L) 136 - 145 mmol/L    Potassium 4.2 3.5 - 5.1 mmol/L    Chloride 98 (L) 99 - 110 mmol/L    CO2 22 21 - 32 mmol/L    Anion Gap 14 3 - 16    Glucose 161 (H) 70 - 99 mg/dL    BUN 60 (H) 7 - 20 mg/dL    Creatinine 2.7 (H) 0.8 - 1.3 mg/dL    Est, Glom Filt Rate 24 (A) >60    Calcium 9.1 8.3 - 10.6 mg/dL   Basic Metabolic Panel   Result Value Ref Range    Sodium 132 (L) 136 - 145 mmol/L    Potassium 4.1 3.5 - 5.1 mmol/L    Chloride 97 (L) 99 - 110 mmol/L    CO2 23 21 - 32 mmol/L    Anion Gap 12 3 - 16    Glucose 118 (H) 70 - 99 mg/dL     (HH) 7 - 20 mg/dL    Creatinine 3.5 (H) 0.8 - 1.3 mg/dL    Est, Glom Filt Rate 17 (A) >60    Calcium 9.2 8.3 - 10.6 mg/dL   Triglyceride   Result Value Ref Range    Triglycerides 67 0 - 150 mg/dL   Basic Metabolic Panel w/ Reflex to MG   Result Value Ref Range    Sodium 131 (L) 136 - 145 mmol/L    Potassium reflex Magnesium 4.2 3.5 - 5.1 mmol/L    Chloride 95 (L) 99 - 110 mmol/L    CO2 25 21 - 32 mmol/L    Anion Gap 11 3 - 16    Glucose 98 70 - 99 mg/dL    BUN 83 (HH) 7 - 20 mg/dL    Creatinine 2.6 (H) 0.8 - 1.3 mg/dL    Est, Glom Filt Rate 25 (A) >60    Calcium 8.9 8.3 - 10.6 mg/dL   Phosphorus   Result Value Ref Range    Phosphorus 3.7 2.5 - 4.9 mg/dL   CBC   Result Value Ref Range    WBC 13.4 (H) 4.0 - 11.0 K/uL    RBC 3.05 (L) 4.20 - 5.90 M/uL    Hemoglobin 9.4 (L) 13.5 - 17.5 g/dL    Hematocrit 29.5 (L) 40.5 - 52.5 %    MCV 96.8 80.0 - 100.0 fL    MCH 30.8 26.0 - 34.0 pg    MCHC 31.9 31.0 - 36.0 g/dL    RDW 17.2 (H) 12.4 - 15.4 %    Platelets 66 (L) 979 - 450 K/uL    MPV 8.8 5.0 - 10.5 fL    PLATELET SLIDE REVIEW Decreased     SLIDE REVIEW see below    HEPARIN INDUCED PLATELET ANTIBODY (HIT SCREENING), REFLEX TO ROSY   Result Value Ref Range    Heparin Induced Plt Ab Negative Negative   Blood Gas, Arterial   Result Value Ref Range    pH, Arterial 7.357 7.350 - 7.450    pCO2, Arterial 48.8 (H) 35.0 - 45.0 mmHg    pO2, Arterial 119.0 (H) 75.0 - 108.0 mmHg    HCO3, Arterial 27.4 21.0 - 29.0 mmol/L    Base Excess, Arterial 1.4 -3.0 - 3.0 mmol/L    Hemoglobin, Art, Extended 10.2 (L) 13.5 - 17.5 g/dL    O2 Sat, Arterial 98.6 >92 %    Carboxyhgb, Arterial 0.8 0.0 - 1.5 %    Methemoglobin, Arterial 0.4 <1.5 %    TCO2, Arterial 28.9 Not Established mmol/L    O2 Therapy Unknown    Blood Gas, Arterial   Result Value Ref Range    pH, Arterial 7.343 (L) 7.350 - 7.450    pCO2, Arterial 50.0 (H) 35.0 - 45.0 mmHg    pO2, Arterial 122.0 (H) 75.0 - 108.0 mmHg    HCO3, Arterial 27.1 21.0 - 29.0 mmol/L    Base Excess, Arterial 0.9 -3.0 - 3.0 mmol/L    Hemoglobin, Art, Extended 10.4 (L) 13.5 - 17.5 g/dL    O2 Sat, Arterial 98.7 >92 %    Carboxyhgb, Arterial 0.9 0.0 - 1.5 % Methemoglobin, Arterial 0.7 <1.5 %    TCO2, Arterial 28.7 Not Established mmol/L    O2 Therapy Unknown    Renal Function Panel   Result Value Ref Range    Sodium 133 (L) 136 - 145 mmol/L    Potassium 4.4 3.5 - 5.1 mmol/L    Chloride 95 (L) 99 - 110 mmol/L    CO2 25 21 - 32 mmol/L    Anion Gap 13 3 - 16    Glucose 111 (H) 70 - 99 mg/dL    BUN 79 (H) 7 - 20 mg/dL    Creatinine 2.6 (H) 0.8 - 1.3 mg/dL    Est, Glom Filt Rate 25 (A) >60    Calcium 9.4 8.3 - 10.6 mg/dL    Phosphorus 3.8 2.5 - 4.9 mg/dL    Albumin 3.5 3.4 - 5.0 g/dL   CBC with Auto Differential   Result Value Ref Range    WBC 17.6 (H) 4.0 - 11.0 K/uL    RBC 3.44 (L) 4.20 - 5.90 M/uL    Hemoglobin 10.6 (L) 13.5 - 17.5 g/dL    Hematocrit 32.6 (L) 40.5 - 52.5 %    MCV 94.7 80.0 - 100.0 fL    MCH 31.0 26.0 - 34.0 pg    MCHC 32.7 31.0 - 36.0 g/dL    RDW 16.9 (H) 12.4 - 15.4 %    Platelets 97 (L) 222 - 450 K/uL    MPV 9.4 5.0 - 10.5 fL    Path Consult No     Neutrophils % 85.8 %    Lymphocytes % 3.5 %    Monocytes % 10.2 %    Eosinophils % 0.1 %    Basophils % 0.4 %    Neutrophils Absolute 15.1 (H) 1.7 - 7.7 K/uL    Lymphocytes Absolute 0.6 (L) 1.0 - 5.1 K/uL    Monocytes Absolute 1.8 (H) 0.0 - 1.3 K/uL    Eosinophils Absolute 0.0 0.0 - 0.6 K/uL    Basophils Absolute 0.1 0.0 - 0.2 K/uL   Renal Function Panel   Result Value Ref Range    Sodium 132 (L) 136 - 145 mmol/L    Potassium 4.3 3.5 - 5.1 mmol/L    Chloride 96 (L) 99 - 110 mmol/L    CO2 25 21 - 32 mmol/L    Anion Gap 11 3 - 16    Glucose 117 (H) 70 - 99 mg/dL    BUN 83 (HH) 7 - 20 mg/dL    Creatinine 2.9 (H) 0.8 - 1.3 mg/dL    Est, Glom Filt Rate 22 (A) >60    Calcium 9.0 8.3 - 10.6 mg/dL    Phosphorus 3.7 2.5 - 4.9 mg/dL    Albumin 3.4 3.4 - 5.0 g/dL   CBC with Auto Differential   Result Value Ref Range    WBC 14.8 (H) 4.0 - 11.0 K/uL    RBC 3.25 (L) 4.20 - 5.90 M/uL    Hemoglobin 10.1 (L) 13.5 - 17.5 g/dL    Hematocrit 30.8 (L) 40.5 - 52.5 %    MCV 94.8 80.0 - 100.0 fL    MCH 31.0 26.0 - 34.0 pg    MCHC 32.7 31.0 - 36.0 g/dL    RDW 16.9 (H) 12.4 - 15.4 %    Platelets 89 (L) 249 - 450 K/uL    MPV 9.8 5.0 - 10.5 fL    PLATELET SLIDE REVIEW Decreased     SLIDE REVIEW see below     Neutrophils % 89.0 %    Lymphocytes % 3.0 %    Monocytes % 6.0 %    Eosinophils % 0.0 %    Basophils % 0.0 %    Neutrophils Absolute 13.5 (H) 1.7 - 7.7 K/uL    Lymphocytes Absolute 0.4 (L) 1.0 - 5.1 K/uL    Monocytes Absolute 0.9 0.0 - 1.3 K/uL    Eosinophils Absolute 0.0 0.0 - 0.6 K/uL    Basophils Absolute 0.0 0.0 - 0.2 K/uL    Bands Relative 2 0 - 7 %    Anisocytosis 1+ (A)     Poikilocytes Occasional (A)     Ovalocytes Occasional (A)    Renal Function Panel   Result Value Ref Range    Sodium 138 136 - 145 mmol/L    Potassium 4.5 3.5 - 5.1 mmol/L    Chloride 98 (L) 99 - 110 mmol/L    CO2 25 21 - 32 mmol/L    Anion Gap 15 3 - 16    Glucose 132 (H) 70 - 99 mg/dL     (HH) 7 - 20 mg/dL    Creatinine 4.0 (H) 0.8 - 1.3 mg/dL    Est, Glom Filt Rate 15 (A) >60    Calcium 9.2 8.3 - 10.6 mg/dL    Phosphorus 5.3 (H) 2.5 - 4.9 mg/dL    Albumin 3.3 (L) 3.4 - 5.0 g/dL   CBC with Auto Differential   Result Value Ref Range    WBC 15.7 (H) 4.0 - 11.0 K/uL    RBC 3.40 (L) 4.20 - 5.90 M/uL    Hemoglobin 10.5 (L) 13.5 - 17.5 g/dL    Hematocrit 32.4 (L) 40.5 - 52.5 %    MCV 95.4 80.0 - 100.0 fL    MCH 30.8 26.0 - 34.0 pg    MCHC 32.3 31.0 - 36.0 g/dL    RDW 17.3 (H) 12.4 - 15.4 %    Platelets 062 (L) 271 - 450 K/uL    MPV 9.8 5.0 - 10.5 fL    PLATELET SLIDE REVIEW Decreased     SLIDE REVIEW see below     Path Consult No     Neutrophils % 82.0 %    Lymphocytes % 5.0 %    Monocytes % 9.0 %    Eosinophils % 0.0 %    Basophils % 0.0 %    Neutrophils Absolute 13.5 (H) 1.7 - 7.7 K/uL    Lymphocytes Absolute 0.8 (L) 1.0 - 5.1 K/uL    Monocytes Absolute 1.4 (H) 0.0 - 1.3 K/uL    Eosinophils Absolute 0.0 0.0 - 0.6 K/uL    Basophils Absolute 0.0 0.0 - 0.2 K/uL    Metamyelocytes Relative 1 (A) %    Myelocyte Percent 3 (A) %    Anisocytosis 1+ (A) Polychromasia Occasional (A)     Poikilocytes 1+ (A)     Acanthocytes Occasional (A)     Ovalocytes Occasional (A)     Basophilic Stippling Occasional (A)    Blood Gas, Venous   Result Value Ref Range    pH, Jeff 7.322 (L) 7.350 - 7.450    pCO2, Jeff 53.9 (H) 40.0 - 50.0 mmHg    pO2, Jeff 38 Not Established mmHg    HCO3, Venous 28 23 - 29 mmol/L    Base Excess, Jeff 1.0 Not Established mmol/L    O2 Sat, Jeff 71 Not Established %    Carboxyhemoglobin 1.5 %    MetHgb, Jeff 0.5 <1.5 %    TC02 (Calc), Jeff 30 Not Established mmol/L    O2 Therapy Unknown    Procalcitonin   Result Value Ref Range    Procalcitonin 0.47 (H) 0.00 - 0.15 ng/mL   Blood Gas, Venous   Result Value Ref Range    pH, Jeff 7.338 (L) 7.350 - 7.450    pCO2, Jeff 49.3 40.0 - 50.0 mmHg    pO2, Jeff 40 Not Established mmHg    HCO3, Venous 27 23 - 29 mmol/L    Base Excess, Jeff 0.2 Not Established mmol/L    O2 Sat, Jeff 74 Not Established %    Carboxyhemoglobin 1.6 %    MetHgb, Jeff 0.6 <1.5 %    TC02 (Calc), Jeff 28 Not Established mmol/L    O2 Therapy Unknown    Renal Function Panel   Result Value Ref Range    Sodium 135 (L) 136 - 145 mmol/L    Potassium 4.1 3.5 - 5.1 mmol/L    Chloride 99 99 - 110 mmol/L    CO2 21 21 - 32 mmol/L    Anion Gap 15 3 - 16    Glucose 111 (H) 70 - 99 mg/dL     (HH) 7 - 20 mg/dL    Creatinine 3.7 (H) 0.8 - 1.3 mg/dL    Est, Glom Filt Rate 16 (A) >60    Calcium 9.2 8.3 - 10.6 mg/dL    Phosphorus 2.8 2.5 - 4.9 mg/dL    Albumin 3.2 (L) 3.4 - 5.0 g/dL   CBC with Auto Differential   Result Value Ref Range    WBC 15.6 (H) 4.0 - 11.0 K/uL    RBC 3.31 (L) 4.20 - 5.90 M/uL    Hemoglobin 10.1 (L) 13.5 - 17.5 g/dL    Hematocrit 31.5 (L) 40.5 - 52.5 %    MCV 95.0 80.0 - 100.0 fL    MCH 30.5 26.0 - 34.0 pg    MCHC 32.1 31.0 - 36.0 g/dL    RDW 17.3 (H) 12.4 - 15.4 %    Platelets 274 (L) 008 - 450 K/uL    MPV 9.9 5.0 - 10.5 fL    PLATELET SLIDE REVIEW Decreased     SLIDE REVIEW see below     Neutrophils % 94.0 %    Lymphocytes % 0.0 %    Monocytes % 4.0 %    Eosinophils % 0.0 %    Basophils % 0.0 %    Neutrophils Absolute 15.0 (H) 1.7 - 7.7 K/uL    Lymphocytes Absolute 0.0 (L) 1.0 - 5.1 K/uL    Monocytes Absolute 0.6 0.0 - 1.3 K/uL    Eosinophils Absolute 0.0 0.0 - 0.6 K/uL    Basophils Absolute 0.0 0.0 - 0.2 K/uL    Bands Relative 2 0 - 7 %    Anisocytosis 1+ (A)     Poikilocytes Occasional (A)     Acanthocytes Occasional (A)     Ovalocytes Occasional (A)    Ammonia   Result Value Ref Range    Ammonia 44 16 - 60 umol/L   Urinalysis with Reflex to Culture    Specimen: Urine   Result Value Ref Range    Color, UA Yellow Straw/Yellow    Clarity, UA TURBID (A) Clear    Glucose, Ur Negative Negative mg/dL    Bilirubin Urine Negative Negative    Ketones, Urine Negative Negative mg/dL    Specific Gravity, UA 1.018 1.005 - 1.030    Blood, Urine LARGE (A) Negative    pH, UA 5.0 5.0 - 8.0    Protein,  (A) Negative mg/dL    Urobilinogen, Urine 1.0 <2.0 E.U./dL    Nitrite, Urine Negative Negative    Leukocyte Esterase, Urine MODERATE (A) Negative    Microscopic Examination YES     Urine Type NotGiven     Urine Reflex to Culture Yes    Microscopic Urinalysis   Result Value Ref Range    Coarse Casts, UA 0-2 0 - 2 /LPF    Mucus, UA Rare (A) None Seen /LPF    Bacteria, UA None Seen None Seen /HPF    Amorphous, UA 1+ /HPF    Yeast, UA Present (A) None Seen /HPF    Urinalysis Comments see below     Hyaline Casts, UA 6 0 - 8 /LPF    WBC, UA 27 (H) 0 - 5 /HPF    RBC,  (H) 0 - 4 /HPF    Epithelial Cells, UA 10 (H) 0 - 5 /HPF   Renal Function Panel   Result Value Ref Range    Sodium 139 136 - 145 mmol/L    Potassium 4.0 3.5 - 5.1 mmol/L    Chloride 104 99 - 110 mmol/L    CO2 21 21 - 32 mmol/L    Anion Gap 14 3 - 16    Glucose 127 (H) 70 - 99 mg/dL     (HH) 7 - 20 mg/dL    Creatinine 4.6 (H) 0.8 - 1.3 mg/dL    Est, Glom Filt Rate 12 (A) >60    Calcium 9.2 8.3 - 10.6 mg/dL    Phosphorus 2.9 2.5 - 4.9 mg/dL    Albumin 2.9 (L) 3.4 - 5.0 g/dL   CBC with Auto Differential   Result Value Ref Range    WBC 15.4 (H) 4.0 - 11.0 K/uL    RBC 2.74 (L) 4.20 - 5.90 M/uL    Hemoglobin 8.4 (L) 13.5 - 17.5 g/dL    Hematocrit 26.0 (L) 40.5 - 52.5 %    MCV 95.0 80.0 - 100.0 fL    MCH 30.7 26.0 - 34.0 pg    MCHC 32.3 31.0 - 36.0 g/dL    RDW 17.3 (H) 12.4 - 15.4 %    Platelets 92 (L) 231 - 450 K/uL    MPV 9.7 5.0 - 10.5 fL    PLATELET SLIDE REVIEW Decreased     SLIDE REVIEW see below     Neutrophils % 83.7 %    Lymphocytes % 5.1 %    Monocytes % 10.0 %    Eosinophils % 0.9 %    Basophils % 0.3 %    Neutrophils Absolute 12.9 (H) 1.7 - 7.7 K/uL    Lymphocytes Absolute 0.8 (L) 1.0 - 5.1 K/uL    Monocytes Absolute 1.5 (H) 0.0 - 1.3 K/uL    Eosinophils Absolute 0.1 0.0 - 0.6 K/uL    Basophils Absolute 0.1 0.0 - 0.2 K/uL    Smudge Cells Present (A)     Anisocytosis 1+ (A)     Polychromasia Occasional (A)     Poikilocytes 1+ (A)     Acanthocytes Occasional (A)     Ovalocytes Occasional (A)    Blood Occult Stool Screen #1   Result Value Ref Range    Occult Blood Screening Result: POSITIVE  Normal range: Negative   (A)    Ferritin   Result Value Ref Range    Ferritin 222.8 30.0 - 400.0 ng/mL   Iron and TIBC   Result Value Ref Range    Iron 42 (L) 59 - 158 ug/dL    TIBC 260 260 - 445 ug/dL    Iron Saturation 16 (L) 20 - 50 %   Comprehensive Metabolic Panel w/ Reflex to MG   Result Value Ref Range    Sodium 137 136 - 145 mmol/L    Potassium reflex Magnesium 3.7 3.5 - 5.1 mmol/L    Chloride 100 99 - 110 mmol/L    CO2 22 21 - 32 mmol/L    Anion Gap 15 3 - 16    Glucose 122 (H) 70 - 99 mg/dL     (HH) 7 - 20 mg/dL    Creatinine 4.8 (H) 0.8 - 1.3 mg/dL    Est, Glom Filt Rate 12 (A) >60    Calcium 9.4 8.3 - 10.6 mg/dL    Total Protein 5.8 (L) 6.4 - 8.2 g/dL    Albumin 3.1 (L) 3.4 - 5.0 g/dL    Albumin/Globulin Ratio 1.1 1.1 - 2.2    Total Bilirubin 0.7 0.0 - 1.0 mg/dL    Alkaline Phosphatase 132 (H) 40 - 129 U/L    ALT 21 10 - 40 U/L    AST 36 15 - 37 U/L   Calcium, Ionized   Result Value Ref Range    Calcium, Ionized 1.23 1.12 - 1.32 mmol/L    pH, Jeff 7.335 (L) 7.350 - 7.450   Magnesium   Result Value Ref Range    Magnesium 2.60 (H) 1.80 - 2.40 mg/dL   Phosphorus   Result Value Ref Range    Phosphorus 3.2 2.5 - 4.9 mg/dL   Comprehensive Metabolic Panel w/ Reflex to MG   Result Value Ref Range    Sodium 140 136 - 145 mmol/L    Potassium reflex Magnesium 3.9 3.5 - 5.1 mmol/L    Chloride 103 99 - 110 mmol/L    CO2 20 (L) 21 - 32 mmol/L    Anion Gap 17 (H) 3 - 16    Glucose 135 (H) 70 - 99 mg/dL     (HH) 7 - 20 mg/dL    Creatinine 4.2 (H) 0.8 - 1.3 mg/dL    Est, Glom Filt Rate 14 (A) >60    Calcium 9.2 8.3 - 10.6 mg/dL    Total Protein 6.2 (L) 6.4 - 8.2 g/dL    Albumin 3.1 (L) 3.4 - 5.0 g/dL    Albumin/Globulin Ratio 1.0 (L) 1.1 - 2.2    Total Bilirubin 0.8 0.0 - 1.0 mg/dL    Alkaline Phosphatase 143 (H) 40 - 129 U/L    ALT 22 10 - 40 U/L    AST 40 (H) 15 - 37 U/L   Magnesium   Result Value Ref Range    Magnesium 2.60 (H) 1.80 - 2.40 mg/dL   Phosphorus   Result Value Ref Range    Phosphorus 2.9 2.5 - 4.9 mg/dL   POC ACT-Low Range   Result Value Ref Range    POC ACT  Not Established sec   POC ACT-Low Range   Result Value Ref Range    POC ACT  Not Established sec   POCT Arterial   Result Value Ref Range    pH, Arterial 7.303 (L) 7.350 - 7.450    pCO2, Arterial 49.5 (H) 35.0 - 45.0 mm Hg    pO2, Arterial 370.1 (H) 75.0 - 108.0 mm Hg    HCO3, Arterial 24.5 21.0 - 29.0 mmol/L    Base Excess, Arterial -2 -3 - 3    O2 Sat, Arterial 100 93 - 100 %    TCO2, Arterial 26 Not Established mmol/L    Sample Type ART     Performed on SEE BELOW    POCT Arterial   Result Value Ref Range    POC Sodium 140 136 - 145 mmol/L    POC Potassium 4.8 3.5 - 5.1 mmol/L    Calcium, Ionized 1.19 1.12 - 1.32 mmol/L    Lactate 2.51 (H) 0.40 - 2.00 mmol/L    POC Hematocrit 40.0 (L) 40.5 - 52.5 %    Hemoglobin 13.7 13.5 - 17.5 gm/dL    Sample Type ART     Performed on SEE BELOW    POCT Arterial   Result Value Ref Range pH, Arterial 7.227 (L) 7.350 - 7.450    pCO2, Arterial 57.9 (H) 35.0 - 45.0 mm Hg    pO2, Arterial 239.7 (H) 75.0 - 108.0 mm Hg    HCO3, Arterial 24.1 21.0 - 29.0 mmol/L    Base Excess, Arterial -4 (L) -3 - 3    O2 Sat, Arterial 100 93 - 100 %    TCO2, Arterial 26 Not Established mmol/L    Sample Type ART     Performed on SEE BELOW    POCT Venous   Result Value Ref Range    pH, Taylor 7.223 (L) 7.350 - 7.450    pCO2, Taylor 59.3 (H) 40.0 - 50.0 mm Hg    pO2, Taylor 48 Not Established mm Hg    HCO3, Venous 24.4 23.0 - 29.0 mmol/L    Base Excess, Taylor -3 -3 - 3    O2 Sat, Taylor 75 Not Established %    TC02 (Calc), Taylor 26 Not Established mmol/L    Sample Type TAYLOR     Performed on SEE BELOW    POCT Arterial   Result Value Ref Range    pH, Arterial 7.272 (L) 7.350 - 7.450    pCO2, Arterial 54.7 (H) 35.0 - 45.0 mm Hg    pO2, Arterial 312.5 (H) 75.0 - 108.0 mm Hg    HCO3, Arterial 25.2 21.0 - 29.0 mmol/L    Base Excess, Arterial -2 -3 - 3    O2 Sat, Arterial 100 93 - 100 %    TCO2, Arterial 27 Not Established mmol/L    FIO2 80.00     Sample Type ART     Performed on SEE BELOW    POCT Venous   Result Value Ref Range    pH, Taylor 7.263 (L) 7.350 - 7.450    pCO2, Taylor 55.7 (H) 40.0 - 50.0 mm Hg    pO2, Taylor 45 Not Established mm Hg    HCO3, Venous 25.1 23.0 - 29.0 mmol/L    Base Excess, Taylor -2 -3 - 3    O2 Sat, Taylor 73 Not Established %    TC02 (Calc), Taylor 27 Not Established mmol/L    Sample Type TAYLOR     Performed on SEE BELOW    POCT Arterial   Result Value Ref Range    pH, Arterial 7.319 (L) 7.350 - 7.450    pCO2, Arterial 47.3 (H) 35.0 - 45.0 mm Hg    pO2, Arterial 153.3 (H) 75.0 - 108.0 mm Hg    HCO3, Arterial 24.3 21.0 - 29.0 mmol/L    Base Excess, Arterial -2 -3 - 3    O2 Sat, Arterial 99 93 - 100 %    TCO2, Arterial 26 Not Established mmol/L    FIO2 15.00     Sample Type ART     Performed on SEE BELOW    POCT Arterial   Result Value Ref Range    pH, Arterial 7.282 (L) 7.350 - 7.450    pCO2, Arterial 51.7 (H) 35.0 - 45.0 mm Hg    pO2, Arterial 92.3 75.0 - 108.0 mm Hg    HCO3, Arterial 24.4 21.0 - 29.0 mmol/L    Base Excess, Arterial -2 -3 - 3    O2 Sat, Arterial 96 93 - 100 %    TCO2, Arterial 26 Not Established mmol/L    Sample Type ART     Performed on SEE BELOW    POCT Arterial   Result Value Ref Range    pH, Arterial 7.343 (L) 7.350 - 7.450    pCO2, Arterial 46.3 (H) 35.0 - 45.0 mm Hg    pO2, Arterial 182.1 (H) 75.0 - 108.0 mm Hg    HCO3, Arterial 25.1 21.0 - 29.0 mmol/L    Base Excess, Arterial -1 -3 - 3    O2 Sat, Arterial 100 93 - 100 %    TCO2, Arterial 27 Not Established mmol/L    Sample Type ART     Performed on SEE BELOW    POCT Arterial   Result Value Ref Range    pH, Arterial 7.319 (L) 7.350 - 7.450    pCO2, Arterial 48.7 (H) 35.0 - 45.0 mm Hg    pO2, Arterial 90.5 75.0 - 108.0 mm Hg    HCO3, Arterial 25.1 21.0 - 29.0 mmol/L    Base Excess, Arterial -1 -3 - 3    O2 Sat, Arterial 96 93 - 100 %    TCO2, Arterial 27 Not Established mmol/L    Sample Type ART     Performed on SEE BELOW    POCT Arterial   Result Value Ref Range    pH, Arterial 7.364 7.350 - 7.450    pCO2, Arterial 44.1 35.0 - 45.0 mm Hg    pO2, Arterial 153.3 (H) 75.0 - 108.0 mm Hg    HCO3, Arterial 25.1 21.0 - 29.0 mmol/L    Base Excess, Arterial 0 -3 - 3    O2 Sat, Arterial 99 93 - 100 %    TCO2, Arterial 27 Not Established mmol/L    Sample Type ART     Performed on SEE BELOW    POCT Arterial   Result Value Ref Range    pH, Arterial 7.381 7.350 - 7.450    pCO2, Arterial 40.8 35.0 - 45.0 mm Hg    pO2, Arterial 77.5 75.0 - 108.0 mm Hg    HCO3, Arterial 24.2 21.0 - 29.0 mmol/L    Base Excess, Arterial -1 -3 - 3    O2 Sat, Arterial 95 93 - 100 %    TCO2, Arterial 26 Not Established mmol/L    Sample Type ART     Performed on SEE BELOW    POCT Glucose   Result Value Ref Range    POC Glucose 142 (H) 70 - 99 mg/dl    Performed on ACCU-CHEK    POCT Arterial   Result Value Ref Range    POC Potassium 5.7 (H) 3.5 - 5.1 mmol/L    Calcium, Ionized 1.37 (H) 1.12 - 1.32 mmol/L    pH, Arterial 7.108 (LL) 7.350 - 7.450    pCO2, Arterial 95.4 (HH) 35.0 - 45.0 mm Hg    pO2, Arterial 109.9 (H) 75.0 - 108.0 mm Hg    HCO3, Arterial 30.2 (H) 21.0 - 29.0 mmol/L    Base Excess, Arterial 1 -3 - 3    O2 Sat, Arterial 96 93 - 100 %    TCO2, Arterial 33 Not Established mmol/L    Lactate 1.04 0.40 - 2.00 mmol/L    POC Hematocrit 41.0 40.5 - 52.5 %    Hemoglobin 14.0 13.5 - 17.5 gm/dL    FIO2 100.00     Sample Type ART     Performed on SEE BELOW    POCT Arterial   Result Value Ref Range    pH, Arterial 7.285 (L) 7.350 - 7.450    pCO2, Arterial 54.8 (H) 35.0 - 45.0 mm Hg    pO2, Arterial 278.1 (H) 75.0 - 108.0 mm Hg    HCO3, Arterial 26.1 21.0 - 29.0 mmol/L    Base Excess, Arterial -1 -3 - 3    O2 Sat, Arterial 100 93 - 100 %    TCO2, Arterial 28 Not Established mmol/L    FIO2 80.00     Sample Type ART     Performed on SEE BELOW    POCT Arterial   Result Value Ref Range    pH, Arterial 7.347 (L) 7.350 - 7.450    pCO2, Arterial 47.0 (H) 35.0 - 45.0 mm Hg    pO2, Arterial 62.8 (L) 75.0 - 108.0 mm Hg    HCO3, Arterial 25.8 21.0 - 29.0 mmol/L    Base Excess, Arterial 0 -3 - 3    O2 Sat, Arterial 90 (L) 93 - 100 %    TCO2, Arterial 27 Not Established mmol/L    Sample Type ART     Performed on SEE BELOW      *Note: Due to a large number of results and/or encounters for the requested time period, some results have not been displayed. A complete set of results can be found in Results Review.           Electronically signed by Lissette Leon MD on 2/23/2023 at 8:21 AM    Discharging Hospitalist

## 2023-02-25 LAB
BLOOD CULTURE, ROUTINE: NORMAL
CULTURE, BLOOD 2: NORMAL

## 2023-02-27 ENCOUNTER — TELEPHONE (OUTPATIENT)
Dept: CARDIOLOGY CLINIC | Age: 78
End: 2023-02-27

## 2023-02-27 NOTE — TELEPHONE ENCOUNTER
Dr. Desirae Yanes, patient's death certificate needs signed. Thank you. Called  home - updated that Dr. Desirae Yanes is out until next week and he will sign it then. They v/u.

## 2023-02-27 NOTE — TELEPHONE ENCOUNTER
[address] [place] [address] called in regards to the passing of FAUSKE. They wanted to know if Dr. Marlen Simpson will sign the death certificate?     [facility]'s callback number: [phone]

## 2023-03-01 NOTE — TELEPHONE ENCOUNTER
[facility]ursing home stopped by this afternoon with Miguel's death certificate. I placed it in Dr. Carolina Friedman folder in the clinical area. Clarita Christiansen RN witnessed.

## 2023-03-06 NOTE — TELEPHONE ENCOUNTER
Dr. Uyen Cervantes signed death certificate.  Left voicemail at  home stating it was signed and to call back the office with further instructions on , etc.

## 2023-03-07 NOTE — TELEPHONE ENCOUNTER
Received a call from  that [facility] called and stated they will come  the death certificate. Placed at the  for .

## 2023-09-26 NOTE — PROGRESS NOTES
Discussed with Dr. Gillette. Pt history provoked DVT 2020 with COVID. On anticoag x 2 years then stopped.  Second provoked DVT March 2023 after pneumonia, on Warfarin.  Factor V Leiden, heterozygous.    Ok to hold warfarin with  no bridging  prior to spinal injection.     Hospitalist Progress Note    CC:   Shortness of breath and cough. Admit date: 10/7/2018  Days in hospital:  9    Subjective: Pt S/E. No new complaints. Pt is worried that he's not getting his vitamins and supplements. Denies chest pain and SOB    ROS:   Review of Systems   Constitutional: Negative for chills and fever. Cardiovascular: Negative for chest pain and palpitations. Gastrointestinal: Negative for abdominal pain, constipation and diarrhea. Genitourinary: Negative for dysuria and urgency. Neurological: Negative for headaches. Objective:    /70   Pulse 86   Temp 98.2 °F (36.8 °C) (Oral)   Resp 20   Ht 6' 1\" (1.854 m)   Wt (!) 311 lb 8.2 oz (141.3 kg)   SpO2 95%   BMI 41.10 kg/m²     Gen: NAD, morbidly obese  HEENT: NC/AT, moist mucous membranes, no oropharyngeal erythema or exudate  Neck: supple, trachea midline  Heart: Irregular irregular , no murmurs, gallops, or rubs. Lungs: CTA bilaterally,  No wheezing. No use of accessory muscles. Abd: bowel sounds present, soft, nondistended, none tender. Extrem: No clubbing, cyanosis, trace edema. Psych: A & O x3, affect appropriate  Neuro: grossly intact, moves all four extremities spontaneously.       Medications:  Scheduled Meds:   metoprolol succinate  50 mg Oral Daily    sodium chloride flush  10 mL Intravenous 2 times per day       PRN Meds:  albuterol, LORazepam, sodium chloride flush, magnesium hydroxide, ondansetron, perflutren lipid microspheres    IV:        Intake/Output Summary (Last 24 hours) at 10/16/18 0943  Last data filed at 10/16/18 0713   Gross per 24 hour   Intake             1480 ml   Output             2600 ml   Net            -1120 ml       Results:  CBC:   Recent Labs      10/14/18   0633  10/15/18   0615  10/16/18   0607   WBC  7.5  7.7  7.7   HGB  15.4  16.1  16.3   HCT  45.9  48.7  49.0   MCV  97.2  97.4  96.2   PLT  293  316  332     BMP:   Recent Labs      10/14/18   0633  10/15/18   0615

## 2023-11-02 NOTE — PROGRESS NOTES
Orders Placed This Encounter    US Abdomen Limited_Liver       Dr. Sabine Rowan D.O.   [facility]     4 Eyes Skin Assessment     NAME:  Erna Monroe  YOB: 1945  MEDICAL RECORD NUMBER:  [id number]    The patient is being assessed for  Shift Handoff    I agree that One RN has performed a thorough Head to Toe Skin Assessment on the patient. ALL assessment sites listed below have been assessed. Areas assessed by both nurses:    Head, Face, Ears, Shoulders, Back, Chest, Arms, Elbows, Hands, Sacrum. Buttock, Coccyx, Ischium, and Legs. Feet and Heels        Does the Patient have a Wound?  No noted wound(s)       Tera Prevention initiated by RN: Yes   Wound Care Orders initiated by RN: NA    Pressure Injury (Stage 3,4, Unstageable, DTI, NWPT, and Complex wounds) if present, place referral order by RN under : NA    New and Established Ostomies, if present place, referral order under : NA      Nurse 1 eSignature: Electronically signed by Brody Knight RN on 2/14/23 at 1:50 AM EST    **SHARE this note so that the co-signing nurse can place an eSignature**    Nurse 2 eSignature: Electronically signed by Bard Leticia RN on 2/19/23 at 11:06 AM EST

## 2024-09-30 NOTE — PROGRESS NOTES
HD complete  Reassessment completed, no changes with exception of pt following commands with right hand. Daughter returned to bedside. Appropriate